# Patient Record
Sex: FEMALE | Race: WHITE | Employment: OTHER | URBAN - METROPOLITAN AREA
[De-identification: names, ages, dates, MRNs, and addresses within clinical notes are randomized per-mention and may not be internally consistent; named-entity substitution may affect disease eponyms.]

---

## 2017-03-14 ENCOUNTER — TELEPHONE (OUTPATIENT)
Dept: INTERNAL MEDICINE CLINIC | Age: 72
End: 2017-03-14

## 2017-04-12 ENCOUNTER — HOSPITAL ENCOUNTER (OUTPATIENT)
Dept: OTHER | Age: 72
Discharge: HOME | End: 2017-04-12
Attending: FAMILY MEDICINE | Admitting: FAMILY MEDICINE

## 2017-04-12 ENCOUNTER — OFFICE VISIT (OUTPATIENT)
Dept: INTERNAL MEDICINE CLINIC | Age: 72
End: 2017-04-12

## 2017-04-12 VITALS
OXYGEN SATURATION: 94 % | HEART RATE: 68 BPM | TEMPERATURE: 98 F | SYSTOLIC BLOOD PRESSURE: 150 MMHG | BODY MASS INDEX: 34.04 KG/M2 | HEIGHT: 62 IN | RESPIRATION RATE: 14 BRPM | WEIGHT: 185 LBS | DIASTOLIC BLOOD PRESSURE: 60 MMHG

## 2017-04-12 DIAGNOSIS — R53.83 FATIGUE, UNSPECIFIED TYPE: ICD-10-CM

## 2017-04-12 DIAGNOSIS — F32.A DEPRESSION, UNSPECIFIED DEPRESSION TYPE: ICD-10-CM

## 2017-04-12 DIAGNOSIS — L97.511 FOOT ULCER, RIGHT, LIMITED TO BREAKDOWN OF SKIN (HCC): ICD-10-CM

## 2017-04-12 DIAGNOSIS — M54.2 NECK PAIN: ICD-10-CM

## 2017-04-12 DIAGNOSIS — H81.09 MENIERE DISEASE, UNSPECIFIED LATERALITY: ICD-10-CM

## 2017-04-12 DIAGNOSIS — Z13.220 SCREENING FOR HYPERLIPIDEMIA: ICD-10-CM

## 2017-04-12 DIAGNOSIS — E03.9 ACQUIRED HYPOTHYROIDISM: ICD-10-CM

## 2017-04-12 DIAGNOSIS — E11.65 TYPE 2 DIABETES MELLITUS WITH HYPERGLYCEMIA, WITHOUT LONG-TERM CURRENT USE OF INSULIN (HCC): ICD-10-CM

## 2017-04-12 DIAGNOSIS — M89.9 DISORDER OF BONE: ICD-10-CM

## 2017-04-12 DIAGNOSIS — Z11.59 NEED FOR HEPATITIS C SCREENING TEST: ICD-10-CM

## 2017-04-12 DIAGNOSIS — I10 ESSENTIAL HYPERTENSION: Primary | ICD-10-CM

## 2017-04-12 LAB
ALBUMIN SERPL-MCNC: 4.5 G/DL (ref 3.4–5)
ALP BLD-CCNC: 62 U/L (ref 40–129)
ALT SERPL-CCNC: 25 U/L (ref 10–40)
ANION GAP SERPL CALCULATED.3IONS-SCNC: 16 MMOL/L (ref 3–16)
AST SERPL-CCNC: 25 U/L (ref 15–37)
BASOPHILS ABSOLUTE: 0 K/UL (ref 0–0.2)
BASOPHILS RELATIVE PERCENT: 0.4 %
BILIRUB SERPL-MCNC: 0.4 MG/DL (ref 0–1)
BILIRUBIN DIRECT: <0.2 MG/DL (ref 0–0.3)
BILIRUBIN, INDIRECT: NORMAL MG/DL (ref 0–1)
BUN BLDV-MCNC: 22 MG/DL (ref 7–20)
CALCIUM SERPL-MCNC: 9.8 MG/DL (ref 8.3–10.6)
CHLORIDE BLD-SCNC: 100 MMOL/L (ref 99–110)
CHOLESTEROL, TOTAL: 242 MG/DL (ref 0–199)
CO2: 26 MMOL/L (ref 21–32)
CREAT SERPL-MCNC: 1.3 MG/DL (ref 0.6–1.2)
CREATININE URINE POCT: 163.2
EOSINOPHILS ABSOLUTE: 0.3 K/UL (ref 0–0.6)
EOSINOPHILS RELATIVE PERCENT: 4.8 %
GFR AFRICAN AMERICAN: 49
GFR NON-AFRICAN AMERICAN: 40
GLUCOSE BLD-MCNC: 128 MG/DL (ref 70–99)
GLUCOSE BLD-MCNC: 134 MG/DL
HAV IGM SER IA-ACNC: NORMAL
HBA1C MFR BLD: 6.7 %
HCT VFR BLD CALC: 37.7 % (ref 36–48)
HDLC SERPL-MCNC: 36 MG/DL (ref 40–60)
HEMOGLOBIN: 12.5 G/DL (ref 12–16)
HEPATITIS B CORE IGM ANTIBODY: NORMAL
HEPATITIS B SURFACE ANTIGEN INTERPRETATION: NORMAL
HEPATITIS C ANTIBODY INTERPRETATION: NORMAL
LDL CHOLESTEROL CALCULATED: ABNORMAL MG/DL
LDL CHOLESTEROL DIRECT: 143 MG/DL
LYMPHOCYTES ABSOLUTE: 1.3 K/UL (ref 1–5.1)
LYMPHOCYTES RELATIVE PERCENT: 22.7 %
MCH RBC QN AUTO: 28.5 PG (ref 26–34)
MCHC RBC AUTO-ENTMCNC: 33 G/DL (ref 31–36)
MCV RBC AUTO: 86.2 FL (ref 80–100)
MICROALBUMIN/CREAT 24H UR: 5 MG/G{CREAT}
MICROALBUMIN/CREAT UR-RTO: 8.1
MONOCYTES ABSOLUTE: 0.4 K/UL (ref 0–1.3)
MONOCYTES RELATIVE PERCENT: 6.6 %
NEUTROPHILS ABSOLUTE: 3.7 K/UL (ref 1.7–7.7)
NEUTROPHILS RELATIVE PERCENT: 65.5 %
PDW BLD-RTO: 15.4 % (ref 12.4–15.4)
PLATELET # BLD: 198 K/UL (ref 135–450)
PMV BLD AUTO: 9 FL (ref 5–10.5)
POTASSIUM SERPL-SCNC: 4.1 MMOL/L (ref 3.5–5.1)
RBC # BLD: 4.38 M/UL (ref 4–5.2)
SODIUM BLD-SCNC: 142 MMOL/L (ref 136–145)
TOTAL PROTEIN: 7.3 G/DL (ref 6.4–8.2)
TRIGL SERPL-MCNC: 334 MG/DL (ref 0–150)
TSH SERPL DL<=0.05 MIU/L-ACNC: 3 UIU/ML (ref 0.27–4.2)
VITAMIN D 25-HYDROXY: 53.8 NG/ML
VLDLC SERPL CALC-MCNC: ABNORMAL MG/DL
WBC # BLD: 5.7 K/UL (ref 4–11)

## 2017-04-12 PROCEDURE — 82044 UR ALBUMIN SEMIQUANTITATIVE: CPT | Performed by: FAMILY MEDICINE

## 2017-04-12 PROCEDURE — 82962 GLUCOSE BLOOD TEST: CPT | Performed by: FAMILY MEDICINE

## 2017-04-12 PROCEDURE — 99214 OFFICE O/P EST MOD 30 MIN: CPT | Performed by: FAMILY MEDICINE

## 2017-04-12 PROCEDURE — 83036 HEMOGLOBIN GLYCOSYLATED A1C: CPT | Performed by: FAMILY MEDICINE

## 2017-04-12 ASSESSMENT — ENCOUNTER SYMPTOMS
CONSTIPATION: 1
DIARRHEA: 0
BACK PAIN: 1
SHORTNESS OF BREATH: 0
TROUBLE SWALLOWING: 0

## 2017-04-18 ENCOUNTER — TELEPHONE (OUTPATIENT)
Dept: INTERNAL MEDICINE CLINIC | Age: 72
End: 2017-04-18

## 2017-05-02 ENCOUNTER — OFFICE VISIT (OUTPATIENT)
Dept: ENT CLINIC | Age: 72
End: 2017-05-02

## 2017-05-02 VITALS
WEIGHT: 175 LBS | SYSTOLIC BLOOD PRESSURE: 155 MMHG | DIASTOLIC BLOOD PRESSURE: 91 MMHG | HEIGHT: 62 IN | HEART RATE: 79 BPM | BODY MASS INDEX: 32.2 KG/M2

## 2017-05-02 DIAGNOSIS — H60.8X1 CHRONIC ECZEMATOUS OTITIS EXTERNA OF RIGHT EAR: Primary | ICD-10-CM

## 2017-05-02 PROCEDURE — 99214 OFFICE O/P EST MOD 30 MIN: CPT | Performed by: OTOLARYNGOLOGY

## 2017-05-02 RX ORDER — LANOLIN ALCOHOL/MO/W.PET/CERES
1000 CREAM (GRAM) TOPICAL DAILY
COMMUNITY
End: 2019-11-21

## 2017-05-02 RX ORDER — ASCORBIC ACID 500 MG
500 TABLET ORAL DAILY
COMMUNITY
End: 2019-11-21

## 2017-05-02 RX ORDER — VITAMIN E 268 MG
400 CAPSULE ORAL DAILY
COMMUNITY
End: 2019-11-21

## 2017-05-02 RX ORDER — METHYLPREDNISOLONE 4 MG/1
4 TABLET ORAL SEE ADMIN INSTRUCTIONS
Qty: 1 KIT | Refills: 0 | Status: SHIPPED | OUTPATIENT
Start: 2017-05-02 | End: 2018-02-15

## 2017-05-02 RX ORDER — TRIAMCINOLONE ACETONIDE 1 MG/G
CREAM TOPICAL
Qty: 15 G | Refills: 1 | Status: SHIPPED | OUTPATIENT
Start: 2017-05-02 | End: 2018-01-08

## 2017-05-02 ASSESSMENT — ENCOUNTER SYMPTOMS
ALLERGIC/IMMUNOLOGIC NEGATIVE: 1
SINUS PRESSURE: 0
RESPIRATORY NEGATIVE: 1
EYES NEGATIVE: 1

## 2017-05-03 ENCOUNTER — OFFICE VISIT (OUTPATIENT)
Dept: INTERNAL MEDICINE CLINIC | Age: 72
End: 2017-05-03

## 2017-05-03 VITALS
TEMPERATURE: 98.6 F | SYSTOLIC BLOOD PRESSURE: 180 MMHG | HEIGHT: 62 IN | RESPIRATION RATE: 18 BRPM | BODY MASS INDEX: 33.68 KG/M2 | DIASTOLIC BLOOD PRESSURE: 50 MMHG | OXYGEN SATURATION: 98 % | WEIGHT: 183 LBS | HEART RATE: 95 BPM

## 2017-05-03 DIAGNOSIS — G47.00 INSOMNIA, UNSPECIFIED TYPE: ICD-10-CM

## 2017-05-03 DIAGNOSIS — E11.9 TYPE 2 DIABETES MELLITUS WITHOUT COMPLICATION, WITHOUT LONG-TERM CURRENT USE OF INSULIN (HCC): ICD-10-CM

## 2017-05-03 DIAGNOSIS — M54.2 NECK PAIN: Primary | ICD-10-CM

## 2017-05-03 DIAGNOSIS — M79.89 LEG SWELLING: ICD-10-CM

## 2017-05-03 DIAGNOSIS — R07.9 CHEST PAIN, UNSPECIFIED TYPE: ICD-10-CM

## 2017-05-03 DIAGNOSIS — E78.5 HYPERLIPIDEMIA, UNSPECIFIED HYPERLIPIDEMIA TYPE: ICD-10-CM

## 2017-05-03 PROCEDURE — 99214 OFFICE O/P EST MOD 30 MIN: CPT | Performed by: FAMILY MEDICINE

## 2017-05-03 RX ORDER — QUETIAPINE FUMARATE 50 MG/1
50 TABLET, FILM COATED ORAL NIGHTLY
Qty: 30 TABLET | Refills: 1 | Status: SHIPPED | OUTPATIENT
Start: 2017-05-03 | End: 2018-01-08

## 2017-05-03 RX ORDER — GLIMEPIRIDE 1 MG/1
1 TABLET ORAL EVERY MORNING
Qty: 90 TABLET | Refills: 1 | Status: SHIPPED | OUTPATIENT
Start: 2017-05-03 | End: 2017-06-16 | Stop reason: SDUPTHER

## 2017-05-03 RX ORDER — ATORVASTATIN CALCIUM 20 MG/1
20 TABLET, FILM COATED ORAL DAILY
Qty: 90 TABLET | Refills: 1 | Status: SHIPPED | OUTPATIENT
Start: 2017-05-03 | End: 2018-01-08

## 2017-05-03 ASSESSMENT — ENCOUNTER SYMPTOMS: SHORTNESS OF BREATH: 1

## 2017-06-14 ENCOUNTER — OFFICE VISIT (OUTPATIENT)
Dept: INTERNAL MEDICINE CLINIC | Age: 72
End: 2017-06-14

## 2017-06-14 VITALS
HEIGHT: 61 IN | RESPIRATION RATE: 16 BRPM | DIASTOLIC BLOOD PRESSURE: 60 MMHG | OXYGEN SATURATION: 97 % | WEIGHT: 182 LBS | SYSTOLIC BLOOD PRESSURE: 130 MMHG | BODY MASS INDEX: 34.36 KG/M2 | TEMPERATURE: 98.2 F | HEART RATE: 67 BPM

## 2017-06-14 DIAGNOSIS — S05.12XA ECCHYMOSIS OF LEFT EYE: ICD-10-CM

## 2017-06-14 DIAGNOSIS — R00.2 PALPITATIONS: ICD-10-CM

## 2017-06-14 DIAGNOSIS — R07.9 CHEST PAIN, UNSPECIFIED TYPE: Primary | ICD-10-CM

## 2017-06-14 DIAGNOSIS — Z23 NEED FOR PNEUMOCOCCAL VACCINATION: ICD-10-CM

## 2017-06-14 DIAGNOSIS — S00.81XA FACIAL ABRASION, INITIAL ENCOUNTER: ICD-10-CM

## 2017-06-14 PROCEDURE — 99213 OFFICE O/P EST LOW 20 MIN: CPT | Performed by: FAMILY MEDICINE

## 2017-06-14 PROCEDURE — 90670 PCV13 VACCINE IM: CPT | Performed by: FAMILY MEDICINE

## 2017-06-14 PROCEDURE — 90471 IMMUNIZATION ADMIN: CPT | Performed by: FAMILY MEDICINE

## 2017-06-14 PROCEDURE — G0009 ADMIN PNEUMOCOCCAL VACCINE: HCPCS | Performed by: FAMILY MEDICINE

## 2017-06-14 PROCEDURE — 90714 TD VACC NO PRESV 7 YRS+ IM: CPT | Performed by: FAMILY MEDICINE

## 2017-06-14 ASSESSMENT — ENCOUNTER SYMPTOMS: FACIAL SWELLING: 1

## 2017-06-16 DIAGNOSIS — I10 ESSENTIAL HYPERTENSION: ICD-10-CM

## 2017-06-16 DIAGNOSIS — E11.9 TYPE 2 DIABETES MELLITUS WITHOUT COMPLICATION, WITHOUT LONG-TERM CURRENT USE OF INSULIN (HCC): ICD-10-CM

## 2017-06-16 RX ORDER — CLONIDINE HYDROCHLORIDE 0.3 MG/1
0.3 TABLET ORAL 3 TIMES DAILY
Qty: 270 TABLET | Refills: 1 | Status: SHIPPED | OUTPATIENT
Start: 2017-06-16 | End: 2018-03-23 | Stop reason: SDUPTHER

## 2017-06-16 RX ORDER — AMLODIPINE BESYLATE 10 MG/1
TABLET ORAL
Qty: 90 TABLET | Refills: 1 | Status: SHIPPED | OUTPATIENT
Start: 2017-06-16 | End: 2018-03-23 | Stop reason: SDUPTHER

## 2017-06-16 RX ORDER — LOSARTAN POTASSIUM 25 MG/1
25 TABLET ORAL DAILY
Qty: 30 TABLET | Refills: 5 | Status: SHIPPED | OUTPATIENT
Start: 2017-06-16 | End: 2017-10-18 | Stop reason: SDUPTHER

## 2017-06-16 RX ORDER — LEVOTHYROXINE SODIUM 0.05 MG/1
TABLET ORAL
Qty: 90 TABLET | Refills: 1 | Status: SHIPPED | OUTPATIENT
Start: 2017-06-16 | End: 2018-03-23 | Stop reason: SDUPTHER

## 2017-06-16 RX ORDER — GLIMEPIRIDE 1 MG/1
1 TABLET ORAL EVERY MORNING
Qty: 90 TABLET | Refills: 1 | Status: SHIPPED | OUTPATIENT
Start: 2017-06-16 | End: 2018-03-23 | Stop reason: SDUPTHER

## 2017-10-18 ENCOUNTER — OFFICE VISIT (OUTPATIENT)
Dept: INTERNAL MEDICINE CLINIC | Age: 72
End: 2017-10-18

## 2017-10-18 VITALS
RESPIRATION RATE: 16 BRPM | WEIGHT: 171 LBS | SYSTOLIC BLOOD PRESSURE: 140 MMHG | HEIGHT: 61 IN | DIASTOLIC BLOOD PRESSURE: 40 MMHG | OXYGEN SATURATION: 98 % | BODY MASS INDEX: 32.28 KG/M2 | TEMPERATURE: 98.1 F | HEART RATE: 71 BPM

## 2017-10-18 DIAGNOSIS — I10 ESSENTIAL HYPERTENSION: ICD-10-CM

## 2017-10-18 DIAGNOSIS — F32.A DEPRESSION, UNSPECIFIED DEPRESSION TYPE: ICD-10-CM

## 2017-10-18 DIAGNOSIS — E11.9 TYPE 2 DIABETES MELLITUS WITHOUT COMPLICATION, WITHOUT LONG-TERM CURRENT USE OF INSULIN (HCC): Primary | ICD-10-CM

## 2017-10-18 PROCEDURE — 99214 OFFICE O/P EST MOD 30 MIN: CPT | Performed by: FAMILY MEDICINE

## 2017-10-18 RX ORDER — LOSARTAN POTASSIUM 25 MG/1
25 TABLET ORAL DAILY
Qty: 30 TABLET | Refills: 5 | Status: SHIPPED | OUTPATIENT
Start: 2017-10-18 | End: 2017-10-18 | Stop reason: SDUPTHER

## 2017-10-18 RX ORDER — LOSARTAN POTASSIUM 25 MG/1
25 TABLET ORAL DAILY
Qty: 90 TABLET | Refills: 1 | Status: SHIPPED | OUTPATIENT
Start: 2017-10-18 | End: 2018-03-23 | Stop reason: SDUPTHER

## 2017-10-18 RX ORDER — SERTRALINE HYDROCHLORIDE 100 MG/1
100 TABLET, FILM COATED ORAL DAILY
Qty: 90 TABLET | Refills: 1 | Status: SHIPPED | OUTPATIENT
Start: 2017-10-18 | End: 2018-03-23 | Stop reason: SDUPTHER

## 2017-10-18 ASSESSMENT — ENCOUNTER SYMPTOMS: SHORTNESS OF BREATH: 0

## 2017-10-18 NOTE — PROGRESS NOTES
[facility]  1945  female     Chief Complaint   Patient presents with    Diabetes       HPI: Pt presents to the office for f/u controlled diabetes. Pt states her blood sugars are ranging 110-130 (fasting). Her post prandials are running in the 200s. Pt's  just recently  so she had not been able to exercise. She does plan to start walking. She admits to bilateral hand numbness that seems to be improving with taking otc potassium. Review of Systems   Respiratory: Negative for shortness of breath. Cardiovascular: Negative for chest pain and leg swelling. Neurological: Positive for numbness. BP (!) 140/40   Pulse 71   Temp 98.1 °F (36.7 °C)   Resp 16   Ht 5' 1\" (1.549 m)   Wt 171 lb (77.6 kg)   SpO2 98%   BMI 32.31 kg/m²     Physical Exam   Constitutional: She appears well-developed and well-nourished. No distress. HENT:   Head: Normocephalic and atraumatic. Eyes: Conjunctivae and EOM are normal.   Neck: Normal range of motion. Neck supple. Cardiovascular: Normal rate, regular rhythm, normal heart sounds and intact distal pulses. No murmur heard. Pulmonary/Chest: Effort normal and breath sounds normal.   Musculoskeletal: She exhibits edema. 1+ pitting edema R>>L    Neurological: She is alert. Skin: Skin is warm and dry. Psychiatric: She has a normal mood and affect. Her behavior is normal. Judgment and thought content normal.   Vitals reviewed. 1. Type 2 diabetes mellitus without complication, without long-term current use of insulin (HCC)  Hemoglobin A1C   2. Essential hypertension  Controlled. Refilled losartan (COZAAR) 25 MG tablet     Pt advised to discontinue or take otc potassium every 1-3 days to avoid hyperkalemia being on the losartan. Return in about 3 months (around 2018) for f/u diabetes . Patient's medications, allergies, past medical, surgical, social and family histories were reviewed and updated as appropriate.

## 2017-10-18 NOTE — PATIENT INSTRUCTIONS
rapid-acting insulin to take before you eat. If you use an insulin pump, you get a constant rate of insulin during the day. So the pump must be programmed at meals to give you extra insulin to cover the rise in blood sugar after meals. When you know how much carbohydrate you will eat, you can take the right amount of insulin. Or, if you always use the same amount of insulin, you need to make sure that you eat the same amount of carbohydrate at meals. If you need more help to understand carbohydrate counting and food labels, ask your doctor, dietitian, or diabetes educator. How do you get started with meal planning? Here are some tips to get started:  · Plan your meals a week at a time. Don't forget to include snacks too. · Use cookbooks or online recipes to plan several main meals. Plan some quick meals for busy nights. You also can double some recipes that freeze well. Then you can save half for other busy nights when you don't have time to cook. · Make sure you have the ingredients you need for your recipes. If you're running low on basic items, put these items on your shopping list too. · List foods that you use to make breakfasts, lunches, and snacks. List plenty of fruits and vegetables. · Post this list on the refrigerator. Add to it as you think of more things you need. · Take the list to the store to do your weekly shopping. Follow-up care is a key part of your treatment and safety. Be sure to make and go to all appointments, and call your doctor if you are having problems. It's also a good idea to know your test results and keep a list of the medicines you take. Where can you learn more? Go to https://franc.Klip.in. org and sign in to your [employer] account. Enter Z313 in the ChorPpay box to learn more about \"Learning About Meal Planning for Diabetes. \"     If you do not have an account, please click on the \"Sign Up Now\" link.   Current as of: March 13, 2017  Content Version: 11.3  © 1004-9483 [employer], [employer]. Care instructions adapted under license by [facility] ([facility]). If you have questions about a medical condition or this instruction, always ask your healthcare professional. [address] any warranty or liability for your use of this information. Patient Education        Learning About Diabetes Food Guidelines  Your Care Instructions  Meal planning is important to manage diabetes. It helps keep your blood sugar at a target level (which you set with your doctor). You don't have to eat special foods. You can eat what your family eats, including sweets once in a while. But you do have to pay attention to how often you eat and how much you eat of certain foods. You may want to work with a dietitian or a certified diabetes educator (CDE) to help you plan meals and snacks. A dietitian or CDE can also help you lose weight if that is one of your goals. What should you know about eating carbs? Managing the amount of carbohydrate (carbs) you eat is an important part of healthy meals when you have diabetes. Carbohydrate is found in many foods. · Learn which foods have carbs. And learn the amounts of carbs in different foods. ¨ Bread, cereal, pasta, and rice have about 15 grams of carbs in a serving. A serving is 1 slice of bread (1 ounce), ½ cup of cooked cereal, or 1/3 cup of cooked pasta or rice. ¨ Fruits have 15 grams of carbs in a serving. A serving is 1 small fresh fruit, such as an apple or orange; ½ of a banana; ½ cup of cooked or canned fruit; ½ cup of fruit juice; 1 cup of melon or raspberries; or 2 tablespoons of dried fruit. ¨ Milk and no-sugar-added yogurt have 15 grams of carbs in a serving. A serving is 1 cup of milk or 2/3 cup of no-sugar-added yogurt. ¨ Starchy vegetables have 15 grams of carbs in a serving.  A serving is ½ cup of mashed potatoes or sweet potato; 1 cup winter squash; ½ of a small baked potato; ½ cup of cooked sugar.  What else should you know? · Limit saturated fat, such as the fat from meat and dairy products. This is a healthy choice because people who have diabetes are at higher risk of heart disease. So choose lean cuts of meat and nonfat or low-fat dairy products. Use olive or canola oil instead of butter or shortening when cooking. · Don't skip meals. Your blood sugar may drop too low if you skip meals and take insulin or certain medicines for diabetes. · Check with your doctor before you drink alcohol. Alcohol can cause your blood sugar to drop too low. Alcohol can also cause a bad reaction if you take certain diabetes medicines. Follow-up care is a key part of your treatment and safety. Be sure to make and go to all appointments, and call your doctor if you are having problems. It's also a good idea to know your test results and keep a list of the medicines you take. Where can you learn more? Go to https://IntelliDOTpeAllied [employer] Network.Hotlease.Com. org and sign in to your LiveOffice account. Enter J998 in the [employer] box to learn more about \"Learning About Diabetes Food Guidelines. \"     If you do not have an account, please click on the \"Sign Up Now\" link. Current as of: March 13, 2017  Content Version: 11.3  © 2856-9614 [employer], Incorporated. Care instructions adapted under license by [facility] ([facility]). If you have questions about a medical condition or this instruction, always ask your healthcare professional. [place] [address] any warranty or liability for your use of this information.

## 2017-10-19 LAB
ESTIMATED AVERAGE GLUCOSE: 145.6 MG/DL
HBA1C MFR BLD: 6.7 %

## 2017-10-26 ENCOUNTER — HOSPITAL ENCOUNTER (OUTPATIENT)
Dept: MAMMOGRAPHY | Age: 72
Discharge: HOME | End: 2017-10-26
Attending: FAMILY MEDICINE | Admitting: FAMILY MEDICINE

## 2017-10-26 DIAGNOSIS — Z12.31 ENCOUNTER FOR SCREENING MAMMOGRAM FOR BREAST CANCER: ICD-10-CM

## 2017-10-31 ENCOUNTER — OFFICE VISIT (OUTPATIENT)
Dept: INTERNAL MEDICINE CLINIC | Age: 72
End: 2017-10-31

## 2017-10-31 VITALS
DIASTOLIC BLOOD PRESSURE: 60 MMHG | TEMPERATURE: 98.5 F | WEIGHT: 167 LBS | RESPIRATION RATE: 16 BRPM | BODY MASS INDEX: 31.53 KG/M2 | SYSTOLIC BLOOD PRESSURE: 120 MMHG | OXYGEN SATURATION: 97 % | HEIGHT: 61 IN | HEART RATE: 67 BPM

## 2017-10-31 DIAGNOSIS — Z23 NEEDS FLU SHOT: ICD-10-CM

## 2017-10-31 DIAGNOSIS — E11.65 CONTROLLED TYPE 2 DIABETES MELLITUS WITH HYPERGLYCEMIA, WITHOUT LONG-TERM CURRENT USE OF INSULIN (HCC): ICD-10-CM

## 2017-10-31 DIAGNOSIS — Z71.89 DNR (DO NOT RESUSCITATE) DISCUSSION: ICD-10-CM

## 2017-10-31 DIAGNOSIS — Z00.00 ROUTINE GENERAL MEDICAL EXAMINATION AT A HEALTH CARE FACILITY: Primary | ICD-10-CM

## 2017-10-31 PROCEDURE — 90662 IIV NO PRSV INCREASED AG IM: CPT | Performed by: FAMILY MEDICINE

## 2017-10-31 PROCEDURE — 99213 OFFICE O/P EST LOW 20 MIN: CPT | Performed by: FAMILY MEDICINE

## 2017-10-31 PROCEDURE — G0008 ADMIN INFLUENZA VIRUS VAC: HCPCS | Performed by: FAMILY MEDICINE

## 2017-10-31 PROCEDURE — G0438 PPPS, INITIAL VISIT: HCPCS | Performed by: FAMILY MEDICINE

## 2017-10-31 ASSESSMENT — ANXIETY QUESTIONNAIRES: GAD7 TOTAL SCORE: 0

## 2017-10-31 ASSESSMENT — LIFESTYLE VARIABLES: HOW OFTEN DO YOU HAVE A DRINK CONTAINING ALCOHOL: 0

## 2017-10-31 NOTE — PROGRESS NOTES
Medicare Annual Wellness Visit  Name: Mina Francisco Date: 10/31/2017   MRN: [id number] Sex: Female   Age: 71 y.o. Ethnicity: Non-/Non    : 1945 Race: Magda Hsieh is here for Annual Exam (wellness)    Screenings for behavioral, psychosocial and functional/safety risks, and cognitive dysfunction are all negative except as indicated below. These results, as well as other patient data from the [address] form, are documented in Flowsheets linked to this Encounter. Allergies   Allergen Reactions    Erythromycin      coma    Iodine Hives     Ct dye (however, pt states she has tolerated the dye after that one incident)    Metformin And Related Other (See Comments)     \"make me woozy\"     Prior to Visit Medications    Medication Sig Taking?  Authorizing Provider   losartan (COZAAR) 25 MG tablet Take 1 tablet by mouth daily Yes Rossy Cisneros DO   sertraline (ZOLOFT) 100 MG tablet Take 1 tablet by mouth daily Yes Rossy Cisneros DO   glimepiride (AMARYL) 1 MG tablet Take 1 tablet by mouth every morning Yes Rossy Cisneros DO   cloNIDine (CATAPRES) 0.3 MG tablet Take 1 tablet by mouth 3 times daily Yes Rossy Cisneros DO   amLODIPine (NORVASC) 10 MG tablet TAKE ONE TABLET BY MOUTH EVERY DAY Yes Rossy Cisneros DO   levothyroxine (SYNTHROID) 50 MCG tablet TAKE ONE TABLET BY MOUTH ONCE DAILY Yes Rossy Cisneros DO   linaclotide (LINZESS) 290 MCG CAPS capsule Take 1 capsule by mouth every morning (before breakfast) Yes Rossy Cisneros DO   atorvastatin (LIPITOR) 20 MG tablet Take 1 tablet by mouth daily Yes Rossy Cisneros DO   QUEtiapine (SEROQUEL) 50 MG tablet Take 1 tablet by mouth nightly Yes Rossy Dowell,    vitamin B-12 (CYANOCOBALAMIN) 1000 MCG tablet Take 1,000 mcg by mouth daily Yes Historical Provider, MD   ascorbic acid (VITAMIN C) 500 MG tablet Take 500 mg by mouth daily Yes Historical Provider, MD   vitamin D (CHOLECALCIFEROL) 1000 UNITS TABS tablet Take 4,000 Units by mouth daily Yes Historical Provider, MD   Flaxseed, Linseed, (FLAXSEED OIL) 1200 MG CAPS Take by mouth daily Yes Historical Provider, MD   vitamin E 400 UNIT capsule Take 400 Units by mouth daily Yes Historical Provider, MD   methylPREDNISolone (MEDROL, JHON,) 4 MG tablet Take 1 tablet by mouth See Admin Instructions Take by mouth. Yes Priscilla Cartwright MD   triamcinolone (KENALOG) 0.1 % cream Apply topically 2 times daily.  Yes Priscilla Cartwright MD   hydrochlorothiazide (HYDRODIURIL) 25 MG tablet Take 1 tablet by mouth daily Yes Kelvin Lin MD     Past Medical History:   Diagnosis Date    Alopecia     Anxiety and depression     Cancer ([facility] [address]     uterine    CTS (carpal tunnel syndrome)      History of rheumatic fever     Hypertension     Hypothyroidism     Meniere disease     bilateral ears    Nausea     associated with Meniere's dz    Obesity     Reflux esophagitis     Renal impairment     Sleep apnea     no longer needs cpap (per pt)    Type II or unspecified type diabetes mellitus without mention of complication, not stated as uncontrolled      Past Surgical History:   Procedure Laterality Date    ANKLE SURGERY Right     repair of fracture     APPENDECTOMY      BREAST SURGERY      rt  bx negative    COLONOSCOPY  4/15/11    HYSTERECTOMY      at age 35    MASTOID SURGERY      at age 26    TONSILLECTOMY      UPPER GASTROINTESTINAL ENDOSCOPY  4/14/11    with dilitation and biopsy     Family History   Problem Relation Age of Onset    Heart Disease Mother     Diabetes Mother     Cancer Mother      breast     Heart Failure Mother     Diabetes Father     Kidney Disease Father     Heart Disease Father     Heart Disease Sister     Diabetes Maternal Grandmother     Crohn's Disease Child     Crohn's Disease Grandchild        CareTeam (Including outside providers/suppliers regularly involved in providing care):   Patient Care Team:  Jorge Holland DO as PCP - [address] MD Tadeo as PCP - [facility] Attributed Provider  Radha Varner MD as Consulting Physician (Otolaryngology)    Wt Readings from Last 3 Encounters:   10/31/17 167 lb (75.8 kg)   10/18/17 171 lb (77.6 kg)   06/14/17 182 lb (82.6 kg)     Vitals:    10/31/17 0906   BP: 120/60   Pulse: 67   Resp: 16   Temp: 98.5 °F (36.9 °C)   SpO2: 97%   Weight: 167 lb (75.8 kg)   Height: 5' 1\" (1.549 m)       Musculoskeletal: normal range of motion, no joint swelling, deformity or tenderness and  DP and PT pulses are intact. Pt's monofilament testing revealed no diminished sensation. The following problems were reviewed today and where indicated follow up appointments were made and/or referrals ordered. Risk Factor Screenings with Interventions:   Fall Risk:  Timed Up and Go Test > 12 seconds?: no  2 or more falls in past year?: (!) yes  Fall with injury in past year?: (!) yes  Fall Risk Interventions:    · Home safety tips provided  · Home exercises provided to promote strength and balance    Depression:  PHQ-2 Score: 5  Depression Interventions:  · Regular exercise recommended- 3-5 times per week, 30-45 minutes per session    Anxiety:  Anxiety Score: 0  Anxiety Interventions:  · None indicated    Cognitive:   Words recalled: 0  Clock Drawing Test (CDT) Score: (!) Abnormal  Cognitive Impairment Interventions:  · Patient advised to follow-up in this office for further evaluation and treatment within 1 month(s)    Substance Abuse:  Social History     Social History Main Topics    Smoking status: Never Smoker    Smokeless tobacco: Never Used    Alcohol use No    Drug use: No    Sexual activity: Not Currently     Audit Questionnaire: Screen for Alcohol Misuse  How often do you have a drink containing alcohol?: Never  Substance Abuse Interventions:  · None indicated    Health Risk Assessment:   General  In general, how would you say your health is?: Very Good  In the past 7 days, have you experienced any of the following?: (!) Virus Vaccine 10/12/2012, 10/21/2013, 10/14/2014    Influenza Whole 10/12/2010    Influenza, High Dose 11/03/2015, 09/15/2016    Pneumococcal 13-valent Conjugate ([id number]) 06/14/2017    Pneumococcal Polysaccharide ([id number]) 04/14/2011    Td 06/14/2017    Zoster 12/22/2015        Health Maintenance   Topic Date Due    Diabetic retinal exam  09/01/2015    DTaP/Tdap/Td vaccine (1 - Tdap) 06/15/2017    Diabetic foot exam  07/13/2017    Flu vaccine (1) 09/01/2017    Diabetic microalbuminuria test  04/12/2018    Lipid screen  04/12/2018    Pneumococcal low/med risk (2 of 2 - PPSV23) 06/14/2018    Diabetic hemoglobin A1C test  10/18/2018    Breast cancer screen  10/26/2019    Colon cancer screen colonoscopy  04/15/2021    Zostavax vaccine  Completed    DEXA (modify frequency per FRAX score)  Completed    Hepatitis C screen  Completed     Recommendations for Preventive Services Due: see orders.   Recommended screening schedule for the next 5-10 years is provided to the patient in written form: see Patient Instructions/AVS.

## 2017-11-28 ENCOUNTER — OFFICE VISIT (OUTPATIENT)
Dept: INTERNAL MEDICINE CLINIC | Age: 72
End: 2017-11-28

## 2017-11-28 VITALS
DIASTOLIC BLOOD PRESSURE: 50 MMHG | WEIGHT: 170 LBS | OXYGEN SATURATION: 98 % | TEMPERATURE: 98 F | SYSTOLIC BLOOD PRESSURE: 120 MMHG | HEIGHT: 61 IN | RESPIRATION RATE: 16 BRPM | HEART RATE: 58 BPM | BODY MASS INDEX: 32.1 KG/M2

## 2017-11-28 DIAGNOSIS — Z13.31 POSITIVE DEPRESSION SCREENING: ICD-10-CM

## 2017-11-28 DIAGNOSIS — F03.90 DEMENTIA WITHOUT BEHAVIORAL DISTURBANCE, UNSPECIFIED DEMENTIA TYPE: Primary | ICD-10-CM

## 2017-11-28 PROCEDURE — G8431 POS CLIN DEPRES SCRN F/U DOC: HCPCS | Performed by: FAMILY MEDICINE

## 2017-11-28 PROCEDURE — 99214 OFFICE O/P EST MOD 30 MIN: CPT | Performed by: FAMILY MEDICINE

## 2017-11-28 RX ORDER — DONEPEZIL HYDROCHLORIDE 5 MG/1
5 TABLET, FILM COATED ORAL NIGHTLY
Qty: 30 TABLET | Refills: 1 | Status: SHIPPED | OUTPATIENT
Start: 2017-11-28 | End: 2018-01-08

## 2017-11-28 NOTE — PATIENT INSTRUCTIONS
Patient Education        Learning About the Symptoms of Dementia  What is dementia? We all forget things as we get older. Many older people have a slight loss of memory that does not affect their daily lives. But memory loss that gets worse may mean that you have dementia. Dementia is a loss of mental skills that affects your daily life. It can cause problems with memory, problem-solving, and learning. It also can cause problems with thinking and planning. Dementia usually gets worse over time. But how quickly it gets worse is different for each person. Some people stay the same for years. Others lose skills quickly. Your chances of having dementia rise as you get older. But this doesn't mean that everyone will get it. What causes dementia? Dementia is caused by damage to or changes in the brain. Alzheimer's disease is the most common kind of dementia. Alzheimer's causes a steady loss of memory and how well you can speak, think, and do your daily activities. The second most common kind of dementia is caused by a series of strokes. It's called vascular dementia. It often gets worse step by step. With each new stroke, more mental skills are lost.  Serious head injuries in the past can sometimes lead to dementia, too. What are the symptoms? Usually the first symptom of dementia is memory loss. Often the person who has the memory problem doesn't notice it, but family and friends do. People who have dementia may have increasing trouble with:  · Recalling recent events. They may forget appointments or lose objects. · Recognizing people and places. · Keeping up with conversations and activity. · Finding their way around familiar places, or driving to and from places they know well. · Keeping up personal care such as grooming or bathing. · Planning and carrying out routine tasks. They may have trouble following a recipe or writing a letter or email. What are some next steps?   If you are worried about memory loss, see your doctor soon. He or she can do a physical exam and ask questions about recent and past illnesses and life events. Think about bringing someone to your doctor's appointment to take notes for you. Your doctor may suggest a series of tests to measure memory changes over time. These tests give the doctor an overall picture of how well your brain is working. The doctor can use the results to decide the best treatment program and help make your life safer and easier. It is important to know that memory loss can be caused by things other than dementia. These things can include health problems such as depression, a low amount of thyroid hormone, and some infections. When those things are treated, your ability to remember can get better. Follow-up care is a key part of your treatment and safety. Be sure to make and go to all appointments, and call your doctor if you are having problems. It's also a good idea to know your test results and keep a list of the medicines you take. Where can you learn more? Go to https://[employer] Careyuli.Filmaka. org and sign in to your [employer] account. Enter [id number] [phone] in the Tipbit box to learn more about \"Learning About the Symptoms of Dementia. \"     If you do not have an account, please click on the \"Sign Up Now\" link. Current as of: May 3, 2017  Content Version: 11.3  © 1942-7949 [employer], Incorporated. Care instructions adapted under license by [facility] ([facility]). If you have questions about a medical condition or this instruction, always ask your healthcare professional. [place] [address] any warranty or liability for your use of this information. Patient Education        Learning About Depression Screening  What is depression screening? Depression screening is a way to see if you have depression symptoms. It may be done by a doctor or counselor. This screening is often part of a routine checkup.  That's because your mental health is just as important as your physical health. Depression is a medical illness that affects how you feel, think, and act. You may:  · Have less energy. · Lose interest in your daily activities. · Feel sad and grouchy for a long time. Depression is very common. It affects men and women of all ages. Many things can trigger depression. Some people become depressed after they have a stroke or find out they have a major illness like cancer or heart disease. The death of a loved one, a breakup, or changes in the natural brain chemicals may lead to depression. It can run in families. Most experts believe that a combination of family history (a person's genes) and stressful life events can cause depression. What happens during screening? Your doctor may ask about your feelings, any changes in eating habits, your energy level, and your interest in your daily tasks. He or she may ask other questions, such as how well you are sleeping and if you can focus on the things you do. This may be an informal talk between the two of you. Or your doctor may ask you to fill out a quick form and then talk about your answers. Some diseases or changes in your body can cause symptoms that look like depression. So your doctor may do blood tests to help rule out other problems, such as hormone changes, a low thyroid level, or anemia. What happens after screening? If you have signs of depression, your doctor will talk to you about your options. Doctors usually treat depression with medicines or counseling. Often, combining the two works best. Many people don't get help because they think that they'll get over the depression on their own. But people with depression may not get better unless they get treatment. Many people feel embarrassed or ashamed about having depression. But it isn't a sign of personal weakness. It's not a character flaw. A person who is depressed is not \"crazy. \" Depression is caused by changes in the brain.   A

## 2018-01-08 ENCOUNTER — OFFICE VISIT (OUTPATIENT)
Dept: ENT CLINIC | Age: 73
End: 2018-01-08

## 2018-01-08 VITALS — BODY MASS INDEX: 31.74 KG/M2 | SYSTOLIC BLOOD PRESSURE: 120 MMHG | WEIGHT: 168 LBS | DIASTOLIC BLOOD PRESSURE: 80 MMHG

## 2018-01-08 DIAGNOSIS — H69.91 DISORDER OF RIGHT EUSTACHIAN TUBE: ICD-10-CM

## 2018-01-08 DIAGNOSIS — H81.01 MENIERE'S DISEASE OF RIGHT EAR: Primary | ICD-10-CM

## 2018-01-08 DIAGNOSIS — J32.9 RECURRENT SINUSITIS: ICD-10-CM

## 2018-01-08 PROCEDURE — 99213 OFFICE O/P EST LOW 20 MIN: CPT | Performed by: OTOLARYNGOLOGY

## 2018-01-08 RX ORDER — LEVOFLOXACIN 500 MG/1
500 TABLET, FILM COATED ORAL DAILY
Qty: 7 TABLET | Refills: 0 | Status: SHIPPED | OUTPATIENT
Start: 2018-01-08 | End: 2018-02-15

## 2018-01-08 RX ORDER — PREDNISONE 10 MG/1
TABLET ORAL
Qty: 25 TABLET | Refills: 0 | Status: SHIPPED | OUTPATIENT
Start: 2018-01-08 | End: 2018-02-15

## 2018-01-12 ENCOUNTER — TELEPHONE (OUTPATIENT)
Dept: ENT CLINIC | Age: 73
End: 2018-01-12

## 2018-01-12 DIAGNOSIS — J30.9 ALLERGIC SINUSITIS: Primary | ICD-10-CM

## 2018-01-12 RX ORDER — MONTELUKAST SODIUM 10 MG/1
10 TABLET ORAL NIGHTLY
Qty: 15 TABLET | Refills: 1 | Status: SHIPPED | OUTPATIENT
Start: 2018-01-12 | End: 2018-02-15

## 2018-01-12 NOTE — TELEPHONE ENCOUNTER
Pt called back, she thinks it's the predisone, she has diabetes, things this may be causing her to feel weird. Sugar has been running high, please advise. She took it for 5 days. Still has quite a bit left, should she continue them?

## 2018-01-12 NOTE — TELEPHONE ENCOUNTER
Pt states you gave her a pill on Monday and it is making her head feel weird. (not the predisone). Her whole body feels tingly and sickening.    Please advise

## 2018-01-15 ENCOUNTER — TELEPHONE (OUTPATIENT)
Dept: ENT CLINIC | Age: 73
End: 2018-01-15

## 2018-01-15 NOTE — TELEPHONE ENCOUNTER
I doubt if it's due to the medication.   Maybe she should see her family physician and see what that person says

## 2018-02-14 ENCOUNTER — TELEPHONE (OUTPATIENT)
Dept: INTERNAL MEDICINE CLINIC | Age: 73
End: 2018-02-14

## 2018-02-15 ENCOUNTER — OFFICE VISIT (OUTPATIENT)
Dept: INTERNAL MEDICINE CLINIC | Age: 73
End: 2018-02-15

## 2018-02-15 VITALS
HEIGHT: 61 IN | DIASTOLIC BLOOD PRESSURE: 72 MMHG | WEIGHT: 179 LBS | SYSTOLIC BLOOD PRESSURE: 136 MMHG | BODY MASS INDEX: 33.79 KG/M2 | HEART RATE: 84 BPM

## 2018-02-15 DIAGNOSIS — E55.9 VITAMIN D DEFICIENCY: ICD-10-CM

## 2018-02-15 DIAGNOSIS — E03.9 ACQUIRED HYPOTHYROIDISM: ICD-10-CM

## 2018-02-15 DIAGNOSIS — I10 ESSENTIAL HYPERTENSION: ICD-10-CM

## 2018-02-15 DIAGNOSIS — E78.5 DYSLIPIDEMIA: ICD-10-CM

## 2018-02-15 DIAGNOSIS — F33.2 SEVERE EPISODE OF RECURRENT MAJOR DEPRESSIVE DISORDER, WITHOUT PSYCHOTIC FEATURES (HCC): ICD-10-CM

## 2018-02-15 DIAGNOSIS — E11.9 TYPE 2 DIABETES MELLITUS WITHOUT COMPLICATION, WITHOUT LONG-TERM CURRENT USE OF INSULIN (HCC): Primary | ICD-10-CM

## 2018-02-15 LAB
A/G RATIO: 1.8 (ref 1.1–2.2)
ALBUMIN SERPL-MCNC: 4.7 G/DL (ref 3.4–5)
ALP BLD-CCNC: 70 U/L (ref 40–129)
ALT SERPL-CCNC: 29 U/L (ref 10–40)
ANION GAP SERPL CALCULATED.3IONS-SCNC: 16 MMOL/L (ref 3–16)
AST SERPL-CCNC: 32 U/L (ref 15–37)
BASOPHILS ABSOLUTE: 0 K/UL (ref 0–0.2)
BASOPHILS RELATIVE PERCENT: 0.2 %
BILIRUB SERPL-MCNC: 0.4 MG/DL (ref 0–1)
BUN BLDV-MCNC: 24 MG/DL (ref 7–20)
CALCIUM SERPL-MCNC: 9.8 MG/DL (ref 8.3–10.6)
CHLORIDE BLD-SCNC: 95 MMOL/L (ref 99–110)
CHOLESTEROL, TOTAL: 298 MG/DL (ref 0–199)
CO2: 27 MMOL/L (ref 21–32)
CREAT SERPL-MCNC: 1.5 MG/DL (ref 0.6–1.2)
EOSINOPHILS ABSOLUTE: 0.1 K/UL (ref 0–0.6)
EOSINOPHILS RELATIVE PERCENT: 2.4 %
GFR AFRICAN AMERICAN: 41
GFR NON-AFRICAN AMERICAN: 34
GLOBULIN: 2.6 G/DL
GLUCOSE BLD-MCNC: 186 MG/DL (ref 70–99)
HCT VFR BLD CALC: 39.1 % (ref 36–48)
HDLC SERPL-MCNC: 36 MG/DL (ref 40–60)
HEMOGLOBIN: 13.3 G/DL (ref 12–16)
LDL CHOLESTEROL CALCULATED: ABNORMAL MG/DL
LDL CHOLESTEROL DIRECT: 153 MG/DL
LYMPHOCYTES ABSOLUTE: 1.2 K/UL (ref 1–5.1)
LYMPHOCYTES RELATIVE PERCENT: 20.2 %
MCH RBC QN AUTO: 28.8 PG (ref 26–34)
MCHC RBC AUTO-ENTMCNC: 34.2 G/DL (ref 31–36)
MCV RBC AUTO: 84.3 FL (ref 80–100)
MONOCYTES ABSOLUTE: 0.4 K/UL (ref 0–1.3)
MONOCYTES RELATIVE PERCENT: 6.4 %
NEUTROPHILS ABSOLUTE: 4.2 K/UL (ref 1.7–7.7)
NEUTROPHILS RELATIVE PERCENT: 70.8 %
PDW BLD-RTO: 15.9 % (ref 12.4–15.4)
PLATELET # BLD: 192 K/UL (ref 135–450)
PMV BLD AUTO: 8.7 FL (ref 5–10.5)
POTASSIUM SERPL-SCNC: 4 MMOL/L (ref 3.5–5.1)
RBC # BLD: 4.64 M/UL (ref 4–5.2)
SODIUM BLD-SCNC: 138 MMOL/L (ref 136–145)
TOTAL PROTEIN: 7.3 G/DL (ref 6.4–8.2)
TRIGL SERPL-MCNC: 582 MG/DL (ref 0–150)
TSH SERPL DL<=0.05 MIU/L-ACNC: 2.53 UIU/ML (ref 0.27–4.2)
VLDLC SERPL CALC-MCNC: ABNORMAL MG/DL
WBC # BLD: 6 K/UL (ref 4–11)

## 2018-02-15 PROCEDURE — 99214 OFFICE O/P EST MOD 30 MIN: CPT | Performed by: NURSE PRACTITIONER

## 2018-02-16 LAB
ESTIMATED AVERAGE GLUCOSE: 151.3 MG/DL
HBA1C MFR BLD: 6.9 %
VITAMIN D 25-HYDROXY: 80.8 NG/ML

## 2018-02-18 ASSESSMENT — ENCOUNTER SYMPTOMS
SHORTNESS OF BREATH: 0
GASTROINTESTINAL NEGATIVE: 1
RESPIRATORY NEGATIVE: 1

## 2018-02-18 NOTE — PROGRESS NOTES
 vitamin B-12 (CYANOCOBALAMIN) 1000 MCG tablet Take 1,000 mcg by mouth daily      ascorbic acid (VITAMIN C) 500 MG tablet Take 500 mg by mouth daily      vitamin D (CHOLECALCIFEROL) 1000 UNITS TABS tablet Take 4,000 Units by mouth daily      vitamin E 400 UNIT capsule Take 400 Units by mouth daily      hydrochlorothiazide (HYDRODIURIL) 25 MG tablet Take 1 tablet by mouth daily 90 tablet 2     Current Facility-Administered Medications   Medication Dose Route Frequency Provider Last Rate Last Dose    triamcinolone acetonide (KENALOG) injection 20 mg  20 mg Other Once Kike Dietz MD             Review of Systems   Constitutional: Negative for chills and fever. Respiratory: Negative. Negative for shortness of breath. Cardiovascular: Negative. Negative for chest pain. Gastrointestinal: Negative. Genitourinary: Negative. Skin: Negative. Neurological: Negative. Psychiatric/Behavioral: Positive for dysphoric mood. The patient is nervous/anxious. All other systems reviewed and are negative. Objective:   Physical Exam   Constitutional: She is oriented to person, place, and time. She appears well-developed and well-nourished. No distress. HENT:   Head: Normocephalic and atraumatic. Eyes: Conjunctivae are normal. No scleral icterus. Neck: Neck supple. No JVD present. Cardiovascular: Normal rate and regular rhythm. Pulmonary/Chest: Effort normal and breath sounds normal. No respiratory distress. She has no wheezes. Abdominal: Soft. She exhibits no distension. There is no tenderness. There is no rebound and no guarding. Musculoskeletal: Normal range of motion. She exhibits no edema. Neurological: She is alert and oriented to person, place, and time. Skin: Skin is warm and dry. She is not diaphoretic. Psychiatric: Her behavior is normal. Thought content normal. Her mood appears anxious. She exhibits a depressed mood.      /72   Pulse 84   Ht 5' 1\" (1.549 m)

## 2018-02-19 DIAGNOSIS — E78.5 DYSLIPIDEMIA: ICD-10-CM

## 2018-02-19 DIAGNOSIS — E11.9 TYPE 2 DIABETES MELLITUS WITHOUT COMPLICATION, WITHOUT LONG-TERM CURRENT USE OF INSULIN (HCC): Primary | ICD-10-CM

## 2018-02-19 RX ORDER — ATORVASTATIN CALCIUM 40 MG/1
40 TABLET, FILM COATED ORAL NIGHTLY
Qty: 30 TABLET | Refills: 5 | Status: SHIPPED | OUTPATIENT
Start: 2018-02-19 | End: 2018-02-23

## 2018-02-21 ENCOUNTER — TELEPHONE (OUTPATIENT)
Dept: INTERNAL MEDICINE CLINIC | Age: 73
End: 2018-02-21

## 2018-02-21 NOTE — LETTER
[facility]  [address] [address]  Phone: [phone]  Fax: [facility], [address] [place] [address]        February 22, 2018     Patient: Lorean Goldberg   YOB: 1945   Date of Visit: 2/21/2018       To Whom It May Concern: It is my medical opinion that Shahida Marino requires a disability parking placard for the following reasons:  She cannot walk 200 feet without stopping to rest.  Duration of need: permanent    If you have any questions or concerns, please don't hesitate to call.     Sincerely,        Timur Lopez, CNP

## 2018-02-23 DIAGNOSIS — E78.2 MIXED HYPERLIPIDEMIA: Primary | ICD-10-CM

## 2018-02-23 RX ORDER — PRAVASTATIN SODIUM 40 MG
40 TABLET ORAL EVERY EVENING
Qty: 30 TABLET | Refills: 5 | Status: SHIPPED | OUTPATIENT
Start: 2018-02-23 | End: 2018-03-23 | Stop reason: SDUPTHER

## 2018-03-09 DIAGNOSIS — E11.9 TYPE 2 DIABETES MELLITUS WITHOUT COMPLICATION, WITHOUT LONG-TERM CURRENT USE OF INSULIN (HCC): ICD-10-CM

## 2018-03-23 DIAGNOSIS — F32.A DEPRESSION, UNSPECIFIED DEPRESSION TYPE: ICD-10-CM

## 2018-03-23 DIAGNOSIS — E78.2 MIXED HYPERLIPIDEMIA: ICD-10-CM

## 2018-03-23 DIAGNOSIS — I10 ESSENTIAL HYPERTENSION: ICD-10-CM

## 2018-03-23 DIAGNOSIS — E11.9 TYPE 2 DIABETES MELLITUS WITHOUT COMPLICATION, WITHOUT LONG-TERM CURRENT USE OF INSULIN (HCC): ICD-10-CM

## 2018-03-23 RX ORDER — PRAVASTATIN SODIUM 40 MG
40 TABLET ORAL EVERY EVENING
Qty: 90 TABLET | Refills: 1 | Status: SHIPPED | OUTPATIENT
Start: 2018-03-23 | End: 2018-09-11

## 2018-03-23 RX ORDER — SERTRALINE HYDROCHLORIDE 100 MG/1
100 TABLET, FILM COATED ORAL DAILY
Qty: 90 TABLET | Refills: 1 | Status: SHIPPED | OUTPATIENT
Start: 2018-03-23 | End: 2018-12-14 | Stop reason: SDUPTHER

## 2018-03-23 RX ORDER — GLIMEPIRIDE 2 MG/1
2 TABLET ORAL EVERY MORNING
Qty: 90 TABLET | Refills: 1 | Status: SHIPPED | OUTPATIENT
Start: 2018-03-23 | End: 2018-09-11 | Stop reason: SDUPTHER

## 2018-03-23 RX ORDER — AMLODIPINE BESYLATE 10 MG/1
10 TABLET ORAL DAILY
Qty: 90 TABLET | Refills: 1 | Status: SHIPPED | OUTPATIENT
Start: 2018-03-23 | End: 2019-01-18 | Stop reason: SDUPTHER

## 2018-03-23 RX ORDER — LEVOTHYROXINE SODIUM 0.05 MG/1
50 TABLET ORAL DAILY
Qty: 90 TABLET | Refills: 1 | Status: SHIPPED | OUTPATIENT
Start: 2018-03-23 | End: 2018-12-28 | Stop reason: SDUPTHER

## 2018-03-23 RX ORDER — CLONIDINE HYDROCHLORIDE 0.3 MG/1
0.3 TABLET ORAL 3 TIMES DAILY
Qty: 270 TABLET | Refills: 1 | Status: SHIPPED | OUTPATIENT
Start: 2018-03-23 | End: 2018-12-17 | Stop reason: SDUPTHER

## 2018-03-23 RX ORDER — LOSARTAN POTASSIUM 25 MG/1
25 TABLET ORAL DAILY
Qty: 90 TABLET | Refills: 1 | Status: SHIPPED | OUTPATIENT
Start: 2018-03-23 | End: 2018-09-13 | Stop reason: SDUPTHER

## 2018-03-23 NOTE — TELEPHONE ENCOUNTER
Patient is calling to request refills on all her medicines. She wants 90 days on all of them. She wants them sent to [facility] on [address] [facility]. 1. Metformin 500 mg  2. Pravastatin  40 mg  3. Losartan  25 mg  4. Sertraline 100 mg  5. Glimepiride 1 mg  6. Clonidine  0.3 mg  7. Amlodipine 10 mg  8.  Levothyroxine  50 mcg

## 2018-05-15 ENCOUNTER — OFFICE VISIT (OUTPATIENT)
Dept: INTERNAL MEDICINE CLINIC | Age: 73
End: 2018-05-15

## 2018-05-15 ENCOUNTER — TELEPHONE (OUTPATIENT)
Dept: INTERNAL MEDICINE CLINIC | Age: 73
End: 2018-05-15

## 2018-05-15 VITALS
WEIGHT: 174 LBS | HEIGHT: 61 IN | HEART RATE: 68 BPM | SYSTOLIC BLOOD PRESSURE: 136 MMHG | DIASTOLIC BLOOD PRESSURE: 62 MMHG | BODY MASS INDEX: 32.85 KG/M2

## 2018-05-15 DIAGNOSIS — I10 ESSENTIAL HYPERTENSION: Primary | ICD-10-CM

## 2018-05-15 DIAGNOSIS — F33.2 SEVERE EPISODE OF RECURRENT MAJOR DEPRESSIVE DISORDER, WITHOUT PSYCHOTIC FEATURES (HCC): ICD-10-CM

## 2018-05-15 DIAGNOSIS — E11.9 TYPE 2 DIABETES MELLITUS WITHOUT COMPLICATION, WITHOUT LONG-TERM CURRENT USE OF INSULIN (HCC): ICD-10-CM

## 2018-05-15 DIAGNOSIS — N18.30 CKD (CHRONIC KIDNEY DISEASE) STAGE 3, GFR 30-59 ML/MIN (HCC): ICD-10-CM

## 2018-05-15 LAB
ALBUMIN SERPL-MCNC: 4.9 G/DL (ref 3.4–5)
ANION GAP SERPL CALCULATED.3IONS-SCNC: 19 MMOL/L (ref 3–16)
BUN BLDV-MCNC: 30 MG/DL (ref 7–20)
CALCIUM SERPL-MCNC: 9.9 MG/DL (ref 8.3–10.6)
CHLORIDE BLD-SCNC: 96 MMOL/L (ref 99–110)
CO2: 26 MMOL/L (ref 21–32)
CREAT SERPL-MCNC: 1.6 MG/DL (ref 0.6–1.2)
GFR AFRICAN AMERICAN: 38
GFR NON-AFRICAN AMERICAN: 32
GLUCOSE BLD-MCNC: 113 MG/DL (ref 70–99)
PHOSPHORUS: 3.9 MG/DL (ref 2.5–4.9)
POTASSIUM SERPL-SCNC: 4 MMOL/L (ref 3.5–5.1)
SODIUM BLD-SCNC: 141 MMOL/L (ref 136–145)

## 2018-05-15 PROCEDURE — 99214 OFFICE O/P EST MOD 30 MIN: CPT | Performed by: NURSE PRACTITIONER

## 2018-05-15 ASSESSMENT — ENCOUNTER SYMPTOMS
SHORTNESS OF BREATH: 0
GASTROINTESTINAL NEGATIVE: 1

## 2018-05-16 LAB
ESTIMATED AVERAGE GLUCOSE: 148.5 MG/DL
HBA1C MFR BLD: 6.8 %

## 2018-06-14 ENCOUNTER — OFFICE VISIT (OUTPATIENT)
Dept: ENT CLINIC | Age: 73
End: 2018-06-14

## 2018-06-14 VITALS — SYSTOLIC BLOOD PRESSURE: 160 MMHG | DIASTOLIC BLOOD PRESSURE: 80 MMHG

## 2018-06-14 DIAGNOSIS — H81.02 MENIERE'S DISEASE OF LEFT EAR: Primary | ICD-10-CM

## 2018-06-14 PROCEDURE — 99213 OFFICE O/P EST LOW 20 MIN: CPT | Performed by: OTOLARYNGOLOGY

## 2018-06-14 RX ORDER — TRIAMTERENE AND HYDROCHLOROTHIAZIDE 37.5; 25 MG/1; MG/1
1 CAPSULE ORAL EVERY MORNING
Qty: 90 CAPSULE | Refills: 3 | Status: SHIPPED | OUTPATIENT
Start: 2018-06-14 | End: 2020-09-11 | Stop reason: ALTCHOICE

## 2018-06-26 ENCOUNTER — HOSPITAL ENCOUNTER (OUTPATIENT)
Dept: PHYSICAL THERAPY | Age: 73
Discharge: HOME | End: 2018-06-30
Admitting: OTOLARYNGOLOGY

## 2018-07-01 ENCOUNTER — HOSPITAL ENCOUNTER (OUTPATIENT)
Dept: OTHER | Age: 73
Discharge: HOME | End: 2018-07-31
Attending: OTOLARYNGOLOGY | Admitting: OTOLARYNGOLOGY

## 2018-09-11 ENCOUNTER — OFFICE VISIT (OUTPATIENT)
Dept: FAMILY MEDICINE CLINIC | Age: 73
End: 2018-09-11

## 2018-09-11 ENCOUNTER — TELEPHONE (OUTPATIENT)
Dept: FAMILY MEDICINE CLINIC | Age: 73
End: 2018-09-11

## 2018-09-11 VITALS
DIASTOLIC BLOOD PRESSURE: 36 MMHG | HEART RATE: 71 BPM | BODY MASS INDEX: 32.74 KG/M2 | SYSTOLIC BLOOD PRESSURE: 132 MMHG | OXYGEN SATURATION: 97 % | HEIGHT: 61 IN | WEIGHT: 173.4 LBS

## 2018-09-11 DIAGNOSIS — M54.50 CHRONIC BILATERAL LOW BACK PAIN WITHOUT SCIATICA: ICD-10-CM

## 2018-09-11 DIAGNOSIS — Z00.00 ANNUAL PHYSICAL EXAM: ICD-10-CM

## 2018-09-11 DIAGNOSIS — E11.9 TYPE 2 DIABETES MELLITUS WITHOUT COMPLICATION, WITHOUT LONG-TERM CURRENT USE OF INSULIN (HCC): Primary | ICD-10-CM

## 2018-09-11 DIAGNOSIS — I10 HYPERTENSION, ESSENTIAL: ICD-10-CM

## 2018-09-11 DIAGNOSIS — Z12.39 BREAST CANCER SCREENING: ICD-10-CM

## 2018-09-11 DIAGNOSIS — G89.29 CHRONIC BILATERAL LOW BACK PAIN WITHOUT SCIATICA: ICD-10-CM

## 2018-09-11 DIAGNOSIS — Z78.0 POST-MENOPAUSAL: ICD-10-CM

## 2018-09-11 LAB
CREATININE URINE POCT: NORMAL
HBA1C MFR BLD: 7.3 %
MICROALBUMIN/CREAT 24H UR: NORMAL MG/G{CREAT}
MICROALBUMIN/CREAT UR-RTO: NORMAL

## 2018-09-11 PROCEDURE — 83036 HEMOGLOBIN GLYCOSYLATED A1C: CPT | Performed by: FAMILY MEDICINE

## 2018-09-11 PROCEDURE — 82044 UR ALBUMIN SEMIQUANTITATIVE: CPT | Performed by: FAMILY MEDICINE

## 2018-09-11 PROCEDURE — 99215 OFFICE O/P EST HI 40 MIN: CPT | Performed by: FAMILY MEDICINE

## 2018-09-11 RX ORDER — GLIMEPIRIDE 2 MG/1
2 TABLET ORAL EVERY MORNING
Qty: 90 TABLET | Refills: 1 | Status: SHIPPED | OUTPATIENT
Start: 2018-09-11 | End: 2019-08-01 | Stop reason: SDUPTHER

## 2018-09-11 ASSESSMENT — PATIENT HEALTH QUESTIONNAIRE - PHQ9
SUM OF ALL RESPONSES TO PHQ9 QUESTIONS 1 & 2: 0
1. LITTLE INTEREST OR PLEASURE IN DOING THINGS: 0
SUM OF ALL RESPONSES TO PHQ QUESTIONS 1-9: 0
2. FEELING DOWN, DEPRESSED OR HOPELESS: 0
SUM OF ALL RESPONSES TO PHQ QUESTIONS 1-9: 0

## 2018-09-11 NOTE — TELEPHONE ENCOUNTER
Please see note regarding Tino. ...... Lázaro Murray Relion Prime Test Strips  Last refill , I could not find these.  Was not able to greta up

## 2018-09-11 NOTE — PATIENT INSTRUCTIONS
good, quick way to make sure that you have a balanced meal. It also helps you spread carbs throughout the day. ¨ Divide your plate by types of foods. Put non-starchy vegetables on half the plate, meat or other protein food on one-quarter of the plate, and a grain or starchy vegetable in the final quarter of the plate. To this you can add a small piece of fruit and 1 cup of milk or yogurt, depending on how many carbs you are supposed to eat at a meal.  · Try to eat about the same amount of carbs at each meal. Do not \"save up\" your daily allowance of carbs to eat at one meal.  · Proteins have very little or no carbs per serving. Examples of proteins are beef, chicken, turkey, fish, eggs, tofu, cheese, cottage cheese, and peanut butter. A serving size of meat is 3 ounces, which is about the size of a deck of cards. Examples of meat substitute serving sizes (equal to 1 ounce of meat) are 1/4 cup of cottage cheese, 1 egg, 1 tablespoon of peanut butter, and ½ cup of tofu. How can you eat out and still eat healthy? · Learn to estimate the serving sizes of foods that have carbohydrate. If you measure food at home, it will be easier to estimate the amount in a serving of restaurant food. · If the meal you order has too much carbohydrate (such as potatoes, corn, or baked beans), ask to have a low-carbohydrate food instead. Ask for a salad or green vegetables. · If you use insulin, check your blood sugar before and after eating out to help you plan how much to eat in the future. · If you eat more carbohydrate at a meal than you had planned, take a walk or do other exercise. This will help lower your blood sugar. What else should you know? · Limit saturated fat, such as the fat from meat and dairy products. This is a healthy choice because people who have diabetes are at higher risk of heart disease. So choose lean cuts of meat and nonfat or low-fat dairy products.  Use olive or canola oil instead of butter or shortening when cooking. · Don't skip meals. Your blood sugar may drop too low if you skip meals and take insulin or certain medicines for diabetes. · Check with your doctor before you drink alcohol. Alcohol can cause your blood sugar to drop too low. Alcohol can also cause a bad reaction if you take certain diabetes medicines. Follow-up care is a key part of your treatment and safety. Be sure to make and go to all appointments, and call your doctor if you are having problems. It's also a good idea to know your test results and keep a list of the medicines you take. Where can you learn more? Go to [email or web address] org and sign in to your [employer] account. Enter T501 in the [employer] box to learn more about \"Learning About Diabetes Food Guidelines. \"     If you do not have an account, please click on the \"Sign Up Now\" link. Current as of: December 7, 2017  Content Version: 11.7  © 2895-1072 [employer]. Care instructions adapted under license by [facility] ([facility]). If you have questions about a medical condition or this instruction, always ask your healthcare professional. [address] any warranty or liability for your use of this information.

## 2018-09-11 NOTE — TELEPHONE ENCOUNTER
Medication and Quantity requested: Relion Prime Test Strips with ICD-10 and testing directions    Last Visit  9/11/18    Pharmacy and phone number updated in EPIC:  yes

## 2018-09-11 NOTE — PROGRESS NOTES
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 She takes triamterene HCTZ, losartan, clonidine, amlodipine for blood pressure. Takes glimepiride for DM 2. Last A1c was 7.3 today. AM glucose is typically in 150s. Pt not following diabetic diet. Last eye exam 3 months ago. Denies polyuria/ polydipsia. +occasional low blood sugar when she takes glimepiride, resolves easily with some sugar. Last mammogram 11/2018. Last tdap 2017. Last c-scope 4-5 years ago. Was told to follow-up in 10 years. Patient has stage I kidney failure, is not seeing nephrology. Pt has HLD, couldn't tolerate statins. Patient has complaints of low bilateral back pain at the waistline starting over a year ago. Seemed to start when she was helping to lift her late  during transfers. Denies sciatica or incontinence. Pain is more or less constant, worse with movement.          Patient Active Problem List    Diagnosis Date Noted    Essential hypertension      Priority: Medium    Severe episode of recurrent major depressive disorder, without psychotic features ([address] 05/15/2018    Type 2 diabetes mellitus without complication, without long-term current use of insulin ([address] 02/19/2018    Disorder of right eustachian tube 01/08/2018    Allergic sinusitis 01/08/2018    Chronic eczematous otitis externa of right ear 05/02/2017    Acquired hypothyroidism 11/16/2016    Sleep apnea     Meniere disease     Cancer [facility])        Past Medical History:   Diagnosis Date    Alopecia     Anxiety and depression     Cancer ([address]     uterine    CTS (carpal tunnel syndrome)      History of rheumatic fever     Hypertension     Hypothyroidism     Meniere disease     bilateral (1.55 m)   Wt 173 lb 6.4 oz (78.7 kg)   SpO2 97%   BMI 32.74 kg/m²     Physical Exam   General:  Well-appearing, NAD, alert, non-toxic  HEENT:  Normocephalic, atraumatic. Pupils equal and round. Moist mucous membranes. Normal dentition  NECK:  Supple, normal range of motion, no LAD, no meningeal signs, no JVD, nontender  CHEST/LUNGS: CTAB, no crackles, no wheeze, no rhonchi. Symmetric rise  CARDIOVASCULAR: RRR,  no murmur, no rub  ABDOMEN: Soft, non-tender, non-distended. No masses  EXTREMETIES: Normal movement of all extremities. No edema. No joint swelling. SKIN:  No rash, no cellulitis, no bruising, no petechiae/purpura/vesicles/pustules/abscess  PSYCH:  A+O x 3; normal affect  NEURO:  GCS 15, CN2-12 grossly intact, no focal motor/sensory deficits, no cerebellar deficits, normal gait, normal speech      Assessment/Plan     56-year-old female here for annual exam.  She is up-to-date on her vaccines. She is up-to-date with her mammogram, we will order new one to be done in 2 months. She is up-to-date on her colonoscopy, asked her to bring in the official record of her latest CT scan. She is due for routine labs, we will order today. We will also check a DEXA scan. Patient's blood pressure is borderline on her current medicine, will continue current meds. She has a wide pulse pressure, will monitor for now. No significant murmur on exam.    Patient has type 2 diabetes is slightly above goal on glimepiride alone. Recommend following diabetic diet and weight loss. Will add Januvia. Follow-up in 3 months for A1c check. Patient has chronic low back pain. Likely musculoskeletal.  We will check an L-spine x-ray. Recommend Tylenol as needed for pain. May need physical therapy.     Discussed with patient medication/s dosage, instructions, potential S/E, A/R and Drug Interaction  Educational material provided regarding patient's condition  Tylenol or Motrin as needed for pain or fever  Advise to return here if worse or go to nearest ER  Encourage fluids  Pt discharged in stable condition at 15:11      1. Annual physical exam    - CBC Auto Differential; Future  - Comprehensive Metabolic Panel; Future  - Lipid, Fasting; Future  - TSH with Reflex; Future    2. Type 2 diabetes mellitus without complication, without long-term current use of insulin (HCC)    - POCT glycosylated hemoglobin (Hb A1C)  - POCT microalbumin  - glimepiride (AMARYL) 2 MG tablet; Take 1 tablet by mouth every morning  Dispense: 90 tablet; Refill: 1  - SITagliptin (JANUVIA) 50 MG tablet; Take 1 tablet by mouth daily  Dispense: 90 tablet; Refill: 1    3. Post-menopausal    - DEXA BONE DENSITY AXIAL SKELETON; Future    4. Chronic bilateral low back pain without sciatica    - XR LUMBAR SPINE (MIN 4 VIEWS); Future    5. Breast cancer screening    - Mammography Screening    6. Hypertension, essential        Orders Placed This Encounter   Procedures    XR LUMBAR SPINE (MIN 4 VIEWS)     Standing Status:   Future     Standing Expiration Date:   9/11/2019     Order Specific Question:   Reason for exam:     Answer:   Low back pain    DEXA BONE DENSITY AXIAL SKELETON     Standing Status:   Future     Standing Expiration Date:   9/11/2019   [facility] Mammography Screening     Scheduling Instructions:       To schedule your mammography within one of our [facility] ([facility] [place]), please call [phone] ([facility])            To schedule through our [place] [address] at a location near you, please call [phone]     Order Specific Question:   Other (Specify in Free Text)     Answer:   Screening bilateral mammogram with additional diagnostic mammogram and/or ultrasound as recommended by the radiologist    CBC Auto Differential     Standing Status:   Future     Standing Expiration Date:   9/11/2019    Comprehensive Metabolic Panel     Standing Status:   Future     Standing Expiration Date:   9/11/2019    Lipid, Fasting Standing Status:   Future     Standing Expiration Date:   9/11/2019    TSH with Reflex     Standing Status:   Future     Standing Expiration Date:   9/11/2019    POCT glycosylated hemoglobin (Hb A1C)    POCT microalbumin       Return in about 3 months (around 12/11/2018) for labwork follow up.     Ramya Romero MD    9/11/2018  3:12 PM

## 2018-09-12 DIAGNOSIS — E11.9 TYPE 2 DIABETES MELLITUS WITHOUT COMPLICATION, WITHOUT LONG-TERM CURRENT USE OF INSULIN (HCC): ICD-10-CM

## 2018-09-13 ENCOUNTER — HOSPITAL ENCOUNTER (OUTPATIENT)
Dept: OTHER | Age: 73
Discharge: HOME | End: 2018-09-13
Attending: FAMILY MEDICINE | Admitting: FAMILY MEDICINE

## 2018-09-13 DIAGNOSIS — I10 ESSENTIAL HYPERTENSION: ICD-10-CM

## 2018-09-13 DIAGNOSIS — E11.9 TYPE 2 DIABETES MELLITUS WITHOUT COMPLICATION, WITHOUT LONG-TERM CURRENT USE OF INSULIN (HCC): ICD-10-CM

## 2018-09-13 DIAGNOSIS — G89.29 CHRONIC BILATERAL LOW BACK PAIN WITHOUT SCIATICA: ICD-10-CM

## 2018-09-13 DIAGNOSIS — Z00.00 ANNUAL PHYSICAL EXAM: ICD-10-CM

## 2018-09-13 DIAGNOSIS — M54.50 CHRONIC BILATERAL LOW BACK PAIN WITHOUT SCIATICA: ICD-10-CM

## 2018-09-13 DIAGNOSIS — H81.02 MENIERE'S DISEASE OF LEFT EAR: ICD-10-CM

## 2018-09-13 LAB
A/G RATIO: 1.6 (ref 1.1–2.2)
ALBUMIN SERPL-MCNC: 4.9 G/DL (ref 3.4–5)
ALP BLD-CCNC: 77 U/L (ref 40–129)
ALT SERPL-CCNC: 39 U/L (ref 10–40)
ANION GAP SERPL CALCULATED.3IONS-SCNC: 14 MMOL/L (ref 3–16)
AST SERPL-CCNC: 34 U/L (ref 15–37)
BASOPHILS ABSOLUTE: 0 K/UL (ref 0–0.2)
BASOPHILS RELATIVE PERCENT: 0.3 %
BILIRUB SERPL-MCNC: 0.4 MG/DL (ref 0–1)
BUN BLDV-MCNC: 25 MG/DL (ref 7–20)
CALCIUM SERPL-MCNC: 10.2 MG/DL (ref 8.3–10.6)
CHLORIDE BLD-SCNC: 99 MMOL/L (ref 99–110)
CHOLESTEROL, FASTING: 309 MG/DL (ref 0–199)
CO2: 29 MMOL/L (ref 21–32)
CREAT SERPL-MCNC: 1.5 MG/DL (ref 0.6–1.2)
EOSINOPHILS ABSOLUTE: 0.3 K/UL (ref 0–0.6)
EOSINOPHILS RELATIVE PERCENT: 4.4 %
GFR AFRICAN AMERICAN: 41
GFR NON-AFRICAN AMERICAN: 34
GLOBULIN: 3.1 G/DL
GLUCOSE BLD-MCNC: 143 MG/DL (ref 70–99)
HCT VFR BLD CALC: 41.3 % (ref 36–48)
HDLC SERPL-MCNC: 40 MG/DL (ref 40–60)
HEMOGLOBIN: 14.2 G/DL (ref 12–16)
LDL CHOLESTEROL CALCULATED: ABNORMAL MG/DL
LDL CHOLESTEROL DIRECT: 187 MG/DL
LYMPHOCYTES ABSOLUTE: 1.6 K/UL (ref 1–5.1)
LYMPHOCYTES RELATIVE PERCENT: 24.8 %
MCH RBC QN AUTO: 29.1 PG (ref 26–34)
MCHC RBC AUTO-ENTMCNC: 34.5 G/DL (ref 31–36)
MCV RBC AUTO: 84.4 FL (ref 80–100)
MONOCYTES ABSOLUTE: 0.5 K/UL (ref 0–1.3)
MONOCYTES RELATIVE PERCENT: 7.5 %
NEUTROPHILS ABSOLUTE: 4 K/UL (ref 1.7–7.7)
NEUTROPHILS RELATIVE PERCENT: 63 %
PDW BLD-RTO: 14.9 % (ref 12.4–15.4)
PLATELET # BLD: 194 K/UL (ref 135–450)
PMV BLD AUTO: 8.7 FL (ref 5–10.5)
POTASSIUM SERPL-SCNC: 4.1 MMOL/L (ref 3.5–5.1)
RBC # BLD: 4.89 M/UL (ref 4–5.2)
SODIUM BLD-SCNC: 142 MMOL/L (ref 136–145)
TOTAL PROTEIN: 8 G/DL (ref 6.4–8.2)
TRIGLYCERIDE, FASTING: 584 MG/DL (ref 0–150)
TSH REFLEX: 3.83 UIU/ML (ref 0.27–4.2)
VLDLC SERPL CALC-MCNC: ABNORMAL MG/DL
WBC # BLD: 6.3 K/UL (ref 4–11)

## 2018-09-13 RX ORDER — LOSARTAN POTASSIUM 25 MG/1
25 TABLET ORAL DAILY
Qty: 90 TABLET | Refills: 3 | Status: SHIPPED | OUTPATIENT
Start: 2018-09-13 | End: 2019-08-07 | Stop reason: SDUPTHER

## 2018-09-13 NOTE — TELEPHONE ENCOUNTER
Medication and Quantity requested:     losartan (COZAAR) 25 MG tablet   And  hydrochlorothiazide (HYDRODIURIL) 25 MG tablet     Last Visit  9/11/18    Pharmacy and phone number updated in EPIC:  yes

## 2018-09-13 NOTE — TELEPHONE ENCOUNTER
Please call patient tell her we will stop the Januvia and put her back on metformin. The prescription was are sent to the pharmacy.

## 2018-09-25 ENCOUNTER — HOSPITAL ENCOUNTER (OUTPATIENT)
Dept: GENERAL RADIOLOGY | Age: 73
Discharge: HOME | End: 2018-09-25
Payer: MEDICARE

## 2018-09-25 DIAGNOSIS — Z78.0 POST-MENOPAUSAL: ICD-10-CM

## 2018-09-25 PROCEDURE — 77080 DXA BONE DENSITY AXIAL: CPT

## 2018-10-30 ENCOUNTER — HOSPITAL ENCOUNTER (OUTPATIENT)
Dept: WOMENS IMAGING | Age: 73
Discharge: HOME | End: 2018-10-30
Payer: MEDICARE

## 2018-10-30 DIAGNOSIS — Z12.39 BREAST CANCER SCREENING: ICD-10-CM

## 2018-10-30 PROCEDURE — 77063 BREAST TOMOSYNTHESIS BI: CPT

## 2018-12-11 ENCOUNTER — OFFICE VISIT (OUTPATIENT)
Dept: FAMILY MEDICINE CLINIC | Age: 73
End: 2018-12-11
Payer: MEDICARE

## 2018-12-11 VITALS
OXYGEN SATURATION: 98 % | SYSTOLIC BLOOD PRESSURE: 120 MMHG | DIASTOLIC BLOOD PRESSURE: 70 MMHG | HEART RATE: 81 BPM | WEIGHT: 168 LBS | BODY MASS INDEX: 31.72 KG/M2

## 2018-12-11 DIAGNOSIS — K59.00 CONSTIPATION, UNSPECIFIED CONSTIPATION TYPE: ICD-10-CM

## 2018-12-11 DIAGNOSIS — E11.9 TYPE 2 DIABETES MELLITUS WITHOUT COMPLICATION, WITHOUT LONG-TERM CURRENT USE OF INSULIN (HCC): Primary | ICD-10-CM

## 2018-12-11 DIAGNOSIS — E78.5 HYPERLIPIDEMIA, UNSPECIFIED HYPERLIPIDEMIA TYPE: ICD-10-CM

## 2018-12-11 LAB — HBA1C MFR BLD: 7 %

## 2018-12-11 PROCEDURE — 99214 OFFICE O/P EST MOD 30 MIN: CPT | Performed by: FAMILY MEDICINE

## 2018-12-11 PROCEDURE — 83036 HEMOGLOBIN GLYCOSYLATED A1C: CPT | Performed by: FAMILY MEDICINE

## 2018-12-11 RX ORDER — SIMVASTATIN 20 MG
20 TABLET ORAL NIGHTLY
Qty: 90 TABLET | Refills: 1 | Status: SHIPPED | OUTPATIENT
Start: 2018-12-11 | End: 2019-03-14 | Stop reason: SDUPTHER

## 2018-12-11 NOTE — PROGRESS NOTES
[address] Note    Date: 12/11/2018                                               Subjective/Objective:     Chief Complaint   Patient presents with   [facility]     DM. pt said she is ill. . hands and legs are numb. .. augusto not moving. HPI   Patient is here for diabetic checkup. She currently takes glimepiride. Is following diabetic diet for the most part. Denies polyuria or polydipsia. Last diabetic eye exam was 6 months ago. Has had a few episodes of hypoglycemia that resolved with taking some sugar. Morning glucose is typically in mid 100s. PM glucose is below 100 consistently. Patient has hypertension. Takes Losartan, triamterene, HCTZ, clonidine, amlodipine. Is tolerating the medicines well without side effects. Pt has concerns for constipation. Says her bowels don't move. Denies dry hard stools. No blood in stool. Is only having a BM about once a week. Has tried miralax but it doesn't work. Has also tried dulcolax, which helps a bit. Had a prescription medicine in the past that worked well but doesn't recall the name.          Patient Active Problem List    Diagnosis Date Noted    Essential hypertension      Priority: Medium    Severe episode of recurrent major depressive disorder, without psychotic features ([address] 05/15/2018    Type 2 diabetes mellitus without complication, without long-term current use of insulin ([facility] 02/19/2018    Disorder of right eustachian tube 01/08/2018    Allergic sinusitis 01/08/2018    Chronic eczematous otitis externa of right ear 05/02/2017    Acquired hypothyroidism 11/16/2016    Sleep apnea     Meniere disease     Cancer [facility])        Past Medical History:   Diagnosis Date    Alopecia     Anxiety and depression     Cancer (HCC)     uterine    CTS (carpal tunnel syndrome)      History of rheumatic fever     Hypertension     Hypothyroidism     Meniere disease     bilateral ears    Nausea     associated with Meniere's dz vomiting, no fever, no SOB, no HA    Vitals:  /70   Pulse 81   Wt 168 lb (76.2 kg)   SpO2 98%   BMI 31.72 kg/m²     Physical Exam   General:  Well-appearing, NAD, alert, non-toxic  HEENT:  Normocephalic, atraumatic. Pupils equal and round. CHEST/LUNGS: CTAB, no crackles, no wheeze, no rhonchi. Symmetric rise  CARDIOVASCULAR: RRR,  no murmur, no rub  EXTREMETIES: Normal movement of all extremities  SKIN:  No rash, no cellulitis, no bruising, no petechiae/purpura/vesicles/pustules/abscess  PSYCH:  A+O x 3; normal affect  NEURO:  GCS 15, CN2-12 grossly intact, no focal motor/sensory deficits, no cerebellar deficits, normal gait, normal speech      Assessment/Plan     43-year-old female with type 2 diabetes. Her A1c is slightly above goal.  Patient does not want to start a new medicine. Refer to drop some weight with an enhanced diabetic diet. Follow-up in 3 months for recheck. May need to adjust medicines at that time. Patient's blood pressure is at goal.  Continue current medicines. Patient has functional constipation. Given that it's not improved with MiraLAX and Dulcolax, will refer to GI for further evaluation. Recommend increase fiber in the diet. Continue MiraLAX twice a day and Dulcolax as needed. Patient has hyperlipidemia. She felt a little woozy when she took Lipitor. We'll try simvastatin. Recheck lipids in 6 months. Discussed with patient medication/s dosage, instructions, potential S/E, A/R and Drug Interaction  Educational material provided regarding patient's condition  Tylenol or Motrin as needed for pain or fever  Advise to return here if worse or go to nearest ER  Encourage fluids  Pt discharged in stable condition at 10:15      1. Type 2 diabetes mellitus without complication, without long-term current use of insulin (HCC)    - POCT glycosylated hemoglobin (Hb A1C)    2.  Constipation, unspecified constipation type    - [facility] - Everett You MD, Gastroenterology,

## 2018-12-14 ENCOUNTER — TELEPHONE (OUTPATIENT)
Dept: INTERNAL MEDICINE CLINIC | Age: 73
End: 2018-12-14

## 2018-12-14 DIAGNOSIS — F32.A DEPRESSION, UNSPECIFIED DEPRESSION TYPE: ICD-10-CM

## 2018-12-14 RX ORDER — SERTRALINE HYDROCHLORIDE 100 MG/1
100 TABLET, FILM COATED ORAL DAILY
Qty: 90 TABLET | Refills: 1 | Status: SHIPPED | OUTPATIENT
Start: 2018-12-14 | End: 2019-03-18 | Stop reason: SDUPTHER

## 2018-12-14 RX ORDER — ALPRAZOLAM 0.5 MG/1
0.5 TABLET ORAL 3 TIMES DAILY PRN
Qty: 60 TABLET | Refills: 1 | OUTPATIENT
Start: 2018-12-14

## 2018-12-17 ENCOUNTER — TELEPHONE (OUTPATIENT)
Dept: INTERNAL MEDICINE CLINIC | Age: 73
End: 2018-12-17

## 2018-12-17 DIAGNOSIS — I10 ESSENTIAL HYPERTENSION: ICD-10-CM

## 2018-12-17 RX ORDER — CLONIDINE HYDROCHLORIDE 0.3 MG/1
0.3 TABLET ORAL 3 TIMES DAILY
Qty: 270 TABLET | Refills: 1 | Status: SHIPPED | OUTPATIENT
Start: 2018-12-17 | End: 2019-07-15 | Stop reason: SDUPTHER

## 2018-12-17 NOTE — TELEPHONE ENCOUNTER
Medication and Quantity requested: cloNIDine (CATAPRES) 0.3 MG tablet  #270     Last Visit  12/11/18    Pharmacy and phone number updated in EPIC:  yes

## 2018-12-28 RX ORDER — LEVOTHYROXINE SODIUM 0.05 MG/1
50 TABLET ORAL DAILY
Qty: 90 TABLET | Refills: 1 | Status: SHIPPED | OUTPATIENT
Start: 2018-12-28 | End: 2019-06-21 | Stop reason: SDUPTHER

## 2019-01-18 DIAGNOSIS — I10 ESSENTIAL HYPERTENSION: ICD-10-CM

## 2019-01-18 RX ORDER — AMLODIPINE BESYLATE 10 MG/1
10 TABLET ORAL DAILY
Qty: 90 TABLET | Refills: 1 | Status: SHIPPED | OUTPATIENT
Start: 2019-01-18 | End: 2019-07-12 | Stop reason: SDUPTHER

## 2019-02-21 LAB
DIABETIC RETINOPATHY: NEGATIVE
DIABETIC RETINOPATHY: NEGATIVE

## 2019-03-06 ENCOUNTER — OFFICE VISIT (OUTPATIENT)
Dept: FAMILY MEDICINE CLINIC | Age: 74
End: 2019-03-06
Payer: MEDICARE

## 2019-03-06 VITALS
HEART RATE: 68 BPM | RESPIRATION RATE: 17 BRPM | WEIGHT: 169 LBS | HEIGHT: 61 IN | SYSTOLIC BLOOD PRESSURE: 128 MMHG | BODY MASS INDEX: 31.91 KG/M2 | TEMPERATURE: 98 F | DIASTOLIC BLOOD PRESSURE: 78 MMHG | OXYGEN SATURATION: 97 %

## 2019-03-06 DIAGNOSIS — Z01.818 PRE-OP EXAM: ICD-10-CM

## 2019-03-06 DIAGNOSIS — Z01.818 PRE-OP EXAM: Primary | ICD-10-CM

## 2019-03-06 PROCEDURE — 99213 OFFICE O/P EST LOW 20 MIN: CPT | Performed by: FAMILY MEDICINE

## 2019-03-07 LAB
A/G RATIO: 2 (ref 1.1–2.2)
ALBUMIN SERPL-MCNC: 4.9 G/DL (ref 3.4–5)
ALP BLD-CCNC: 66 U/L (ref 40–129)
ALT SERPL-CCNC: 21 U/L (ref 10–40)
ANION GAP SERPL CALCULATED.3IONS-SCNC: 16 MMOL/L (ref 3–16)
AST SERPL-CCNC: 22 U/L (ref 15–37)
BILIRUB SERPL-MCNC: 0.3 MG/DL (ref 0–1)
BUN BLDV-MCNC: 42 MG/DL (ref 7–20)
CALCIUM SERPL-MCNC: 9.9 MG/DL (ref 8.3–10.6)
CHLORIDE BLD-SCNC: 97 MMOL/L (ref 99–110)
CO2: 25 MMOL/L (ref 21–32)
CREAT SERPL-MCNC: 1.6 MG/DL (ref 0.6–1.2)
GFR AFRICAN AMERICAN: 38
GFR NON-AFRICAN AMERICAN: 32
GLOBULIN: 2.5 G/DL
GLUCOSE BLD-MCNC: 138 MG/DL (ref 70–99)
PHOSPHORUS: 4.1 MG/DL (ref 2.5–4.9)
POTASSIUM SERPL-SCNC: 4.2 MMOL/L (ref 3.5–5.1)
SODIUM BLD-SCNC: 138 MMOL/L (ref 136–145)
TOTAL PROTEIN: 7.4 G/DL (ref 6.4–8.2)

## 2019-03-11 ENCOUNTER — OFFICE VISIT (OUTPATIENT)
Dept: FAMILY MEDICINE CLINIC | Age: 74
End: 2019-03-11
Payer: MEDICARE

## 2019-03-11 VITALS
HEART RATE: 71 BPM | DIASTOLIC BLOOD PRESSURE: 80 MMHG | WEIGHT: 169 LBS | SYSTOLIC BLOOD PRESSURE: 120 MMHG | OXYGEN SATURATION: 95 % | BODY MASS INDEX: 32.32 KG/M2

## 2019-03-11 DIAGNOSIS — E78.5 HYPERLIPIDEMIA, UNSPECIFIED HYPERLIPIDEMIA TYPE: ICD-10-CM

## 2019-03-11 DIAGNOSIS — F32.A DEPRESSION, UNSPECIFIED DEPRESSION TYPE: ICD-10-CM

## 2019-03-11 DIAGNOSIS — E03.9 HYPOTHYROIDISM, UNSPECIFIED TYPE: ICD-10-CM

## 2019-03-11 DIAGNOSIS — E11.9 TYPE 2 DIABETES MELLITUS WITHOUT COMPLICATION, WITHOUT LONG-TERM CURRENT USE OF INSULIN (HCC): Primary | ICD-10-CM

## 2019-03-11 DIAGNOSIS — I10 HYPERTENSION, ESSENTIAL: ICD-10-CM

## 2019-03-11 LAB
CHOLESTEROL, FASTING: 225 MG/DL (ref 0–199)
HBA1C MFR BLD: 6.7 %
HDLC SERPL-MCNC: 38 MG/DL (ref 40–60)
LDL CHOLESTEROL CALCULATED: ABNORMAL MG/DL
LDL CHOLESTEROL DIRECT: 150 MG/DL
TRIGLYCERIDE, FASTING: 385 MG/DL (ref 0–150)
VLDLC SERPL CALC-MCNC: ABNORMAL MG/DL

## 2019-03-11 PROCEDURE — 83036 HEMOGLOBIN GLYCOSYLATED A1C: CPT | Performed by: FAMILY MEDICINE

## 2019-03-11 PROCEDURE — 99214 OFFICE O/P EST MOD 30 MIN: CPT | Performed by: FAMILY MEDICINE

## 2019-03-14 DIAGNOSIS — E78.5 HYPERLIPIDEMIA, UNSPECIFIED HYPERLIPIDEMIA TYPE: ICD-10-CM

## 2019-03-14 RX ORDER — SIMVASTATIN 40 MG
40 TABLET ORAL NIGHTLY
Qty: 90 TABLET | Refills: 1 | Status: SHIPPED | OUTPATIENT
Start: 2019-03-14 | End: 2019-09-07 | Stop reason: SDUPTHER

## 2019-03-18 ENCOUNTER — TELEPHONE (OUTPATIENT)
Dept: FAMILY MEDICINE CLINIC | Age: 74
End: 2019-03-18

## 2019-03-18 DIAGNOSIS — F32.A DEPRESSION, UNSPECIFIED DEPRESSION TYPE: ICD-10-CM

## 2019-03-18 RX ORDER — SERTRALINE HYDROCHLORIDE 100 MG/1
100 TABLET, FILM COATED ORAL DAILY
Qty: 90 TABLET | Refills: 1 | Status: CANCELLED | OUTPATIENT
Start: 2019-03-18

## 2019-03-18 RX ORDER — SERTRALINE HYDROCHLORIDE 100 MG/1
100 TABLET, FILM COATED ORAL DAILY
Qty: 90 TABLET | Refills: 1 | Status: SHIPPED | OUTPATIENT
Start: 2019-03-18 | End: 2019-09-20 | Stop reason: SDUPTHER

## 2019-03-23 ENCOUNTER — OFFICE VISIT (OUTPATIENT)
Dept: FAMILY MEDICINE CLINIC | Age: 74
End: 2019-03-23
Payer: MEDICARE

## 2019-03-23 VITALS
WEIGHT: 170.8 LBS | DIASTOLIC BLOOD PRESSURE: 60 MMHG | OXYGEN SATURATION: 98 % | HEART RATE: 72 BPM | BODY MASS INDEX: 32.67 KG/M2 | TEMPERATURE: 98 F | SYSTOLIC BLOOD PRESSURE: 120 MMHG

## 2019-03-23 DIAGNOSIS — R05.9 COUGH: Primary | ICD-10-CM

## 2019-03-23 DIAGNOSIS — J40 BRONCHITIS: ICD-10-CM

## 2019-03-23 PROCEDURE — 99213 OFFICE O/P EST LOW 20 MIN: CPT | Performed by: FAMILY MEDICINE

## 2019-03-23 RX ORDER — PROMETHAZINE HYDROCHLORIDE AND CODEINE PHOSPHATE 6.25; 1 MG/5ML; MG/5ML
5 SYRUP ORAL EVERY 4 HOURS PRN
Qty: 118 ML | Refills: 0 | Status: SHIPPED | OUTPATIENT
Start: 2019-03-23 | End: 2019-03-30

## 2019-03-25 ENCOUNTER — OFFICE VISIT (OUTPATIENT)
Dept: FAMILY MEDICINE CLINIC | Age: 74
End: 2019-03-25
Payer: MEDICARE

## 2019-03-25 VITALS
HEART RATE: 87 BPM | SYSTOLIC BLOOD PRESSURE: 130 MMHG | TEMPERATURE: 97.7 F | WEIGHT: 170 LBS | OXYGEN SATURATION: 95 % | DIASTOLIC BLOOD PRESSURE: 60 MMHG | BODY MASS INDEX: 32.51 KG/M2

## 2019-03-25 DIAGNOSIS — R05.9 COUGH: Primary | ICD-10-CM

## 2019-03-25 DIAGNOSIS — J40 BRONCHITIS: ICD-10-CM

## 2019-03-25 PROCEDURE — 99213 OFFICE O/P EST LOW 20 MIN: CPT | Performed by: FAMILY MEDICINE

## 2019-03-25 RX ORDER — BENZONATATE 100 MG/1
100 CAPSULE ORAL 3 TIMES DAILY PRN
Qty: 15 CAPSULE | Refills: 0 | Status: SHIPPED | OUTPATIENT
Start: 2019-03-25 | End: 2019-03-30

## 2019-03-25 RX ORDER — DOXYCYCLINE HYCLATE 100 MG
100 TABLET ORAL 2 TIMES DAILY
Qty: 14 TABLET | Refills: 0 | Status: SHIPPED | OUTPATIENT
Start: 2019-03-25 | End: 2019-04-01

## 2019-05-03 DIAGNOSIS — K59.00 CONSTIPATION, UNSPECIFIED CONSTIPATION TYPE: Primary | ICD-10-CM

## 2019-05-03 NOTE — TELEPHONE ENCOUNTER
[facility] called with patient in pharmacy to check on this prescription, requesting a refill. Please advise.

## 2019-05-21 DIAGNOSIS — E11.9 TYPE 2 DIABETES MELLITUS WITHOUT COMPLICATION, WITHOUT LONG-TERM CURRENT USE OF INSULIN (HCC): ICD-10-CM

## 2019-05-21 NOTE — TELEPHONE ENCOUNTER
The patient went to  her metformin rx from the pharmacy, because she took her last pill today. The pharmacy told her that it wasn't on file for her anymore. It is also no longer on her med list. If you refer back to the telephone encounter for 09/11/18, it looks like it was okay'd by Dr. Dayday Berumen, but the pharmacy needs a new order. Rx pending.     Last ov: 03/25/19    Lab Results   Component Value Date    LABA1C 6.7 03/11/2019     Lab Results   Component Value Date    .5 05/15/2018

## 2019-05-23 DIAGNOSIS — E11.9 TYPE 2 DIABETES MELLITUS WITHOUT COMPLICATION, WITHOUT LONG-TERM CURRENT USE OF INSULIN (HCC): ICD-10-CM

## 2019-05-23 NOTE — TELEPHONE ENCOUNTER
Medication and Quantity requested:        metFORMIN (GLUCOPHAGE) 1000 MG tablet - (takes twice daily w/meals)      Last Visit  03/25/19 - Dr Amie Thomas and phone number updated in EPIC:  Yes    ([address])

## 2019-07-12 DIAGNOSIS — I10 ESSENTIAL HYPERTENSION: ICD-10-CM

## 2019-07-12 RX ORDER — AMLODIPINE BESYLATE 10 MG/1
TABLET ORAL
Qty: 90 TABLET | Refills: 0 | Status: SHIPPED | OUTPATIENT
Start: 2019-07-12 | End: 2019-10-31

## 2019-07-15 DIAGNOSIS — I10 ESSENTIAL HYPERTENSION: ICD-10-CM

## 2019-07-15 RX ORDER — CLONIDINE HYDROCHLORIDE 0.3 MG/1
0.3 TABLET ORAL 3 TIMES DAILY
Qty: 270 TABLET | Refills: 1 | Status: SHIPPED | OUTPATIENT
Start: 2019-07-15 | End: 2020-03-30

## 2019-09-07 DIAGNOSIS — E78.5 HYPERLIPIDEMIA, UNSPECIFIED HYPERLIPIDEMIA TYPE: ICD-10-CM

## 2019-09-10 RX ORDER — SIMVASTATIN 40 MG
40 TABLET ORAL NIGHTLY
Qty: 90 TABLET | Refills: 2 | Status: SHIPPED | OUTPATIENT
Start: 2019-09-10 | End: 2019-11-21 | Stop reason: ALTCHOICE

## 2019-09-13 ENCOUNTER — OFFICE VISIT (OUTPATIENT)
Dept: FAMILY MEDICINE CLINIC | Age: 74
End: 2019-09-13
Payer: MEDICARE

## 2019-09-13 VITALS
DIASTOLIC BLOOD PRESSURE: 80 MMHG | HEART RATE: 65 BPM | OXYGEN SATURATION: 98 % | BODY MASS INDEX: 32.13 KG/M2 | SYSTOLIC BLOOD PRESSURE: 138 MMHG | WEIGHT: 168 LBS

## 2019-09-13 DIAGNOSIS — M19.90 ARTHRITIS: ICD-10-CM

## 2019-09-13 DIAGNOSIS — K59.00 CONSTIPATION, UNSPECIFIED CONSTIPATION TYPE: ICD-10-CM

## 2019-09-13 DIAGNOSIS — I10 ESSENTIAL HYPERTENSION: ICD-10-CM

## 2019-09-13 DIAGNOSIS — E11.9 TYPE 2 DIABETES MELLITUS WITHOUT COMPLICATION, WITHOUT LONG-TERM CURRENT USE OF INSULIN (HCC): Primary | ICD-10-CM

## 2019-09-13 LAB
CREATININE URINE POCT: 200
HBA1C MFR BLD: 6.7 %
MICROALBUMIN/CREAT 24H UR: 80 MG/G{CREAT}
MICROALBUMIN/CREAT UR-RTO: <30

## 2019-09-13 PROCEDURE — 99214 OFFICE O/P EST MOD 30 MIN: CPT | Performed by: FAMILY MEDICINE

## 2019-09-13 PROCEDURE — 82044 UR ALBUMIN SEMIQUANTITATIVE: CPT | Performed by: FAMILY MEDICINE

## 2019-09-13 PROCEDURE — 83036 HEMOGLOBIN GLYCOSYLATED A1C: CPT | Performed by: FAMILY MEDICINE

## 2019-09-20 DIAGNOSIS — F32.A DEPRESSION, UNSPECIFIED DEPRESSION TYPE: ICD-10-CM

## 2019-09-20 RX ORDER — SERTRALINE HYDROCHLORIDE 100 MG/1
100 TABLET, FILM COATED ORAL DAILY
Qty: 90 TABLET | Refills: 1 | Status: SHIPPED | OUTPATIENT
Start: 2019-09-20 | End: 2019-10-31

## 2019-09-20 NOTE — TELEPHONE ENCOUNTER
Pt requesting refill on sertraline (ZOLOFT) 100 MG tablet.     [address] [address] [phone] [place] [phone]    Last visit 09/13/19  Next visit 12/13/19

## 2019-09-21 DIAGNOSIS — F32.A DEPRESSION, UNSPECIFIED DEPRESSION TYPE: ICD-10-CM

## 2019-09-23 RX ORDER — SERTRALINE HYDROCHLORIDE 100 MG/1
TABLET, FILM COATED ORAL
Qty: 90 TABLET | Refills: 1 | Status: SHIPPED | OUTPATIENT
Start: 2019-09-23 | End: 2020-01-21 | Stop reason: SDUPTHER

## 2019-10-31 ENCOUNTER — HOSPITAL ENCOUNTER (OUTPATIENT)
Dept: WOMENS IMAGING | Age: 74
Discharge: HOME | End: 2019-10-31
Payer: MEDICARE

## 2019-10-31 ENCOUNTER — OFFICE VISIT (OUTPATIENT)
Dept: ENT CLINIC | Age: 74
End: 2019-10-31
Payer: MEDICARE

## 2019-10-31 VITALS — DIASTOLIC BLOOD PRESSURE: 60 MMHG | HEART RATE: 71 BPM | OXYGEN SATURATION: 97 % | SYSTOLIC BLOOD PRESSURE: 136 MMHG

## 2019-10-31 DIAGNOSIS — Z12.31 ENCOUNTER FOR SCREENING MAMMOGRAM FOR BREAST CANCER: ICD-10-CM

## 2019-10-31 DIAGNOSIS — H69.93 DISORDER OF BOTH EUSTACHIAN TUBES: Primary | ICD-10-CM

## 2019-10-31 DIAGNOSIS — J30.9 ALLERGIC SINUSITIS: ICD-10-CM

## 2019-10-31 PROCEDURE — 77067 SCR MAMMO BI INCL CAD: CPT

## 2019-10-31 PROCEDURE — 99213 OFFICE O/P EST LOW 20 MIN: CPT | Performed by: OTOLARYNGOLOGY

## 2019-10-31 RX ORDER — METHYLPREDNISOLONE 4 MG/1
4 TABLET ORAL SEE ADMIN INSTRUCTIONS
Qty: 1 KIT | Refills: 0 | Status: SHIPPED | OUTPATIENT
Start: 2019-10-31 | End: 2019-11-21

## 2019-10-31 RX ORDER — FLUTICASONE PROPIONATE 50 MCG
2 SPRAY, SUSPENSION (ML) NASAL DAILY
Qty: 1 BOTTLE | Refills: 1 | Status: SHIPPED | OUTPATIENT
Start: 2019-10-31 | End: 2019-11-21

## 2019-11-21 ENCOUNTER — TELEPHONE (OUTPATIENT)
Dept: INTERNAL MEDICINE CLINIC | Age: 74
End: 2019-11-21

## 2019-11-21 ENCOUNTER — OFFICE VISIT (OUTPATIENT)
Dept: INTERNAL MEDICINE CLINIC | Age: 74
End: 2019-11-21
Payer: MEDICARE

## 2019-11-21 VITALS
DIASTOLIC BLOOD PRESSURE: 60 MMHG | OXYGEN SATURATION: 98 % | BODY MASS INDEX: 32.13 KG/M2 | HEART RATE: 89 BPM | SYSTOLIC BLOOD PRESSURE: 126 MMHG | WEIGHT: 168 LBS

## 2019-11-21 DIAGNOSIS — Z91.81 AT HIGH RISK FOR FALLS: ICD-10-CM

## 2019-11-21 DIAGNOSIS — E11.65 CONTROLLED TYPE 2 DIABETES MELLITUS WITH HYPERGLYCEMIA, UNSPECIFIED WHETHER LONG TERM INSULIN USE (HCC): Primary | ICD-10-CM

## 2019-11-21 DIAGNOSIS — I10 ESSENTIAL HYPERTENSION: ICD-10-CM

## 2019-11-21 DIAGNOSIS — E03.9 HYPOTHYROIDISM, UNSPECIFIED TYPE: ICD-10-CM

## 2019-11-21 DIAGNOSIS — E11.9 TYPE 2 DIABETES MELLITUS WITHOUT COMPLICATION, WITHOUT LONG-TERM CURRENT USE OF INSULIN (HCC): ICD-10-CM

## 2019-11-21 LAB
ALBUMIN SERPL-MCNC: 4.7 G/DL (ref 3.4–5)
ANION GAP SERPL CALCULATED.3IONS-SCNC: 16 MMOL/L (ref 3–16)
BASOPHILS ABSOLUTE: 0 K/UL (ref 0–0.2)
BASOPHILS RELATIVE PERCENT: 0.2 %
BUN BLDV-MCNC: 29 MG/DL (ref 7–20)
CALCIUM SERPL-MCNC: 9.9 MG/DL (ref 8.3–10.6)
CHLORIDE BLD-SCNC: 95 MMOL/L (ref 99–110)
CHP ED QC CHECK: NORMAL
CO2: 24 MMOL/L (ref 21–32)
CREAT SERPL-MCNC: 1.6 MG/DL (ref 0.6–1.2)
EOSINOPHILS ABSOLUTE: 0.2 K/UL (ref 0–0.6)
EOSINOPHILS RELATIVE PERCENT: 2.6 %
GFR AFRICAN AMERICAN: 38
GFR NON-AFRICAN AMERICAN: 32
GLUCOSE BLD-MCNC: 169 MG/DL (ref 70–99)
GLUCOSE BLD-MCNC: 225 MG/DL
HCT VFR BLD CALC: 42 % (ref 36–48)
HEMOGLOBIN: 14.2 G/DL (ref 12–16)
LYMPHOCYTES ABSOLUTE: 1.1 K/UL (ref 1–5.1)
LYMPHOCYTES RELATIVE PERCENT: 15.3 %
MCH RBC QN AUTO: 28.5 PG (ref 26–34)
MCHC RBC AUTO-ENTMCNC: 33.8 G/DL (ref 31–36)
MCV RBC AUTO: 84.3 FL (ref 80–100)
MONOCYTES ABSOLUTE: 0.5 K/UL (ref 0–1.3)
MONOCYTES RELATIVE PERCENT: 6.3 %
NEUTROPHILS ABSOLUTE: 5.5 K/UL (ref 1.7–7.7)
NEUTROPHILS RELATIVE PERCENT: 75.6 %
PDW BLD-RTO: 15.2 % (ref 12.4–15.4)
PHOSPHORUS: 3.8 MG/DL (ref 2.5–4.9)
PLATELET # BLD: 187 K/UL (ref 135–450)
PMV BLD AUTO: 8.7 FL (ref 5–10.5)
POTASSIUM SERPL-SCNC: 4.3 MMOL/L (ref 3.5–5.1)
RBC # BLD: 4.98 M/UL (ref 4–5.2)
SODIUM BLD-SCNC: 135 MMOL/L (ref 136–145)
TSH REFLEX: 1.72 UIU/ML (ref 0.27–4.2)
WBC # BLD: 7.2 K/UL (ref 4–11)

## 2019-11-21 PROCEDURE — 82962 GLUCOSE BLOOD TEST: CPT | Performed by: INTERNAL MEDICINE

## 2019-11-21 PROCEDURE — 99214 OFFICE O/P EST MOD 30 MIN: CPT | Performed by: INTERNAL MEDICINE

## 2019-11-21 RX ORDER — PIOGLITAZONEHYDROCHLORIDE 15 MG/1
15 TABLET ORAL DAILY
Qty: 90 TABLET | Refills: 2 | Status: SHIPPED | OUTPATIENT
Start: 2019-11-21 | End: 2020-01-21

## 2019-11-21 RX ORDER — AMLODIPINE BESYLATE 10 MG/1
10 TABLET ORAL
COMMUNITY
Start: 2017-12-13 | End: 2019-11-21 | Stop reason: ALTCHOICE

## 2019-11-21 RX ORDER — GLIMEPIRIDE 2 MG/1
TABLET ORAL
Qty: 90 TABLET | Refills: 3 | Status: SHIPPED | OUTPATIENT
Start: 2019-11-21 | End: 2020-03-10 | Stop reason: ALTCHOICE

## 2019-11-27 ASSESSMENT — ENCOUNTER SYMPTOMS
BLURRED VISION: 0
ORTHOPNEA: 0
SHORTNESS OF BREATH: 0

## 2020-01-21 ENCOUNTER — OFFICE VISIT (OUTPATIENT)
Dept: INTERNAL MEDICINE CLINIC | Age: 75
End: 2020-01-21
Payer: MEDICARE

## 2020-01-21 VITALS
DIASTOLIC BLOOD PRESSURE: 68 MMHG | SYSTOLIC BLOOD PRESSURE: 200 MMHG | HEART RATE: 76 BPM | OXYGEN SATURATION: 96 % | WEIGHT: 171 LBS | BODY MASS INDEX: 32.71 KG/M2

## 2020-01-21 DIAGNOSIS — E11.65 CONTROLLED TYPE 2 DIABETES MELLITUS WITH HYPERGLYCEMIA, UNSPECIFIED WHETHER LONG TERM INSULIN USE (HCC): ICD-10-CM

## 2020-01-21 LAB
A/G RATIO: 1.7 (ref 1.1–2.2)
ALBUMIN SERPL-MCNC: 4.6 G/DL (ref 3.4–5)
ALP BLD-CCNC: 64 U/L (ref 40–129)
ALT SERPL-CCNC: 22 U/L (ref 10–40)
ANION GAP SERPL CALCULATED.3IONS-SCNC: 16 MMOL/L (ref 3–16)
AST SERPL-CCNC: 23 U/L (ref 15–37)
BILIRUB SERPL-MCNC: 0.3 MG/DL (ref 0–1)
BUN BLDV-MCNC: 22 MG/DL (ref 7–20)
CALCIUM SERPL-MCNC: 10.1 MG/DL (ref 8.3–10.6)
CHLORIDE BLD-SCNC: 99 MMOL/L (ref 99–110)
CHP ED QC CHECK: NORMAL
CO2: 26 MMOL/L (ref 21–32)
CREAT SERPL-MCNC: 1.3 MG/DL (ref 0.6–1.2)
GFR AFRICAN AMERICAN: 48
GFR NON-AFRICAN AMERICAN: 40
GLOBULIN: 2.7 G/DL
GLUCOSE BLD-MCNC: 126 MG/DL
GLUCOSE BLD-MCNC: 129 MG/DL (ref 70–99)
POTASSIUM SERPL-SCNC: 4.4 MMOL/L (ref 3.5–5.1)
SODIUM BLD-SCNC: 141 MMOL/L (ref 136–145)
TOTAL PROTEIN: 7.3 G/DL (ref 6.4–8.2)

## 2020-01-21 PROCEDURE — G8399 PT W/DXA RESULTS DOCUMENT: HCPCS | Performed by: INTERNAL MEDICINE

## 2020-01-21 PROCEDURE — 1036F TOBACCO NON-USER: CPT | Performed by: INTERNAL MEDICINE

## 2020-01-21 PROCEDURE — 3017F COLORECTAL CA SCREEN DOC REV: CPT | Performed by: INTERNAL MEDICINE

## 2020-01-21 PROCEDURE — G8482 FLU IMMUNIZE ORDER/ADMIN: HCPCS | Performed by: INTERNAL MEDICINE

## 2020-01-21 PROCEDURE — 2022F DILAT RTA XM EVC RTNOPTHY: CPT | Performed by: INTERNAL MEDICINE

## 2020-01-21 PROCEDURE — 4040F PNEUMOC VAC/ADMIN/RCVD: CPT | Performed by: INTERNAL MEDICINE

## 2020-01-21 PROCEDURE — G8427 DOCREV CUR MEDS BY ELIG CLIN: HCPCS | Performed by: INTERNAL MEDICINE

## 2020-01-21 PROCEDURE — G8417 CALC BMI ABV UP PARAM F/U: HCPCS | Performed by: INTERNAL MEDICINE

## 2020-01-21 PROCEDURE — 1090F PRES/ABSN URINE INCON ASSESS: CPT | Performed by: INTERNAL MEDICINE

## 2020-01-21 PROCEDURE — 99214 OFFICE O/P EST MOD 30 MIN: CPT | Performed by: INTERNAL MEDICINE

## 2020-01-21 PROCEDURE — 1123F ACP DISCUSS/DSCN MKR DOCD: CPT | Performed by: INTERNAL MEDICINE

## 2020-01-21 PROCEDURE — 82962 GLUCOSE BLOOD TEST: CPT | Performed by: INTERNAL MEDICINE

## 2020-01-21 PROCEDURE — 3044F HG A1C LEVEL LT 7.0%: CPT | Performed by: INTERNAL MEDICINE

## 2020-01-21 RX ORDER — SERTRALINE HYDROCHLORIDE 25 MG/1
25 TABLET, FILM COATED ORAL EVERY MORNING
Qty: 90 TABLET | Refills: 1 | Status: SHIPPED | OUTPATIENT
Start: 2020-01-21 | End: 2020-03-10

## 2020-01-21 RX ORDER — LOSARTAN POTASSIUM 50 MG/1
50 TABLET ORAL DAILY
Qty: 90 TABLET | Refills: 1 | Status: SHIPPED | OUTPATIENT
Start: 2020-01-21 | End: 2020-05-11

## 2020-01-21 RX ORDER — SERTRALINE HYDROCHLORIDE 100 MG/1
100 TABLET, FILM COATED ORAL EVERY MORNING
Qty: 90 TABLET | Refills: 1 | Status: SHIPPED | OUTPATIENT
Start: 2020-01-21 | End: 2020-07-24 | Stop reason: SDUPTHER

## 2020-01-21 RX ORDER — CLONIDINE HYDROCHLORIDE 0.1 MG/1
0.3 TABLET ORAL 2 TIMES DAILY
Status: DISCONTINUED | OUTPATIENT
Start: 2020-01-21 | End: 2020-03-30

## 2020-01-21 RX ADMIN — CLONIDINE HYDROCHLORIDE 0.3 MG: 0.1 TABLET ORAL at 08:16

## 2020-01-21 ASSESSMENT — PATIENT HEALTH QUESTIONNAIRE - PHQ9
2. FEELING DOWN, DEPRESSED OR HOPELESS: 0
1. LITTLE INTEREST OR PLEASURE IN DOING THINGS: 0
SUM OF ALL RESPONSES TO PHQ QUESTIONS 1-9: 0
SUM OF ALL RESPONSES TO PHQ QUESTIONS 1-9: 0
SUM OF ALL RESPONSES TO PHQ9 QUESTIONS 1 & 2: 0

## 2020-01-21 NOTE — PROGRESS NOTES
[address]  Phone: [phone]           Patient Name: Henok Dickson    YOB: 1945    Today's Date: 1/21/20           Chief Complaint   Patient presents with    Diabetes    Discuss Medications          Subjective: Anxiety and depression  Grieving  still  She is alone all the time  Her three children live locally, but her one son she has not seen in two years. Her daughter is busy with her children- last saw her three weeks ago  Her younger son she sees him twice per week   Friends-  They are busy   She attends Religious- [facility].. Attends Sundays. Has a hard time driving at night so has a hard time with evening services    She feels like her mood is worse in the winter        Diabetes   She presents for her follow-up diabetic visit. She has type 2 diabetes mellitus. Her disease course has been stable. Pertinent negatives for hypoglycemia include no headaches or sweats. There are no diabetic associated symptoms. Pertinent negatives for diabetes include no blurred vision and no chest pain. Symptoms are stable. Risk factors for coronary artery disease include diabetes mellitus and sedentary lifestyle. Hypertension   This is a chronic problem. The current episode started more than 1 year ago. The problem is controlled. Associated symptoms include anxiety. Pertinent negatives include no blurred vision, chest pain, headaches, malaise/fatigue, neck pain, orthopnea, palpitations, peripheral edema, PND, shortness of breath or sweats. There are no associated agents to hypertension.    Has not taken blood pressure medications today yet  Home BPs 130/70s   History:     Past Medical History:   Diagnosis Date    Alopecia     Anxiety and depression     Cancer (HCC)     uterine    CTS (carpal tunnel syndrome)      History of rheumatic fever     Hypertension     Hypothyroidism     Meniere disease bilateral ears    Nausea     associated with Meniere's dz    Obesity     Reflux esophagitis     Renal impairment     Sleep apnea     no longer needs cpap (per pt)    Type II or unspecified type diabetes mellitus without mention of complication, not stated as uncontrolled        Current Outpatient Medications on File Prior to Visit   Medication Sig Dispense Refill    metFORMIN (GLUCOPHAGE) 500 MG tablet Take 1,000 mg by mouth      glimepiride (AMARYL) 2 MG tablet TAKE 1 TABLET BY MOUTH IN THE MORNING 90 tablet 3    cloNIDine (CATAPRES) 0.3 MG tablet Take 1 tablet by mouth 3 times daily 270 tablet 1    levothyroxine (SYNTHROID) 50 MCG tablet TAKE 1 TABLET BY MOUTH ONCE DAILY 90 tablet 3    Multiple Vitamins-Minerals (CENTRUM SILVER 50+WOMEN PO) Take by mouth      blood glucose test strips (RELION GLUCOSE TEST STRIPS) strip 1 each by In Vitro route daily As needed. 100 each 3    triamterene-hydrochlorothiazide (DYAZIDE) 37.5-25 MG per capsule Take 1 capsule by mouth every morning 90 capsule 3     No current facility-administered medications on file prior to visit. Social History     Tobacco Use    Smoking status: Never Smoker    Smokeless tobacco: Never Used   Substance Use Topics    Alcohol use: No         Review of Systems:    Review of Systems   Constitutional: Negative for malaise/fatigue. Eyes: Negative for blurred vision. Respiratory: Negative for shortness of breath. Cardiovascular: Negative for chest pain, palpitations, orthopnea and PND. Musculoskeletal: Negative for neck pain. Neurological: Negative for headaches.        Objective:    Vitals:    01/21/20 0800 01/21/20 0836 01/21/20 0849   BP: (!) 200/70 (!) 202/80 (!) 200/68   Site:  Right Upper Arm    Position:  Sitting    Cuff Size:  Medium Adult    Pulse: 76     SpO2: 96%     Weight: 171 lb (77.6 kg)       Wt Readings from Last 3 Encounters:   01/22/20 171 lb (77.6 kg)   01/21/20 171 lb (77.6 kg)   11/21/19 168 lb (76.2 kg) Body mass index is 32.71 kg/m². Physical Exam  Constitutional:       General: She is not in acute distress. Appearance: She is well-developed. HENT:      Head: Normocephalic. Mouth/Throat:      Pharynx: No oropharyngeal exudate. Cardiovascular:      Rate and Rhythm: Normal rate and regular rhythm. Heart sounds: Normal heart sounds. No murmur. Pulmonary:      Effort: Pulmonary effort is normal.      Breath sounds: Normal breath sounds. Abdominal:      General: There is no distension. Palpations: Abdomen is soft. Tenderness: There is no tenderness. Musculoskeletal:      Right lower leg: Edema present. Left lower leg: Edema present. Skin:     General: Skin is warm. Findings: No rash. Results for Ryann Maciel (MRN [id number]) as of 1/21/2020 08:06   Ref.  Range 11/21/2019 11:20   Sodium Latest Ref Range: 136 - 145 mmol/L 135 (L)   Potassium Latest Ref Range: 3.5 - 5.1 mmol/L 4.3   Chloride Latest Ref Range: 99 - 110 mmol/L 95 (L)   CO2 Latest Ref Range: 21 - 32 mmol/L 24   BUN Latest Ref Range: 7 - 20 mg/dL 29 (H)   Creatinine Latest Ref Range: 0.6 - 1.2 mg/dL 1.6 (H)   Anion Gap Latest Ref Range: 3 - 16  16   GFR Non- Latest Ref Range: >60  32 (A)   GFR  Latest Ref Range: >60  38 (A)   Glucose Latest Ref Range: 70 - 99 mg/dL 169 (H)   Calcium Latest Ref Range: 8.3 - 10.6 mg/dL 9.9   Phosphorus Latest Ref Range: 2.5 - 4.9 mg/dL 3.8   Albumin Latest Ref Range: 3.4 - 5.0 g/dL 4.7   TSH Latest Ref Range: 0.27 - 4.20 uIU/mL 1.72   WBC Latest Ref Range: 4.0 - 11.0 K/uL 7.2   RBC Latest Ref Range: 4.00 - 5.20 M/uL 4.98   Hemoglobin Quant Latest Ref Range: 12.0 - 16.0 g/dL 14.2   Hematocrit Latest Ref Range: 36.0 - 48.0 % 42.0   MCV Latest Ref Range: 80.0 - 100.0 fL 84.3   MCH Latest Ref Range: 26.0 - 34.0 pg 28.5   MCHC Latest Ref Range: 31.0 - 36.0 g/dL 33.8   MPV Latest Ref Range: 5.0 - 10.5 fL 8.7   RDW Latest Ref Range: transcription errors may be present.

## 2020-01-22 ENCOUNTER — OFFICE VISIT (OUTPATIENT)
Dept: INTERNAL MEDICINE CLINIC | Age: 75
End: 2020-01-22
Payer: MEDICARE

## 2020-01-22 ENCOUNTER — NURSE TRIAGE (OUTPATIENT)
Dept: OTHER | Facility: CLINIC | Age: 75
End: 2020-01-22

## 2020-01-22 ENCOUNTER — TELEPHONE (OUTPATIENT)
Dept: INTERNAL MEDICINE CLINIC | Age: 75
End: 2020-01-22

## 2020-01-22 VITALS
WEIGHT: 171 LBS | SYSTOLIC BLOOD PRESSURE: 196 MMHG | DIASTOLIC BLOOD PRESSURE: 84 MMHG | BODY MASS INDEX: 32.71 KG/M2 | HEART RATE: 66 BPM

## 2020-01-22 LAB
ESTIMATED AVERAGE GLUCOSE: 142.7 MG/DL
HBA1C MFR BLD: 6.6 %

## 2020-01-22 PROCEDURE — G8417 CALC BMI ABV UP PARAM F/U: HCPCS | Performed by: INTERNAL MEDICINE

## 2020-01-22 PROCEDURE — 3017F COLORECTAL CA SCREEN DOC REV: CPT | Performed by: INTERNAL MEDICINE

## 2020-01-22 PROCEDURE — 1036F TOBACCO NON-USER: CPT | Performed by: INTERNAL MEDICINE

## 2020-01-22 PROCEDURE — 99214 OFFICE O/P EST MOD 30 MIN: CPT | Performed by: INTERNAL MEDICINE

## 2020-01-22 PROCEDURE — G8482 FLU IMMUNIZE ORDER/ADMIN: HCPCS | Performed by: INTERNAL MEDICINE

## 2020-01-22 PROCEDURE — 4040F PNEUMOC VAC/ADMIN/RCVD: CPT | Performed by: INTERNAL MEDICINE

## 2020-01-22 PROCEDURE — 1090F PRES/ABSN URINE INCON ASSESS: CPT | Performed by: INTERNAL MEDICINE

## 2020-01-22 PROCEDURE — G8399 PT W/DXA RESULTS DOCUMENT: HCPCS | Performed by: INTERNAL MEDICINE

## 2020-01-22 PROCEDURE — G8427 DOCREV CUR MEDS BY ELIG CLIN: HCPCS | Performed by: INTERNAL MEDICINE

## 2020-01-22 PROCEDURE — 1123F ACP DISCUSS/DSCN MKR DOCD: CPT | Performed by: INTERNAL MEDICINE

## 2020-01-22 RX ORDER — ACETAMINOPHEN 500 MG
1000 TABLET ORAL EVERY 6 HOURS PRN
Qty: 120 TABLET | Refills: 3 | Status: SHIPPED | OUTPATIENT
Start: 2020-01-22

## 2020-01-22 RX ORDER — TOPIRAMATE 25 MG/1
25 TABLET ORAL NIGHTLY
Qty: 10 TABLET | Refills: 0 | Status: SHIPPED | OUTPATIENT
Start: 2020-01-22 | End: 2021-07-19 | Stop reason: ALTCHOICE

## 2020-01-22 ASSESSMENT — ENCOUNTER SYMPTOMS
SHORTNESS OF BREATH: 0
PHOTOPHOBIA: 0

## 2020-01-22 NOTE — PROGRESS NOTES
2020     Darrel Kern (:  1945) is a 69 y.o. female, here for evaluation of the following medical concerns:    HPI  Patient comes to the office today after being seen in the office yesterday for blood pressure. She complained of having a headache today and needs to be seen. Patient did not take her blood pressure medication yesterday but took 50 mg of losartan and thinks that that caused her headache today. Patient is not compliant with her medications for her diabetes, hypothyroidism or hypertension. She was adamant that she did not need to take any medication for these things and was only taking metformin as needed when her blood sugar was elevated. Review of Systems   Eyes: Negative for photophobia. Respiratory: Negative for shortness of breath. Cardiovascular: Negative for chest pain. Neurological: Positive for dizziness, weakness and light-headedness. Prior to Visit Medications    Medication Sig Taking?  Authorizing Provider   acetaminophen (APAP EXTRA STRENGTH) 500 MG tablet Take 2 tablets by mouth every 6 hours as needed for Pain Yes Kim Lugo MD   topiramate (TOPAMAX) 25 MG tablet Take 1 tablet by mouth nightly Yes Kim Lugo MD   losartan (COZAAR) 50 MG tablet Take 1 tablet by mouth daily Yes Thierry Soliz MD   sertraline (ZOLOFT) 100 MG tablet Take 1 tablet by mouth every morning Total 125mg Yes Thierry Soliz MD   sertraline (ZOLOFT) 25 MG tablet Take 1 tablet by mouth every morning Total 125mg Yes Thierry Soliz MD   metFORMIN (GLUCOPHAGE) 500 MG tablet Take 1,000 mg by mouth Yes Historical Provider, MD   glimepiride (AMARYL) 2 MG tablet TAKE 1 TABLET BY MOUTH IN THE MORNING Yes Thierry Soliz MD   cloNIDine (CATAPRES) 0.3 MG tablet Take 1 tablet by mouth 3 times daily Yes Trenton Sutton MD   levothyroxine (SYNTHROID) 50 MCG tablet TAKE 1 TABLET BY MOUTH ONCE DAILY Yes Trenton Sutton MD   Multiple Vitamins-Minerals (CENTRUM SILVER 50+WOMEN PO) Take by mouth Yes Historical Provider, MD   blood glucose test strips (RELION GLUCOSE TEST STRIPS) strip 1 each by In Vitro route daily As needed. Yes Sam Lou MD   triamterene-hydrochlorothiazide (DYAZIDE) 37.5-25 MG per capsule Take 1 capsule by mouth every morning Yes Freddy Mc MD        Social History     Tobacco Use    Smoking status: Never Smoker    Smokeless tobacco: Never Used   Substance Use Topics    Alcohol use: No        Vitals:    01/22/20 1651 01/22/20 1655   BP: (!) 196/86 (!) 196/84   Pulse: 66    Weight: 171 lb (77.6 kg)      Estimated body mass index is 32.71 kg/m² as calculated from the following:    Height as of 3/6/19: 5' 0.63\" (1.54 m). Weight as of this encounter: 171 lb (77.6 kg). Physical Exam  Vitals signs reviewed. Constitutional:       General: She is not in acute distress. Appearance: She is well-developed. She is not diaphoretic. HENT:      Head: Normocephalic and atraumatic. Right Ear: Tympanic membrane, ear canal and external ear normal. There is no impacted cerumen. Left Ear: Tympanic membrane, ear canal and external ear normal. There is no impacted cerumen. Nose: Nose normal. No congestion or rhinorrhea. Mouth/Throat:      Mouth: Mucous membranes are moist.   Pulmonary:      Effort: Pulmonary effort is normal.   Neurological:      Mental Status: She is alert and oriented to person, place, and time. Cranial Nerves: No cranial nerve deficit. Psychiatric:         Behavior: Behavior normal.         Thought Content: Thought content normal.         Judgment: Judgment normal.         ASSESSMENT/PLAN:  Assessment/Plan:  Evelin Nickerson was seen today for dizziness and headache. Diagnoses and all orders for this visit:    Hypertensive urgency  Comments:  Patient would not believe that her headache might be related to the hypertension that she had. She refused to take any medication.     Acute non intractable tension-type headache  -     acetaminophen (APAP EXTRA STRENGTH) 500 MG tablet; Take 2 tablets by mouth every 6 hours as needed for Pain  -     topiramate (TOPAMAX) 25 MG tablet; Take 1 tablet by mouth nightly    Non-compliance  Comments: This apparently has been a trait consistent with the patient for a while. Acquired hypothyroidism  Comments:  Patient is noncompliant. Return in about 4 weeks (around 2/19/2020) for visit with Dr. Christopher Butterfield. An electronic signature was used to authenticate this note.     --Yajaira Ram MD on 1/22/2020 at 5:28 PM

## 2020-01-22 NOTE — TELEPHONE ENCOUNTER
Reason for Disposition   SEVERE headache or neck pain    Protocols used: DIZZINESS-ADULT-OH    Received call from Marley Hummel in [facility]. Patient states she took Losartan 50 mg  Yesterday and today. She states she knows that this has caused her problems. She states she has dizziness, and severe headache. When asked to rate pain on scale 1-10, she said \"100\". She denies weakness on one side of her body versus the other. Speech is clear. She just wants something for her pain. She   Call soft transferred to PCP office to discuss. Spoke with Dr. Jeri Saucedo and he suggests patient come into office at 4:50. Patient agrees with plan.

## 2020-01-28 ASSESSMENT — ENCOUNTER SYMPTOMS
BLURRED VISION: 0
SHORTNESS OF BREATH: 0
ORTHOPNEA: 0

## 2020-01-31 ENCOUNTER — OFFICE VISIT (OUTPATIENT)
Dept: INTERNAL MEDICINE CLINIC | Age: 75
End: 2020-01-31
Payer: MEDICARE

## 2020-01-31 VITALS
DIASTOLIC BLOOD PRESSURE: 88 MMHG | HEART RATE: 86 BPM | BODY MASS INDEX: 31.94 KG/M2 | SYSTOLIC BLOOD PRESSURE: 168 MMHG | OXYGEN SATURATION: 98 % | WEIGHT: 167 LBS

## 2020-01-31 PROBLEM — I10 WHITE COAT SYNDROME WITH DIAGNOSIS OF HYPERTENSION: Status: ACTIVE | Noted: 2020-01-31

## 2020-01-31 PROCEDURE — G8399 PT W/DXA RESULTS DOCUMENT: HCPCS | Performed by: INTERNAL MEDICINE

## 2020-01-31 PROCEDURE — 1090F PRES/ABSN URINE INCON ASSESS: CPT | Performed by: INTERNAL MEDICINE

## 2020-01-31 PROCEDURE — 1036F TOBACCO NON-USER: CPT | Performed by: INTERNAL MEDICINE

## 2020-01-31 PROCEDURE — G8417 CALC BMI ABV UP PARAM F/U: HCPCS | Performed by: INTERNAL MEDICINE

## 2020-01-31 PROCEDURE — 4040F PNEUMOC VAC/ADMIN/RCVD: CPT | Performed by: INTERNAL MEDICINE

## 2020-01-31 PROCEDURE — G8427 DOCREV CUR MEDS BY ELIG CLIN: HCPCS | Performed by: INTERNAL MEDICINE

## 2020-01-31 PROCEDURE — 3017F COLORECTAL CA SCREEN DOC REV: CPT | Performed by: INTERNAL MEDICINE

## 2020-01-31 PROCEDURE — 1123F ACP DISCUSS/DSCN MKR DOCD: CPT | Performed by: INTERNAL MEDICINE

## 2020-01-31 PROCEDURE — G8482 FLU IMMUNIZE ORDER/ADMIN: HCPCS | Performed by: INTERNAL MEDICINE

## 2020-01-31 PROCEDURE — 99213 OFFICE O/P EST LOW 20 MIN: CPT | Performed by: INTERNAL MEDICINE

## 2020-01-31 RX ORDER — LISINOPRIL 10 MG/1
10 TABLET ORAL DAILY
Qty: 90 TABLET | Refills: 3 | Status: SHIPPED | OUTPATIENT
Start: 2020-01-31 | End: 2020-08-06 | Stop reason: SDUPTHER

## 2020-01-31 RX ORDER — LISINOPRIL 10 MG/1
10 TABLET ORAL DAILY
COMMUNITY
End: 2020-01-31 | Stop reason: SDUPTHER

## 2020-01-31 ASSESSMENT — ENCOUNTER SYMPTOMS
SHORTNESS OF BREATH: 0
ORTHOPNEA: 0
BLURRED VISION: 0

## 2020-01-31 NOTE — PROGRESS NOTES
[address] [place] [address]  Phone: [phone]           Patient Name: Gardenia Barron    YOB: 1945    Today's Date: 1/31/20           Chief Complaint   Patient presents with    Hypertension    Depression          Subjective:  Says clonidine \"tore her up\" that was given to her in the office  Went to the ER for headache. She was given lisinopril 10mg   Patient says she now feels great today. She feels it is because of claustrophobia her blood pressure so high    Hypertension   This is a chronic problem. The current episode started more than 1 year ago. The problem is uncontrolled. Associated symptoms include headaches (resolved ). Pertinent negatives include no anxiety, blurred vision, chest pain, malaise/fatigue, neck pain, orthopnea, palpitations, peripheral edema, PND, shortness of breath or sweats.    Home BPs 130/60     History:     Past Medical History:   Diagnosis Date    Alopecia     Anxiety and depression     Cancer (HCC)     uterine    CTS (carpal tunnel syndrome)      History of rheumatic fever     Hypertension     Hypothyroidism     Meniere disease     bilateral ears    Nausea     associated with Meniere's dz    Obesity     Reflux esophagitis     Renal impairment     Sleep apnea     no longer needs cpap (per pt)    Type II or unspecified type diabetes mellitus without mention of complication, not stated as uncontrolled        Current Outpatient Medications on File Prior to Visit   Medication Sig Dispense Refill    topiramate (TOPAMAX) 25 MG tablet Take 1 tablet by mouth nightly 10 tablet 0    sertraline (ZOLOFT) 100 MG tablet Take 1 tablet by mouth every morning Total 125mg 90 tablet 1    sertraline (ZOLOFT) 25 MG tablet Take 1 tablet by mouth every morning Total 125mg 90 tablet 1    metFORMIN (GLUCOPHAGE) 500 MG tablet Take 1,000 mg by mouth      glimepiride (AMARYL) 2 MG tablet TAKE 1 TABLET BY

## 2020-02-26 ENCOUNTER — TELEPHONE (OUTPATIENT)
Dept: INTERNAL MEDICINE CLINIC | Age: 75
End: 2020-02-26

## 2020-02-26 NOTE — TELEPHONE ENCOUNTER
Patient came into the office complaining of dizziness and wozziness and she thinks it is from her Linsinopril. Please advise, patient offered an appointment for 3/10 at 9:30 to discuss with you if needed.

## 2020-02-27 NOTE — TELEPHONE ENCOUNTER
Called patient and informed her of the message but the patient informed me that she had already thrown the medication away. She said they were bad for me and made her very sick.

## 2020-03-02 ENCOUNTER — HOSPITAL ENCOUNTER (OUTPATIENT)
Age: 75
Discharge: HOME | End: 2020-03-02
Payer: MEDICARE

## 2020-03-02 LAB
A/G RATIO: 1.5 (ref 1.1–2.2)
ALBUMIN SERPL-MCNC: 4.6 G/DL (ref 3.4–5)
ALP BLD-CCNC: 60 U/L (ref 40–129)
ALT SERPL-CCNC: 20 U/L (ref 10–40)
ANION GAP SERPL CALCULATED.3IONS-SCNC: 17 MMOL/L (ref 3–16)
AST SERPL-CCNC: 23 U/L (ref 15–37)
BILIRUB SERPL-MCNC: 0.4 MG/DL (ref 0–1)
BUN BLDV-MCNC: 33 MG/DL (ref 7–20)
CALCIUM SERPL-MCNC: 9.8 MG/DL (ref 8.3–10.6)
CHLORIDE BLD-SCNC: 97 MMOL/L (ref 99–110)
CHOLESTEROL, TOTAL: 271 MG/DL (ref 0–199)
CO2: 24 MMOL/L (ref 21–32)
CREAT SERPL-MCNC: 1.5 MG/DL (ref 0.6–1.2)
FOLATE: >20 NG/ML (ref 4.78–24.2)
GFR AFRICAN AMERICAN: 41
GFR NON-AFRICAN AMERICAN: 34
GLOBULIN: 3 G/DL
GLUCOSE BLD-MCNC: 130 MG/DL (ref 70–99)
HDLC SERPL-MCNC: 39 MG/DL (ref 40–60)
HOMOCYSTEINE: 15 UMOL/L (ref 0–10)
LDL CHOLESTEROL CALCULATED: ABNORMAL MG/DL
LDL CHOLESTEROL DIRECT: 179 MG/DL
MAGNESIUM: 1.9 MG/DL (ref 1.8–2.4)
POTASSIUM SERPL-SCNC: 4.6 MMOL/L (ref 3.5–5.1)
SODIUM BLD-SCNC: 138 MMOL/L (ref 136–145)
T4 FREE: 1.5 NG/DL (ref 0.9–1.8)
THYROID PEROXIDASE (TPO) ABS: 9 IU/ML
TOTAL PROTEIN: 7.6 G/DL (ref 6.4–8.2)
TRIGL SERPL-MCNC: 370 MG/DL (ref 0–150)
TSH SERPL DL<=0.05 MIU/L-ACNC: 1.44 UIU/ML (ref 0.27–4.2)
VITAMIN B-12: 943 PG/ML (ref 211–911)
VITAMIN D 25-HYDROXY: 43.6 NG/ML
VLDLC SERPL CALC-MCNC: ABNORMAL MG/DL

## 2020-03-02 PROCEDURE — 36415 COLL VENOUS BLD VENIPUNCTURE: CPT

## 2020-03-02 PROCEDURE — 83090 ASSAY OF HOMOCYSTEINE: CPT

## 2020-03-02 PROCEDURE — 82746 ASSAY OF FOLIC ACID SERUM: CPT

## 2020-03-02 PROCEDURE — 84681 ASSAY OF C-PEPTIDE: CPT

## 2020-03-02 PROCEDURE — 84443 ASSAY THYROID STIM HORMONE: CPT

## 2020-03-02 PROCEDURE — 86376 MICROSOMAL ANTIBODY EACH: CPT

## 2020-03-02 PROCEDURE — 83921 ORGANIC ACID SINGLE QUANT: CPT

## 2020-03-02 PROCEDURE — 84439 ASSAY OF FREE THYROXINE: CPT

## 2020-03-02 PROCEDURE — 83735 ASSAY OF MAGNESIUM: CPT

## 2020-03-02 PROCEDURE — 80061 LIPID PANEL: CPT

## 2020-03-02 PROCEDURE — 80053 COMPREHEN METABOLIC PANEL: CPT

## 2020-03-02 PROCEDURE — 82607 VITAMIN B-12: CPT

## 2020-03-02 PROCEDURE — 82306 VITAMIN D 25 HYDROXY: CPT

## 2020-03-04 LAB
C-PEPTIDE: 4.7 NG/ML (ref 1.1–4.4)
METHYLMALONIC ACID: 0.2 UMOL/L (ref 0–0.4)

## 2020-03-10 ENCOUNTER — TELEPHONE (OUTPATIENT)
Dept: INTERNAL MEDICINE CLINIC | Age: 75
End: 2020-03-10

## 2020-03-10 ENCOUNTER — OFFICE VISIT (OUTPATIENT)
Dept: INTERNAL MEDICINE CLINIC | Age: 75
End: 2020-03-10
Payer: MEDICARE

## 2020-03-10 VITALS
OXYGEN SATURATION: 97 % | SYSTOLIC BLOOD PRESSURE: 140 MMHG | BODY MASS INDEX: 30.98 KG/M2 | DIASTOLIC BLOOD PRESSURE: 80 MMHG | WEIGHT: 162 LBS | HEART RATE: 84 BPM

## 2020-03-10 PROCEDURE — 4040F PNEUMOC VAC/ADMIN/RCVD: CPT | Performed by: INTERNAL MEDICINE

## 2020-03-10 PROCEDURE — 1090F PRES/ABSN URINE INCON ASSESS: CPT | Performed by: INTERNAL MEDICINE

## 2020-03-10 PROCEDURE — G8399 PT W/DXA RESULTS DOCUMENT: HCPCS | Performed by: INTERNAL MEDICINE

## 2020-03-10 PROCEDURE — G8427 DOCREV CUR MEDS BY ELIG CLIN: HCPCS | Performed by: INTERNAL MEDICINE

## 2020-03-10 PROCEDURE — G8417 CALC BMI ABV UP PARAM F/U: HCPCS | Performed by: INTERNAL MEDICINE

## 2020-03-10 PROCEDURE — G8482 FLU IMMUNIZE ORDER/ADMIN: HCPCS | Performed by: INTERNAL MEDICINE

## 2020-03-10 PROCEDURE — 3017F COLORECTAL CA SCREEN DOC REV: CPT | Performed by: INTERNAL MEDICINE

## 2020-03-10 PROCEDURE — 1123F ACP DISCUSS/DSCN MKR DOCD: CPT | Performed by: INTERNAL MEDICINE

## 2020-03-10 PROCEDURE — 1036F TOBACCO NON-USER: CPT | Performed by: INTERNAL MEDICINE

## 2020-03-10 PROCEDURE — 99214 OFFICE O/P EST MOD 30 MIN: CPT | Performed by: INTERNAL MEDICINE

## 2020-03-10 RX ORDER — DULAGLUTIDE 0.75 MG/.5ML
INJECTION, SOLUTION SUBCUTANEOUS
COMMUNITY
Start: 2020-02-06 | End: 2020-08-06 | Stop reason: SDUPTHER

## 2020-03-10 RX ORDER — AMPICILLIN TRIHYDRATE 250 MG
1000 CAPSULE ORAL
COMMUNITY
End: 2022-06-06

## 2020-03-10 ASSESSMENT — ENCOUNTER SYMPTOMS
COUGH: 0
CHANGE IN BOWEL HABIT: 0
ABDOMINAL PAIN: 0
NAUSEA: 0
SHORTNESS OF BREATH: 0

## 2020-03-10 NOTE — PROGRESS NOTES
 History of rheumatic fever     Hypertension     Hypothyroidism     Meniere disease     bilateral ears    Nausea     associated with Meniere's dz    Obesity     Reflux esophagitis     Renal impairment     Sleep apnea     no longer needs cpap (per pt)    Type II or unspecified type diabetes mellitus without mention of complication, not stated as uncontrolled        Current Outpatient Medications on File Prior to Visit   Medication Sig Dispense Refill    Cinnamon 500 MG CAPS Take 1,000 mg by mouth      UNABLE TO FIND Brain Strong (Memory Support)      lisinopril (PRINIVIL;ZESTRIL) 10 MG tablet Take 1 tablet by mouth daily 90 tablet 3    acetaminophen (APAP EXTRA STRENGTH) 500 MG tablet Take 2 tablets by mouth every 6 hours as needed for Pain 120 tablet 3    sertraline (ZOLOFT) 100 MG tablet Take 1 tablet by mouth every morning Total 125mg 90 tablet 1    metFORMIN (GLUCOPHAGE) 500 MG tablet Take 1,000 mg by mouth      cloNIDine (CATAPRES) 0.3 MG tablet Take 1 tablet by mouth 3 times daily 270 tablet 1    levothyroxine (SYNTHROID) 50 MCG tablet TAKE 1 TABLET BY MOUTH ONCE DAILY 90 tablet 3    Multiple Vitamins-Minerals (CENTRUM SILVER 50+WOMEN PO) Take by mouth      blood glucose test strips (RELION GLUCOSE TEST STRIPS) strip 1 each by In Vitro route daily As needed.  869 each 3    TRULICITY 2.68 IE/4.2UP SOPN       topiramate (TOPAMAX) 25 MG tablet Take 1 tablet by mouth nightly (Patient not taking: Reported on 3/10/2020) 10 tablet 0    losartan (COZAAR) 50 MG tablet Take 1 tablet by mouth daily (Patient not taking: Reported on 1/31/2020) 90 tablet 1    triamterene-hydrochlorothiazide (DYAZIDE) 37.5-25 MG per capsule Take 1 capsule by mouth every morning (Patient not taking: Reported on 1/31/2020) 90 capsule 3     Current Facility-Administered Medications on File Prior to Visit   Medication Dose Route Frequency Provider Last Rate Last Dose    cloNIDine (CATAPRES) tablet 0.3 mg  0.3 mg Oral BID Jennifer Hinton MD   0.3 mg at 01/21/20 0090       Social History     Tobacco Use    Smoking status: Never Smoker    Smokeless tobacco: Never Used   Substance Use Topics    Alcohol use: No         Review of Systems:    Review of Systems   Constitutional: Negative for chills, diaphoresis, fatigue and fever. HENT: Positive for tinnitus. Negative for congestion. Ear congestion   Respiratory: Negative for cough and shortness of breath. Cardiovascular: Negative for chest pain. Gastrointestinal: Negative for abdominal pain, anorexia, change in bowel habit and nausea. Musculoskeletal: Negative for arthralgias, joint swelling, myalgias and neck pain. Neurological: Positive for dizziness and light-headedness. Negative for numbness and headaches. Occasional vertigo     Objective:    Vitals:    03/10/20 0920   BP: (!) 140/80   Pulse: 84   SpO2: 97%   Weight: 162 lb (73.5 kg)     Wt Readings from Last 3 Encounters:   03/10/20 162 lb (73.5 kg)   01/31/20 167 lb (75.8 kg)   01/22/20 171 lb (77.6 kg)       Body mass index is 30.98 kg/m². Physical Exam  Constitutional:       General: She is not in acute distress. Appearance: She is well-developed. HENT:      Head: Normocephalic. Mouth/Throat:      Pharynx: No oropharyngeal exudate. Cardiovascular:      Rate and Rhythm: Normal rate and regular rhythm. Heart sounds: Normal heart sounds. No murmur. Pulmonary:      Effort: Pulmonary effort is normal.      Breath sounds: Normal breath sounds. Abdominal:      General: There is no distension. Palpations: Abdomen is soft. Tenderness: There is no abdominal tenderness. Skin:     General: Skin is warm. Findings: No rash. Results for Jignesh Templeton (MRN [id number]) as of 3/10/2020 09:31   Ref.  Range 3/2/2020 12:07   Sodium Latest Ref Range: 136 - 145 mmol/L 138   Potassium Latest Ref Range: 3.5 - 5.1 mmol/L 4.6   Chloride Latest Ref Range: 99 - 110 Latest Ref Range: 0.0 - 1.0 mg/dL 0.3   Total Protein Latest Ref Range: 6.4 - 8.2 g/dL 7.3   Glucose Latest Ref Range: 70 - 99 mg/dL 129 (H)   Hemoglobin A1C Latest Ref Range: See comment % 6.6   eAG (mg/dL) Latest Units: mg/dL 142.7     Assessment:    1. Mood disorder [facility])  To psychology for further management  - Ambulatory referral to Psychology    2. Essential hypertension  Well-controlled        Plan/Patient Instructions:    Patient Instructions   Let me know your Diabetes doctor's  name    Restart Flonase     Refer for counseling        Return for as scheduled . [address]       Documentation was done using voice recognition dragon software. Every effort was made to ensure accuracy; however, inadvertent, unintentional computerized transcription errors may be present.

## 2020-03-30 RX ORDER — CLONIDINE HYDROCHLORIDE 0.3 MG/1
0.3 TABLET ORAL 3 TIMES DAILY
Qty: 270 TABLET | Refills: 1 | OUTPATIENT
Start: 2020-03-30

## 2020-03-30 RX ORDER — CLONIDINE HYDROCHLORIDE 0.3 MG/1
TABLET ORAL
Qty: 270 TABLET | Refills: 1 | Status: SHIPPED | OUTPATIENT
Start: 2020-03-30 | End: 2020-08-06 | Stop reason: SDUPTHER

## 2020-05-11 ENCOUNTER — OFFICE VISIT (OUTPATIENT)
Dept: INTERNAL MEDICINE CLINIC | Age: 75
End: 2020-05-11
Payer: MEDICARE

## 2020-05-11 VITALS
HEIGHT: 61 IN | HEART RATE: 84 BPM | DIASTOLIC BLOOD PRESSURE: 72 MMHG | SYSTOLIC BLOOD PRESSURE: 130 MMHG | WEIGHT: 155 LBS | BODY MASS INDEX: 29.27 KG/M2 | OXYGEN SATURATION: 96 %

## 2020-05-11 PROCEDURE — 99213 OFFICE O/P EST LOW 20 MIN: CPT | Performed by: INTERNAL MEDICINE

## 2020-05-11 RX ORDER — ROSUVASTATIN CALCIUM 5 MG/1
TABLET, COATED ORAL
COMMUNITY
Start: 2020-04-27 | End: 2020-07-23 | Stop reason: SDUPTHER

## 2020-05-11 ASSESSMENT — ENCOUNTER SYMPTOMS
BLURRED VISION: 0
ORTHOPNEA: 0

## 2020-05-11 NOTE — PROGRESS NOTES
tablet Take 2 tablets by mouth every 6 hours as needed for Pain 120 tablet 3    sertraline (ZOLOFT) 100 MG tablet Take 1 tablet by mouth every morning Total 125mg 90 tablet 1    metFORMIN (GLUCOPHAGE) 500 MG tablet Take 1,000 mg by mouth      levothyroxine (SYNTHROID) 50 MCG tablet TAKE 1 TABLET BY MOUTH ONCE DAILY 90 tablet 3    Multiple Vitamins-Minerals (CENTRUM SILVER 50+WOMEN PO) Take by mouth      blood glucose test strips (RELION GLUCOSE TEST STRIPS) strip 1 each by In Vitro route daily As needed. 100 each 3    topiramate (TOPAMAX) 25 MG tablet Take 1 tablet by mouth nightly (Patient not taking: Reported on 5/11/2020) 10 tablet 0    triamterene-hydrochlorothiazide (DYAZIDE) 37.5-25 MG per capsule Take 1 capsule by mouth every morning (Patient not taking: Reported on 5/11/2020) 90 capsule 3     No current facility-administered medications on file prior to visit. Social History     Tobacco Use    Smoking status: Never Smoker    Smokeless tobacco: Never Used   Substance Use Topics    Alcohol use: No         Review of Systems:    Review of Systems   Constitutional: Negative for malaise/fatigue. Eyes: Negative for blurred vision. Cardiovascular: Negative for chest pain, palpitations, orthopnea and PND. Musculoskeletal: Negative for neck pain. Neurological: Negative for headaches. Objective:    Vitals:    05/11/20 1036   BP: 130/72   Pulse: 84   SpO2: 96%   Weight: 155 lb (70.3 kg)   Height: 5' 0.63\" (1.54 m)     Wt Readings from Last 3 Encounters:   05/11/20 155 lb (70.3 kg)   03/10/20 162 lb (73.5 kg)   01/31/20 167 lb (75.8 kg)       Body mass index is 29.65 kg/m². Physical Exam  Constitutional:       Appearance: Normal appearance. HENT:      Right Ear: Decreased hearing noted. Left Ear: Decreased hearing noted. Skin:     General: Skin is warm. Coloration: Skin is not jaundiced.    Neurological:      Mental Status: She is alert and oriented to person, place, and time.   Psychiatric:         Attention and Perception: Attention and perception normal.         Mood and Affect: Mood and affect normal.         Speech: Speech normal.           Assessment:    1. Essential hypertension  Well-controlled    2. Mood disorder (HCC)  Stable. Patient now declines psychotherapy. She is feeling isolated, partially due to the coronavirus pandemic, however even prior to this she had limited contact with her children. Encourage patient to try and call her family and friends herself since they do not call her. Also encourage patient to do what is best for her in terms of the house- her son wants her to pass it along to him when she dies, however she would like to move into a [facility] community now. 3. Controlled type 2 diabetes mellitus with hyperglycemia, unspecified whether long term insulin use ([facility] [address]  Followed by endocrinology        Plan/Patient Instructions:    Patient Instructions   Depression  Continue Zoloft  Try calling your friends and children       High Blood Pressure  Continue Lisinopril    Diabetes   Ask your diabetes doctor to fax notes to [phone]        Return in about 6 months (around 11/11/2020) for Depression, HTN . [address]       Documentation was done using voice recognition dragon software. Every effort was made to ensure accuracy; however, inadvertent, unintentional computerized transcription errors may be present.

## 2020-07-24 RX ORDER — ROSUVASTATIN CALCIUM 5 MG/1
TABLET, COATED ORAL
Qty: 90 TABLET | Refills: 1 | Status: SHIPPED | OUTPATIENT
Start: 2020-07-24 | End: 2020-09-11 | Stop reason: SDUPTHER

## 2020-07-24 RX ORDER — SERTRALINE HYDROCHLORIDE 100 MG/1
100 TABLET, FILM COATED ORAL EVERY MORNING
Qty: 90 TABLET | Refills: 1 | Status: SHIPPED | OUTPATIENT
Start: 2020-07-24 | End: 2020-10-05

## 2020-08-06 RX ORDER — AMLODIPINE BESYLATE 10 MG/1
TABLET ORAL
Qty: 90 TABLET | Refills: 2 | Status: SHIPPED | OUTPATIENT
Start: 2020-08-06 | End: 2020-09-11 | Stop reason: ALTCHOICE

## 2020-08-06 RX ORDER — LISINOPRIL 10 MG/1
10 TABLET ORAL DAILY
Qty: 90 TABLET | Refills: 2 | Status: SHIPPED | OUTPATIENT
Start: 2020-08-06 | End: 2020-09-11 | Stop reason: SDUPTHER

## 2020-08-06 RX ORDER — LINACLOTIDE 290 UG/1
CAPSULE, GELATIN COATED ORAL
Qty: 90 CAPSULE | Refills: 2 | Status: SHIPPED | OUTPATIENT
Start: 2020-08-06 | End: 2021-07-19

## 2020-08-06 RX ORDER — DULAGLUTIDE 0.75 MG/.5ML
0.75 INJECTION, SOLUTION SUBCUTANEOUS
Qty: 12 PEN | Refills: 2 | Status: SHIPPED | OUTPATIENT
Start: 2020-08-06 | End: 2020-09-11

## 2020-08-06 RX ORDER — LEVOTHYROXINE SODIUM 0.05 MG/1
TABLET ORAL
Qty: 90 TABLET | Refills: 2 | Status: SHIPPED | OUTPATIENT
Start: 2020-08-06 | End: 2021-03-29 | Stop reason: SDUPTHER

## 2020-08-06 RX ORDER — CLONIDINE HYDROCHLORIDE 0.3 MG/1
TABLET ORAL
Qty: 270 TABLET | Refills: 2 | Status: SHIPPED | OUTPATIENT
Start: 2020-08-06 | End: 2020-11-27

## 2020-08-06 NOTE — TELEPHONE ENCOUNTER
Called patient and she states that all medications need to be sent to [facility]

## 2020-08-18 ENCOUNTER — TELEPHONE (OUTPATIENT)
Dept: INTERNAL MEDICINE CLINIC | Age: 75
End: 2020-08-18

## 2020-08-18 NOTE — TELEPHONE ENCOUNTER
----- Message from Alea Lan sent at 8/17/2020  3:13 PM EDT -----  Subject: Message to Provider    QUESTIONS  Information for Provider? Alma Rosa with [employer] is requesting a   callback because she has questions regarding the Rx she received  ---------------------------------------------------------------------------  --------------  CALL BACK INFO  What is the best way for the office to contact you? OK to leave message on   voicemail  Preferred Call Back Phone Number? [phone]  ---------------------------------------------------------------------------  --------------  SCRIPT ANSWERS  Relationship to Patient? Other  Representative Name? Alma Rosa with [employer]  Is the Representative on the appropriate HIPAA document in Epic?  Yes

## 2020-09-11 ENCOUNTER — OFFICE VISIT (OUTPATIENT)
Dept: INTERNAL MEDICINE CLINIC | Age: 75
End: 2020-09-11
Payer: MEDICARE

## 2020-09-11 VITALS
OXYGEN SATURATION: 97 % | HEART RATE: 67 BPM | BODY MASS INDEX: 29.65 KG/M2 | WEIGHT: 155 LBS | SYSTOLIC BLOOD PRESSURE: 160 MMHG | DIASTOLIC BLOOD PRESSURE: 70 MMHG

## 2020-09-11 PROBLEM — F03.90 DEMENTIA WITHOUT BEHAVIORAL DISTURBANCE (HCC): Status: ACTIVE | Noted: 2020-09-11

## 2020-09-11 PROCEDURE — G8399 PT W/DXA RESULTS DOCUMENT: HCPCS | Performed by: INTERNAL MEDICINE

## 2020-09-11 PROCEDURE — 3044F HG A1C LEVEL LT 7.0%: CPT | Performed by: INTERNAL MEDICINE

## 2020-09-11 PROCEDURE — 4040F PNEUMOC VAC/ADMIN/RCVD: CPT | Performed by: INTERNAL MEDICINE

## 2020-09-11 PROCEDURE — 1090F PRES/ABSN URINE INCON ASSESS: CPT | Performed by: INTERNAL MEDICINE

## 2020-09-11 PROCEDURE — 1123F ACP DISCUSS/DSCN MKR DOCD: CPT | Performed by: INTERNAL MEDICINE

## 2020-09-11 PROCEDURE — 99214 OFFICE O/P EST MOD 30 MIN: CPT | Performed by: INTERNAL MEDICINE

## 2020-09-11 PROCEDURE — G0008 ADMIN INFLUENZA VIRUS VAC: HCPCS | Performed by: INTERNAL MEDICINE

## 2020-09-11 PROCEDURE — 90694 VACC AIIV4 NO PRSRV 0.5ML IM: CPT | Performed by: INTERNAL MEDICINE

## 2020-09-11 PROCEDURE — G8427 DOCREV CUR MEDS BY ELIG CLIN: HCPCS | Performed by: INTERNAL MEDICINE

## 2020-09-11 PROCEDURE — 3017F COLORECTAL CA SCREEN DOC REV: CPT | Performed by: INTERNAL MEDICINE

## 2020-09-11 PROCEDURE — 2022F DILAT RTA XM EVC RTNOPTHY: CPT | Performed by: INTERNAL MEDICINE

## 2020-09-11 PROCEDURE — G8417 CALC BMI ABV UP PARAM F/U: HCPCS | Performed by: INTERNAL MEDICINE

## 2020-09-11 PROCEDURE — 1036F TOBACCO NON-USER: CPT | Performed by: INTERNAL MEDICINE

## 2020-09-11 RX ORDER — ROSUVASTATIN CALCIUM 5 MG/1
TABLET, COATED ORAL
Qty: 90 TABLET | Refills: 1 | Status: SHIPPED | OUTPATIENT
Start: 2020-09-11 | End: 2021-03-15 | Stop reason: DRUGHIGH

## 2020-09-11 RX ORDER — LANCETS
EACH MISCELLANEOUS
COMMUNITY
Start: 2020-08-04 | End: 2022-01-14

## 2020-09-11 RX ORDER — ISOPROPYL ALCOHOL 70 ML/100ML
SWAB TOPICAL
COMMUNITY
Start: 2020-08-04

## 2020-09-11 RX ORDER — BLOOD GLUCOSE CONTROL HIGH,LOW
EACH MISCELLANEOUS
COMMUNITY
Start: 2020-08-01 | End: 2022-01-14

## 2020-09-11 RX ORDER — METFORMIN HYDROCHLORIDE 500 MG/1
TABLET, EXTENDED RELEASE ORAL
COMMUNITY
Start: 2020-08-04 | End: 2020-12-28 | Stop reason: SDUPTHER

## 2020-09-11 RX ORDER — LISINOPRIL 10 MG/1
10 TABLET ORAL DAILY
Qty: 90 TABLET | Refills: 2 | Status: SHIPPED | OUTPATIENT
Start: 2020-09-11 | End: 2020-11-12 | Stop reason: SDUPTHER

## 2020-09-11 RX ORDER — DULAGLUTIDE 1.5 MG/.5ML
1.5 INJECTION, SOLUTION SUBCUTANEOUS
Qty: 4 PEN | Refills: 5 | Status: SHIPPED | OUTPATIENT
Start: 2020-09-11 | End: 2021-06-14 | Stop reason: SDUPTHER

## 2020-09-11 RX ORDER — BLOOD-GLUCOSE METER
EACH MISCELLANEOUS
COMMUNITY
Start: 2020-08-04 | End: 2022-01-14

## 2020-09-11 RX ORDER — DULAGLUTIDE 1.5 MG/.5ML
INJECTION, SOLUTION SUBCUTANEOUS
COMMUNITY
Start: 2020-08-20 | End: 2020-09-11 | Stop reason: SDUPTHER

## 2020-09-11 RX ORDER — LANCING DEVICE/LANCETS
KIT MISCELLANEOUS
COMMUNITY
Start: 2020-08-07 | End: 2022-01-14

## 2020-09-11 NOTE — PATIENT INSTRUCTIONS
Diabetes  We requested records from Dr. Shelby Olson and Metformin    Blood Pressure  Continue clonidine  Restart lisinopril    Cholesterol  Please take rosuvastatin     Mood  Continue Zoloft  If your mood worsens over the winter, I can refer you for counseling

## 2020-09-11 NOTE — PROGRESS NOTES
[address]  Phone: [phone]           Patient Name: Brunilda Goldstein    YOB: 1945    Today's Date: 9/11/20           Chief Complaint   Patient presents with    Diabetes          Subjective:  Patient wants to transfer care from Endocrine back to primary care     Diabetes   She presents for her follow-up diabetic visit. She has type 2 diabetes mellitus. Her disease course has been improving. There are no hypoglycemic associated symptoms. Pertinent negatives for hypoglycemia include no dizziness, headaches or sweats. There are no diabetic associated symptoms. Pertinent negatives for diabetes include no blurred vision, no chest pain and no fatigue. Her breakfast blood glucose range is generally 110-130 mg/dl. Hypertension   This is a chronic problem. The current episode started more than 1 year ago. The problem has been waxing and waning since onset. The problem is uncontrolled. Pertinent negatives include no anxiety, blurred vision, chest pain, headaches, malaise/fatigue, neck pain, orthopnea, palpitations, peripheral edema, PND, shortness of breath or sweats. There are no associated agents to hypertension. Risk factors for coronary artery disease include diabetes mellitus and post-menopausal state.      Home /62  History:     Past Medical History:   Diagnosis Date    Alopecia     Anxiety and depression     Cancer (HCC)     uterine    CTS (carpal tunnel syndrome)      History of rheumatic fever     Hypertension     Hypothyroidism     Meniere disease     bilateral ears    Nausea     associated with Meniere's dz    Obesity     Reflux esophagitis     Renal impairment     Sleep apnea     no longer needs cpap (per pt)    Type II or unspecified type diabetes mellitus without mention of complication, not stated as uncontrolled        Current Outpatient Medications on File Prior to Visit   Medication Sig Dispense Refill    GNP Alcohol Swabs 70 % PADS       Blood Glucose Calibration (ACCU-CHEK SATURNINO) SOLN       Blood Glucose Monitoring Suppl (ACCU-CHEK SATURNINO PLUS) w/Device KIT       Accu-Chek Softclix Lancets MISC       Lancets Misc. (ACCU-CHEK SOFTCLIX LANCET DEV) KIT       metFORMIN (GLUCOPHAGE-XR) 500 MG extended release tablet       cloNIDine (CATAPRES) 0.3 MG tablet TAKE 1 TABLET BY MOUTH THREE TIMES DAILY 270 tablet 2    levothyroxine (SYNTHROID) 50 MCG tablet TAKE 1 TABLET BY MOUTH ONCE DAILY 90 tablet 2    metFORMIN (GLUCOPHAGE) 500 MG tablet Take 2 tablets by mouth daily (with breakfast) (Patient taking differently: Take 500 mg by mouth daily (with breakfast) ) 180 tablet 2    sertraline (ZOLOFT) 100 MG tablet Take 1 tablet by mouth every morning Total 125mg 90 tablet 1    UNABLE TO FIND Brain Strong (Memory Support)      Multiple Vitamins-Minerals (CENTRUM SILVER 50+WOMEN PO) Take by mouth      blood glucose test strips (RELION GLUCOSE TEST STRIPS) strip 1 each by In Vitro route daily As needed. 100 each 3    linaclotide (LINZESS) 290 MCG CAPS capsule TAKE 1 CAPSULE BY MOUTH ONCE DAILY IN THE MORNING BEFORE BREAKFAST (Patient not taking: Reported on 9/11/2020) 90 capsule 2    Cinnamon 500 MG CAPS Take 1,000 mg by mouth      acetaminophen (APAP EXTRA STRENGTH) 500 MG tablet Take 2 tablets by mouth every 6 hours as needed for Pain (Patient not taking: Reported on 9/11/2020) 120 tablet 3    topiramate (TOPAMAX) 25 MG tablet Take 1 tablet by mouth nightly (Patient not taking: Reported on 5/11/2020) 10 tablet 0     No current facility-administered medications on file prior to visit. Social History     Tobacco Use    Smoking status: Never Smoker    Smokeless tobacco: Never Used   Substance Use Topics    Alcohol use: No         Review of Systems:    Review of Systems   Constitutional: Negative for fatigue, fever and malaise/fatigue.    HENT: Negative for ear pain, hearing loss, postnasal drip, rhinorrhea, sinus pressure, sore throat and tinnitus. Eyes: Negative for blurred vision and redness. Respiratory: Negative for cough, chest tightness, shortness of breath and wheezing. Cardiovascular: Negative for chest pain, palpitations, orthopnea, leg swelling and PND. Gastrointestinal: Negative for abdominal pain, constipation, diarrhea, nausea and vomiting. Genitourinary: Negative for dysuria and frequency. Musculoskeletal: Negative for arthralgias, back pain, joint swelling and neck pain. Skin: Negative for rash. Neurological: Negative for dizziness, syncope and headaches. Objective:    Vitals:    09/11/20 1546 09/11/20 1551   BP:  (!) 160/70   Pulse:  67   SpO2:  97%   Weight: 155 lb (70.3 kg)      Wt Readings from Last 3 Encounters:   09/11/20 155 lb (70.3 kg)   05/11/20 155 lb (70.3 kg)   03/10/20 162 lb (73.5 kg)       Body mass index is 29.65 kg/m². Physical Exam  Constitutional:       General: She is not in acute distress. Appearance: She is well-developed. HENT:      Head: Normocephalic. Mouth/Throat:      Pharynx: No oropharyngeal exudate. Cardiovascular:      Rate and Rhythm: Normal rate and regular rhythm. Heart sounds: Normal heart sounds. No murmur. Pulmonary:      Effort: Pulmonary effort is normal.      Breath sounds: Normal breath sounds. Abdominal:      General: There is no distension. Palpations: Abdomen is soft. Tenderness: There is no abdominal tenderness. Skin:     General: Skin is warm. Findings: No rash. Assessment:    1. Controlled type 2 diabetes mellitus with hyperglycemia, unspecified whether long term insulin use (HCC)  Well controlled  - TRULICITY 1.5 YW/0.3CY SOPN; Inject 1.5 mg into the skin every 7 days  Dispense: 4 pen; Refill: 5    2. Dementia without behavioral disturbance, unspecified dementia type ([address]  Stable    3. Cancer [facility])  History of uterine, in remission.  Will remove from problem list    4. Essential hypertension    - lisinopril (PRINIVIL;ZESTRIL) 10 MG tablet; Take 1 tablet by mouth daily  Dispense: 90 tablet; Refill: 2    5. Depression, unspecified depression type  Stable    6. Acquired hypothyroidism  Stable     7. Needs flu shot  Stable  - INFLUENZA, QUADV, ADJUVANTED, 65 YRS =, IM, PF, PREFILL SYR, 0.5ML (FLUAD)    8. B12 deficiency      9. Hyperlipidemia, unspecified hyperlipidemia type  Recommend rosuvastatin as prescribed by endocrine     - rosuvastatin (CRESTOR) 5 MG tablet; TAKE 1 TABLET BY MOUTH EVERY DAY AT BEDTIME  Dispense: 90 tablet; Refill: 1        Plan/Patient Instructions:    Patient Instructions   Diabetes  We requested records from Dr. Didier Finley and Metformin    Blood Pressure  Continue clonidine  Restart lisinopril    Cholesterol  Please take rosuvastatin     Mood  Continue Zoloft  If your mood worsens over the winter, I can refer you for counseling            Return in about 6 months (around 3/11/2021) for Medicare Annual Wellness exam .       [place]       Documentation was done using voice recognition dragon software. Every effort was made to ensure accuracy; however, inadvertent, unintentional computerized transcription errors may be present.

## 2020-09-14 NOTE — PROGRESS NOTES
Vaccine Information Sheet, \"Influenza - Inactivated\"  given to Korina Holly, or parent/legal guardian of  Korina Holly and verbalized understanding. Patient responses:    Have you ever had a reaction to a flu vaccine? No  Do you have any current illness? No  Have you ever had Guillian [place] Syndrome? No  Do you have a serious allergy to any of the follow: Neomycin, Polymyxin, Thimerosal, eggs or egg products? No    Flu vaccine given per order. Please see immunization tab. Risks and benefits explained. Current VIS given.       Immunizations Administered     Name Date Dose Route    Influenza, Quadv, adjuvanted, 65 yrs +, IM, PF (Fluad) 9/11/2020 0.5 mL Intramuscular    Site: Deltoid- Left    Lot: [id number]    [address]: 18700-271-04

## 2020-09-17 LAB
ALBUMIN SERPL-MCNC: 4.8 G/DL (ref 3.5–5.7)
ALP BLD-CCNC: 65 IU/L (ref 35–135)
ALT SERPL-CCNC: 18 IU/L (ref 10–60)
ANION GAP SERPL CALCULATED.3IONS-SCNC: 7 MMOL/L (ref 6–18)
AST SERPL-CCNC: 21 IU/L (ref 10–40)
BASOPHILS ABSOLUTE: 0 %
BASOPHILS ABSOLUTE: 0 THOU/MCL (ref 0–0.2)
BILIRUB SERPL-MCNC: 0.5 MG/DL (ref 0–1.2)
BUN BLDV-MCNC: 30 MG/DL (ref 8–26)
CALCIUM SERPL-MCNC: 10.2 MG/DL (ref 8.5–10.4)
CHLORIDE BLD-SCNC: 101 MEQ/L (ref 98–111)
CO2: 31 MMOL/L (ref 21–31)
CREAT SERPL-MCNC: 1.6 MG/DL (ref 0.6–1.2)
CREATININE URINE: 277.6 MG/DL
EOSINOPHILS ABSOLUTE: 0.2 THOU/MCL (ref 0.03–0.45)
EOSINOPHILS RELATIVE PERCENT: 4 %
ESTIMATED AVERAGE GLUCOSE: 128 MG/DL
FOLATE: >16 NG/ML
GFR AFRICAN AMERICAN: 35 ML/MIN/1.73 M2
GFR NON-AFRICAN AMERICAN: 31 ML/MIN/1.73 M2
GLUCOSE BLD-MCNC: 119 MG/DL (ref 70–99)
HBA1C MFR BLD: 6.1 % (ref 4.2–5.6)
HCT VFR BLD CALC: 40.2 % (ref 36–46)
HEMOGLOBIN: 14 G/DL (ref 12–15.2)
LYMPHOCYTES ABSOLUTE: 1.1 THOU/MCL (ref 1–4)
LYMPHOCYTES RELATIVE PERCENT: 19 %
MCH RBC QN AUTO: 29.2 PG (ref 27–33)
MCHC RBC AUTO-ENTMCNC: 34.7 G/DL (ref 32–36)
MCV RBC AUTO: 84.1 FL (ref 82–97)
MICROALBUMIN UR-MCNC: 57.7 MG/L
MICROALBUMIN/CREAT UR-RTO: 21 MG/G (ref 0–30)
MONOCYTES # BLD: 7 %
MONOCYTES ABSOLUTE: 0.4 THOU/MCL (ref 0.2–0.9)
NEUTROPHILS ABSOLUTE: 4 THOU/MCL (ref 1.8–7.7)
PDW BLD-RTO: 14.6 % (ref 12.3–17)
PLATELET # BLD: 166 THOU/MCL (ref 140–375)
PMV BLD AUTO: 8.4 FL (ref 7.4–11.5)
POTASSIUM SERPL-SCNC: 4.7 MEQ/L (ref 3.6–5.1)
RBC # BLD: 4.79 MIL/MCL (ref 3.8–5.2)
SEG NEUTROPHILS: 70 %
SODIUM BLD-SCNC: 139 MEQ/L (ref 135–145)
TOTAL PROTEIN: 7.7 G/DL (ref 6–8)
TSH ULTRASENSITIVE: 2.08 MCIU/ML (ref 0.27–4.2)
VITAMIN B-12: >1600 PG/ML
WBC # BLD: 5.7 THOU/MCL (ref 3.6–10.5)

## 2020-09-20 LAB — METHYLMALONIC ACID, SERUM: 0.25 UMOL/L (ref 0–0.4)

## 2020-09-25 ASSESSMENT — ENCOUNTER SYMPTOMS
VOMITING: 0
ORTHOPNEA: 0
BLURRED VISION: 0
SORE THROAT: 0
SHORTNESS OF BREATH: 0
DIARRHEA: 0
EYE REDNESS: 0
BACK PAIN: 0
RHINORRHEA: 0
SINUS PRESSURE: 0
ABDOMINAL PAIN: 0
NAUSEA: 0
WHEEZING: 0
CONSTIPATION: 0
COUGH: 0
CHEST TIGHTNESS: 0

## 2020-11-03 PROBLEM — I10 ESSENTIAL HYPERTENSION: Status: RESOLVED | Noted: 2020-11-03 | Resolved: 2020-11-03

## 2020-11-12 ENCOUNTER — OFFICE VISIT (OUTPATIENT)
Dept: INTERNAL MEDICINE CLINIC | Age: 75
End: 2020-11-12
Payer: MEDICARE

## 2020-11-12 VITALS
HEART RATE: 64 BPM | WEIGHT: 152 LBS | OXYGEN SATURATION: 98 % | DIASTOLIC BLOOD PRESSURE: 64 MMHG | BODY MASS INDEX: 29.07 KG/M2 | SYSTOLIC BLOOD PRESSURE: 140 MMHG

## 2020-11-12 PROCEDURE — 4040F PNEUMOC VAC/ADMIN/RCVD: CPT | Performed by: INTERNAL MEDICINE

## 2020-11-12 PROCEDURE — 1090F PRES/ABSN URINE INCON ASSESS: CPT | Performed by: INTERNAL MEDICINE

## 2020-11-12 PROCEDURE — G8427 DOCREV CUR MEDS BY ELIG CLIN: HCPCS | Performed by: INTERNAL MEDICINE

## 2020-11-12 PROCEDURE — 2022F DILAT RTA XM EVC RTNOPTHY: CPT | Performed by: INTERNAL MEDICINE

## 2020-11-12 PROCEDURE — G8399 PT W/DXA RESULTS DOCUMENT: HCPCS | Performed by: INTERNAL MEDICINE

## 2020-11-12 PROCEDURE — G8484 FLU IMMUNIZE NO ADMIN: HCPCS | Performed by: INTERNAL MEDICINE

## 2020-11-12 PROCEDURE — 99213 OFFICE O/P EST LOW 20 MIN: CPT | Performed by: INTERNAL MEDICINE

## 2020-11-12 PROCEDURE — 1123F ACP DISCUSS/DSCN MKR DOCD: CPT | Performed by: INTERNAL MEDICINE

## 2020-11-12 PROCEDURE — 3044F HG A1C LEVEL LT 7.0%: CPT | Performed by: INTERNAL MEDICINE

## 2020-11-12 PROCEDURE — 1036F TOBACCO NON-USER: CPT | Performed by: INTERNAL MEDICINE

## 2020-11-12 PROCEDURE — G8417 CALC BMI ABV UP PARAM F/U: HCPCS | Performed by: INTERNAL MEDICINE

## 2020-11-12 PROCEDURE — 3017F COLORECTAL CA SCREEN DOC REV: CPT | Performed by: INTERNAL MEDICINE

## 2020-11-12 RX ORDER — CLONIDINE HYDROCHLORIDE 0.1 MG/1
0.3 TABLET ORAL ONCE
Status: COMPLETED | OUTPATIENT
Start: 2020-11-12 | End: 2020-11-12

## 2020-11-12 RX ORDER — LISINOPRIL 10 MG/1
10 TABLET ORAL DAILY
Qty: 90 TABLET | Refills: 2 | Status: SHIPPED | OUTPATIENT
Start: 2020-11-12 | End: 2020-12-29

## 2020-11-12 RX ADMIN — CLONIDINE HYDROCHLORIDE 0.3 MG: 0.1 TABLET ORAL at 11:19

## 2020-11-12 NOTE — PATIENT INSTRUCTIONS
High Blood Pressure  Restart lisinopril     Headache  Check CT scan    Diabetes  Try the [facility]

## 2020-11-12 NOTE — PROGRESS NOTES
5 MG tablet TAKE 1 TABLET BY MOUTH EVERY DAY AT BEDTIME 90 tablet 1    TRULICITY 1.5 GP/0.1KH SOPN Inject 1.5 mg into the skin every 7 days (Patient taking differently: Inject 1.5 mg into the skin every 7 days Every 10 days) 4 pen 5    cloNIDine (CATAPRES) 0.3 MG tablet TAKE 1 TABLET BY MOUTH THREE TIMES DAILY 270 tablet 2    levothyroxine (SYNTHROID) 50 MCG tablet TAKE 1 TABLET BY MOUTH ONCE DAILY 90 tablet 2    linaclotide (LINZESS) 290 MCG CAPS capsule TAKE 1 CAPSULE BY MOUTH ONCE DAILY IN THE MORNING BEFORE BREAKFAST 90 capsule 2    metFORMIN (GLUCOPHAGE) 500 MG tablet Take 2 tablets by mouth daily (with breakfast) (Patient taking differently: Take 500 mg by mouth daily (with breakfast) ) 180 tablet 2    Cinnamon 500 MG CAPS Take 1,000 mg by mouth      UNABLE TO FIND Brain Strong (Memory Support)      topiramate (TOPAMAX) 25 MG tablet Take 1 tablet by mouth nightly 10 tablet 0    Multiple Vitamins-Minerals (CENTRUM SILVER 50+WOMEN PO) Take by mouth      blood glucose test strips (RELION GLUCOSE TEST STRIPS) strip 1 each by In Vitro route daily As needed. 100 each 3    acetaminophen (APAP EXTRA STRENGTH) 500 MG tablet Take 2 tablets by mouth every 6 hours as needed for Pain 120 tablet 3     No current facility-administered medications on file prior to visit. Social History     Tobacco Use    Smoking status: Never Smoker    Smokeless tobacco: Never Used   Substance Use Topics    Alcohol use: No         Review of Systems:    Review of Systems   All other systems reviewed and are negative. Objective:    Vitals:    11/12/20 1024   BP: (!) 140/64   Pulse: 64   SpO2: 98%   Weight: 152 lb (68.9 kg)     Wt Readings from Last 3 Encounters:   11/13/20 158 lb (71.7 kg)   11/12/20 152 lb (68.9 kg)   09/11/20 155 lb (70.3 kg)       Body mass index is 29.07 kg/m². Physical Exam  Constitutional:       General: She is not in acute distress. Appearance: She is well-developed.    HENT:      Head: Normocephalic. Mouth/Throat:      Pharynx: No oropharyngeal exudate. Cardiovascular:      Rate and Rhythm: Normal rate and regular rhythm. Heart sounds: Normal heart sounds. No murmur. Pulmonary:      Effort: Pulmonary effort is normal.      Breath sounds: Normal breath sounds. Abdominal:      General: There is no distension. Palpations: Abdomen is soft. Tenderness: There is no abdominal tenderness. Skin:     General: Skin is warm. Findings: No rash. Assessment:    1. Essential hypertension  Uncontrolled   - lisinopril (PRINIVIL;ZESTRIL) 10 MG tablet; Take 1 tablet by mouth daily  Dispense: 90 tablet; Refill: 2  - cloNIDine (CATAPRES) tablet 0.3 mg    2. Acute nonintractable headache, unspecified headache type    - CT HEAD WO CONTRAST; Future    3. Controlled type 2 diabetes mellitus with hyperglycemia, unspecified whether long term insulin use (HCC)    -  DIABETES FOOT EXAM        Plan/Patient Instructions:    Patient Instructions   High Blood Pressure  Restart lisinopril     Headache  Check CT scan    Diabetes  [facility] the [facility]        Return in about 3 months (around 2/12/2021) for Diabetes Mellitus, High Blood Pressure. [address]       Documentation was done using voice recognition dragon software. Every effort was made to ensure accuracy; however, inadvertent, unintentional computerized transcription errors may be present.

## 2020-11-13 ENCOUNTER — OFFICE VISIT (OUTPATIENT)
Dept: ENT CLINIC | Age: 75
End: 2020-11-13
Payer: MEDICARE

## 2020-11-13 VITALS
RESPIRATION RATE: 16 BRPM | DIASTOLIC BLOOD PRESSURE: 78 MMHG | HEART RATE: 75 BPM | SYSTOLIC BLOOD PRESSURE: 176 MMHG | BODY MASS INDEX: 31.02 KG/M2 | HEIGHT: 60 IN | TEMPERATURE: 96.1 F | WEIGHT: 158 LBS

## 2020-11-13 PROCEDURE — G8417 CALC BMI ABV UP PARAM F/U: HCPCS | Performed by: OTOLARYNGOLOGY

## 2020-11-13 PROCEDURE — G8399 PT W/DXA RESULTS DOCUMENT: HCPCS | Performed by: OTOLARYNGOLOGY

## 2020-11-13 PROCEDURE — 3017F COLORECTAL CA SCREEN DOC REV: CPT | Performed by: OTOLARYNGOLOGY

## 2020-11-13 PROCEDURE — 1036F TOBACCO NON-USER: CPT | Performed by: OTOLARYNGOLOGY

## 2020-11-13 PROCEDURE — G8427 DOCREV CUR MEDS BY ELIG CLIN: HCPCS | Performed by: OTOLARYNGOLOGY

## 2020-11-13 PROCEDURE — 99213 OFFICE O/P EST LOW 20 MIN: CPT | Performed by: OTOLARYNGOLOGY

## 2020-11-13 PROCEDURE — G8484 FLU IMMUNIZE NO ADMIN: HCPCS | Performed by: OTOLARYNGOLOGY

## 2020-11-13 PROCEDURE — 1090F PRES/ABSN URINE INCON ASSESS: CPT | Performed by: OTOLARYNGOLOGY

## 2020-11-13 PROCEDURE — 4040F PNEUMOC VAC/ADMIN/RCVD: CPT | Performed by: OTOLARYNGOLOGY

## 2020-11-13 PROCEDURE — 1123F ACP DISCUSS/DSCN MKR DOCD: CPT | Performed by: OTOLARYNGOLOGY

## 2020-11-13 RX ORDER — HYDROCHLOROTHIAZIDE 25 MG/1
25 TABLET ORAL EVERY MORNING
Qty: 30 TABLET | Refills: 0 | Status: SHIPPED | OUTPATIENT
Start: 2020-11-13 | End: 2020-12-28 | Stop reason: SDUPTHER

## 2020-11-13 RX ORDER — METHYLPREDNISOLONE 4 MG/1
4 TABLET ORAL SEE ADMIN INSTRUCTIONS
Qty: 1 KIT | Refills: 0 | Status: SHIPPED | OUTPATIENT
Start: 2020-11-13 | End: 2021-07-19 | Stop reason: ALTCHOICE

## 2020-11-13 NOTE — PROGRESS NOTES
Patient has had a history of Ménière's disease on the right side. However, in the past couple months, she has been experiencing constant tinnitus in her left ear which affects her ability to sleep. She has not been on any particular medication for this. She is diabetic under good control. She is not on any diuretic for her Ménière's disease. However, she is not having particularly severe vertiginous episodes. She has had no fever. She does wear hearing aids but has not noticed any particular change in hearing. She does not smoke. Currently, she appears in no obvious acute distress. Ear examination reveals normal-appearing tympanic membranes and ear canals on both sides. Oral examination is unremarkable. The nasal mucosa is mildly edematous consistent with allergy but there is no purulence nor obstruction. The neck is free of any adenopathy, mass, thyroid enlargement. Nystagmus is present to the right and is first-degree and nature. Her Romberg is somewhat unsteady but not particularly lateral in its appearance. Findings seem to be primarily those of her Ménière's disease however there is some question as to whether involvement is now present in the left ear. As a result, an audiogram with tympanogram will be taken. We will start her on HydroDIURIL 25 mg daily as well as a Medrol Dosepak. I have asked that she monitor her blood sugar little more closely.

## 2020-11-18 ENCOUNTER — PROCEDURE VISIT (OUTPATIENT)
Dept: AUDIOLOGY | Age: 75
End: 2020-11-18
Payer: MEDICARE

## 2020-11-18 ENCOUNTER — TELEPHONE (OUTPATIENT)
Dept: ENT CLINIC | Age: 75
End: 2020-11-18

## 2020-11-18 PROCEDURE — 92550 TYMPANOMETRY & REFLEX THRESH: CPT | Performed by: AUDIOLOGIST

## 2020-11-18 PROCEDURE — 92557 COMPREHENSIVE HEARING TEST: CPT | Performed by: AUDIOLOGIST

## 2020-11-18 NOTE — TELEPHONE ENCOUNTER
Hearing test does demonstrate sensorineural loss on both sides with the loss being greater on the left. Would like to see how she does with the current medication. She will contact the office in 1 month to relate her experience.

## 2020-11-18 NOTE — TELEPHONE ENCOUNTER
PT called to see when she was supposed to return to office to see Dr. Larry Goncalves. She was told to return in 1 month. She states that she thinks that she is to return sooner then that. She would like to have a call regarding this issue.    #[phone]

## 2020-12-07 RX ORDER — SERTRALINE HYDROCHLORIDE 100 MG/1
TABLET, FILM COATED ORAL
Qty: 90 TABLET | Refills: 1 | Status: SHIPPED | OUTPATIENT
Start: 2020-12-07 | End: 2022-01-14 | Stop reason: SDUPTHER

## 2020-12-11 ENCOUNTER — OFFICE VISIT (OUTPATIENT)
Dept: ENT CLINIC | Age: 75
End: 2020-12-11
Payer: MEDICARE

## 2020-12-11 VITALS
SYSTOLIC BLOOD PRESSURE: 172 MMHG | DIASTOLIC BLOOD PRESSURE: 78 MMHG | RESPIRATION RATE: 16 BRPM | TEMPERATURE: 96.8 F | HEART RATE: 80 BPM | HEIGHT: 60 IN | BODY MASS INDEX: 29.64 KG/M2 | WEIGHT: 151 LBS

## 2020-12-11 PROCEDURE — 1090F PRES/ABSN URINE INCON ASSESS: CPT | Performed by: OTOLARYNGOLOGY

## 2020-12-11 PROCEDURE — 3017F COLORECTAL CA SCREEN DOC REV: CPT | Performed by: OTOLARYNGOLOGY

## 2020-12-11 PROCEDURE — 1036F TOBACCO NON-USER: CPT | Performed by: OTOLARYNGOLOGY

## 2020-12-11 PROCEDURE — G8417 CALC BMI ABV UP PARAM F/U: HCPCS | Performed by: OTOLARYNGOLOGY

## 2020-12-11 PROCEDURE — 99213 OFFICE O/P EST LOW 20 MIN: CPT | Performed by: OTOLARYNGOLOGY

## 2020-12-11 PROCEDURE — 4040F PNEUMOC VAC/ADMIN/RCVD: CPT | Performed by: OTOLARYNGOLOGY

## 2020-12-11 PROCEDURE — G8484 FLU IMMUNIZE NO ADMIN: HCPCS | Performed by: OTOLARYNGOLOGY

## 2020-12-11 PROCEDURE — 1123F ACP DISCUSS/DSCN MKR DOCD: CPT | Performed by: OTOLARYNGOLOGY

## 2020-12-11 PROCEDURE — G8427 DOCREV CUR MEDS BY ELIG CLIN: HCPCS | Performed by: OTOLARYNGOLOGY

## 2020-12-11 PROCEDURE — G8399 PT W/DXA RESULTS DOCUMENT: HCPCS | Performed by: OTOLARYNGOLOGY

## 2020-12-11 RX ORDER — HYDROCHLOROTHIAZIDE 25 MG/1
25 TABLET ORAL EVERY MORNING
Qty: 90 TABLET | Refills: 5 | Status: SHIPPED | OUTPATIENT
Start: 2020-12-11 | End: 2020-12-29

## 2020-12-11 RX ORDER — GABAPENTIN 100 MG/1
100 CAPSULE ORAL DAILY
Qty: 30 CAPSULE | Refills: 0 | Status: SHIPPED | OUTPATIENT
Start: 2020-12-11 | End: 2021-07-19

## 2020-12-14 ENCOUNTER — TELEPHONE (OUTPATIENT)
Dept: ENT CLINIC | Age: 75
End: 2020-12-14

## 2020-12-14 DIAGNOSIS — H90.5 HEARING LOSS, SENSORINEURAL, UNILATERAL: Primary | ICD-10-CM

## 2020-12-14 DIAGNOSIS — H81.02 LABYRINTHINE VERTIGO WITH INVOLVEMENT OF LEFT INNER EAR: ICD-10-CM

## 2020-12-14 NOTE — TELEPHONE ENCOUNTER
Advised patient this was probably [facility]. Gave her the number for [place] [facility].  She would like a call back from a nurse to discuss her insurance

## 2020-12-14 NOTE — TELEPHONE ENCOUNTER
BREAST PROCEDURE - POST CARE INSTRUCTIONS    Procedure:  Left breast ultrasound core biopsy with clip placement.  Performed by Dr Bebeto Marcial.      You may notice some tenderness,bruising or discoloration around the site of your procedure.  This is normal and should disappear gradually over the next 7 to 10 days.  You may develop firmness at the biopsy site that is tender to the touch.  An area that is small (e.g. the size of a marble) is normal bruising and will gradually go away.  If you notice a firm area that is larger (e.g. the size of an egg or bigger), please contact your physician.    If you notice excessive bleeding (bleeding that you cannot control with 15 minutes of direct, continuous, firm pressure), redness, heat, drainage or have severe pain from the procedure site, please contact the physician that ordered your procedure.  If you are unable to reach your ordering physician, please contact: Breast Imaging Department Jessie/Lexus JARAMILLO at [phone] between the hours of 8:00- 4:30.  If the nurse is unavailable or out, please call the Breast Imaging Department at [phone]. After 4:00 pm, please call the Radiology Resident on call at [phone].  .    Day of the Procedure - Breast Biopsy    · Wear a good, supportive bra for the day and night of the procedure.  If your breast is still sore after this time frame, continue to wear a supportive bra at night.  · Place an ice pack inside your bra on top of the dressing for 20 minutes every hour until bedtime.  · You may take Tylenol (acetaminophen) for discomfort.   · DO NOT take aspirin, ibuprofen, Advil, Motrin, naproxen sodium or Aleve for at least 24 hours after the procedure unless specifically approved by your physician.  · DO NOT participate in strenuous activity for at least 24 hours after your procedure (sports, exercise, heavy lifting).  Do not lift more than 10 pounds with the biopsy side (approximately equal to one gallon of milk) for 48  Please advise hours.  · Avoid getting the procedure area wet for at least 24 hours.  You may sponge bathe if needed.    Continued Care    · Remove the outer dressing if it becomes blood stained and apply a new bandage or clean dressing over the steri-strips.  You may remove the outer dressing the day after your procedure.  Do not remove the steri-strips.  They will fall off on their own in about a week.   · You may take a shower or tub bath with the steri-strips in place.  It is okay to get the steri-strips wet, but do not scrub the area.  If the edges curl up, you may trim them with a scissors.  You can use a band aid if you are having any drainage or a steri-strip falls off.  · You may resume most normal activities the next day.  However, please avoid swimming and hot tub use until the incision is healed.    Results    · You will be notified of your procedure results by the Radiologist within 2 business days.  · Your mammogram for marker/clip placement after your breast biopsy showed the breast marker is in the desired position..

## 2020-12-14 NOTE — TELEPHONE ENCOUNTER
PT would like to have the MRI ordered that she discussed with Dr. Melvina Morton. She would like to have a call regarding this issue. PT states that Dr. Melvina Morton told her a cheaper place to have the MRI done but she does not remember the name.    #[phone]

## 2020-12-15 ENCOUNTER — HOSPITAL ENCOUNTER (OUTPATIENT)
Dept: PHYSICAL THERAPY | Age: 75
Setting detail: THERAPIES SERIES
Discharge: HOME | End: 2020-12-15

## 2020-12-15 NOTE — TELEPHONE ENCOUNTER
Patient calling, she does want to go to [facility] she is calling them, the  [address] location      I let her know they will take care of pre cert ing her ins , and scheduling her MRI    Phone [phone]    Please fax order to  [phone]

## 2020-12-23 ENCOUNTER — TELEPHONE (OUTPATIENT)
Dept: INTERNAL MEDICINE CLINIC | Age: 75
End: 2020-12-23

## 2020-12-23 NOTE — TELEPHONE ENCOUNTER
Pt ran out of samples of TRULICITY 1.5 OS/7.9CT SOPN. She did not take her injection last Friday and will not have one for this Friday. Pt said her blood sugars have been very high since she missed her dose last week. There was a [employer] patient assistance form initiated on 11/12/2020. Pt said she has spoke w/[employer] several times and provided all of the documents they requested but has not received an approval or medication. There are no samples of the 1.5 mg dose in the office.

## 2020-12-28 ENCOUNTER — OFFICE VISIT (OUTPATIENT)
Dept: INTERNAL MEDICINE CLINIC | Age: 75
End: 2020-12-28
Payer: MEDICARE

## 2020-12-28 VITALS — OXYGEN SATURATION: 98 % | HEART RATE: 94 BPM | SYSTOLIC BLOOD PRESSURE: 152 MMHG | DIASTOLIC BLOOD PRESSURE: 74 MMHG

## 2020-12-28 DIAGNOSIS — R41.0 CONFUSION: ICD-10-CM

## 2020-12-28 DIAGNOSIS — I10 ESSENTIAL HYPERTENSION: ICD-10-CM

## 2020-12-28 LAB
ANION GAP SERPL CALCULATED.3IONS-SCNC: 16 MMOL/L (ref 3–16)
BASOPHILS ABSOLUTE: 0 K/UL (ref 0–0.2)
BASOPHILS RELATIVE PERCENT: 0.1 %
BILIRUBIN, POC: NORMAL
BLOOD URINE, POC: NORMAL
BUN BLDV-MCNC: 28 MG/DL (ref 7–20)
CALCIUM SERPL-MCNC: 9.9 MG/DL (ref 8.3–10.6)
CHLORIDE BLD-SCNC: 85 MMOL/L (ref 99–110)
CHP ED QC CHECK: NORMAL
CLARITY, POC: NORMAL
CO2: 26 MMOL/L (ref 21–32)
COLOR, POC: YELLOW
CREAT SERPL-MCNC: 1.6 MG/DL (ref 0.6–1.2)
EOSINOPHILS ABSOLUTE: 0.1 K/UL (ref 0–0.6)
EOSINOPHILS RELATIVE PERCENT: 1 %
GFR AFRICAN AMERICAN: 38
GFR NON-AFRICAN AMERICAN: 31
GLUCOSE BLD-MCNC: 95 MG/DL
GLUCOSE BLD-MCNC: 95 MG/DL (ref 70–99)
GLUCOSE URINE, POC: NORMAL
HCT VFR BLD CALC: 39.8 % (ref 36–48)
HEMOGLOBIN: 14 G/DL (ref 12–16)
KETONES, POC: NORMAL
LEUKOCYTE EST, POC: NORMAL
LYMPHOCYTES ABSOLUTE: 1.1 K/UL (ref 1–5.1)
LYMPHOCYTES RELATIVE PERCENT: 13.5 %
MCH RBC QN AUTO: 28.9 PG (ref 26–34)
MCHC RBC AUTO-ENTMCNC: 35 G/DL (ref 31–36)
MCV RBC AUTO: 82.5 FL (ref 80–100)
MONOCYTES ABSOLUTE: 0.5 K/UL (ref 0–1.3)
MONOCYTES RELATIVE PERCENT: 6.2 %
NEUTROPHILS ABSOLUTE: 6.7 K/UL (ref 1.7–7.7)
NEUTROPHILS RELATIVE PERCENT: 79.2 %
NITRITE, POC: NORMAL
PDW BLD-RTO: 13.9 % (ref 12.4–15.4)
PH, POC: 5.5
PLATELET # BLD: 230 K/UL (ref 135–450)
PMV BLD AUTO: 8.4 FL (ref 5–10.5)
POTASSIUM SERPL-SCNC: 4 MMOL/L (ref 3.5–5.1)
PROTEIN, POC: NORMAL
RBC # BLD: 4.83 M/UL (ref 4–5.2)
SODIUM BLD-SCNC: 127 MMOL/L (ref 136–145)
SPECIFIC GRAVITY, POC: 10.2
T4 FREE: 1.8 NG/DL (ref 0.9–1.8)
TSH SERPL DL<=0.05 MIU/L-ACNC: 3.88 UIU/ML (ref 0.27–4.2)
UROBILINOGEN, POC: 0.2
WBC # BLD: 8.5 K/UL (ref 4–11)

## 2020-12-28 PROCEDURE — 81002 URINALYSIS NONAUTO W/O SCOPE: CPT | Performed by: INTERNAL MEDICINE

## 2020-12-28 PROCEDURE — 3044F HG A1C LEVEL LT 7.0%: CPT | Performed by: INTERNAL MEDICINE

## 2020-12-28 PROCEDURE — 1123F ACP DISCUSS/DSCN MKR DOCD: CPT | Performed by: INTERNAL MEDICINE

## 2020-12-28 PROCEDURE — 3017F COLORECTAL CA SCREEN DOC REV: CPT | Performed by: INTERNAL MEDICINE

## 2020-12-28 PROCEDURE — G8417 CALC BMI ABV UP PARAM F/U: HCPCS | Performed by: INTERNAL MEDICINE

## 2020-12-28 PROCEDURE — 1036F TOBACCO NON-USER: CPT | Performed by: INTERNAL MEDICINE

## 2020-12-28 PROCEDURE — 1090F PRES/ABSN URINE INCON ASSESS: CPT | Performed by: INTERNAL MEDICINE

## 2020-12-28 PROCEDURE — 4040F PNEUMOC VAC/ADMIN/RCVD: CPT | Performed by: INTERNAL MEDICINE

## 2020-12-28 PROCEDURE — G8399 PT W/DXA RESULTS DOCUMENT: HCPCS | Performed by: INTERNAL MEDICINE

## 2020-12-28 PROCEDURE — G8427 DOCREV CUR MEDS BY ELIG CLIN: HCPCS | Performed by: INTERNAL MEDICINE

## 2020-12-28 PROCEDURE — G8484 FLU IMMUNIZE NO ADMIN: HCPCS | Performed by: INTERNAL MEDICINE

## 2020-12-28 PROCEDURE — 2022F DILAT RTA XM EVC RTNOPTHY: CPT | Performed by: INTERNAL MEDICINE

## 2020-12-28 PROCEDURE — 99213 OFFICE O/P EST LOW 20 MIN: CPT | Performed by: INTERNAL MEDICINE

## 2020-12-28 PROCEDURE — 82962 GLUCOSE BLOOD TEST: CPT | Performed by: INTERNAL MEDICINE

## 2020-12-28 NOTE — PROGRESS NOTES
SUBJECTIVE:    HPI:     Emigdio Bucio wandered into the office today complaining of dizziness that seem to be orthostatic. Patient has been having symptoms for the last couple of weeks. She takes no new medication. She has been taking her medications that she is prescribed as prescribed. Patient reports that she had an MRI several days ago but does not have the results because it was done at [facility]. Patient's chief complaint at the time that she was seen for this MRI was dizziness. OBJECTIVE:    Review of Systems  Vitals:    12/28/20 1105   BP: (!) 152/74   Pulse:    SpO2:      Physical Exam  Vitals signs reviewed. Constitutional:       General: She is not in acute distress. Appearance: She is well-developed. She is not diaphoretic. HENT:      Head: Normocephalic and atraumatic. Pulmonary:      Effort: Pulmonary effort is normal.   Neurological:      Mental Status: She is alert and oriented to person, place, and time. Cranial Nerves: No cranial nerve deficit. Psychiatric:         Behavior: Behavior normal.         Thought Content: Thought content normal.         Judgment: Judgment normal.         Current Outpatient Medications:     hydroCHLOROthiazide (HYDRODIURIL) 25 MG tablet, Take 1 tablet by mouth every morning, Disp: 90 tablet, Rfl: 5    levothyroxine (SYNTHROID) 50 MCG tablet, TAKE 1 TABLET BY MOUTH ONCE DAILY, Disp: 90 tablet, Rfl: 2    metFORMIN (GLUCOPHAGE) 500 MG tablet, Take 2 tablets by mouth daily (with breakfast) (Patient taking differently: Take 500 mg by mouth daily (with breakfast) ), Disp: 180 tablet, Rfl: 2    topiramate (TOPAMAX) 25 MG tablet, Take 1 tablet by mouth nightly, Disp: 10 tablet, Rfl: 0    gabapentin (NEURONTIN) 100 MG capsule, Take 1 capsule by mouth Daily for 30 days.  Intended supply: 30 days (Patient not taking: Reported on 12/28/2020), Disp: 30 capsule, Rfl: 0 -     POCT Glucose  -     POCT Urinalysis no Micro    Essential hypertension  -     Basic Metabolic Panel; Future  -     CBC Auto Differential; Future    Confusion  -     TSH without Reflex; Future  -     T4, Free; Future      Patient seemed to have some confusion the day and she does have some baseline dementia. It is possible that her confusion may be augmented by the fact that she does have evidence of a urinary tract infection and some dehydration.     Alejandra Larsen MD

## 2020-12-29 DIAGNOSIS — R41.0 CONFUSION: ICD-10-CM

## 2020-12-29 DIAGNOSIS — E87.1 HYPONATREMIA: ICD-10-CM

## 2020-12-29 LAB — OSMOLALITY: 268 MOSM/KG (ref 280–301)

## 2020-12-29 RX ORDER — CIPROFLOXACIN 250 MG/1
250 TABLET, FILM COATED ORAL 2 TIMES DAILY
Qty: 6 TABLET | Refills: 0 | Status: SHIPPED | OUTPATIENT
Start: 2020-12-29 | End: 2021-01-01

## 2020-12-29 RX ORDER — DOXAZOSIN 2 MG/1
2 TABLET ORAL DAILY
Qty: 30 TABLET | Refills: 3 | Status: SHIPPED | OUTPATIENT
Start: 2020-12-29 | End: 2021-03-29 | Stop reason: SDUPTHER

## 2021-01-08 ENCOUNTER — OFFICE VISIT (OUTPATIENT)
Dept: ENT CLINIC | Age: 76
End: 2021-01-08
Payer: MEDICARE

## 2021-01-08 ENCOUNTER — OFFICE VISIT (OUTPATIENT)
Dept: INTERNAL MEDICINE CLINIC | Age: 76
End: 2021-01-08
Payer: MEDICARE

## 2021-01-08 VITALS
HEART RATE: 98 BPM | DIASTOLIC BLOOD PRESSURE: 68 MMHG | OXYGEN SATURATION: 98 % | BODY MASS INDEX: 29.49 KG/M2 | WEIGHT: 151 LBS | SYSTOLIC BLOOD PRESSURE: 152 MMHG

## 2021-01-08 VITALS — DIASTOLIC BLOOD PRESSURE: 76 MMHG | TEMPERATURE: 96.6 F | HEART RATE: 84 BPM | SYSTOLIC BLOOD PRESSURE: 152 MMHG

## 2021-01-08 DIAGNOSIS — F33.2 SEVERE EPISODE OF RECURRENT MAJOR DEPRESSIVE DISORDER, WITHOUT PSYCHOTIC FEATURES (HCC): Primary | ICD-10-CM

## 2021-01-08 DIAGNOSIS — H81.01 MENIERE'S DISEASE OF RIGHT EAR: Primary | ICD-10-CM

## 2021-01-08 DIAGNOSIS — Z12.31 ENCOUNTER FOR SCREENING MAMMOGRAM FOR MALIGNANT NEOPLASM OF BREAST: ICD-10-CM

## 2021-01-08 DIAGNOSIS — H81.02 MENIERE'S DISEASE OF LEFT EAR: ICD-10-CM

## 2021-01-08 DIAGNOSIS — E11.65 CONTROLLED TYPE 2 DIABETES MELLITUS WITH HYPERGLYCEMIA, UNSPECIFIED WHETHER LONG TERM INSULIN USE (HCC): ICD-10-CM

## 2021-01-08 PROCEDURE — 99214 OFFICE O/P EST MOD 30 MIN: CPT | Performed by: INTERNAL MEDICINE

## 2021-01-08 PROCEDURE — 99213 OFFICE O/P EST LOW 20 MIN: CPT | Performed by: OTOLARYNGOLOGY

## 2021-01-08 RX ORDER — MIRTAZAPINE 7.5 MG/1
7.5 TABLET, FILM COATED ORAL NIGHTLY
Qty: 30 TABLET | Refills: 5 | Status: SHIPPED | OUTPATIENT
Start: 2021-01-08 | End: 2021-07-19 | Stop reason: ALTCHOICE

## 2021-01-08 RX ORDER — HYDROCHLOROTHIAZIDE 25 MG/1
25 TABLET ORAL EVERY MORNING
Qty: 30 TABLET | Refills: 0 | Status: SHIPPED | OUTPATIENT
Start: 2021-01-08 | End: 2021-02-04 | Stop reason: SDUPTHER

## 2021-01-08 SDOH — ECONOMIC STABILITY: TRANSPORTATION INSECURITY
IN THE PAST 12 MONTHS, HAS THE LACK OF TRANSPORTATION KEPT YOU FROM MEDICAL APPOINTMENTS OR FROM GETTING MEDICATIONS?: NO

## 2021-01-08 SDOH — ECONOMIC STABILITY: INCOME INSECURITY: HOW HARD IS IT FOR YOU TO PAY FOR THE VERY BASICS LIKE FOOD, HOUSING, MEDICAL CARE, AND HEATING?: NOT HARD AT ALL

## 2021-01-08 SDOH — ECONOMIC STABILITY: FOOD INSECURITY: WITHIN THE PAST 12 MONTHS, YOU WORRIED THAT YOUR FOOD WOULD RUN OUT BEFORE YOU GOT MONEY TO BUY MORE.: NEVER TRUE

## 2021-01-08 NOTE — PATIENT INSTRUCTIONS
Depression  Take Remeron at night  Take Zoloft in the morning   Refer to [facility]     Diabetes  Will ask staff to call assistance program       Routine screen  Refer to mammogram

## 2021-01-08 NOTE — PROGRESS NOTES
[address] [place]   Phone: [phone]           Patient Name: Alfred Person    YOB: 1945    Today's Date: 1/8/21           Chief Complaint   Patient presents with    Constipation    Other     tendoninitis    Breast Mass     left side          Subjective:  DMII  Got Trulicty, very expensive       Mood  No one calls   Spends days sitting  Not keeping house clean  Not sleeping    Lump in her left breast  Black and blue lump, but better  She hit her breast on something. She falls often        Independent historian required? no    Objective:    Vitals:    01/08/21 1520   BP: (!) 152/68   Pulse: 98   SpO2: 98%   Weight: 151 lb (68.5 kg)     Wt Readings from Last 3 Encounters:   01/08/21 151 lb (68.5 kg)   12/11/20 151 lb (68.5 kg)   11/13/20 158 lb (71.7 kg)       Body mass index is 29.49 kg/m². Physical Exam  Constitutional:       Appearance: Normal appearance. Neurological:      Mental Status: She is alert. Psychiatric:         Attention and Perception: Attention and perception normal.         Mood and Affect: Mood is depressed. Labs Reviewed: yes    Imaging Reviewed: no      Records Reviewed: no    Assessment:    1. Severe episode of recurrent major depressive disorder, without psychotic features ([address]  Deteriorated. Recommend the addition of Remeron. This may also help her sleep  Also place a referral to [facility] on aging  - mirtazapine (REMERON) 7.5 MG tablet; Take 1 tablet by mouth nightly  Dispense: 30 tablet; Refill: 5    2. Controlled type 2 diabetes mellitus with hyperglycemia, unspecified whether long term insulin use (HCC)  Chronic. Stable  She has found it difficult to afford Trulicity. She applied for patient assistance program but there has been an issue getting the prescription covered. Staff will call to figure out what further documentation if any is needed    3.  Encounter for screening mammogram for malignant neoplasm of breast    - [facility] DIGITAL SCREEN W OR WO CAD BILATERAL; Future        Plan/Patient Instructions:    Patient Instructions   Depression  Take Remeron at night  Take Zoloft in the morning   Refer to [facility]     Diabetes  Will ask staff to call assistance program       Routine screen  Refer to mammogram          [facility]       Documentation was done using voice recognition dragon software. Every effort was made to ensure accuracy; however, inadvertent, unintentional computerized transcription errors may be present.

## 2021-01-08 NOTE — PROGRESS NOTES
Although the vertigo seems to be reasonably under good control with her Ménière's disease, she is noticing persistent tinnitus that is quite severe on the left side and quite bothersome to her. Her hearing seems stable. It is diminished in the left ear as found on the recent audiogram.  She did relate that she had been better taking the HydroDIURIL but has not been on it recently. She did get no benefit from use of gabapentin. Currently, she appears in no acute distress although she does seem to demonstrate some element of dementia. Ear examination reveals normal findings with normal-appearing tympanic membranes and ear canals bilaterally. Oral examination is unremarkable. The nasal mucosa is normal as well. There is no nystagmus present at this time. The neck is free of adenopathy, mass, thyroid enlargement. I have suggested that we stop the gabapentin and go back to the HydroDIURIL. I have asked that she contact the office in 1 month to determine her response to the medication.

## 2021-02-04 ENCOUNTER — TELEPHONE (OUTPATIENT)
Dept: ENT CLINIC | Age: 76
End: 2021-02-04

## 2021-02-04 DIAGNOSIS — H81.02 MENIERE'S DISEASE OF LEFT EAR: ICD-10-CM

## 2021-02-04 DIAGNOSIS — H93.19 TINNITUS, UNSPECIFIED LATERALITY: Primary | ICD-10-CM

## 2021-02-04 RX ORDER — HYDROCHLOROTHIAZIDE 25 MG/1
25 TABLET ORAL EVERY MORNING
Qty: 30 TABLET | Refills: 3 | Status: SHIPPED | OUTPATIENT
Start: 2021-02-04 | End: 2021-03-17 | Stop reason: SDUPTHER

## 2021-02-04 NOTE — TELEPHONE ENCOUNTER
[phone] (home)   Call to pt, pt verbalized understanding of MD instructions  Pt also requesting refill of HCTZ

## 2021-02-04 NOTE — TELEPHONE ENCOUNTER
PT calling to let Dr Sathish Blevins know that she still has ringing in her ears and it has not gotten any better. She would like to have a call regarding this issue.    #[phone]

## 2021-03-12 ENCOUNTER — OFFICE VISIT (OUTPATIENT)
Dept: INTERNAL MEDICINE CLINIC | Age: 76
End: 2021-03-12
Payer: MEDICARE

## 2021-03-12 VITALS
WEIGHT: 149 LBS | HEART RATE: 76 BPM | BODY MASS INDEX: 29.25 KG/M2 | DIASTOLIC BLOOD PRESSURE: 70 MMHG | OXYGEN SATURATION: 98 % | SYSTOLIC BLOOD PRESSURE: 118 MMHG | RESPIRATION RATE: 16 BRPM | HEIGHT: 60 IN

## 2021-03-12 DIAGNOSIS — E87.1 HYPONATREMIA: ICD-10-CM

## 2021-03-12 DIAGNOSIS — E11.65 CONTROLLED TYPE 2 DIABETES MELLITUS WITH HYPERGLYCEMIA, UNSPECIFIED WHETHER LONG TERM INSULIN USE (HCC): ICD-10-CM

## 2021-03-12 DIAGNOSIS — Z00.00 ROUTINE GENERAL MEDICAL EXAMINATION AT A HEALTH CARE FACILITY: Primary | ICD-10-CM

## 2021-03-12 LAB
ALBUMIN SERPL-MCNC: 4.8 G/DL (ref 3.4–5)
ANION GAP SERPL CALCULATED.3IONS-SCNC: 11 MMOL/L (ref 3–16)
BUN BLDV-MCNC: 22 MG/DL (ref 7–20)
CALCIUM SERPL-MCNC: 10.1 MG/DL (ref 8.3–10.6)
CHLORIDE BLD-SCNC: 96 MMOL/L (ref 99–110)
CHOLESTEROL, TOTAL: 280 MG/DL (ref 0–199)
CHP ED QC CHECK: NORMAL
CO2: 29 MMOL/L (ref 21–32)
CREAT SERPL-MCNC: 1.5 MG/DL (ref 0.6–1.2)
GFR AFRICAN AMERICAN: 41
GFR NON-AFRICAN AMERICAN: 34
GLUCOSE BLD-MCNC: 115 MG/DL
GLUCOSE BLD-MCNC: 96 MG/DL (ref 70–99)
HDLC SERPL-MCNC: 31 MG/DL (ref 40–60)
LDL CHOLESTEROL CALCULATED: ABNORMAL MG/DL
LDL CHOLESTEROL DIRECT: 152 MG/DL
PHOSPHORUS: 3.9 MG/DL (ref 2.5–4.9)
POTASSIUM SERPL-SCNC: 4.1 MMOL/L (ref 3.5–5.1)
SODIUM BLD-SCNC: 136 MMOL/L (ref 136–145)
TRIGL SERPL-MCNC: 533 MG/DL (ref 0–150)
VLDLC SERPL CALC-MCNC: ABNORMAL MG/DL

## 2021-03-12 PROCEDURE — G0439 PPPS, SUBSEQ VISIT: HCPCS | Performed by: INTERNAL MEDICINE

## 2021-03-12 PROCEDURE — 82962 GLUCOSE BLOOD TEST: CPT | Performed by: INTERNAL MEDICINE

## 2021-03-12 ASSESSMENT — LIFESTYLE VARIABLES
HOW OFTEN DO YOU HAVE A DRINK CONTAINING ALCOHOL: 0
AUDIT-C TOTAL SCORE: INCOMPLETE
AUDIT TOTAL SCORE: INCOMPLETE
HOW OFTEN DO YOU HAVE A DRINK CONTAINING ALCOHOL: NEVER

## 2021-03-12 ASSESSMENT — PATIENT HEALTH QUESTIONNAIRE - PHQ9
SUM OF ALL RESPONSES TO PHQ QUESTIONS 1-9: 0
2. FEELING DOWN, DEPRESSED OR HOPELESS: 0
SUM OF ALL RESPONSES TO PHQ QUESTIONS 1-9: 0
1. LITTLE INTEREST OR PLEASURE IN DOING THINGS: 0

## 2021-03-12 NOTE — PROGRESS NOTES
Medicare Annual Wellness Visit  Name: Vanessa Michele Date: 3/12/2021   MRN: [id number] Sex: Female   Age: 77 y.o. Ethnicity: Non-/Non    : 1945 Race: Jere Irving is here for Medicare AWV    Screenings for behavioral, psychosocial and functional/safety risks, and cognitive dysfunction are all negative except as indicated below. These results, as well as other patient data from the [address] [facility] [address] form, are documented in Flowsheets linked to this Encounter. No Known Allergies      Prior to Visit Medications    Medication Sig Taking?  Authorizing Provider   hydroCHLOROthiazide (HYDRODIURIL) 25 MG tablet Take 1 tablet by mouth every morning Yes Asher Newman MD   sertraline (ZOLOFT) 100 MG tablet TAKE 1 TABLET BY MOUTH EVERY MORNING (TOTAL 125MG) Yes Maria Elena Zaman MD   cloNIDine (CATAPRES) 0.3 MG tablet TAKE 1 TABLET BY MOUTH THREE TIMES DAILY Yes Maria Elena Zaman MD   [facility] [place] [facility] Alcohol Swabs 70 % PADS  Yes Historical Provider, MD   Blood Glucose Calibration (ACCU-CHEK SATURNINO) SOLN  Yes Historical Provider, MD   Blood Glucose Monitoring Suppl (ACCU-CHEK SATURNINO PLUS) w/Device KIT  Yes Historical Provider, MD   Accu-Chek Softclix Lancets MISC  Yes Historical Provider, MD   Lancets Misc. (ACCU-CHEK SOFTCLIX LANCET DEV) KIT  Yes Historical Provider, MD   rosuvastatin (CRESTOR) 5 MG tablet TAKE 1 TABLET BY MOUTH EVERY DAY AT BEDTIME Yes Maria Elena Zaman MD   TRULICITY 1.5 VG/3.5XH SOPN Inject 1.5 mg into the skin every 7 days Yes Maria Elena Zaman MD   levothyroxine (SYNTHROID) 50 MCG tablet TAKE 1 TABLET BY MOUTH ONCE DAILY Yes Maria Elena Zaman MD   metFORMIN (GLUCOPHAGE) 500 MG tablet Take 2 tablets by mouth daily (with breakfast)  Patient taking differently: Take 500 mg by mouth daily (with breakfast)  Yes Maria Elena Zaman MD   Cinnamon 500 MG CAPS Take 1,000 mg by mouth Yes Historical Provider, MD Mercy Marcelosa Tha Ar[address] (Memory Support) Yes Historical Provider, MD   acetaminophen (APAP EXTRA STRENGTH) 500 MG tablet Take 2 tablets by mouth every 6 hours as needed for Pain Yes Andrae Wray MD   topiramate (TOPAMAX) 25 MG tablet Take 1 tablet by mouth nightly Yes Andrae Wray MD   Multiple Vitamins-Minerals (CENTRUM SILVER 50+WOMEN PO) Take by mouth Yes Historical Provider, MD   blood glucose test strips (RELION GLUCOSE TEST STRIPS) strip 1 each by In Vitro route daily As needed. Yes Alisa Castaneda MD   Vitamins-Lipotropics (LIPO FLAVONOID PLUS) TABS Take 1 capsule by mouth 3 times daily (with meals) for 15 days  Flakita Castorena MD   mirtazapine (REMERON) 7.5 MG tablet Take 1 tablet by mouth nightly  Patient not taking: Reported on 3/12/2021  Lyn Mendoza MD   doxazosin (CARDURA) 2 MG tablet Take 1 tablet by mouth daily  Andrae Wray MD   gabapentin (NEURONTIN) 100 MG capsule Take 1 capsule by mouth Daily for 30 days. Intended supply: 30 days  Flakita Castorena MD   methylPREDNISolone (MEDROL, JHON,) 4 MG tablet Take 1 tablet by mouth See Admin Instructions Take by mouth.   Patient not taking: Reported on 3/12/2021  Flakita Castorena MD   linaclotide (LINZESS) 290 MCG CAPS capsule TAKE 1 CAPSULE BY MOUTH ONCE DAILY IN THE MORNING BEFORE BREAKFAST  Patient not taking: Reported on 3/12/2021  Lyn Mendoza MD         Past Medical History:   Diagnosis Date    Alopecia     Anxiety and depression     Cancer ([facility] [address]     uterine    CTS (carpal tunnel syndrome)      History of rheumatic fever     Hypertension     Hypothyroidism     Meniere disease     bilateral ears    Nausea     associated with Meniere's dz    Obesity     Reflux esophagitis     Renal impairment     Sleep apnea     no longer needs cpap (per pt)    Type II or unspecified type diabetes mellitus without mention of complication, not stated as uncontrolled        Past Surgical History:   Procedure Laterality Date    ANKLE SURGERY Right     repair of fracture     APPENDECTOMY      BREAST SURGERY      rt  bx negative    COLONOSCOPY  4/15/11    HYSTERECTOMY      at age 35    MASTOID SURGERY      at age 26   [address]  4/14/11    with dilitation and biopsy         Family History   Problem Relation Age of Onset    Heart Disease Mother     Diabetes Mother     Cancer Mother         breast     Heart Failure Mother     Diabetes Father     Kidney Disease Father     Heart Disease Father     Heart Disease Sister     Diabetes Maternal Grandmother     Crohn's Disease Child     Crohn's Disease Grandchild        CareTeam (Including outside providers/suppliers regularly involved in providing care):   Patient Care Team:  Kwesi Preciado MD as PCP - General (Internal Medicine)  Kwesi Preciado MD as PCP - [facility]  Magdiel Lorenzo MD as Consulting Physician (Otolaryngology)  JENN Serna CNP as Nurse Practitioner (Endocrinology)  JENN Serna CNP as Nurse Practitioner (Family Nurse Practitioner)    Wt Readings from Last 3 Encounters:   03/12/21 149 lb (67.6 kg)   01/08/21 151 lb (68.5 kg)   12/11/20 151 lb (68.5 kg)     Vitals:    03/12/21 1001   BP: 118/70   Pulse: 76   Resp: 16   SpO2: 98%   Weight: 149 lb (67.6 kg)   Height: 5' (1.524 m)     Body mass index is 29.1 kg/m². Based upon direct observation of the patient, evaluation of cognition reveals recent and remote memory intact. Patient's complete Health Risk Assessment and screening values have been reviewed and are found in Flowsheets. The following problems were reviewed today and where indicated follow up appointments were made and/or referrals ordered.     Positive Risk Factor Screenings with Interventions:     Fall Risk:  2 or more falls in past year?: no  Fall with injury in past year?: (!) yes  Fall Risk Interventions:    · one mechanical fall          General Health and ACP:  General  In general, how would you say your health is?: Fair  In the past 7 days, have you experienced any of the following?  New or Increased Pain, New or Increased Fatigue, Loneliness, Social Isolation, Stress or Anger?: (!) Loneliness, Social Isolation  Do you get the social and emotional support that you need?: Yes  Do you have a Living Will?: Yes  Advance Directives     Power of Joaquín Smith Will ACP-Advance Directive ACP-Power of     Not on File Not on File Not on File Not on File      General Health Risk Interventions:  · Loneliness: doing better  · Social isolation: doing better    Health Habits/Nutrition:  Health Habits/Nutrition  Do you exercise for at least 20 minutes 2-3 times per week?: (!) No  Have you lost any weight without trying in the past 3 months?: (!) Yes  Do you eat only one meal per day?: No  Have you seen the dentist within the past year?: (!) No  Body mass index: (!) 29.1  Health Habits/Nutrition Interventions:  · Will start exercising once the weather improves     Hearing/Vision:  No exam data present  Hearing/Vision  Do you or your family notice any trouble with your hearing that hasn't been managed with hearing aids?: (!) Yes  Do you have difficulty driving, watching TV, or doing any of your daily activities because of your eyesight?: No  Have you had an eye exam within the past year?: Yes  Hearing/Vision Interventions:  · Hearing concerns:  Getting new hearing aids     Safety:  Safety  Do you have working smoke detectors?: Yes  Have all throw rugs been removed or fastened?: (!) No  Do you have non-slip mats or surfaces in all bathtubs/showers?: Yes  Do all of your stairways have a railing or banister?: Yes  Are your doorways, halls and stairs free of clutter?: Yes  Do you always fasten your seatbelt when you are in a car?: Yes  Safety Interventions:  · Home safety tips provided     Personalized Preventive Plan   Current Health Maintenance Status  Immunization History   Administered Date(s) Administered    Influenza 10/12/2012, 10/21/2013    Influenza Virus Vaccine 10/14/2014    Influenza Whole 10/12/2010    Influenza, High Dose (Fluzone 65 yrs and older) 11/03/2015, 09/15/2016, 10/31/2017, 10/21/2018, 09/21/2019    Influenza, Quadv, adjuvanted, 65 yrs +, IM, PF (Fluad) 09/11/2020    Pneumococcal Conjugate 13-valent (Vavqblc03) 06/14/2017    Pneumococcal Polysaccharide (Gcreuktsz10) 04/14/2011    Td, unspecified formulation 06/14/2017    Zoster Live (Zostavax) 12/22/2015        Health Maintenance   Topic Date Due    DTaP/Tdap/Td vaccine (1 - Tdap) 05/12/1965    Shingles Vaccine (2 of 3) 02/16/2016    Pneumococcal 65+ years Vaccine (2 of 2 - PPSV23) 06/14/2018    Annual Wellness Visit (AWV)  Never done    Diabetic retinal exam  02/21/2020    Lipid screen  03/02/2021    COVID-19 Vaccine (2 - Moderna 2-dose series) 03/31/2021    Colon cancer screen colonoscopy  04/15/2021    A1C test (Diabetic or Prediabetic)  09/17/2021    Diabetic microalbuminuria test  09/17/2021    Diabetic foot exam  11/12/2021    TSH testing  12/28/2021    Potassium monitoring  12/28/2021    Creatinine monitoring  12/28/2021    Breast cancer screen  01/18/2023    DEXA (modify frequency per FRAX score)  Completed    Flu vaccine  Completed    Hepatitis C screen  Completed    Hepatitis A vaccine  Aged Out    Hib vaccine  Aged Out    Meningococcal (ACWY) vaccine  Aged Out     Recommendations for [place] Due: see orders and patient instructions/AVS.  . Recommended screening schedule for the next 5-10 years is provided to the patient in written form: see Patient Instructions/AVS.    Ruby Melvin was seen today for medicare awv.     Diagnoses and all orders for this visit:    Routine general medical examination at a health care facility    Controlled type 2 diabetes mellitus with hyperglycemia, unspecified whether long term insulin use (HCC)  -     POCT Glucose  -     Hemoglobin A1C  -     Lipid Panel    Hyponatremia  -     Renal Function Panel

## 2021-03-13 LAB
ESTIMATED AVERAGE GLUCOSE: 114 MG/DL
HBA1C MFR BLD: 5.6 %

## 2021-03-15 DIAGNOSIS — E78.5 HYPERLIPIDEMIA, UNSPECIFIED HYPERLIPIDEMIA TYPE: Primary | ICD-10-CM

## 2021-03-15 RX ORDER — ROSUVASTATIN CALCIUM 20 MG/1
20 TABLET, COATED ORAL DAILY
Qty: 90 TABLET | Refills: 1 | Status: SHIPPED | OUTPATIENT
Start: 2021-03-15 | Stop reason: SDUPTHER

## 2021-03-17 DIAGNOSIS — H81.02 MENIERE'S DISEASE OF LEFT EAR: ICD-10-CM

## 2021-03-17 NOTE — TELEPHONE ENCOUNTER
Refill Request     Last Seen: 1/8/2021    Last Written: 2/4/2021    Next Appointment:   Future Appointments   Date Time Provider Tevin Alvarez   7/19/2021 10:00 AM MD JESSICA Rhodes   3/28/2022 10:00 AM MD JESSICA Rhodes  E[facility]       Future appointment scheduled      Requested Prescriptions     Pending Prescriptions Disp Refills    hydroCHLOROthiazide (HYDRODIURIL) 25 MG tablet 30 tablet 3     Sig: Take 1 tablet by mouth every morning

## 2021-03-21 RX ORDER — HYDROCHLOROTHIAZIDE 25 MG/1
25 TABLET ORAL EVERY MORNING
Qty: 30 TABLET | Refills: 1 | Status: SHIPPED | OUTPATIENT
Start: 2021-03-21 | Stop reason: SDUPTHER

## 2021-03-25 DIAGNOSIS — H81.02 MENIERE'S DISEASE OF LEFT EAR: ICD-10-CM

## 2021-03-25 RX ORDER — HYDROCHLOROTHIAZIDE 25 MG/1
25 TABLET ORAL EVERY MORNING
Qty: 30 TABLET | Refills: 1 | Status: SHIPPED | OUTPATIENT
Start: 2021-03-25 | End: 2021-06-01

## 2021-03-29 ENCOUNTER — TELEPHONE (OUTPATIENT)
Dept: ENT CLINIC | Age: 76
End: 2021-03-29

## 2021-03-29 ENCOUNTER — TELEPHONE (OUTPATIENT)
Dept: INTERNAL MEDICINE CLINIC | Age: 76
End: 2021-03-29

## 2021-03-29 DIAGNOSIS — E78.5 HYPERLIPIDEMIA, UNSPECIFIED HYPERLIPIDEMIA TYPE: ICD-10-CM

## 2021-03-29 DIAGNOSIS — F32.A DEPRESSION, UNSPECIFIED DEPRESSION TYPE: ICD-10-CM

## 2021-03-29 DIAGNOSIS — I10 ESSENTIAL HYPERTENSION: ICD-10-CM

## 2021-03-29 DIAGNOSIS — H81.02 MENIERE'S DISEASE OF LEFT EAR: Primary | ICD-10-CM

## 2021-03-29 RX ORDER — METFORMIN HYDROCHLORIDE 500 MG/1
1000 TABLET, EXTENDED RELEASE ORAL
Qty: 60 TABLET | Refills: 0 | Status: CANCELLED | OUTPATIENT
Start: 2021-03-29

## 2021-03-29 RX ORDER — HYDROCHLOROTHIAZIDE 25 MG/1
25 TABLET ORAL EVERY MORNING
Qty: 90 TABLET | Refills: 3 | Status: SHIPPED | OUTPATIENT
Start: 2021-03-29 | End: 2021-11-04 | Stop reason: SDUPTHER

## 2021-03-29 NOTE — TELEPHONE ENCOUNTER
Patient very confused about her medication refills. She said her insurance company wants the refills sent to them directly but she's not sure if it is [employer] [place] or Medicare. Patient said they do not go to the pharmacy, they go directly to her insurance company. She said there are 5 medications to be sent and Dr Bharathi Duarte will know what they are.

## 2021-03-29 NOTE — TELEPHONE ENCOUNTER
Ava haro [facility] 90 day supply with RF's   3,         For  Hydro Chlorothiazide    25 mg ,     Jose

## 2021-03-29 NOTE — TELEPHONE ENCOUNTER
Vivek Oseguera called to clarify patient will need a 90 day supply of her medications sent to them as well as a 30 day script to [address] [place] [address].     Multiple refill dates for 5 medications  Last OV 03/12/2021  Next OV 07/19/2021

## 2021-03-30 RX ORDER — LEVOTHYROXINE SODIUM 0.05 MG/1
TABLET ORAL
Qty: 90 TABLET | Refills: 1 | Status: SHIPPED | OUTPATIENT
Start: 2021-03-30 | End: 2021-11-04 | Stop reason: SDUPTHER

## 2021-03-30 RX ORDER — SERTRALINE HYDROCHLORIDE 100 MG/1
TABLET, FILM COATED ORAL
Qty: 90 TABLET | Refills: 1 | Status: SHIPPED | OUTPATIENT
Start: 2021-03-30 | End: 2021-10-19 | Stop reason: ALTCHOICE

## 2021-03-30 RX ORDER — ROSUVASTATIN CALCIUM 20 MG/1
20 TABLET, COATED ORAL DAILY
Qty: 90 TABLET | Refills: 1 | Status: SHIPPED | OUTPATIENT
Start: 2021-03-30 | End: 2021-07-19

## 2021-03-30 RX ORDER — DOXAZOSIN 2 MG/1
2 TABLET ORAL DAILY
Qty: 90 TABLET | Refills: 1 | Status: SHIPPED | OUTPATIENT
Start: 2021-03-30 | End: 2021-07-19 | Stop reason: ALTCHOICE

## 2021-04-07 ENCOUNTER — TELEPHONE (OUTPATIENT)
Dept: INTERNAL MEDICINE CLINIC | Age: 76
End: 2021-04-07

## 2021-04-07 NOTE — TELEPHONE ENCOUNTER
Called and let a message for patient to call the office for a clearer message as to what she is wanting Dr. Trung Frias or myself to do regarding her medication.

## 2021-04-07 NOTE — TELEPHONE ENCOUNTER
----- Message from Dary Qureshi sent at 4/7/2021 11:34 AM EDT -----  Subject: Message to Provider    QUESTIONS  Information for Provider? Pt would like to provide the number for Penny Pan or reynaldo LACY that her pharmacy is providing for her [phone]. Pt   believes it is for her insurance to clear her medication. Please advise pt   is not sure she said they aren't giving her all the info.  ---------------------------------------------------------------------------  --------------  CALL BACK INFO  What is the best way for the office to contact you? OK to leave message on   voicemail  Preferred Call Back Phone Number? [phone]  ---------------------------------------------------------------------------  --------------  SCRIPT ANSWERS  Relationship to Patient?  Self

## 2021-04-20 NOTE — TELEPHONE ENCOUNTER
Pt came in the office to discuss medication, but was unable to say which one it was so she would like a call back to discuss not taking anymore. The pt stated the nurse or doctor would know which medication. Lela Campa stated she would give the pt a juan back.

## 2021-04-20 NOTE — TELEPHONE ENCOUNTER
Called patient back and she states that her Trulicity makes her so dizzy and that she doesn't want to take it anymore. Please advise.

## 2021-04-21 ENCOUNTER — OFFICE VISIT (OUTPATIENT)
Dept: ENT CLINIC | Age: 76
End: 2021-04-21
Payer: MEDICARE

## 2021-04-21 VITALS
HEART RATE: 68 BPM | DIASTOLIC BLOOD PRESSURE: 60 MMHG | HEIGHT: 60 IN | BODY MASS INDEX: 29.45 KG/M2 | WEIGHT: 150 LBS | SYSTOLIC BLOOD PRESSURE: 98 MMHG | TEMPERATURE: 97 F

## 2021-04-21 DIAGNOSIS — H93.19 TINNITUS, UNSPECIFIED LATERALITY: ICD-10-CM

## 2021-04-21 DIAGNOSIS — H81.02 MENIERE'S DISEASE OF LEFT EAR: Primary | ICD-10-CM

## 2021-04-21 PROCEDURE — 99213 OFFICE O/P EST LOW 20 MIN: CPT | Performed by: OTOLARYNGOLOGY

## 2021-04-21 RX ORDER — GABAPENTIN 100 MG/1
100 CAPSULE ORAL 2 TIMES DAILY
Qty: 60 CAPSULE | Refills: 0 | Status: SHIPPED | OUTPATIENT
Start: 2021-04-21 | End: 2021-10-19

## 2021-04-21 NOTE — TELEPHONE ENCOUNTER
Pt came in the office and stated to disregard the message that it was her and she need to go to a different doctor for something else.

## 2021-04-21 NOTE — PROGRESS NOTES
Patient continues to complain of tinnitus greater on the left than the right that is constant and is associated with vertigo which is not seemingly controlled. She has rather strange affect in this regard. Because of this, I do suspect that dementia is contributing factor. She is diabetic on oral medication. Currently, she appears in no obvious acute distress. Ear examination reveals normal-appearing tympanic membranes and ear canals bilaterally. Oral examination is unremarkable. The neck is free of adenopathy, mass, thyroid enlargement. No focal neurologic signs are apparent. Gait appears normal.  No cerebellar findings are noted. There is no nystagmus present. Romberg is normal.  I do feel that electronystagmogram may be beneficial.  We will try gabapentin. She may well need a neurologic evaluation.

## 2021-04-22 ENCOUNTER — TELEPHONE (OUTPATIENT)
Dept: ENT CLINIC | Age: 76
End: 2021-04-22

## 2021-04-22 DIAGNOSIS — H81.02 MENIERE'S DISEASE OF LEFT EAR: Primary | ICD-10-CM

## 2021-04-22 RX ORDER — MECLIZINE HYDROCHLORIDE 25 MG/1
12.5 TABLET ORAL 2 TIMES DAILY
Qty: 30 TABLET | Refills: 1 | Status: SHIPPED | OUTPATIENT
Start: 2021-04-22 | End: 2021-10-19 | Stop reason: ALTCHOICE

## 2021-04-22 NOTE — TELEPHONE ENCOUNTER
Patient called. She is unable to take the gabapentin prescribed. It is making her very dizzy and foggy.  I advised her to stop taking it and I would reach out to Dr. Polo Covarrubias to see if there is anything else she would be able to take

## 2021-05-03 ENCOUNTER — PROCEDURE VISIT (OUTPATIENT)
Dept: AUDIOLOGY | Age: 76
End: 2021-05-03
Payer: MEDICARE

## 2021-05-03 DIAGNOSIS — H90.A32 MIXED CONDUCTIVE AND SENSORINEURAL HEARING LOSS OF LEFT EAR WITH RESTRICTED HEARING OF RIGHT EAR: Primary | ICD-10-CM

## 2021-05-03 PROCEDURE — 92557 COMPREHENSIVE HEARING TEST: CPT | Performed by: AUDIOLOGIST

## 2021-05-03 PROCEDURE — 92567 TYMPANOMETRY: CPT | Performed by: AUDIOLOGIST

## 2021-05-03 NOTE — Clinical Note
Thresholds essentially unchanged from last audio (November 2020). Some decrease in right sided discrimination.

## 2021-05-03 NOTE — PROGRESS NOTES
[facility]  Division of Audiology/Otolaryngology    5/3/2021     PCP: Owen Severin, MD   MRN: [id number]     AUDIOLOGIC AND OTHER PERTINENT MEDICAL HISTORY:        Ms. Reno Polanco was seen for hearing and middle ear evaluation. Otolaryngology  is referral source of today's appointment. Patient presents with debilitating tinnitus moreso on left side. The degree is reportedly severe. The timing is constant. She is a longtime user of amplification, bilaterally. AUDIOGRAM/IMMITTANCE (MIDDLE EAR FUNCTION):        Audiogram demonstrates mild-moderate sensory loss of right ear. Severe mixed loss of left. Loss is asymmetrical-greater on left. Speech discrimination ability was good, from left, more difficulty from right side in comparison to last audiogram in 11/20. Immittance consistent with normal middle ear function (Type A curve) bilaterally. Ipsilateral acoustic reflexes were present from right ear, absent from left-coincides with hearing status. Chart cc'd to: Tracy Paiz MD        COUNSELING:          Audiogram results were reviewed with patient. Patient expressed interest in new amplification-she prefers in-the-ear model hearing devices. RECOMMENDATIONS:      Proceed with new hearing devices.   Will call insurance for verification  ENT to medically advise    Electronically signed by Hyaden Salter on 5/3/21 at 12:00 PM.

## 2021-05-05 ENCOUNTER — TELEPHONE (OUTPATIENT)
Dept: ENT CLINIC | Age: 76
End: 2021-05-05

## 2021-05-05 NOTE — TELEPHONE ENCOUNTER
Audiogram does demonstrate sensorineural loss on both sides with the loss being greater on the right. I do need the ENG in order to determine follow-up therapy.

## 2021-05-31 DIAGNOSIS — H81.02 MENIERE'S DISEASE OF LEFT EAR: ICD-10-CM

## 2021-06-01 ENCOUNTER — CLINICAL DOCUMENTATION (OUTPATIENT)
Dept: AUDIOLOGY | Age: 76
End: 2021-06-01

## 2021-06-01 RX ORDER — HYDROCHLOROTHIAZIDE 25 MG/1
TABLET ORAL
Qty: 30 TABLET | Refills: 0 | Status: SHIPPED | OUTPATIENT
Start: 2021-06-01 | End: 2021-06-02 | Stop reason: SDUPTHER

## 2021-06-01 NOTE — PROGRESS NOTES
Called patient to verify member benefits for hearing aids. All pertinent info relayed. Patient insurance is contracted with [employer]. [phone] is member number to call upon scheduling apt.   spoke with Jennifer Quarles.    Reference number of call is [id number]

## 2021-06-02 ENCOUNTER — TELEPHONE (OUTPATIENT)
Dept: ENT CLINIC | Age: 76
End: 2021-06-02

## 2021-06-02 DIAGNOSIS — H81.02 MENIERE'S DISEASE OF LEFT EAR: ICD-10-CM

## 2021-06-02 RX ORDER — ALPRAZOLAM 0.25 MG/1
0.25 TABLET ORAL DAILY
Qty: 30 TABLET | Refills: 0 | OUTPATIENT
Start: 2021-06-02 | End: 2021-07-02

## 2021-06-02 RX ORDER — HYDROCHLOROTHIAZIDE 25 MG/1
TABLET ORAL
Qty: 90 TABLET | Refills: 3 | Status: SHIPPED | OUTPATIENT
Start: 2021-06-02 | End: 2021-07-19 | Stop reason: ALTCHOICE

## 2021-06-02 NOTE — TELEPHONE ENCOUNTER
Patient would like a 90 days supply of meds due to insurance     Meclizine is not helping her ears     Is there something else that you can call in

## 2021-06-14 ENCOUNTER — TELEPHONE (OUTPATIENT)
Dept: INTERNAL MEDICINE CLINIC | Age: 76
End: 2021-06-14

## 2021-06-14 DIAGNOSIS — E11.65 CONTROLLED TYPE 2 DIABETES MELLITUS WITH HYPERGLYCEMIA, UNSPECIFIED WHETHER LONG TERM INSULIN USE (HCC): ICD-10-CM

## 2021-06-14 RX ORDER — DULAGLUTIDE 1.5 MG/.5ML
1.5 INJECTION, SOLUTION SUBCUTANEOUS
Qty: 4 PEN | Refills: 5 | Status: SHIPPED | OUTPATIENT
Start: 2021-06-14 | End: 2021-07-19

## 2021-06-14 NOTE — TELEPHONE ENCOUNTER
----- Message from Maribell Celeste sent at 6/14/2021  9:01 AM EDT -----  Subject: Medication Problem    QUESTIONS  Name of Medication? TRULICITY 1.5 OZ/0.9YR SOPN  Patient-reported dosage and instructions? Inject once a week   What question or problem do you have with the medication? Patient states   this medication is not working for her doesn't help her at all and is   uncomfortable to use and will not use this medication again. Would like a   tablet or a different form of taking the medication no injection. Preferred Pharmacy? [address] [facility] [address] [phone] - [phone]  Pharmacy phone number (if available)? [phone]  Additional Information for Provider? Also, 1.000 metformin in the morning   would like for the doctor to know that the Metformin is working really   well for her. She has been taking 1,000mg in the morning and 1,000mg at   night. Would rather continue this regimen.   ---------------------------------------------------------------------------  --------------  CALL BACK INFO  What is the best way for the office to contact you? OK to leave message on   voicemail  Preferred Call Back Phone Number? [phone]  ---------------------------------------------------------------------------  --------------  SCRIPT ANSWERS  Relationship to Patient?  Self

## 2021-06-23 ENCOUNTER — TELEPHONE (OUTPATIENT)
Dept: ENT CLINIC | Age: 76
End: 2021-06-23

## 2021-06-23 NOTE — TELEPHONE ENCOUNTER
We just got a call from [address] [place] [address] ,  Patient is there to get glasses     And she told the young lady there to call here and she could get a new rx,  I let her know that we are ears nose and throat, and we do not do eye exams     And let her know that if she needed eye drops or ear drops fine,  We could do that,

## 2021-06-28 ENCOUNTER — TELEPHONE (OUTPATIENT)
Dept: INTERNAL MEDICINE CLINIC | Age: 76
End: 2021-06-28

## 2021-06-28 NOTE — TELEPHONE ENCOUNTER
----- Message from Miroslava Schwartz sent at 6/25/2021  3:35 PM EDT -----  Subject: Appointment Request    Reason for Call: Routine Physical Exam    QUESTIONS  Type of Appointment? Established Patient  Reason for appointment request? No appointments available during search  Additional Information for Provider? Nain Pinto 1945 [id number]   [phone] Pt needs to reschedule her 6 month follow up because she will   be out of town on July 19, 2021. Tried to reschedule but not available   appt.  ---------------------------------------------------------------------------  --------------  CALL BACK INFO  What is the best way for the office to contact you? OK to leave message on   voicemail  Preferred Call Back Phone Number? [phone]  ---------------------------------------------------------------------------  --------------  SCRIPT ANSWERS  Relationship to Patient? Self  Have your symptoms changed? No  Appointment reason? Well Care/Follow Ups  Select a Well Care/Follow Ups appointment reason? Adult Physical Exam   [Medicare Annual Wellness, AWV, PAP, Pelvic]  (If the patient has Medicare as their primary insurance coverage ask this   question) Are you requesting a Medicare Annual Wellness Visit? No  (Is the patient requesting a pap smear with their physical exam?)? No  (Is the patient requesting their annual physical and does not need PAP or   AWV per above?)? Yes   Have you been diagnosed with, awaiting test results for, or told that you   are suspected of having COVID-19 (Coronavirus)? (If patient has tested   negative or was tested as a requirement for work, school, or travel and   not based on symptoms, answer no)? No  Do you currently have flu-like symptoms including fever or chills, cough,   shortness of breath, difficulty breathing, or new loss of taste or smell? No  Have you had close contact with someone with COVID-19 in the last 14 days?    No  (Service Expert  click yes below to proceed with Paula Parikh Business As Usual   Scheduling)?  Yes

## 2021-07-19 ENCOUNTER — TELEPHONE (OUTPATIENT)
Dept: INTERNAL MEDICINE CLINIC | Age: 76
End: 2021-07-19

## 2021-07-19 ENCOUNTER — OFFICE VISIT (OUTPATIENT)
Dept: INTERNAL MEDICINE CLINIC | Age: 76
End: 2021-07-19
Payer: MEDICARE

## 2021-07-19 VITALS
WEIGHT: 148 LBS | DIASTOLIC BLOOD PRESSURE: 74 MMHG | BODY MASS INDEX: 29.06 KG/M2 | TEMPERATURE: 97.4 F | OXYGEN SATURATION: 98 % | HEIGHT: 60 IN | SYSTOLIC BLOOD PRESSURE: 124 MMHG | HEART RATE: 66 BPM

## 2021-07-19 DIAGNOSIS — E03.9 ACQUIRED HYPOTHYROIDISM: ICD-10-CM

## 2021-07-19 DIAGNOSIS — E11.9 TYPE 2 DIABETES MELLITUS WITHOUT COMPLICATION, WITHOUT LONG-TERM CURRENT USE OF INSULIN (HCC): Primary | ICD-10-CM

## 2021-07-19 DIAGNOSIS — Z12.11 SCREEN FOR COLON CANCER: ICD-10-CM

## 2021-07-19 DIAGNOSIS — F03.90 DEMENTIA WITHOUT BEHAVIORAL DISTURBANCE, UNSPECIFIED DEMENTIA TYPE: ICD-10-CM

## 2021-07-19 DIAGNOSIS — N18.32 CHRONIC KIDNEY DISEASE (CKD) STAGE G3B/A1, MODERATELY DECREASED GLOMERULAR FILTRATION RATE (GFR) BETWEEN 30-44 ML/MIN/1.73 SQUARE METER AND ALBUMINURIA CREATININE RATIO LESS THAN 30 MG/G (HCC): ICD-10-CM

## 2021-07-19 DIAGNOSIS — E78.2 MIXED HYPERLIPIDEMIA: ICD-10-CM

## 2021-07-19 LAB
CHP ED QC CHECK: NORMAL
GLUCOSE BLD-MCNC: 107 MG/DL

## 2021-07-19 PROCEDURE — 99214 OFFICE O/P EST MOD 30 MIN: CPT | Performed by: INTERNAL MEDICINE

## 2021-07-19 PROCEDURE — 82962 GLUCOSE BLOOD TEST: CPT | Performed by: INTERNAL MEDICINE

## 2021-07-19 RX ORDER — FLASH GLUCOSE SCANNING READER
EACH MISCELLANEOUS
COMMUNITY
End: 2021-07-20 | Stop reason: SDUPTHER

## 2021-07-19 RX ORDER — FLASH GLUCOSE SENSOR
KIT MISCELLANEOUS
Qty: 1 EACH | Refills: 0 | Status: SHIPPED | OUTPATIENT
Start: 2021-07-19 | End: 2021-08-03

## 2021-07-19 RX ORDER — FLASH GLUCOSE SCANNING READER
EACH MISCELLANEOUS
Status: CANCELLED | OUTPATIENT
Start: 2021-07-19

## 2021-07-19 RX ORDER — DONEPEZIL HYDROCHLORIDE 5 MG/1
5 TABLET, FILM COATED ORAL NIGHTLY
Qty: 30 TABLET | Refills: 3 | Status: SHIPPED | OUTPATIENT
Start: 2021-07-19 | End: 2021-10-19

## 2021-07-19 ASSESSMENT — PATIENT HEALTH QUESTIONNAIRE - PHQ9
1. LITTLE INTEREST OR PLEASURE IN DOING THINGS: 0
SUM OF ALL RESPONSES TO PHQ QUESTIONS 1-9: 0
2. FEELING DOWN, DEPRESSED OR HOPELESS: 0
SUM OF ALL RESPONSES TO PHQ QUESTIONS 1-9: 0
SUM OF ALL RESPONSES TO PHQ QUESTIONS 1-9: 0
SUM OF ALL RESPONSES TO PHQ9 QUESTIONS 1 & 2: 0

## 2021-07-19 NOTE — PATIENT INSTRUCTIONS
Hand arthritis  Take Tylenol as needed   You can also take Diclofenac gel or Biofreeze- as the pharmacist about it      Memory/Dementia  Start Aricept     Diabetes  Lets see if your insurance will cover Freest[employer]timee     Colon cancer screen  Check Cologuard

## 2021-07-19 NOTE — TELEPHONE ENCOUNTER
Pt needs a prescription for the Free style bessie reader. Pharmacy has recevied script for the sensor but needs the reader to compliment it. Please send script [facility] on file.

## 2021-07-19 NOTE — PROGRESS NOTES
Amanda Nelson (:  1945) is a 77 y.o. female,Established patient, here for evaluation of the following chief complaint(s):  Diabetes (Wants a stick-free sensor) and Hypertension      ASSESSMENT/PLAN:  1. Type 2 diabetes mellitus without complication, without long-term current use of insulin ([facility])  Assessment & Plan:   Well-controlled, changes made today: Continue to hold Trulicity and continue Metformin monotherapy. Orders:  -     POCT Glucose  -     Continuous Blood Gluc Sensor ([address] [place] [address]) [facility] [place] Check sugar TID, Disp-1 each, R-0Normal  -     COMPREHENSIVE METABOLIC PANEL  -     Hemoglobin A1C  2. Dementia without behavioral disturbance, unspecified dementia type ([facility]  Assessment & Plan:   Stable. Patient interested in treatment. Recommend trial of Aricept. Orders:  -     donepezil (ARICEPT) 5 MG tablet; Take 1 tablet by mouth nightly, Disp-30 tablet, R-3Normal  3. Acquired hypothyroidism  Assessment & Plan:   Well-controlled, continue current medications  4. Chronic kidney disease (CKD) stage G3b/A1, moderately decreased glomerular filtration rate (GFR) between 30-44 mL/min/1.73 square meter and albuminuria creatinine ratio less than 30 mg/g ([facility])  Assessment & Plan:   Stable. Encourage patient to avoid NSAIDs. 5. Mixed hyperlipidemia  Assessment & Plan:   Uncontrolled, Patient has chosen not to take statin. 6. Screen for colon cancer  -     Cologuard (For External Results Only); Future      Patient Instructions   Hand arthritis  Take Tylenol as needed   You can also take Diclofenac gel or Biofreeze- as the pharmacist about it      Memory/Dementia  Start Aricept     Diabetes  Lets see if your insurance will cover Freestyle Ezra     Colon cancer screen  Check Cologuard        Return in about 3 months (around 10/19/2021) for Diabetes Mellitus; Dementia . SUBJECTIVE/OBJECTIVE:  Diabetes  She presents for her follow-up diabetic visit. She has type 2 diabetes mellitus.  Her disease course has been stable. There is no change in her home blood glucose trend. Her breakfast blood glucose range is generally 110-130 mg/dl. Trulicity was too expensive. Stopped taking it. Hand arthritis        Review of Systems    Physical Exam  Results for Ladonna Singh (MRN [id number]) as of 7/19/2021 10:10   Ref. Range 12/28/2020 15:34 1/18/2021 10:35 3/12/2021 10:14 3/12/2021 11:24   Sodium Latest Ref Range: 136 - 145 mmol/L 127 (L)   136   Potassium Latest Ref Range: 3.5 - 5.1 mmol/L 4.0   4.1   Chloride Latest Ref Range: 99 - 110 mmol/L 85 (L)   96 (L)   CO2 Latest Ref Range: 21 - 32 mmol/L 26   29   BUN Latest Ref Range: 7 - 20 mg/dL 28 (H)   22 (H)   Creatinine Latest Ref Range: 0.6 - 1.2 mg/dL 1.6 (H)   1.5 (H)   Anion Gap Latest Ref Range: 3 - 16  16   11   GFR Non- Latest Ref Range: >60  31 (A)   34 (A)   GFR  Latest Ref Range: >60  38 (A)   41 (A)   Glucose Latest Ref Range: 70 - 99 mg/dL 95  115 96   Calcium Latest Ref Range: 8.3 - 10.6 mg/dL 9.9   10.1   Phosphorus Latest Ref Range: 2.5 - 4.9 mg/dL    3.9   Osmolality Latest Ref Range: 280 - 301 mOsm/kg 268 (L)      Cholesterol, Total Latest Ref Range: 0 - 199 mg/dL    280 (H)   HDL Cholesterol Latest Ref Range: 40 - 60 mg/dL    31 (L)   LDL Calculated Latest Ref Range: <100 mg/dL    see below   LDL Direct Latest Ref Range: <100 mg/dL    152 (H)   Triglycerides Latest Ref Range: 0 - 150 mg/dL    533 (H)   VLDL Cholesterol Calculated Latest Ref Range: Not Established mg/dL    see below   Albumin Latest Ref Range: 3.4 - 5.0 g/dL    4.8   Hemoglobin A1C Latest Ref Range: See comment %    5.6   eAG (mg/dL) Latest Units: mg/dL    114.0   TSH Latest Ref Range: 0.27 - 4.20 uIU/mL 3.88      T4 Free Latest Ref Range: 0.9 - 1.8 ng/dL 1.8                An electronic signature was used to authenticate this note. --Svetlana Ching MD     Documentation was done using voice recognition dragon software.   Every effort was made to ensure accuracy; however, inadvertent, unintentional computerized transcription errors may be present.

## 2021-07-20 DIAGNOSIS — E11.9 TYPE 2 DIABETES MELLITUS WITHOUT COMPLICATION, WITHOUT LONG-TERM CURRENT USE OF INSULIN (HCC): Primary | ICD-10-CM

## 2021-07-20 RX ORDER — FLASH GLUCOSE SCANNING READER
EACH MISCELLANEOUS
Qty: 1 DEVICE | Refills: 5 | Status: SHIPPED | OUTPATIENT
Start: 2021-07-20

## 2021-07-20 RX ORDER — FLASH GLUCOSE SCANNING READER
1 EACH MISCELLANEOUS 3 TIMES DAILY
Qty: 1 DEVICE | Refills: 5 | Status: SHIPPED | OUTPATIENT
Start: 2021-07-20

## 2021-07-20 NOTE — PROGRESS NOTES
Patient came in to office b/c [facility] received an order for her Freestyle sensors but not the reader device. Ezra reader ordered and sent to [facility].

## 2021-08-02 DIAGNOSIS — E11.9 TYPE 2 DIABETES MELLITUS WITHOUT COMPLICATION, WITHOUT LONG-TERM CURRENT USE OF INSULIN (HCC): ICD-10-CM

## 2021-08-02 NOTE — TELEPHONE ENCOUNTER
Recent Visits  Date Type Provider Dept   07/19/21 Office Visit Mendy Fraser MD [facility]   03/12/21 Office Visit Mendy Fraser MD [facility]   01/08/21 Office Visit Mendy Fraser MD [facility]   12/28/20 Office Visit Caroline Abdalla MD [facility]   11/12/20 Office Visit Mendy Fraser MD [facility]   09/11/20 Office Visit Mendy Fraser MD [facility]   05/11/20 Office Visit Mendy Fraser MD [facility]   03/10/20 Office Visit Mendy Fraser MD [facility]   Showing recent visits within past 540 days with a meds authorizing provider and meeting all other requirements  Future Appointments  Date Type Provider Dept   10/19/21 Appointment Mendy Fraser MD [facility]   Showing future appointments within next 150 days with a meds authorizing provider and meeting all other requirements     7/19/2021

## 2021-08-03 RX ORDER — FLASH GLUCOSE SENSOR
KIT MISCELLANEOUS
Qty: 1 EACH | Refills: 0 | Status: SHIPPED | OUTPATIENT
Start: 2021-08-03 | End: 2021-08-17

## 2021-08-15 DIAGNOSIS — E11.9 TYPE 2 DIABETES MELLITUS WITHOUT COMPLICATION, WITHOUT LONG-TERM CURRENT USE OF INSULIN (HCC): ICD-10-CM

## 2021-08-17 RX ORDER — FLASH GLUCOSE SENSOR
KIT MISCELLANEOUS
Qty: 1 EACH | Refills: 0 | Status: SHIPPED | OUTPATIENT
Start: 2021-08-17 | End: 2021-08-31

## 2021-08-31 ENCOUNTER — TELEPHONE (OUTPATIENT)
Dept: INTERNAL MEDICINE CLINIC | Age: 76
End: 2021-08-31

## 2021-08-31 DIAGNOSIS — E11.9 TYPE 2 DIABETES MELLITUS WITHOUT COMPLICATION, WITHOUT LONG-TERM CURRENT USE OF INSULIN (HCC): ICD-10-CM

## 2021-08-31 RX ORDER — FLASH GLUCOSE SENSOR
KIT MISCELLANEOUS
Qty: 2 EACH | Refills: 3 | Status: SHIPPED | OUTPATIENT
Start: 2021-08-31 | End: 2021-12-20

## 2021-08-31 NOTE — TELEPHONE ENCOUNTER
Patient came into the office and dropped off blood sugar log 7/7/21 to 8/31/21. Patient stated that she had must always drop off her blood sugar logs to Dr. Milla Molina.   Blood glucose log scanned in

## 2021-08-31 NOTE — TELEPHONE ENCOUNTER
Continuous Blood Glucose Sensor (Freestyle Ezra 14 day senor)  Last Filled 8/17/21  Last OV 7/19/21  Next OV 10/19/21    [address]

## 2021-09-08 DIAGNOSIS — E78.5 HYPERLIPIDEMIA, UNSPECIFIED HYPERLIPIDEMIA TYPE: ICD-10-CM

## 2021-09-08 RX ORDER — ROSUVASTATIN CALCIUM 20 MG/1
TABLET, COATED ORAL
Qty: 90 TABLET | Refills: 0 | Status: SHIPPED | OUTPATIENT
Start: 2021-09-08 | End: 2021-11-03 | Stop reason: SDUPTHER

## 2021-10-19 ENCOUNTER — OFFICE VISIT (OUTPATIENT)
Dept: INTERNAL MEDICINE CLINIC | Age: 76
End: 2021-10-19
Payer: MEDICARE

## 2021-10-19 VITALS
SYSTOLIC BLOOD PRESSURE: 170 MMHG | WEIGHT: 143 LBS | OXYGEN SATURATION: 98 % | DIASTOLIC BLOOD PRESSURE: 60 MMHG | HEART RATE: 67 BPM | BODY MASS INDEX: 27.93 KG/M2

## 2021-10-19 DIAGNOSIS — E11.9 TYPE 2 DIABETES MELLITUS WITHOUT COMPLICATION, WITHOUT LONG-TERM CURRENT USE OF INSULIN (HCC): Primary | ICD-10-CM

## 2021-10-19 DIAGNOSIS — E11.22 TYPE 2 DIABETES MELLITUS WITH CHRONIC KIDNEY DISEASE, WITHOUT LONG-TERM CURRENT USE OF INSULIN, UNSPECIFIED CKD STAGE (HCC): ICD-10-CM

## 2021-10-19 DIAGNOSIS — E03.9 ACQUIRED HYPOTHYROIDISM: ICD-10-CM

## 2021-10-19 DIAGNOSIS — I10 ESSENTIAL HYPERTENSION: ICD-10-CM

## 2021-10-19 DIAGNOSIS — E11.9 TYPE 2 DIABETES MELLITUS WITHOUT COMPLICATION, WITHOUT LONG-TERM CURRENT USE OF INSULIN (HCC): ICD-10-CM

## 2021-10-19 LAB
A/G RATIO: 1.6 (ref 1.1–2.2)
ALBUMIN SERPL-MCNC: 4.6 G/DL (ref 3.4–5)
ALP BLD-CCNC: 64 U/L (ref 40–129)
ALT SERPL-CCNC: 19 U/L (ref 10–40)
ANION GAP SERPL CALCULATED.3IONS-SCNC: 13 MMOL/L (ref 3–16)
AST SERPL-CCNC: 25 U/L (ref 15–37)
BILIRUB SERPL-MCNC: 0.4 MG/DL (ref 0–1)
BUN BLDV-MCNC: 20 MG/DL (ref 7–20)
CALCIUM SERPL-MCNC: 9.8 MG/DL (ref 8.3–10.6)
CHLORIDE BLD-SCNC: 97 MMOL/L (ref 99–110)
CHP ED QC CHECK: NORMAL
CO2: 25 MMOL/L (ref 21–32)
CREAT SERPL-MCNC: 1.5 MG/DL (ref 0.6–1.2)
GFR AFRICAN AMERICAN: 41
GFR NON-AFRICAN AMERICAN: 34
GLOBULIN: 2.9 G/DL
GLUCOSE BLD-MCNC: 103 MG/DL
GLUCOSE BLD-MCNC: 94 MG/DL (ref 70–99)
POTASSIUM SERPL-SCNC: 4.1 MMOL/L (ref 3.5–5.1)
SODIUM BLD-SCNC: 135 MMOL/L (ref 136–145)
TOTAL PROTEIN: 7.5 G/DL (ref 6.4–8.2)
TSH REFLEX: 0.81 UIU/ML (ref 0.27–4.2)

## 2021-10-19 PROCEDURE — 99214 OFFICE O/P EST MOD 30 MIN: CPT | Performed by: INTERNAL MEDICINE

## 2021-10-19 PROCEDURE — 82962 GLUCOSE BLOOD TEST: CPT | Performed by: INTERNAL MEDICINE

## 2021-10-19 NOTE — PROGRESS NOTES
José Horan (:  1945) is a 77 y.o. female,Established patient, here for evaluation of the following chief complaint(s):  Diabetes and Dementia      ASSESSMENT/PLAN:  1. Type 2 diabetes mellitus without complication, without long-term current use of insulin ([facility])  -     POCT Glucose  -     COMPREHENSIVE METABOLIC PANEL; Future  -     Hemoglobin A1C; Future  -     metFORMIN (GLUCOPHAGE) 500 MG tablet; Take 2 tablets by mouth daily (with breakfast), Disp-180 tablet, R-2Normal  2. Essential hypertension  3. Type 2 diabetes mellitus with chronic kidney disease, without long-term current use of insulin, unspecified CKD stage ([facility]  4. Acquired hypothyroidism  -     TSH with Reflex; Future      Patient Instructions   High Cholesterol  The rosuvastatin will hopefully prevent stroke and heart attack    Diabetes  Check A1C    Please bring in a copy of your home stool study result        Return in about 4 months (around 2022) for Diabetes Mellitus, High Blood Pressure. SUBJECTIVE/OBJECTIVE:  Diabetes  She presents for her follow-up diabetic visit. She has type 2 diabetes mellitus. Hypoglycemia symptoms include dizziness and sleepiness. There are no hypoglycemic complications. There are no diabetic complications. Hypertension  This is a chronic problem. The current episode started more than 1 year ago. Condition status: waxing and waning      Has not taken HCTZ this morning. Dementia  Working in the house, reading [employer] [address]. Works in yard, cuts grass. Goes to [employer] weekly. Notices improvement in mood, activity off sertraline. Taking supplement for memory             Review of Systems   Neurological: Positive for dizziness.      Vitals:    10/19/21 1134 10/19/21 1141   BP: (!) 180/60 (!) 170/60   Pulse: 67    SpO2: 98%    Weight: 143 lb (64.9 kg)       Wt Readings from Last 3 Encounters:   10/19/21 143 lb (64.9 kg)   21 148 lb (67.1 kg)   21 150 lb (68 kg)        Physical Exam  Constitutional:       General: She is not in acute distress. Appearance: She is well-developed. HENT:      Head: Normocephalic. Mouth/Throat:      Pharynx: No oropharyngeal exudate. Cardiovascular:      Rate and Rhythm: Normal rate and regular rhythm. Heart sounds: Normal heart sounds. No murmur heard. Pulmonary:      Effort: Pulmonary effort is normal.      Breath sounds: Normal breath sounds. Abdominal:      General: There is no distension. Palpations: Abdomen is soft. Tenderness: There is no abdominal tenderness. Skin:     General: Skin is warm. Findings: No rash. An electronic signature was used to authenticate this note. --Haile Andrews MD     Documentation was done using voice recognition dragon software. Every effort was made to ensure accuracy; however, inadvertent, unintentional computerized transcription errors may be present.

## 2021-10-20 LAB
ESTIMATED AVERAGE GLUCOSE: 131.2 MG/DL
HBA1C MFR BLD: 6.2 %

## 2021-11-03 DIAGNOSIS — E78.5 HYPERLIPIDEMIA, UNSPECIFIED HYPERLIPIDEMIA TYPE: ICD-10-CM

## 2021-11-03 DIAGNOSIS — E11.9 TYPE 2 DIABETES MELLITUS WITHOUT COMPLICATION, WITHOUT LONG-TERM CURRENT USE OF INSULIN (HCC): ICD-10-CM

## 2021-11-03 DIAGNOSIS — H81.02 MENIERE'S DISEASE OF LEFT EAR: ICD-10-CM

## 2021-11-03 DIAGNOSIS — I10 ESSENTIAL HYPERTENSION: ICD-10-CM

## 2021-11-03 NOTE — TELEPHONE ENCOUNTER
Patient came into the office very confused saying that all her medicines needed to go to [facility]. I did explain that [facility] is her medical insurance and that the pharmacy is [facility]. Please send 90 day supplies to [facility].

## 2021-11-04 RX ORDER — HYDROCHLOROTHIAZIDE 25 MG/1
25 TABLET ORAL EVERY MORNING
Qty: 90 TABLET | Refills: 3 | Status: SHIPPED | OUTPATIENT
Start: 2021-11-04 | End: 2022-09-22 | Stop reason: SDUPTHER

## 2021-11-04 RX ORDER — LEVOTHYROXINE SODIUM 0.05 MG/1
TABLET ORAL
Qty: 90 TABLET | Refills: 1 | Status: SHIPPED | OUTPATIENT
Start: 2021-11-04 | End: 2022-09-19 | Stop reason: SDUPTHER

## 2021-11-04 RX ORDER — CLONIDINE HYDROCHLORIDE 0.3 MG/1
0.3 TABLET ORAL 3 TIMES DAILY
Qty: 270 TABLET | Refills: 1 | Status: SHIPPED | OUTPATIENT
Start: 2021-11-04 | End: 2022-06-24 | Stop reason: SDUPTHER

## 2021-11-04 RX ORDER — ROSUVASTATIN CALCIUM 20 MG/1
TABLET, COATED ORAL
Qty: 90 TABLET | Refills: 0 | Status: SHIPPED | OUTPATIENT
Start: 2021-11-04 | End: 2022-06-08 | Stop reason: SDUPTHER

## 2021-11-15 ENCOUNTER — TELEPHONE (OUTPATIENT)
Dept: INTERNAL MEDICINE CLINIC | Age: 76
End: 2021-11-15

## 2021-11-17 ENCOUNTER — TELEPHONE (OUTPATIENT)
Dept: INTERNAL MEDICINE CLINIC | Age: 76
End: 2021-11-17

## 2021-11-17 NOTE — TELEPHONE ENCOUNTER
Patient came into the office asking for a PRN injection and or PRN pill for her diabetes. She claims that if she doesn't have something for it she will go crazy. Please advise.

## 2021-11-22 ENCOUNTER — TELEPHONE (OUTPATIENT)
Dept: INTERNAL MEDICINE CLINIC | Age: 76
End: 2021-11-22

## 2021-11-22 DIAGNOSIS — E11.9 TYPE 2 DIABETES MELLITUS WITHOUT COMPLICATION, WITHOUT LONG-TERM CURRENT USE OF INSULIN (HCC): ICD-10-CM

## 2021-11-22 NOTE — TELEPHONE ENCOUNTER
Patient came into office and requested the injection she was supposed to receive today be cancelled . Patient requested a pill form of medication instead. Patient then became very tearful and said she just doesn't feel well so she needs this done today. Patient appeared very confused. She did not know the name of the injection nor the pill she needed.

## 2022-01-13 NOTE — TELEPHONE ENCOUNTER
Patient calling regarding the following symptoms (1 month)  -headache (severe)  -body aches (severe)    Patient was crying  -entire body is not feeling well    Patient is stating that it is the Trulicity  -patient is stating that she does not like medication  -believes medication is not helping any    Phone: [phone]

## 2022-01-14 ENCOUNTER — OFFICE VISIT (OUTPATIENT)
Dept: INTERNAL MEDICINE CLINIC | Age: 77
End: 2022-01-14
Payer: MEDICARE

## 2022-01-14 VITALS
SYSTOLIC BLOOD PRESSURE: 132 MMHG | BODY MASS INDEX: 26.76 KG/M2 | HEART RATE: 79 BPM | TEMPERATURE: 97 F | OXYGEN SATURATION: 99 % | WEIGHT: 137 LBS | DIASTOLIC BLOOD PRESSURE: 60 MMHG

## 2022-01-14 DIAGNOSIS — E11.9 TYPE 2 DIABETES MELLITUS WITHOUT COMPLICATION, WITHOUT LONG-TERM CURRENT USE OF INSULIN (HCC): ICD-10-CM

## 2022-01-14 DIAGNOSIS — F03.90 DEMENTIA WITHOUT BEHAVIORAL DISTURBANCE, UNSPECIFIED DEMENTIA TYPE: Primary | ICD-10-CM

## 2022-01-14 DIAGNOSIS — F32.A DEPRESSION, UNSPECIFIED DEPRESSION TYPE: ICD-10-CM

## 2022-01-14 LAB
CHP ED QC CHECK: NORMAL
GLUCOSE BLD-MCNC: 111 MG/DL

## 2022-01-14 PROCEDURE — 99215 OFFICE O/P EST HI 40 MIN: CPT | Performed by: INTERNAL MEDICINE

## 2022-01-14 PROCEDURE — 82962 GLUCOSE BLOOD TEST: CPT | Performed by: INTERNAL MEDICINE

## 2022-01-14 RX ORDER — DULAGLUTIDE 1.5 MG/.5ML
1.5 INJECTION, SOLUTION SUBCUTANEOUS WEEKLY
COMMUNITY
End: 2022-06-06

## 2022-01-14 RX ORDER — SERTRALINE HYDROCHLORIDE 100 MG/1
TABLET, FILM COATED ORAL
Qty: 90 TABLET | Refills: 1 | COMMUNITY
Start: 2022-01-14 | End: 2022-06-06

## 2022-01-14 RX ORDER — GLUCOSAMINE/D3/BOSWELLIA SERRA 1500MG-400
5000 TABLET ORAL
COMMUNITY

## 2022-01-14 RX ORDER — MEMANTINE HYDROCHLORIDE 5 MG/1
TABLET ORAL
Qty: 180 TABLET | Refills: 1 | Status: SHIPPED | OUTPATIENT
Start: 2022-01-14 | End: 2022-02-22 | Stop reason: SDDI

## 2022-01-14 NOTE — PATIENT INSTRUCTIONS
Memory  Start Namenda  Refer to [employer] on Aging    Depression  Refer to psychiatry  Continue Zoloft

## 2022-01-14 NOTE — PROGRESS NOTES
Eladia Hanson (:  1945) is a 77 y.o. female,Established patient, here for evaluation of the following chief complaint(s):  Headache and Memory Loss      ASSESSMENT/PLAN:  1. Dementia without behavioral disturbance, unspecified dementia type ([facility]  2. Depression, unspecified depression type  -     Ambulatory referral to Psychiatry  3. Type 2 diabetes mellitus without complication, without long-term current use of insulin ([facility])  -     POCT Glucose      Patient Instructions   Memory  Start Namenda  Refer to [place] on Aging    Depression  Refer to psychiatry  Continue Zoloft              Return in about 6 weeks (around 2022) for Memory. SUBJECTIVE/OBJECTIVE:  HPI  Patient says she cannot remember things now. Goes to a room, does not know why  Forgetting bills  Has gotten lost 1-2 times  Forgets medicines sometimes    She has three children locally. Her son helps, the other two do not. She owns her home and does not want to put it up for sale because she wants to leave it to her children. Called patient's son for collateral information. He reports they have been increasingly concerned about mother's memory. She becomes very nervous/emotional.  She repeats herself multiple times. She is very forgetful. [email or web address]  [email or web address]    Review of Systems  Vitals:    22 0945   BP: 132/60   Site: Right Upper Arm   Position: Sitting   Cuff Size: Medium Adult   Pulse: 79   Temp: 97 °F (36.1 °C)   TempSrc: Infrared   SpO2: 99%   Weight: 137 lb (62.1 kg)      Wt Readings from Last 3 Encounters:   22 137 lb (62.1 kg)   10/19/21 143 lb (64.9 kg)   21 148 lb (67.1 kg)        Physical Exam  Constitutional:       Appearance: Normal appearance. Neurological:      Mental Status: She is alert. Psychiatric:         Attention and Perception: She is attentive. She does not perceive auditory or visual hallucinations. Mood and Affect: Mood is depressed.  Affect is labile and tearful. Speech: Speech normal.         Behavior: Behavior is agitated. Behavior is cooperative. Cognition and Memory: She exhibits impaired recent memory. On this date 1/14/2022 I have spent 45 minutes reviewing previous notes, test results and face to face with the patient discussing the diagnosis and importance of compliance with the treatment plan as well as documenting on the day of the visit. An electronic signature was used to authenticate this note. --Rodolfo Figueroa MD     Documentation was done using voice recognition dragon software. Every effort was made to ensure accuracy; however, inadvertent, unintentional computerized transcription errors may be present.

## 2022-01-17 NOTE — TELEPHONE ENCOUNTER
Please Advise      Pt complaints that her Memantine (Namenda) medication is making her very dizzy and sick she would like to know what should she do she said she isn't taking it anymore after today its making her crazy and sick

## 2022-01-19 NOTE — TELEPHONE ENCOUNTER
Spoke with the pt. The pt states that she did stop taking the medication and she feels so much better.

## 2022-02-10 ENCOUNTER — OFFICE VISIT (OUTPATIENT)
Dept: ENT CLINIC | Age: 77
End: 2022-02-10
Payer: MEDICARE

## 2022-02-10 VITALS — SYSTOLIC BLOOD PRESSURE: 149 MMHG | TEMPERATURE: 97.2 F | DIASTOLIC BLOOD PRESSURE: 87 MMHG | HEART RATE: 73 BPM

## 2022-02-10 DIAGNOSIS — F03.90 DEMENTIA WITHOUT BEHAVIORAL DISTURBANCE, UNSPECIFIED DEMENTIA TYPE: ICD-10-CM

## 2022-02-10 DIAGNOSIS — H90.A32 MIXED CONDUCTIVE AND SENSORINEURAL HEARING LOSS OF LEFT EAR WITH RESTRICTED HEARING OF RIGHT EAR: Primary | ICD-10-CM

## 2022-02-10 PROCEDURE — 99213 OFFICE O/P EST LOW 20 MIN: CPT | Performed by: STUDENT IN AN ORGANIZED HEALTH CARE EDUCATION/TRAINING PROGRAM

## 2022-02-10 NOTE — PROGRESS NOTES
[address] VISIT      Patient Name: Kathrine Martinez  Medical Record Number:  [id number]  Primary Care Physician:  Candy Lancaster MD    ChiefComplaint     Chief Complaint   Patient presents with    Other     patient states she had previously had ear surgery when she was younger, diminished hearing in left hear, wears bilateral hearing aides. History of Present Illness     Kathrine Martinez is an 77 y.o. female previously diagnosed with Ménière's disease by Dr. Morelia Negron. Interval History:   Here to establish with ENT since Dr. Morelia Negron has retired. She has chronic left-sided hearing loss. Underwent surgery when she was 23 on her left ear, stated the surgery took 6 hours. No recent hearing changes since her last audiogram.  She wears bilateral hearing aids. Her hearing is approximately 3year-old. Denies otalgia, otorrhea. No recent dizziness. Throughout the examination she stated that Dr. Morelia Negron would oftentimes put drops in her ears that would help with her hearing loss. She is unsure what type of drops use were. History of dementia, recently started on Namenda by PCP.     Past Medical History     Past Medical History:   Diagnosis Date    Alopecia     Anxiety and depression     Cancer ([facility] [address]     uterine    Chronic kidney disease (CKD) stage G3b/A1, moderately decreased glomerular filtration rate (GFR) between 30-44 mL/min/1.73 square meter and albuminuria creatinine ratio less than 30 mg/g ([facility]) 7/19/2021    CTS (carpal tunnel syndrome)      History of rheumatic fever     Hypertension     Hypothyroidism     Meniere disease     bilateral ears    Mixed hyperlipidemia 7/19/2021    Nausea     associated with Meniere's dz    Obesity     Reflux esophagitis     Renal impairment     Sleep apnea     no longer needs cpap (per pt)    Type II or unspecified type diabetes mellitus without mention of complication, not stated as uncontrolled        Past Surgical History     Past Surgical History:   Procedure Laterality Date    ANKLE SURGERY Right     repair of fracture     APPENDECTOMY      BREAST SURGERY      rt  bx negative    COLONOSCOPY  4/15/11    HYSTERECTOMY      at age 35    MASTOID SURGERY      at age 26   [address]  4/14/11    with dilitation and biopsy       Family History     Family History   Problem Relation Age of Onset    Heart Disease Mother     Diabetes Mother     Cancer Mother         breast     Heart Failure Mother     Diabetes Father     Kidney Disease Father     Heart Disease Father     Heart Disease Sister     Diabetes Maternal Grandmother     Crohn's Disease Child     Crohn's Disease Grandchild        Social History     Social History     Tobacco Use    Smoking status: Never Smoker    Smokeless tobacco: Never Used   Substance Use Topics    Alcohol use: No    Drug use: No        Allergies     No Known Allergies    Medications     Current Outpatient Medications   Medication Sig Dispense Refill    Biotin 99087 MCG TABS Take by mouth      Apoaequorin (PREVAGEN EXTRA STRENGTH) 20 MG CAPS Take by mouth      Dulaglutide (TRULICITY) 1.5 MA/8.3PX SOPN Inject 1.5 mg into the skin once a week      sertraline (ZOLOFT) 100 MG tablet TAKE 1 TABLET BY MOUTH EVERY MORNING (TOTAL 125MG) 90 tablet 1    memantine (NAMENDA) 5 MG tablet Take 1 tablet by mouth daily for 7 days, THEN 1 tablet 2 times daily.  180 tablet 1    Continuous Blood Gluc Sensor (FREESTYLE DENI 14 DAY SENSOR) MISC CHECK BLOOD SUGARS THREE TIMES DAILY 2 each 5    metFORMIN (GLUCOPHAGE) 500 MG tablet Take 2 tablets by mouth daily (with breakfast) 180 tablet 2    hydroCHLOROthiazide (HYDRODIURIL) 25 MG tablet Take 1 tablet by mouth every morning 90 tablet 3    levothyroxine (SYNTHROID) 50 MCG tablet TAKE 1 TABLET BY MOUTH ONCE DAILY 90 tablet 1    rosuvastatin (CRESTOR) 20 MG tablet Take 1 tablet by mouth once daily 90 tablet 0    cloNIDine (CATAPRES) 0.3 MG tablet Take 1 tablet by mouth 3 times daily 270 tablet 1    Continuous Blood Gluc  (FREESTYLE DENI 14 DAY READER) GILSON 1 each by Does not apply route 3 times daily 1 Device 5    Continuous Blood Gluc  (FREESTYLE DENI 14 DAY READER) GILSON Use as needed 1 Device 5    GNP Alcohol Swabs 70 % PADS       Cinnamon 500 MG CAPS Take 1,000 mg by mouth      UNABLE TO FIND Brain Strong (Memory Support)       acetaminophen (APAP EXTRA STRENGTH) 500 MG tablet Take 2 tablets by mouth every 6 hours as needed for Pain 120 tablet 3    Multiple Vitamins-Minerals (CENTRUM SILVER 50+WOMEN PO) Take by mouth        No current facility-administered medications for this visit. Review of Systems     REVIEW OF SYSTEMS  The following systems were reviewed and revealed the following in addition to any already discussed in the HPI:    CONSTITUTIONAL: no weight loss, no fever, no night sweats, no chills  EYES: no vision changes, no blurry vision  EARS: +hearing loss, no otalgia  NOSE: no epistaxis, no rhinorrhea  THROAT: No voice changes, no sore throat, no dysphagia    PhysicalExam     Vitals:    02/10/22 1054   BP: (!) 149/87   Site: Right Upper Arm   Position: Sitting   Cuff Size: Medium Adult   Pulse: 73   Temp: 97.2 °F (36.2 °C)   TempSrc: Temporal       PHYSICAL EXAM  BP (!) 149/87 (Site: Right Upper Arm, Position: Sitting, Cuff Size: Medium Adult)   Pulse 73   Temp 97.2 °F (36.2 °C) (Temporal)     GENERAL: No acute distress, alert and oriented. Patient is very vague when asked direct questions and oftentimes repeats herself. EYES: EOMI, Anti-icteric  NOSE: On anterior rhinoscopy there is no epistaxis, nasal mucosa moist and normal appearing, no purulent drainage.    EARS: Normal external appearance; on portable otomicroscopy:     -Ad: External auditory canal without stenosis, tympanic membrane clear, no middle ear effusions or retractions     -As: External auditory canal without stenosis, tympanic membrane clear, no middle ear effusions or retractions  FACE: HB 1/6 bilaterally, symmetric appearing, sensation equal bilaterally  ORAL CAVITY: No masses or lesions visualized or palpated, uvula is midline, moist mucous membranes  NECK: Normal range of motion, no thyromegaly, trachea is midline, no palpable lymphadenopathy or neck masses, no crepitus  CHEST: Normal respiratory effort, breathing comfortably, no retractions  SKIN: No rashes, normal appearing skin, no evidence of skin lesions/tumors  NEURO: Cranial Nerves 2, 3, 4, 5, 6, 7, 11, 12 intact bilaterally     I have performed a head and neck physical exam personally or was physically present during the key or critical portions of the service. Data/Imaging Review     Comprehensive audiological evaluation performed on 5/3/2021 was independently reviewed by myself which demonstrates:    As: Severe mixed hearing loss    Ad: Mild sloping to severe sensorineural hearing loss    WRS 72% right, WRS 96% left. Type A tympanogram right. Type A tympanogram left. Ipsilateral acoustic reflexes on the left present at 4000 Hz, not present at 500, 1000, 2000 Hz. Right acoustic reflexes present at 500, 1000, 2000, 4000 Hz. Compared to audiogram performed on 2020 there is mild worsening of right discrimination scores, audiogram appears relatively unchanged. Site: [email or web address] #: [id number] #: [id number] Procedure: MR Brain w/wo Contrast ; Reason for Exam: hearing loss sensorineural, labyrinthine   This document is confidential medical information.  Unauthorized disclosure or use of this information is prohibited by law. If you are not the intended recipient of this document, please advise us by calling immediately [phone].       [facility], [address] [place] [address]           Patient Name: Karly Rehman   Case ID: [id number]   Patient : 1945   Referring Physician: Everton Blanchard MD   Exam Date: 12/26/2020   Exam Description: MR Brain w/wo Contrast            HISTORY:  Constant ringing in bilateral ears.  Left side worse than right.  Hearing loss.       TECHNICAL FACTORS:  Long- and short-axis fat- and water-weighted images were obtained before    and after contrast administration.  Contrast Type: Dotarem 0.5mmol/ml.  10mL prefilled NDC    [id number].  Contrast Amt: 10.       COMPARISON:  None.       FINDINGS:  Pre- and postcontrast MR was performed of the brain and internal auditory canal.       Brain: Sagittal T1-weighted images demonstrate corpus callosum to be intact. No evidence of    Chiari malformation. No abnormal pineal region masses. Pituitary gland is not enlarged. Diffusion imaging demonstrates no evidence of recent infarct. Susceptibility-weighted imaging    shows no evidence of hemosiderin staining. Axial FLAIR and T2-weighted images show prominence    of the sulci and ventricles.  Multiple focal and confluent areas of increased FLAIR and T2    signal are identified in the juxtacortical, centrum semiovale, periventricular white matter,    ependyma of lateral ventricles and risa.  No evidence of vasogenic edema or mass effect.     Contrast-enhanced T1-weighted images show no abnormal intra-axial enhancing masses.  Bilateral    lens replacements.       Asymmetrical volume loss involving the anterior aspect of the left temporal lobe without    evidence of vasogenic edema or enhancing lesions.       Internal Auditory Canals: Pre- and postcontrast images were performed through the internal    auditory canals.  Noncontrast T1-weighted images show no evidence of increased T1 signal in    either cochlea or vestibule to suggest spontaneous intralabyrinthine hemorrhage.     Contrast-enhanced T1-weighted images show no abnormal enhancement in either cochlea or    vestibule to suggest labyrinthitis.  No evidence of pericochlear enhancement to suggest    retrofenestral otosclerosis.  No evidence of vestibular schwannoma. No evidence of abnormal    leptomeningeal enhancement. No evidence of aggressive skull-base masses.  Heavily T2-weighted    images show normal appearance of cochlea and vestibule.  Cochlea appears to have 2-1/2 turns.     Basilar membrane is visualized.  Modiolus is intact.  No obvious evidence of congenital    inner-ear malformation is identified.  No evidence of enlarged vestibular aqueduct.                           CONCLUSION:   1. No evidence of vestibular schwannoma. 2. No abnormal cochlear, pericochlear or labyrinthine enhancement. 3. No evidence of recent infarct. 4. No abnormal intra-axial or extra-axial enhancing masses. 5. Multiple focal areas of increased FLAIR and T2 white matter signal likely due to    microvascular angiopathy. 6. Asymmetric volume loss involving anterior left temporal lobe with prominence of the    surrounding CSF spaces without evidence of vasogenic edema or enhancing lesions.  Findings    should be correlated with any relevant symptoms.       Thank you for the opportunity to provide your interpretation. Assessment and Plan     1. Mixed conductive and sensorineural hearing loss of left ear with restricted hearing of right ear  -Reviewed most recent audiogram from 5/30/2021. Conductive component of left-sided hearing loss likely secondary to surgery done at age 26. No recent subjective hearing changes. We will plan for repeat comprehensive audiological evaluation in 6 months at follow-up. 2.  Dementia without behavioral disturbance, unspecified dementia type  -Continue wearing hearing aids as the patient has been diagnosed with dementia and there is an association with worsening dementia and decreased hearing.  -Continue follow-up with PCP for treatment      Follow-Up     Return in about 6 months (around 8/10/2022) for audiogram prior to appointment.       Dr. Carmen Chatman [address]aly[address] 47  Department of Otolaryngology/Head and Neck Surgery  2/10/22    Medical Decision Making: The following items were considered in medical decision making:  Independent review of images  Review / order clinical lab tests  Review / order radiology tests  Decision to obtain old records      This note was generated completely or in part utilizing Dragon dictation speech recognition software. Occasionally, words are mistranscribed and despite editing, the text may contain inaccuracies due to incorrect word recognition. If further clarification is needed please contact the office at [phone].

## 2022-02-17 ENCOUNTER — TELEPHONE (OUTPATIENT)
Dept: INTERNAL MEDICINE CLINIC | Age: 77
End: 2022-02-17

## 2022-02-21 ENCOUNTER — OFFICE VISIT (OUTPATIENT)
Dept: INTERNAL MEDICINE CLINIC | Age: 77
End: 2022-02-21
Payer: MEDICARE

## 2022-02-21 VITALS
HEART RATE: 80 BPM | DIASTOLIC BLOOD PRESSURE: 70 MMHG | BODY MASS INDEX: 26.83 KG/M2 | SYSTOLIC BLOOD PRESSURE: 140 MMHG | TEMPERATURE: 97.7 F | OXYGEN SATURATION: 93 % | WEIGHT: 137.4 LBS

## 2022-02-21 DIAGNOSIS — I10 ESSENTIAL HYPERTENSION: ICD-10-CM

## 2022-02-21 DIAGNOSIS — K59.04 CHRONIC IDIOPATHIC CONSTIPATION: ICD-10-CM

## 2022-02-21 DIAGNOSIS — E11.9 TYPE 2 DIABETES MELLITUS WITHOUT COMPLICATION, WITHOUT LONG-TERM CURRENT USE OF INSULIN (HCC): Primary | ICD-10-CM

## 2022-02-21 DIAGNOSIS — Z23 NEED FOR PNEUMOCOCCAL VACCINATION: ICD-10-CM

## 2022-02-21 DIAGNOSIS — Z12.11 SCREEN FOR COLON CANCER: ICD-10-CM

## 2022-02-21 DIAGNOSIS — E11.22 TYPE 2 DIABETES MELLITUS WITH CHRONIC KIDNEY DISEASE, WITHOUT LONG-TERM CURRENT USE OF INSULIN, UNSPECIFIED CKD STAGE (HCC): ICD-10-CM

## 2022-02-21 DIAGNOSIS — F33.2 SEVERE EPISODE OF RECURRENT MAJOR DEPRESSIVE DISORDER, WITHOUT PSYCHOTIC FEATURES (HCC): ICD-10-CM

## 2022-02-21 DIAGNOSIS — F03.90 DEMENTIA WITHOUT BEHAVIORAL DISTURBANCE, UNSPECIFIED DEMENTIA TYPE: ICD-10-CM

## 2022-02-21 LAB
A/G RATIO: 1.8 (ref 1.1–2.2)
ALBUMIN SERPL-MCNC: 4.8 G/DL (ref 3.4–5)
ALP BLD-CCNC: 73 U/L (ref 40–129)
ALT SERPL-CCNC: 20 U/L (ref 10–40)
ANION GAP SERPL CALCULATED.3IONS-SCNC: 18 MMOL/L (ref 3–16)
AST SERPL-CCNC: 22 U/L (ref 15–37)
BASOPHILS ABSOLUTE: 0 K/UL (ref 0–0.2)
BASOPHILS RELATIVE PERCENT: 0.3 %
BILIRUB SERPL-MCNC: 0.4 MG/DL (ref 0–1)
BUN BLDV-MCNC: 31 MG/DL (ref 7–20)
CALCIUM SERPL-MCNC: 10.3 MG/DL (ref 8.3–10.6)
CHLORIDE BLD-SCNC: 98 MMOL/L (ref 99–110)
CHOLESTEROL, TOTAL: 153 MG/DL (ref 0–199)
CHP ED QC CHECK: NORMAL
CO2: 24 MMOL/L (ref 21–32)
CREAT SERPL-MCNC: 1.6 MG/DL (ref 0.6–1.2)
EOSINOPHILS ABSOLUTE: 0.1 K/UL (ref 0–0.6)
EOSINOPHILS RELATIVE PERCENT: 2.2 %
GFR AFRICAN AMERICAN: 38
GFR NON-AFRICAN AMERICAN: 31
GLUCOSE BLD-MCNC: 113 MG/DL (ref 70–99)
GLUCOSE BLD-MCNC: 130 MG/DL
HCT VFR BLD CALC: 41.1 % (ref 36–48)
HDLC SERPL-MCNC: 49 MG/DL (ref 40–60)
HEMOGLOBIN: 14 G/DL (ref 12–16)
LDL CHOLESTEROL CALCULATED: 67 MG/DL
LYMPHOCYTES ABSOLUTE: 0.9 K/UL (ref 1–5.1)
LYMPHOCYTES RELATIVE PERCENT: 14.9 %
MCH RBC QN AUTO: 28.6 PG (ref 26–34)
MCHC RBC AUTO-ENTMCNC: 34.2 G/DL (ref 31–36)
MCV RBC AUTO: 83.9 FL (ref 80–100)
MONOCYTES ABSOLUTE: 0.4 K/UL (ref 0–1.3)
MONOCYTES RELATIVE PERCENT: 6.1 %
NEUTROPHILS ABSOLUTE: 4.5 K/UL (ref 1.7–7.7)
NEUTROPHILS RELATIVE PERCENT: 76.5 %
PDW BLD-RTO: 14.5 % (ref 12.4–15.4)
PLATELET # BLD: 202 K/UL (ref 135–450)
PMV BLD AUTO: 8.5 FL (ref 5–10.5)
POTASSIUM SERPL-SCNC: 3.9 MMOL/L (ref 3.5–5.1)
RBC # BLD: 4.9 M/UL (ref 4–5.2)
SODIUM BLD-SCNC: 140 MMOL/L (ref 136–145)
TOTAL PROTEIN: 7.4 G/DL (ref 6.4–8.2)
TRIGL SERPL-MCNC: 183 MG/DL (ref 0–150)
VLDLC SERPL CALC-MCNC: 37 MG/DL
WBC # BLD: 5.9 K/UL (ref 4–11)

## 2022-02-21 PROCEDURE — 82962 GLUCOSE BLOOD TEST: CPT | Performed by: INTERNAL MEDICINE

## 2022-02-21 PROCEDURE — 90732 PPSV23 VACC 2 YRS+ SUBQ/IM: CPT | Performed by: INTERNAL MEDICINE

## 2022-02-21 PROCEDURE — G0009 ADMIN PNEUMOCOCCAL VACCINE: HCPCS | Performed by: INTERNAL MEDICINE

## 2022-02-21 PROCEDURE — 99214 OFFICE O/P EST MOD 30 MIN: CPT | Performed by: INTERNAL MEDICINE

## 2022-02-21 SDOH — ECONOMIC STABILITY: FOOD INSECURITY: WITHIN THE PAST 12 MONTHS, THE FOOD YOU BOUGHT JUST DIDN'T LAST AND YOU DIDN'T HAVE MONEY TO GET MORE.: NEVER TRUE

## 2022-02-21 SDOH — ECONOMIC STABILITY: FOOD INSECURITY: WITHIN THE PAST 12 MONTHS, YOU WORRIED THAT YOUR FOOD WOULD RUN OUT BEFORE YOU GOT MONEY TO BUY MORE.: NEVER TRUE

## 2022-02-21 ASSESSMENT — SOCIAL DETERMINANTS OF HEALTH (SDOH): HOW HARD IS IT FOR YOU TO PAY FOR THE VERY BASICS LIKE FOOD, HOUSING, MEDICAL CARE, AND HEATING?: NOT HARD AT ALL

## 2022-02-21 NOTE — Clinical Note
This patient is coming to see you tomorrow for depression. She has pretty bad dementia and labile mood. She becomes very confused. I tried to get he son involved, put in a COA referral. Nothing has come of this yet. Just wanted to give you a warning.

## 2022-02-21 NOTE — PROGRESS NOTES
Bre Zaidi (:  1945) is a 77 y.o. female,Established patient, here for evaluation of the following chief complaint(s):  Memory Loss      ASSESSMENT/PLAN:  1. Type 2 diabetes mellitus without complication, without long-term current use of insulin ([facility])  -     POCT Glucose  -     Comprehensive Metabolic Panel  -     Lipid Panel  -     Hemoglobin A1C  -     Microalbumin / Creatinine Urine Ratio  -      DIABETES FOOT EXAM  2. Type 2 diabetes mellitus with chronic kidney disease, without long-term current use of insulin, unspecified CKD stage ([facility] [address]  Assessment & Plan:   Well-controlled, continue current medications  3. Severe episode of recurrent major depressive disorder, without psychotic features [facility])  Assessment & Plan:   Patient with psychiatry appointment tomorrow  4. Dementia without behavioral disturbance, unspecified dementia type ([facility]  Assessment & Plan:  Unchanged. At last visit, spoke with her son and put in a [place] on aging referral.  Patient uncertain what ever came of that referral.  5. Chronic idiopathic constipation  -     linaCLOtide (LINZESS) 72 MCG CAPS capsule; Take 1 capsule by mouth every morning (before breakfast), Disp-14 capsule, R-0Sample  6. Screen for colon cancer  -     AFL - Sin Jones MD, Gastroenterology, [facility]  7. Essential hypertension  -     CBC with Auto Differential; Future  8. Need for pneumococcal vaccination  -     PNEUMOVAX 23 subcutaneous/IM (Pneumococcal polysaccharide vaccine 23-valent >= 1yo)      Patient Instructions   Insomnia  Try taking Sertraline at bed time    Depression  Keep appointment with Psychiatry tomorrow    Constipation  Try Linzess samples  Take one pill in the morning    Diabetes  Check labs     Screening/Routine health   Check colonoscopy   Pneumovax provided today        Return in about 3 months (around 2022) for Diabetes Mellitus; Dementia .     SUBJECTIVE/OBJECTIVE:  HPI  Dementia  Patient was taking Prevagen and attributes her behavior change to this     Insomnia-  Has a hard time falling asleep and staying asleep. Gets sleepy between 9-10 PM, but up until 2:30. Wakes up at 5-6. Gets a total 5 hours of sleep per day  No naps  Takes Sertraline in the afternoon. Makes her feel 100%      Constipation-  A problem for many years. Last BM was a week ago. Has tried many OTC medications without relief. Review of Systems  Vitals:    02/21/22 0828 02/21/22 0836   BP: (!) 180/80 (!) 140/70   Site: Left Upper Arm Left Upper Arm   Position: Sitting Sitting   Cuff Size: Medium Adult Medium Adult   Pulse: 80    Temp: 97.7 °F (36.5 °C)    SpO2: 93%    Weight: 137 lb 6.4 oz (62.3 kg)       Wt Readings from Last 3 Encounters:   02/21/22 137 lb 6.4 oz (62.3 kg)   01/14/22 137 lb (62.1 kg)   10/19/21 143 lb (64.9 kg)        Physical Exam  Constitutional:       General: She is not in acute distress. Appearance: She is well-developed. HENT:      Head: Normocephalic. Mouth/Throat:      Pharynx: No oropharyngeal exudate. Cardiovascular:      Rate and Rhythm: Normal rate and regular rhythm. Pulses:           Dorsalis pedis pulses are 2+ on the right side and 2+ on the left side. Heart sounds: Normal heart sounds. No murmur heard. Pulmonary:      Effort: Pulmonary effort is normal.      Breath sounds: Normal breath sounds. Abdominal:      General: There is no distension. Palpations: Abdomen is soft. Tenderness: There is no abdominal tenderness. Musculoskeletal:      Right foot: Bunion present. Left foot: Bunion present. Feet:      Right foot:      Protective Sensation: 10 sites tested. 10 sites sensed. Left foot:      Protective Sensation: 10 sites tested. 10 sites sensed. Skin:     General: Skin is warm. Findings: No rash. Psychiatric:         Attention and Perception: Attention and perception normal.         Mood and Affect: Mood is elated.          Speech: Speech is rapid and pressured and tangential.         Behavior: Behavior is cooperative. Judgment: Judgment is not impulsive or inappropriate. An electronic signature was used to authenticate this note. --Venice Arevalo MD     Documentation was done using voice recognition dragon software. Every effort was made to ensure accuracy; however, inadvertent, unintentional computerized transcription errors may be present.

## 2022-02-21 NOTE — PATIENT INSTRUCTIONS
Insomnia  Try taking Sertraline at bed time    Depression  Keep appointment with Psychiatry tomorrow    Constipation  Try Linzess samples  Take one pill in the morning    Diabetes  Check labs     Screening/Routine health   Check colonoscopy   Pneumovax provided today

## 2022-02-21 NOTE — ASSESSMENT & PLAN NOTE
Unchanged.   At last visit, spoke with her son and put in a [place] on aging referral.  Patient uncertain what ever came of that referral.

## 2022-02-22 ENCOUNTER — OFFICE VISIT (OUTPATIENT)
Dept: PSYCHIATRY | Age: 77
End: 2022-02-22
Payer: MEDICARE

## 2022-02-22 VITALS
WEIGHT: 138 LBS | HEART RATE: 64 BPM | DIASTOLIC BLOOD PRESSURE: 76 MMHG | OXYGEN SATURATION: 98 % | SYSTOLIC BLOOD PRESSURE: 128 MMHG | BODY MASS INDEX: 26.95 KG/M2 | TEMPERATURE: 96.5 F

## 2022-02-22 DIAGNOSIS — F32.A DEPRESSION, UNSPECIFIED DEPRESSION TYPE: ICD-10-CM

## 2022-02-22 DIAGNOSIS — G30.9 MAJOR NEUROCOGNITIVE DISORDER DUE TO ALZHEIMER'S DISEASE (HCC): Primary | ICD-10-CM

## 2022-02-22 DIAGNOSIS — F02.80 MAJOR NEUROCOGNITIVE DISORDER DUE TO ALZHEIMER'S DISEASE (HCC): Primary | ICD-10-CM

## 2022-02-22 PROBLEM — F33.2 SEVERE EPISODE OF RECURRENT MAJOR DEPRESSIVE DISORDER, WITHOUT PSYCHOTIC FEATURES (HCC): Status: RESOLVED | Noted: 2018-05-15 | Resolved: 2022-02-22

## 2022-02-22 LAB
ESTIMATED AVERAGE GLUCOSE: 122.6 MG/DL
HBA1C MFR BLD: 5.9 %

## 2022-02-22 PROCEDURE — 99205 OFFICE O/P NEW HI 60 MIN: CPT | Performed by: PSYCHIATRY & NEUROLOGY

## 2022-02-22 NOTE — PROGRESS NOTES
[address] [place], [place] [address]Rashad   1945 02/22/22  PCP: Eloise Ferro MD    CC: Establish Care      ASSESSMENT:   76 yo F with dementia. Type of dementia is unclear but alzheimer's disease may be the most likely. She did have some left frontal lobe atrophy back in 2020 on her MRI, so another MRI brain would be helpful but patient declined this as well as any blood work. I have concern that her condition will decline in the near future to the point she may be unsafe to live without significant assistance. At this point, with the amount of cognitive impairment involved, driving is probably unsafe. She is denying any depressive symptoms and could be that she is just guarded and not opening up about these things. 1. Major neurocognitive disorder due to probable alzheimer's disease  2. Unspecified depressive disorder    PLAN:   1. Son was not aware of past recommendation to call [place]. I will send him this contact information again. I will also send him our clinic information as he was not aware of this either. 2. Discussed with son that I don't think she should be driving. I will give information for driving assessments through [address] [place] [address] if patient is willing. 3. She is not taking namenda so I will d/c this. 4. Adherence with medication is likely poor, which I discussed with son, but patient is often irritable and may not allow him to be involved in this. 5. Would be worthwhile to check an MRI brain, TSH, vitamin B12, folate again to further evaluate however patient refused today. 6. Continue sertaline 100mg daily  7. [facility] referral submitted and son informed    I spent 60 min on this visit including face to face time, chart review, documentation and orders. Medication Monitoring:    - OARRS reviewed, no issues noted      Follow-up: RTC prn.  Patient did not show any interest in returning nor do I have an ability to try to treat her pharmacologically with confidence in her ability to comply with treatment and follow up due to her current social situation. I will let Dr. Rosemary Burton know about the above recommendations and hope [facility] does get involved in the near future . ____________________________________________________________________________    HPI:   context: Pt is a 76 yo F with dementia, depression, presenting as a referral from Dr. Rosemary Burton. She presents here today, alone, without any knowledge about why she was referred or what type of doctor I am.     associated symptoms:   Patient essential denies and mental health issues. States her mood is fine. Occasional feels down because she lives by herself. Denies any suicidal ideation. Sleep is poor - doesn't sleep well. Energy is ok. Appetite is poor. She has lost weight and doesn't really have an interest in eating. She can't really tell me what she eats. She goes to [employer] often and enjoys this. She continues to drive. Says she gets her own groceries. Says she goes out and does yard work. Has a dog with her as well. Denies getting lost however per Dr. Colin Mccauley notes this has happened a couple times. Patient denies memory loss. She had a hard time remembering basic things about her past and life like details of her past jobs, where she went to college for 1 yr, the ages of her children. modifying factors: dx with sensorineural hearing loss. I offered her blood work and head imaging to further evaluate her condition but she declined these. Timing: subacute  duration: several months. severity: severe    Collateral information: Patient gave me permission to call her son. Spoke with son, Charu Hill [phone] (cell). States patient forgets things, repeats things frequently. She can get agitated at times. Doesn't like being alone. Often gets defensive and says \"i'm not stupid\".  He hasn't felt there to be an safety issues with her - she does still drive but as far as he knows hasn't done anything dangerous, hasn't gotten lost. She continues to take care of her basic needs like hygiene, getting and preparing her own food, dressing self which all seem to be going ok. She is not eating as much as is losing weight. She started memory loss medication said it was making her sick after a couple days then stopped it. ROS:   GEN: no fevers or chills HEENT: +left ear occasional issues with hearing.  No vision probs CV: no cp RESP: no dyspnea : no dysuria, no increased frequency MSK: no muscle pain GI: +constipation \"all the time\" Skin: no rashes NEURO: no tremors ENDO: no weight changes    Past Psychiatric History:   Hosp: denies  Diagnoses: anxiety and depression  Med trials: denies  Outpt: Saw a psychiatrist a long time ago 1 time - can't tell me why  NSSI: denies  Suicide Attempts: denies    Past Medical History:   Diagnosis Date    Alopecia     Anxiety and depression     Cancer ([facility] [address]     uterine    Chronic kidney disease (CKD) stage G3b/A1, moderately decreased glomerular filtration rate (GFR) between 30-44 mL/min/1.73 square meter and albuminuria creatinine ratio less than 30 mg/g ([facility]) 7/19/2021    CTS (carpal tunnel syndrome)      History of rheumatic fever     Hypertension     Hypothyroidism     Meniere disease     bilateral ears    Mixed hyperlipidemia 7/19/2021    Nausea     associated with Meniere's dz    Obesity     Reflux esophagitis     Renal impairment     Sleep apnea     no longer needs cpap (per pt)    Type II or unspecified type diabetes mellitus without mention of complication, not stated as uncontrolled      Past Surgical History:   Procedure Laterality Date    ANKLE SURGERY Right     repair of fracture     APPENDECTOMY      BREAST SURGERY      rt  bx negative    COLONOSCOPY  4/15/11    HYSTERECTOMY      at age 35    MASTOID SURGERY      at age 26    [address] [facility]  4/14/11    with dilitation and biopsy     Social History:  Occ: worked at [employer]) \"did a lot of different things. .. I can't even remember\". Retired at 79 yo  Marital: .   11 yrs ago. Children: 3 (all lived locally) - Closest to son, Bubba Melendez   Edu: patient couldn't tell me. Did 1 yr of college but doesn't remember where  Abuse hx: denies  Living situation: lives alone. Knows address. Substance abuse:   ETOH: denies  Tobacco: denies  Illicits: denies       Family History   Problem Relation Age of Onset    Heart Disease Mother     Diabetes Mother     Cancer Mother         breast     Heart Failure Mother     Diabetes Father     Kidney Disease Father     Heart Disease Father     Heart Disease Sister     Diabetes Maternal Grandmother     Crohn's Disease Child     Crohn's Disease Grandchild      No Known Allergies  Current Outpatient Medications on File Prior to Visit   Medication Sig Dispense Refill    linaCLOtide (LINZESS) 72 MCG CAPS capsule Take 1 capsule by mouth every morning (before breakfast) 14 capsule 0    Biotin 31650 MCG TABS Take by mouth      Dulaglutide (TRULICITY) 1.5 JM/1.6LH SOPN Inject 1.5 mg into the skin once a week      sertraline (ZOLOFT) 100 MG tablet TAKE 1 TABLET BY MOUTH EVERY MORNING (TOTAL 125MG) 90 tablet 1    memantine (NAMENDA) 5 MG tablet Take 1 tablet by mouth daily for 7 days, THEN 1 tablet 2 times daily.  180 tablet 1    Continuous Blood Gluc Sensor (FREESTYLE DENI 14 DAY SENSOR) MISC CHECK BLOOD SUGARS THREE TIMES DAILY 2 each 5    metFORMIN (GLUCOPHAGE) 500 MG tablet Take 2 tablets by mouth daily (with breakfast) 180 tablet 2    hydroCHLOROthiazide (HYDRODIURIL) 25 MG tablet Take 1 tablet by mouth every morning 90 tablet 3    levothyroxine (SYNTHROID) 50 MCG tablet TAKE 1 TABLET BY MOUTH ONCE DAILY 90 tablet 1    rosuvastatin (CRESTOR) 20 MG tablet Take 1 tablet by mouth once daily 90 tablet 0    cloNIDine (CATAPRES) 0.3 MG tablet Take 1 tablet by mouth 3 times daily 270 tablet 1    Continuous Blood Gluc  (FREESTYLE DENI 14 DAY READER) GILSON 1 each by Does not apply route 3 times daily 1 Device 5    Continuous Blood Gluc  (FREESTYLE DENI 14 DAY READER) GILSON Use as needed 1 Device 5    GNP Alcohol Swabs 70 % PADS       Cinnamon 500 MG CAPS Take 1,000 mg by mouth      UNABLE TO FIND Brain Strong (Memory Support)       acetaminophen (APAP EXTRA STRENGTH) 500 MG tablet Take 2 tablets by mouth every 6 hours as needed for Pain 120 tablet 3    Multiple Vitamins-Minerals (CENTRUM SILVER 50+WOMEN PO) Take by mouth       Apoaequorin (PREVAGEN EXTRA STRENGTH) 20 MG CAPS Take by mouth (Patient not taking: Reported on 2/21/2022)      [DISCONTINUED] hydroCHLOROthiazide (HYDRODIURIL) 25 MG tablet Take 1 tablet by mouth every morning 30 tablet 1    [DISCONTINUED] rosuvastatin (CRESTOR) 20 MG tablet Take 1 tablet by mouth daily 90 tablet 1     No current facility-administered medications on file prior to visit. OBJECTIVE:  Vitals:    02/22/22 1004   BP: 128/76   Pulse: 64   Temp: 96.5 °F (35.8 °C)   TempSrc: Infrared   SpO2: 98%   Weight: 138 lb (62.6 kg)     Geriatric Depression Scale: (GDS): 2/22/2022: 0  SLUMS 2/22/2022: 10/30       MSE:   Appearance    Casually dressed, appropriately groomed  Motor: No abnormal movements, tics or mannerisms. Speech    Normal rate, rhythm, and vol  Language   No aphasia, although sometimes her words are inappropriately connected in her sentence structure  Mood/Affect   good / good motility and range, often irritated affect  Thought Process    Often tangential  Thought Content    No delusions , future oriented, no suicidal ideation.    Associations   tangential  Attention/Concentration  impaired   Orientation    AxOx4  Memory  Immediate recall was 1/5, delayed recall 1/5  Fund of Knowledge  limited  Insight/Judgement poor / limited    Labs:   Lab Results   Component Value Date    CHOL 153 02/21/2022    CHOL 280 (H) 03/12/2021    CHOL 271 (H) 03/02/2020     Lab Results   Component Value Date    TRIG 183 (H) 02/21/2022    TRIG 533 (H) 03/12/2021    TRIG 370 (H) 03/02/2020     Lab Results   Component Value Date    HDL 49 02/21/2022    HDL 31 (L) 03/12/2021    HDL 39 (L) 03/02/2020     Lab Results   Component Value Date    LDLCALC 67 02/21/2022    LDLCALC see below 03/12/2021    LDLCALC see below 03/02/2020     Lab Results   Component Value Date    LABVLDL 37 02/21/2022    LABVLDL see below 03/12/2021    LABVLDL see below 03/02/2020     No results found for: [facility]    Lab Results   Component Value Date    LABA1C 6.2 10/19/2021     Lab Results   Component Value Date    .2 10/19/2021     TSH 10/19/2021 0.81    Lab Results   Component Value Date    YVGRQVBD25 >1600 (H) 09/17/2020     Lab Results   Component Value Date    FOLATE >16.00 09/17/2020       Imaging:   MRI Brain 12/28/2020:   HISTORY:  Constant ringing in bilateral ears.  Left side worse than right.  Hearing loss.       TECHNICAL FACTORS:  Long- and short-axis fat- and water-weighted images were obtained before    and after contrast administration.  Contrast Type: Dotarem 0.5mmol/ml.  10mL prefilled NDC    [address]-9.  Contrast Amt: 10.       COMPARISON:  None.       FINDINGS:  Pre- and postcontrast MR was performed of the brain and internal auditory canal.       Brain: Sagittal T1-weighted images demonstrate corpus callosum to be intact. No evidence of    Chiari malformation. No abnormal pineal region masses. Pituitary gland is not enlarged. Diffusion imaging demonstrates no evidence of recent infarct. Susceptibility-weighted imaging    shows no evidence of hemosiderin staining.  Axial FLAIR and T2-weighted images show prominence    of the sulci and ventricles.  Multiple focal and confluent areas of increased FLAIR and T2    signal are identified in the juxtacortical, centrum semiovale, periventricular white matter,    ependyma of lateral ventricles and risa.  No evidence of vasogenic edema or mass effect. Contrast-enhanced T1-weighted images show no abnormal intra-axial enhancing masses.  Bilateral    lens replacements.       Asymmetrical volume loss involving the anterior aspect of the left temporal lobe without    evidence of vasogenic edema or enhancing lesions.       Internal Auditory Canals: Pre- and postcontrast images were performed through the internal    auditory canals.  Noncontrast T1-weighted images show no evidence of increased T1 signal in    either cochlea or vestibule to suggest spontaneous intralabyrinthine hemorrhage.     Contrast-enhanced T1-weighted images show no abnormal enhancement in either cochlea or    vestibule to suggest labyrinthitis.  No evidence of pericochlear enhancement to suggest    retrofenestral otosclerosis.  No evidence of vestibular schwannoma. No evidence of abnormal    leptomeningeal enhancement. No evidence of aggressive skull-base masses.  Heavily T2-weighted    images show normal appearance of cochlea and vestibule.  Cochlea appears to have 2-1/2 turns.     Basilar membrane is visualized.  Modiolus is intact.  No obvious evidence of congenital    inner-ear malformation is identified.  No evidence of enlarged vestibular aqueduct.                           CONCLUSION:   1. No evidence of vestibular schwannoma. 2. No abnormal cochlear, pericochlear or labyrinthine enhancement. 3. No evidence of recent infarct. 4. No abnormal intra-axial or extra-axial enhancing masses. 5. Multiple focal areas of increased FLAIR and T2 white matter signal likely due to    microvascular angiopathy.    6. Asymmetric volume loss involving anterior left temporal lobe with prominence of the    surrounding CSF spaces without evidence of vasogenic edema or enhancing lesions.  Findings    should be correlated with any relevant symptoms.       Thank you for the opportunity to provide your interpretation.               Durga Resendiz MD   Psychiatry

## 2022-02-23 LAB
CREATININE URINE: 167.7 MG/DL (ref 28–259)
MICROALBUMIN UR-MCNC: 3.1 MG/DL
MICROALBUMIN/CREAT UR-RTO: 18.5 MG/G (ref 0–30)

## 2022-02-25 ENCOUNTER — TELEPHONE (OUTPATIENT)
Dept: INTERNAL MEDICINE CLINIC | Age: 77
End: 2022-02-25

## 2022-02-25 ENCOUNTER — OFFICE VISIT (OUTPATIENT)
Dept: INTERNAL MEDICINE CLINIC | Age: 77
End: 2022-02-25
Payer: MEDICARE

## 2022-02-25 VITALS
OXYGEN SATURATION: 98 % | BODY MASS INDEX: 27.29 KG/M2 | WEIGHT: 139 LBS | DIASTOLIC BLOOD PRESSURE: 70 MMHG | HEART RATE: 71 BPM | SYSTOLIC BLOOD PRESSURE: 125 MMHG | HEIGHT: 60 IN

## 2022-02-25 DIAGNOSIS — E11.9 TYPE 2 DIABETES MELLITUS WITHOUT COMPLICATION, WITHOUT LONG-TERM CURRENT USE OF INSULIN (HCC): ICD-10-CM

## 2022-02-25 DIAGNOSIS — R41.89 COGNITIVE IMPAIRMENT: ICD-10-CM

## 2022-02-25 DIAGNOSIS — K59.09 CHRONIC CONSTIPATION: ICD-10-CM

## 2022-02-25 DIAGNOSIS — K59.03 DRUG-INDUCED CONSTIPATION: ICD-10-CM

## 2022-02-25 DIAGNOSIS — E03.9 ACQUIRED HYPOTHYROIDISM: ICD-10-CM

## 2022-02-25 DIAGNOSIS — K59.04 CHRONIC IDIOPATHIC CONSTIPATION: Primary | ICD-10-CM

## 2022-02-25 DIAGNOSIS — F03.90 DEMENTIA WITHOUT BEHAVIORAL DISTURBANCE, UNSPECIFIED DEMENTIA TYPE: ICD-10-CM

## 2022-02-25 LAB
FOLATE: 17.1 NG/ML (ref 4.78–24.2)
VITAMIN B-12: 912 PG/ML (ref 211–911)

## 2022-02-25 PROCEDURE — 99214 OFFICE O/P EST MOD 30 MIN: CPT | Performed by: INTERNAL MEDICINE

## 2022-02-25 RX ORDER — POLYETHYLENE GLYCOL 3350 17 G/17G
17 POWDER, FOR SOLUTION ORAL 2 TIMES DAILY
Qty: 1530 G | Refills: 2 | Status: SHIPPED | OUTPATIENT
Start: 2022-02-25 | End: 2022-03-27

## 2022-02-25 RX ORDER — LUBIPROSTONE 8 UG/1
8 CAPSULE, GELATIN COATED ORAL 2 TIMES DAILY WITH MEALS
Qty: 60 CAPSULE | Refills: 0 | Status: SHIPPED | OUTPATIENT
Start: 2022-02-25 | End: 2022-04-04

## 2022-02-25 ASSESSMENT — ENCOUNTER SYMPTOMS
CONSTIPATION: 1
TROUBLE SWALLOWING: 0
VOICE CHANGE: 0
WHEEZING: 0
SHORTNESS OF BREATH: 0
NAUSEA: 0
VOMITING: 0

## 2022-02-25 NOTE — TELEPHONE ENCOUNTER
Pharmacy called reporting new medication Linzess is very costly and patient cannot afford it. Are there any alternatives to that medication?

## 2022-02-25 NOTE — PROGRESS NOTES
Caroline Ching  1945  female  77 y.o. SUBJECTIVE:       Chief Complaint   Patient presents with    New Patient     Was seeing Dr. Silvina Paulson Other     Chronic Constipation       HPI:  Patient also established with me. She has been complaining of increasing constipation over the last several years. Patient has been taking sertraline, clonidine for long time. She described bowel movement maybe every 5 days. At times she have to use Fleet enema and manual removal.  She recalls taking almost any over-the-counter medication including fiber or MiraLAX Colace Senokot  History of hypothyroidism. TSH within the normal range. History of diabetes mellitus. Excellent hemoglobin A1c. Denies hypoglycemic symptoms. Patient is diagnosed with cognitive impairment. She have several recommendation for psychiatrist.  Patient reported to me she lives alone she take care of her self and her dog. She reports using GPS. She reported never got lost in driving. She reports not having any motor vehicle accident.         Past Medical History:   Diagnosis Date    Alopecia     Anxiety and depression     Cancer ([facility] [address]     uterine    Chronic kidney disease (CKD) stage G3b/A1, moderately decreased glomerular filtration rate (GFR) between 30-44 mL/min/1.73 square meter and albuminuria creatinine ratio less than 30 mg/g ([facility]) 7/19/2021    CTS (carpal tunnel syndrome)      History of rheumatic fever     Hypertension     Hypothyroidism     Meniere disease     bilateral ears    Mixed hyperlipidemia 7/19/2021    Nausea     associated with Meniere's dz    Obesity     Reflux esophagitis     Renal impairment     Sleep apnea     no longer needs cpap (per pt)    Type II or unspecified type diabetes mellitus without mention of complication, not stated as uncontrolled      Past Surgical History:   Procedure Laterality Date    ANKLE SURGERY Right     repair of fracture     APPENDECTOMY      BREAST SURGERY      rt bx negative    COLONOSCOPY  4/15/11    HYSTERECTOMY      at age 35    MASTOID SURGERY      at age 26   [address]  11    with dilitation and biopsy     Social History     Socioeconomic History    Marital status:      Spouse name: None    Number of children: 3    Years of education: None    Highest education level: None   Occupational History    Occupation: retired \"patino secretary\"      Comment: [employer]   Tobacco Use    Smoking status: Never Smoker    Smokeless tobacco: Never Used   Substance and Sexual Activity    Alcohol use: No    Drug use: No    Sexual activity: Not Currently   Other Topics Concern    None   Social History Narrative    Her   in . She has three children who live locally-2020      Social Determinants of Health     Financial Resource Strain: Low Risk     Difficulty of Paying Living Expenses: Not hard at all   Food Insecurity: No Food Insecurity    Worried About Running Out of Food in the Last Year: Never true    [address] in the Last Year: Never true   Transportation Needs:     Lack of Transportation (Medical): Not on file    Lack of Transportation (Non-Medical):  Not on file   Physical Activity:     Days of Exercise per Week: Not on file    Minutes of Exercise per Session: Not on file   Stress:     Feeling of Stress : Not on file   Social Connections:     Frequency of Communication with Friends and Family: Not on file    Frequency of Social Gatherings with Friends and Family: Not on file    Attends Taoism Services: Not on file    Active Member of Clubs or Organizations: Not on file    Attends Club or Organization Meetings: Not on file    Marital Status: Not on file   Intimate Partner Violence:     Fear of Current or Ex-Partner: Not on file    Emotionally Abused: Not on file    Physically Abused: Not on file    Sexually Abused: Not on file   Housing Stability:     Unable to Pay for Housing in the Last Year: Not on file    Number of Places Lived in the Last Year: Not on file    Unstable Housing in the Last Year: Not on file     Family History   Problem Relation Age of Onset    Heart Disease Mother     Diabetes Mother     Cancer Mother         breast     Heart Failure Mother     Diabetes Father     Kidney Disease Father     Heart Disease Father     Heart Disease Sister     Diabetes Maternal Grandmother     Crohn's Disease Child     Crohn's Disease Grandchild        Review of Systems   Constitutional: Negative for diaphoresis and unexpected weight change. HENT: Negative for trouble swallowing and voice change. Respiratory: Negative for shortness of breath and wheezing. Cardiovascular: Negative for chest pain and palpitations. Gastrointestinal: Positive for constipation. Negative for nausea and vomiting. Neurological: Negative for dizziness and light-headedness. OBJECTIVE:  Pulse Readings from Last 4 Encounters:   02/25/22 71   02/22/22 64   02/21/22 80   02/10/22 73     Wt Readings from Last 4 Encounters:   02/25/22 139 lb (63 kg)   02/22/22 138 lb (62.6 kg)   02/21/22 137 lb 6.4 oz (62.3 kg)   01/14/22 137 lb (62.1 kg)     BP Readings from Last 4 Encounters:   02/25/22 125/70   02/22/22 128/76   02/21/22 (!) 140/70   02/10/22 (!) 149/87     Physical Exam  Vitals and nursing note reviewed. Constitutional:       Appearance: She is not ill-appearing. Eyes:      General: No scleral icterus. Conjunctiva/sclera: Conjunctivae normal.   Cardiovascular:      Rate and Rhythm: Normal rate and regular rhythm. Pulses: Normal pulses. Heart sounds: Normal heart sounds. Pulmonary:      Effort: Pulmonary effort is normal.      Breath sounds: Normal breath sounds. Abdominal:      General: Bowel sounds are normal.      Tenderness: There is no abdominal tenderness. There is no guarding or rebound. Musculoskeletal:      Right lower leg: No edema. Left lower leg: No edema. Skin:     Capillary Refill: Capillary refill takes less than 2 seconds. Neurological:      General: No focal deficit present. Mental Status: She is alert and oriented to person, place, and time.    Psychiatric:         Mood and Affect: Mood normal.         Behavior: Behavior normal.         CBC:   Lab Results   Component Value Date    WBC 5.9 02/21/2022    HGB 14.0 02/21/2022    HCT 41.1 02/21/2022     02/21/2022     CMP:  Lab Results   Component Value Date     02/21/2022    K 3.9 02/21/2022    CL 98 02/21/2022    CO2 24 02/21/2022    ANIONGAP 18 02/21/2022    GLUCOSE 113 02/21/2022    BUN 31 02/21/2022    CREATININE 1.6 02/21/2022    GFRAA 38 02/21/2022    GFRAA 53 04/10/2013    CALCIUM 10.3 02/21/2022    PROT 7.4 02/21/2022    PROT 7.4 06/06/2012    LABALBU 4.8 02/21/2022    AGRATIO 1.8 02/21/2022    BILITOT 0.4 02/21/2022    ALKPHOS 73 02/21/2022    ALT 20 02/21/2022    AST 22 02/21/2022    GLOB 2.9 10/19/2021     URINALYSIS:  Lab Results   Component Value Date    GLUCOSEU neg 12/28/2020    GLUCOSEU NEGATIVE 04/02/2011    KETUA trace 12/28/2020    KETUA NEGATIVE 04/02/2011    SPECGRAV 10.20 12/28/2020    SPECGRAV <=1.005 04/02/2011    BLOODU trace 12/28/2020    BLOODU NEGATIVE 04/02/2011    PHUR 5.5 12/28/2020    PHUR 6.0 04/02/2011    PROTEINU trace 12/28/2020    PROTEINU NEGATIVE 04/02/2011    NITRU NEGATIVE 04/02/2011    LEUKOCYTESUR 3+ 12/28/2020    LEUKOCYTESUR NEGATIVE 04/02/2011    LABMICR 3.10 02/23/2022     HBA1C:   Lab Results   Component Value Date    LABA1C 5.9 02/21/2022    .6 02/21/2022     MICRO/ALB:   Lab Results   Component Value Date    LABMICR 3.10 02/23/2022    LABCREA 167.7 02/23/2022    MALBCR 18.5 02/23/2022     LIPID:  Lab Results   Component Value Date    CHOL 153 02/21/2022    TRIG 183 02/21/2022    HDL 49 02/21/2022    HDL 42 06/06/2012    LDLCALC 67 02/21/2022    LABVLDL 37 02/21/2022     TSH:   Lab Results   Component Value Date TSHREFLEX 0.81 10/19/2021         ASSESSMENT/PLAN:  Assessment/Plan:  Greg Triana was seen today for new patient and other. Diagnoses and all orders for this visit:    Chronic idiopathic constipation  -     linaclotide (LINZESS) 145 MCG capsule; Take 1 capsule by mouth every morning (before breakfast)  -     polyethylene glycol (GLYCOLAX) 17 GM/SCOOP powder; Take 17 g by mouth 2 times daily  -     ROSS Hdez MD, Gastroenterology, [facility]    Cognitive impairment  -     MRI BRAIN WO CONTRAST; Future  -     Vitamin B12 & Folate; Future    Chronic constipation  -     polyethylene glycol (GLYCOLAX) 17 GM/SCOOP powder; Take 17 g by mouth 2 times daily  -     ROSS Hdez MD, Gastroenterology, [facility]    Dementia without behavioral disturbance, unspecified dementia type [facility])  -     MRI BRAIN [address]; Future    Drug-induced constipation  -     linaclotide (LINZESS) 145 MCG capsule; Take 1 capsule by mouth every morning (before breakfast)  -     polyethylene glycol (GLYCOLAX) 17 GM/SCOOP powder; Take 17 g by mouth 2 times daily    Acquired hypothyroidism  Continue current levothyroxine on empty stomach  Type 2 diabetes mellitus without complication, without long-term current use of insulin (HCC)  Excellent hemoglobin A1c  Continue Metformin and Trulicity.           Orders Placed This Encounter   Procedures    MRI BRAIN WO CONTRAST     Standing Status:   Future     Standing Expiration Date:   2/25/2023    Vitamin B12 & Folate     Standing Status:   Future     Number of Occurrences:   1     Standing Expiration Date:   2/25/2023    [facility]     Referral Priority:   Routine     Referral Type:   Eval and Treat     Referral Reason:   Specialty Services Required     Referred to Provider:   Roro Kc MD     Requested Specialty:   Gastroenterology     Number of Visits Requested:   1     Current Outpatient Medications   Medication Sig Dispense Refill    linaclotide (LINZESS) 145 MCG capsule Take 1 capsule by mouth every morning (before breakfast) 90 capsule 0    polyethylene glycol (GLYCOLAX) 17 GM/SCOOP powder Take 17 g by mouth 2 times daily 1530 g 2    Biotin 99272 MCG TABS Take by mouth      Apoaequorin (PREVAGEN EXTRA STRENGTH) 20 MG CAPS Take by mouth       Dulaglutide (TRULICITY) 1.5 KN/0.1LF SOPN Inject 1.5 mg into the skin once a week      sertraline (ZOLOFT) 100 MG tablet TAKE 1 TABLET BY MOUTH EVERY MORNING (TOTAL 125MG) 90 tablet 1    Continuous Blood Gluc Sensor (FREESTYLE DENI 14 DAY SENSOR) MISC CHECK BLOOD SUGARS THREE TIMES DAILY 2 each 5    metFORMIN (GLUCOPHAGE) 500 MG tablet Take 2 tablets by mouth daily (with breakfast) 180 tablet 2    hydroCHLOROthiazide (HYDRODIURIL) 25 MG tablet Take 1 tablet by mouth every morning 90 tablet 3    levothyroxine (SYNTHROID) 50 MCG tablet TAKE 1 TABLET BY MOUTH ONCE DAILY 90 tablet 1    rosuvastatin (CRESTOR) 20 MG tablet Take 1 tablet by mouth once daily 90 tablet 0    cloNIDine (CATAPRES) 0.3 MG tablet Take 1 tablet by mouth 3 times daily 270 tablet 1    Continuous Blood Gluc  (FREESTYLE DENI 14 DAY READER) GILSON 1 each by Does not apply route 3 times daily 1 Device 5    Continuous Blood Gluc  (FREESTYLE DENI 14 DAY READER) GILSON Use as needed 1 Device 5    GNP Alcohol Swabs 70 % PADS       Cinnamon 500 MG CAPS Take 1,000 mg by mouth      UNABLE TO FIND Brain Strong (Memory Support)       acetaminophen (APAP EXTRA STRENGTH) 500 MG tablet Take 2 tablets by mouth every 6 hours as needed for Pain 120 tablet 3    Multiple Vitamins-Minerals (CENTRUM SILVER 50+WOMEN PO) Take by mouth        No current facility-administered medications for this visit. Return in about 4 weeks (around 3/25/2022). An After Visit Summary was printed and given to the patient. Documentation was done using voice recognition dragon software.   Every effort was made to ensure accuracy; however, inadvertent  Unintentional computerized transcription errors may be present.

## 2022-02-25 NOTE — TELEPHONE ENCOUNTER
Continue MiraLAX two times daily as advised  Wait for GI consult for chronic constipation. Will Plecanatide or Lubipristone have better coverage?

## 2022-02-28 ENCOUNTER — CARE COORDINATION (OUTPATIENT)
Dept: CARE COORDINATION | Age: 77
End: 2022-02-28

## 2022-02-28 ENCOUNTER — TELEPHONE (OUTPATIENT)
Dept: INTERNAL MEDICINE CLINIC | Age: 77
End: 2022-02-28

## 2022-02-28 NOTE — CARE COORDINATION
LSW received call from ACM, Analia CASTANEDA, to inquire about pt ref. LSW reviewed feeling the ref was not appropriate for Care Coordination as it appears to be for  exclusively which is not an appropriate ref. Analia provided synopsis of pt ref and reviewed concerns regarding pt not being responsive to her assistance. SW expressed understanding. LSW reviewed that it sounds as if the physician is trying to obtain a true and accurate DX of dementia as they are attempting to scheduled an MRI and blood work. LSW reviewed that if ACM decides to accept case, she can always speak with pt and review that these procedures are being scheduled to investigate pt memory loss and rule out other DX. LSW encouraged ACM to steer clear of the \"dementia\" DX as individuals who receives this DX often experiences denial.     Analia reviewed that pt is not answering her phone. LSW reviewed is that [facility] is doing her due diligence in that she is attempting to outreach. If she completed her 3 outreaches, and pt does not return, LSW advised ACM to notify physician. LSW reviewed pt may benefit from APS ref from voiced concerns from physician. LSW reviewed physician will need to make referral, not ACM or LSW as we do not know of pt condition at this time.

## 2022-02-28 NOTE — CARE COORDINATION
ACM attempted to f/u with patient regarding CC enrollment. No answer. ACM left message with contact information through . Awaiting call return. Due to concern regarding patients memory, ACM will also reach out to social work for additional recommendations.

## 2022-02-28 NOTE — TELEPHONE ENCOUNTER
Error in previous note.     Corrected note:    LVM for patient with information and request for patient to call the office to make sure patient understood all the information and whether or not she scheduled an appt with GI.

## 2022-02-28 NOTE — TELEPHONE ENCOUNTER
LVM for patient with information and request for patient to call the office to make sure patient understood all the information and whether or know she scheduled an appt with GI.

## 2022-02-28 NOTE — TELEPHONE ENCOUNTER
Patient was called back and given all the information from the previous call/voicemail left earlier today. Patient states she will be making the appt with GI soon. Patient given information regarding recent blood work. Information also mailed to patient.

## 2022-03-02 ENCOUNTER — CARE COORDINATION (OUTPATIENT)
Dept: CARE COORDINATION | Age: 77
End: 2022-03-02

## 2022-03-02 NOTE — CARE COORDINATION
pcp referral for safety concerns due to cognitive status. F/u COA referral.     ACM outreached patient and introduced myself. Patient immediately interrupted ACM by stating she is not doing well but will be better in 2 weeks. When ACM asked patient what is wrong, she replied, \"just a whole lot of things. \" Patient continued to talk but did not make sense. Patient talked at me. She was not willing to fully engage in conversation and quickly got off the phone with ACM. Since I do not have first hand knowledge of her situation, I would recommend you place a referral to APS if you have immediate concerns for her safety.

## 2022-03-04 ENCOUNTER — OFFICE VISIT (OUTPATIENT)
Dept: INTERNAL MEDICINE CLINIC | Age: 77
End: 2022-03-04
Payer: MEDICARE

## 2022-03-04 VITALS
HEART RATE: 87 BPM | SYSTOLIC BLOOD PRESSURE: 130 MMHG | OXYGEN SATURATION: 96 % | WEIGHT: 136.4 LBS | DIASTOLIC BLOOD PRESSURE: 76 MMHG | BODY MASS INDEX: 26.78 KG/M2 | HEIGHT: 60 IN

## 2022-03-04 DIAGNOSIS — F03.90 DEMENTIA WITHOUT BEHAVIORAL DISTURBANCE, UNSPECIFIED DEMENTIA TYPE: Primary | ICD-10-CM

## 2022-03-04 PROCEDURE — 99214 OFFICE O/P EST MOD 30 MIN: CPT | Performed by: INTERNAL MEDICINE

## 2022-03-04 ASSESSMENT — ENCOUNTER SYMPTOMS
TROUBLE SWALLOWING: 0
ABDOMINAL PAIN: 0
WHEEZING: 0
NAUSEA: 0
SHORTNESS OF BREATH: 0
VOICE CHANGE: 0
VOMITING: 0

## 2022-03-04 NOTE — Clinical Note
Can you give additional information to patient daughter Edwin Fairbanks about [facility] memory care facilities?

## 2022-03-04 NOTE — PROGRESS NOTES
Joseline Clear  1945  female  77 y.o. SUBJECTIVE:       Chief Complaint   Patient presents with    Follow-up       HPI:  Follow-up visit cognitive issues  Patient has been living alone with her dog. She did reschedule her MRI as well as appointment with audiologist  And  GI. Patient is unable to get refill of medication amitzia. I have reviewed note from the  as well as care coordinator. Past Medical History:   Diagnosis Date    Alopecia     Anxiety and depression     Cancer ([facility] [address]     uterine    Chronic kidney disease (CKD) stage G3b/A1, moderately decreased glomerular filtration rate (GFR) between 30-44 mL/min/1.73 square meter and albuminuria creatinine ratio less than 30 mg/g ([facility]) 7/19/2021    CTS (carpal tunnel syndrome)      History of rheumatic fever     Hypertension     Hypothyroidism     Meniere disease     bilateral ears    Mixed hyperlipidemia 7/19/2021    Nausea     associated with Meniere's dz    Obesity     Reflux esophagitis     Renal impairment     Sleep apnea     no longer needs cpap (per pt)    Type II or unspecified type diabetes mellitus without mention of complication, not stated as uncontrolled      Past Surgical History:   Procedure Laterality Date    ANKLE SURGERY Right     repair of fracture     APPENDECTOMY      BREAST SURGERY      rt  bx negative    COLONOSCOPY  4/15/11    HYSTERECTOMY      at age 35    MASTOID SURGERY      at age 26   [address]  4/14/11    with dilitation and biopsy     Social History     Socioeconomic History    Marital status:       Spouse name: Not on file    Number of children: 3    Years of education: Not on file    Highest education level: Not on file   Occupational History    Occupation: retired \"patino secretary\"      Comment: [employer]   Tobacco Use    Smoking status: Never Smoker    Smokeless tobacco: Never Used   Substance and Sexual Activity    Alcohol use: No    Drug use: No    Sexual activity: Not Currently   Other Topics Concern    Not on file   Social History Narrative    Her   in 2017. She has three children who live locally-2020      Social Determinants of Health     Financial Resource Strain: Low Risk     Difficulty of Paying Living Expenses: Not hard at all   Food Insecurity: No Food Insecurity    Worried About Running Out of Food in the Last Year: Never true    [address] in the Last Year: Never true   Transportation Needs:     Lack of Transportation (Medical): Not on file    Lack of Transportation (Non-Medical):  Not on file   Physical Activity:     Days of Exercise per Week: Not on file    Minutes of Exercise per Session: Not on file   Stress:     Feeling of Stress : Not on file   Social Connections:     Frequency of Communication with Friends and Family: Not on file    Frequency of Social Gatherings with Friends and Family: Not on file    Attends Jain Services: Not on file    Active Member of [address] [place] [address] or Organizations: Not on file    Attends Club or Organization Meetings: Not on file    Marital Status: Not on file   Intimate Partner Violence:     Fear of Current or Ex-Partner: Not on file    Emotionally Abused: Not on file    Physically Abused: Not on file    Sexually Abused: Not on file   Housing Stability:     Unable to Pay for Housing in the Last Year: Not on file    Number of [place] in the Last Year: Not on file    Unstable Housing in the Last Year: Not on file     Family History   Problem Relation Age of Onset    Heart Disease Mother     Diabetes Mother     Cancer Mother         breast     Heart Failure Mother     Diabetes Father     Kidney Disease Father     Heart Disease Father     Heart Disease Sister     Diabetes Maternal Grandmother     Crohn's Disease Child     Crohn's Disease Grandchild        Review of Systems   Constitutional: Negative for diaphoresis and unexpected weight change. HENT: Negative for trouble swallowing and voice change. Respiratory: Negative for shortness of breath and wheezing. Cardiovascular: Negative for chest pain. Gastrointestinal: Negative for abdominal pain, nausea and vomiting. Neurological: Negative for dizziness and light-headedness. OBJECTIVE:  Pulse Readings from Last 4 Encounters:   03/04/22 87   02/25/22 71   02/22/22 64   02/21/22 80     Wt Readings from Last 4 Encounters:   03/04/22 136 lb 6.4 oz (61.9 kg)   02/25/22 139 lb (63 kg)   02/22/22 138 lb (62.6 kg)   02/21/22 137 lb 6.4 oz (62.3 kg)     BP Readings from Last 4 Encounters:   03/04/22 130/76   02/25/22 125/70   02/22/22 128/76   02/21/22 (!) 140/70     Physical Exam  Vitals and nursing note reviewed. Constitutional:       Appearance: She is not ill-appearing. Eyes:      Conjunctiva/sclera: Conjunctivae normal.   Cardiovascular:      Rate and Rhythm: Normal rate and regular rhythm. Pulses: Normal pulses. Heart sounds: Normal heart sounds. Pulmonary:      Effort: Pulmonary effort is normal.      Breath sounds: Normal breath sounds. Musculoskeletal:      Right lower leg: No edema. Left lower leg: No edema. Neurological:      General: No focal deficit present. Mental Status: She is alert and oriented to person, place, and time.    Psychiatric:         Mood and Affect: Mood normal.         Behavior: Behavior normal.         CBC:   Lab Results   Component Value Date    WBC 5.9 02/21/2022    HGB 14.0 02/21/2022    HCT 41.1 02/21/2022     02/21/2022     CMP:  Lab Results   Component Value Date     02/21/2022    K 3.9 02/21/2022    CL 98 02/21/2022    CO2 24 02/21/2022    ANIONGAP 18 02/21/2022    GLUCOSE 113 02/21/2022    BUN 31 02/21/2022    CREATININE 1.6 02/21/2022    GFRAA 38 02/21/2022    GFRAA 53 04/10/2013    CALCIUM 10.3 02/21/2022    PROT 7.4 02/21/2022    PROT 7.4 06/06/2012    LABALBU 4.8 02/21/2022    AGRATIO 1.8 02/21/2022    BILITOT 0.4 02/21/2022    ALKPHOS 73 02/21/2022    ALT 20 02/21/2022    AST 22 02/21/2022    GLOB 2.9 10/19/2021     URINALYSIS:  Lab Results   Component Value Date    GLUCOSEU neg 12/28/2020    GLUCOSEU NEGATIVE 04/02/2011    KETUA trace 12/28/2020    KETUA NEGATIVE 04/02/2011    SPECGRAV 10.20 12/28/2020    SPECGRAV <=1.005 04/02/2011    BLOODU trace 12/28/2020    BLOODU NEGATIVE 04/02/2011    PHUR 5.5 12/28/2020    PHUR 6.0 04/02/2011    PROTEINU trace 12/28/2020    PROTEINU NEGATIVE 04/02/2011    NITRU NEGATIVE 04/02/2011    LEUKOCYTESUR 3+ 12/28/2020    LEUKOCYTESUR NEGATIVE 04/02/2011    LABMICR 3.10 02/23/2022     HBA1C:   Lab Results   Component Value Date    LABA1C 5.9 02/21/2022    .6 02/21/2022     MICRO/ALB:   Lab Results   Component Value Date    LABMICR 3.10 02/23/2022    LABCREA 167.7 02/23/2022    MALBCR 18.5 02/23/2022     LIPID:  Lab Results   Component Value Date    CHOL 153 02/21/2022    TRIG 183 02/21/2022    HDL 49 02/21/2022    HDL 42 06/06/2012    LDLCALC 67 02/21/2022    LABVLDL 37 02/21/2022     TSH:   Lab Results   Component Value Date    TSHREFLEX 0.81 10/19/2021         ASSESSMENT/PLAN:    1. Dementia without behavioral disturbance, unspecified dementia type [facility])    I have discussed with patient son and daughter regarding further treatment plan  Currently patient daughter Aaron Cuenca as well as  Michael Petit working to help patient to consider placement in memory care after selling patient's house. Aaron Cuenca wanted to know more resources about different facilities in [facility]. Patient family member appears very supportive    There does not appears to be an immediate danger for the patient. Total time spent about 40-minute  No orders of the defined types were placed in this encounter.     Current Outpatient Medications   Medication Sig Dispense Refill    polyethylene glycol (GLYCOLAX) 17 GM/SCOOP powder Take 17 g by mouth 2 times daily 1530 g 2    lubiprostone (AMITIZA) 8 MCG CAPS capsule Take 1 capsule by mouth 2 times daily (with meals) 60 capsule 0    Biotin 20783 MCG TABS Take by mouth      Apoaequorin (PREVAGEN EXTRA STRENGTH) 20 MG CAPS Take by mouth       Dulaglutide (TRULICITY) 1.5 FK/7.5XV SOPN Inject 1.5 mg into the skin once a week      sertraline (ZOLOFT) 100 MG tablet TAKE 1 TABLET BY MOUTH EVERY MORNING (TOTAL 125MG) 90 tablet 1    Continuous Blood Gluc Sensor (FREESTYLE DENI 14 DAY SENSOR) MISC CHECK BLOOD SUGARS THREE TIMES DAILY 2 each 5    metFORMIN (GLUCOPHAGE) 500 MG tablet Take 2 tablets by mouth daily (with breakfast) 180 tablet 2    hydroCHLOROthiazide (HYDRODIURIL) 25 MG tablet Take 1 tablet by mouth every morning 90 tablet 3    levothyroxine (SYNTHROID) 50 MCG tablet TAKE 1 TABLET BY MOUTH ONCE DAILY 90 tablet 1    rosuvastatin (CRESTOR) 20 MG tablet Take 1 tablet by mouth once daily 90 tablet 0    cloNIDine (CATAPRES) 0.3 MG tablet Take 1 tablet by mouth 3 times daily 270 tablet 1    Continuous Blood Gluc  (FREESTYLE DENI 14 DAY READER) GILSON 1 each by Does not apply route 3 times daily 1 Device 5    Continuous Blood Gluc  (FREESTYLE DENI 14 DAY READER) GILSON Use as needed 1 Device 5    GNP Alcohol Swabs 70 % PADS       Cinnamon 500 MG CAPS Take 1,000 mg by mouth      UNABLE TO FIND Brain Strong (Memory Support)       acetaminophen (APAP EXTRA STRENGTH) 500 MG tablet Take 2 tablets by mouth every 6 hours as needed for Pain 120 tablet 3    Multiple Vitamins-Minerals (CENTRUM SILVER 50+WOMEN PO) Take by mouth        No current facility-administered medications for this visit. An After Visit Summary was printed and given to the patient. Documentation was done using voice recognition dragon software. Every effort was made to ensure accuracy; however, inadvertent  Unintentional computerized transcription errors may be present.

## 2022-03-07 ENCOUNTER — CARE COORDINATION (OUTPATIENT)
Dept: CARE COORDINATION | Age: 77
End: 2022-03-07

## 2022-03-07 SDOH — ECONOMIC STABILITY: INCOME INSECURITY: IN THE LAST 12 MONTHS, WAS THERE A TIME WHEN YOU WERE NOT ABLE TO PAY THE MORTGAGE OR RENT ON TIME?: NO

## 2022-03-07 SDOH — ECONOMIC STABILITY: HOUSING INSECURITY
IN THE LAST 12 MONTHS, WAS THERE A TIME WHEN YOU DID NOT HAVE A STEADY PLACE TO SLEEP OR SLEPT IN A SHELTER (INCLUDING NOW)?: NO

## 2022-03-07 SDOH — HEALTH STABILITY: PHYSICAL HEALTH: ON AVERAGE, HOW MANY MINUTES DO YOU ENGAGE IN EXERCISE AT THIS LEVEL?: 0 MIN

## 2022-03-07 SDOH — HEALTH STABILITY: PHYSICAL HEALTH: ON AVERAGE, HOW MANY DAYS PER WEEK DO YOU ENGAGE IN MODERATE TO STRENUOUS EXERCISE (LIKE A BRISK WALK)?: 0 DAYS

## 2022-03-07 SDOH — ECONOMIC STABILITY: HOUSING INSECURITY: IN THE LAST 12 MONTHS, HOW MANY PLACES HAVE YOU LIVED?: 1

## 2022-03-07 SDOH — ECONOMIC STABILITY: TRANSPORTATION INSECURITY
IN THE PAST 12 MONTHS, HAS LACK OF TRANSPORTATION KEPT YOU FROM MEETINGS, WORK, OR FROM GETTING THINGS NEEDED FOR DAILY LIVING?: NO

## 2022-03-07 ASSESSMENT — SOCIAL DETERMINANTS OF HEALTH (SDOH)
HOW OFTEN DO YOU GET TOGETHER WITH FRIENDS OR RELATIVES?: ONCE A WEEK
HOW OFTEN DO YOU ATTEND CHURCH OR RELIGIOUS SERVICES?: 1 TO 4 TIMES PER YEAR
IN A TYPICAL WEEK, HOW MANY TIMES DO YOU TALK ON THE PHONE WITH FAMILY, FRIENDS, OR NEIGHBORS?: ONCE A WEEK
HOW OFTEN DO YOU ATTENT MEETINGS OF THE CLUB OR ORGANIZATION YOU BELONG TO?: NEVER
DO YOU BELONG TO ANY CLUBS OR ORGANIZATIONS SUCH AS CHURCH GROUPS UNIONS, FRATERNAL OR ATHLETIC GROUPS, OR SCHOOL GROUPS?: NO

## 2022-03-07 ASSESSMENT — LIFESTYLE VARIABLES: HOW OFTEN DO YOU HAVE A DRINK CONTAINING ALCOHOL: NEVER

## 2022-03-07 NOTE — CARE COORDINATION
Ambulatory Care Coordination Note  3/7/2022  CM Risk Score: 2  Charlson 10 Year Mortality Risk Score: 100%     ACC: Analia Bedolla, RN    Summary Note:     PCP referral to care management for information on memory care placement. Updated communication form reviewed - patient consent given to discuss health information with daughter Faviola Oseguera. Patient is a  77 yr old female. PMHx includes T2DM, hlp, dementia. She lives alone in a single family home that she owns. Her home is paid off. She receives social security only but it is unclear how much. She is not active with community resources. She is not receptive to COA referral. [facility] attempted to complete medication review with patient but she keeps saying, \"honey, I am fine. Everything is fine. \" She insists she is doing what she is supposed to. AC unable to complete enrollment d/t patients cognitive status. Patient manages her medications independently - it is unclear if she is taking as prescribed. She manages her own finances with occasional assistance from her son. She states she does her own housekeeping, grocery shopping and drives short distances. [facility] outreached daughter Faviola Oseguera for additional information regarding needs. Family is in the process of cleaning out the patients home in preparation to sell. [facility] asked Rosette Hendrickson if patient is agreeable to sell her home. Rosette Hendrickson states she is agreeable but thinks it will take years to sell her home. Rosette Hendrickson and her siblings are currently working with an  to complete ACP documents. Rosette Hendrickson is requesting information on assisted living facilities as well as memory care placement. Rosetet Hendrickson is agreeable to referral to social work and Alzheimer's Association for caregiver support. Referral to Alzheimer's Association placed - requested f/u with daughter Rosette Hendrickson.     PLAN:    F/u on social work referral  F/u on referral to [facility] [address]      Prior to sending this referral, please add both AC Social Workers to the patient's care team. The referrals are identified through the care team assignment. Please complete the questions below, and/or provide a narrative description of the identified patient need below and include this smart phrase in your patient encounter.     -This patient is referred this date to [facility] for review, assessment, and consultation as needed regarding the following presenting concern(s). Please bold all that apply. No    [facility] assessment of patient's substance use disorder, if any, is indicated and requested. Yes   Patient has concerns about apparently deteriorating mental status. No   Patient desires assistance with locating an accessible behavioral health treatment provider. No   Patient has questions/concerns regarding application and/or eligibility for Medicaid. No   Patient has questions/concerns regarding application and/or eligibility for a Medicaid Waiver program ([place], Home Care Waiver, Assisted Living Waiver, etc.). No   Patient has questions/concerns about the cost of prescription drugs. No   Patient has questions/concerns regarding Medicare coverage, Including Part D prescription drug coverage. No   Patient desires supportive services in the home regarding activities of daily living (homemaking, transferring, bathing, toileting, transportation, shopping, etc.). Yes   Patient desires information about long-term care in a skilled nursing facility (SNF). No   Patient has inadequate and/or unaffordable housing. No   Patient desires assistance with smoking cessation (Note: select this option only if patient is reasonably unable to participate in smoking cessation support groups offered periodically at [facility]). No   Patient desires information about advance directives (Power of  [place], Living Will, DNR, Guardianship, etc.).     Please provide additional information if needed: (additional needs, household size, monthly income, source of income) Daughter Phi Epstein is requesting information on assisted living facilities and memory care placement. ACM placed referral to Alzheimers Association for caregiver support. Ambulatory Care Coordination Assessment    Care Coordination Protocol  Program Enrollment: Rising Risk  Referral from Primary Care Provider: Yes  Week 1 - Initial Assessment        Do you have Home O2 Therapy?: No      Ability to seek help/take action for Emergent Urgent situations i.e. fire, crime, inclement weather or health crisis.: Needs Assistance  Ability to ambulate to restroom: Independent  Ability handle personal hygeine needs (bathing/dressing/grooming): Independent  Ability to manage Medications: Needs Assistance  Ability to prepare Food Preparation: Needs Assistance  Ability to maintain home (clean home, laundry): Needs Assistance  Ability to drive and/or has transportation: Needs Assistance  Ability to do shopping: Needs Assistance  Ability to manage finances: Needs Assistance  Is patient able to live independently?: No     Current Housing: Private Residence        Per the Fall Risk Screening, did the patient have 2 or more falls or 1 fall with injury in the past year?: No     Frequent urination at night?: No     Are you experiencing loss of meaning?: No  Are you experiencing loss of hope and peace?: No     Suggested Interventions and Community Resources                  Prior to Admission medications    Medication Sig Start Date End Date Taking?  Authorizing Provider   polyethylene glycol (GLYCOLAX) 17 GM/SCOOP powder Take 17 g by mouth 2 times daily 2/25/22 3/27/22  Samia Piedra MD   lubiprostone (AMITIZA) 8 MCG CAPS capsule Take 1 capsule by mouth 2 times daily (with meals) 2/25/22   Saurav Quintana MD   Biotin 91105 MCG TABS Take by mouth    Historical Provider, MD   Apoaequorin (PREVAGEN EXTRA STRENGTH) 20 MG CAPS Take by mouth     Historical Provider, MD   Dulaglutide (TRULICITY) 1.5 MG/0.5ML SOPN Inject 1.5 mg into the skin once a week    Historical Provider, MD   sertraline (ZOLOFT) 100 MG tablet TAKE 1 TABLET BY MOUTH EVERY MORNING (TOTAL 125MG) 1/14/22   Mary Grace Lindo MD   Continuous Blood Gluc Sensor (FREESTYLE DENI 14 DAY SENSOR) MISC CHECK BLOOD SUGARS THREE TIMES DAILY 12/20/21   Mary Grace Lindo MD   metFORMIN (GLUCOPHAGE) 500 MG tablet Take 2 tablets by mouth daily (with breakfast) 11/22/21   Mary Grace Lindo MD   hydroCHLOROthiazide (HYDRODIURIL) 25 MG tablet Take 1 tablet by mouth every morning 11/4/21   Mary Grace Lindo MD   levothyroxine (SYNTHROID) 50 MCG tablet TAKE 1 TABLET BY MOUTH ONCE DAILY 11/4/21   Mary Grace Lindo MD   rosuvastatin (CRESTOR) 20 MG tablet Take 1 tablet by mouth once daily 11/4/21   Mary Grace Lindo MD   cloNIDine (CATAPRES) 0.3 MG tablet Take 1 tablet by mouth 3 times daily 11/4/21   Mary Grace Lindo MD   Continuous Blood Gluc  (FREESTYLE DENI 14 DAY READER) GILSON 1 each by Does not apply route 3 times daily 7/20/21   Mary Grace Lindo MD   Continuous Blood Gluc  (FREESTYLE DENI 14 DAY READER) GILSON Use as needed 7/20/21   Mary Grace Lindo MD   hydroCHLOROthiazide (HYDRODIURIL) 25 MG tablet Take 1 tablet by mouth every morning 3/21/21   Mary Grace Lindo MD   rosuvastatin (CRESTOR) 20 MG tablet Take 1 tablet by mouth daily 3/15/21   Mary Grace Lindo MD   [facility] Alcohol Swabs 70 % PADS  8/4/20   Historical Provider, MD   Cinnamon 500 MG CAPS Take 1,000 mg by mouth    Historical Provider, MD   UNABLE TO FIND Brain Strong (Memory Support)     Historical Provider, MD   acetaminophen (APAP EXTRA STRENGTH) 500 MG tablet Take 2 tablets by mouth every 6 hours as needed for Pain 1/22/20   Lexie Hernandez MD   Multiple Vitamins-Minerals (CENTRUM SILVER 50+WOMEN PO) Take by mouth     Historical Provider, MD       Future Appointments   Date Time Provider Tevin Alvarez   3/29/2022  9:30 AM F MRI RM 2 [facility] MRI [facility]: ROMINA VERGARA 4/4/2022 11:00 AM MD KAR SandovalFIELD IM [place] - D[facility]D   8/11/2022 10:00 AM Hayden Bruner AUDIO MMA   8/11/2022 10:40 AM DO REYES Donato ENT [facility]     ,   Diabetes Assessment    Medic Alert ID: No  How often do you test your blood sugar?:  (Comment: varies - 1-3 times daily - [facility] 3/7/22)   Do you have barriers with adherence to non-pharmacologic self-management interventions?  (Nutrition/Exercise/Self-Monitoring): Yes   Have you ever had to go to the ED for symptoms of low blood sugar?: No       No patient-reported symptoms   Do you have hyperglycemia symptoms?: No   Do you have hypoglycemia symptoms?: No   Blood Sugar Trends: Fluctuating       and   General Assessment    Do you have any symptoms that are causing concern?: No

## 2022-03-24 ENCOUNTER — CARE COORDINATION (OUTPATIENT)
Dept: CARE COORDINATION | Age: 77
End: 2022-03-24

## 2022-03-24 NOTE — CARE COORDINATION
ACM attempted to outreach daughter Rayne Foley to f/u on referrals. No answer. ACM left message with contact information through . Call return requested. ACM will f/u at later date/time.

## 2022-03-29 ENCOUNTER — HOSPITAL ENCOUNTER (OUTPATIENT)
Dept: MRI IMAGING | Age: 77
Discharge: HOME | End: 2022-03-29
Payer: MEDICARE

## 2022-03-29 ENCOUNTER — CARE COORDINATION (OUTPATIENT)
Dept: CARE COORDINATION | Age: 77
End: 2022-03-29

## 2022-03-29 DIAGNOSIS — R41.89 COGNITIVE IMPAIRMENT: ICD-10-CM

## 2022-03-29 DIAGNOSIS — F03.90 DEMENTIA WITHOUT BEHAVIORAL DISTURBANCE, UNSPECIFIED DEMENTIA TYPE: ICD-10-CM

## 2022-03-29 PROCEDURE — 70551 MRI BRAIN STEM W/O DYE: CPT

## 2022-03-29 NOTE — CARE COORDINATION
Ambulatory Care Coordination Note  3/29/2022  CM Risk Score: 2  Charlson 10 Year Mortality Risk Score: 100%     ACC: Analia Bedolla, RN    Summary Note:     ACM outreached patients daughter, Levar Peralta, regarding referrals. Levar Peralta is not receptive to talking to the Alzheimer Association until a definitive diagnosis is given. MRI of brain was completed today. Grethel will f/u with Dr. Devi Ulrich to discuss the results on 4/4/22. ACM will f/u after appointment with pcp. Levar Peralta encouraged to follow up with ACM if additional assistance is needed before then. PLAN:    Results of MRI  Alzheimer Association referral  MSW referral if placement information is needed        Care Coordination Interventions    Program Enrollment: Rising Risk  Referral from Primary Care Provider: Yes  Suggested Interventions and Community Resources         Goals Addressed    None         Prior to Admission medications    Medication Sig Start Date End Date Taking?  Authorizing Provider   lubiprostone (AMITIZA) 8 MCG CAPS capsule Take 1 capsule by mouth 2 times daily (with meals) 2/25/22   M Ag Goyla MD   Biotin 34641 MCG TABS Take by mouth    Historical Provider, MD   Apoaequorin (PREVAGEN EXTRA STRENGTH) 20 MG CAPS Take by mouth     Historical Provider, MD   Dulaglutide (TRULICITY) 1.5 BE/3.1HX SOPN Inject 1.5 mg into the skin once a week    Historical Provider, MD   sertraline (ZOLOFT) 100 MG tablet TAKE 1 TABLET BY MOUTH EVERY MORNING (TOTAL 125MG) 1/14/22   Ming Cheung MD   Continuous Blood Gluc Sensor (FREESTYLE DENI 14 DAY SENSOR) MISC CHECK BLOOD SUGARS THREE TIMES DAILY 12/20/21   Ming Cheung MD   metFORMIN (GLUCOPHAGE) 500 MG tablet Take 2 tablets by mouth daily (with breakfast) 11/22/21   Ming Cheung MD   hydroCHLOROthiazide (HYDRODIURIL) 25 MG tablet Take 1 tablet by mouth every morning 11/4/21   Ming Cheung MD   levothyroxine (SYNTHROID) 50 MCG tablet TAKE 1 TABLET BY MOUTH ONCE DAILY 11/4/21   Charley JANSEN Ella Brown MD   rosuvastatin (CRESTOR) 20 MG tablet Take 1 tablet by mouth once daily 11/4/21   Viki Amaro MD   cloNIDine (CATAPRES) 0.3 MG tablet Take 1 tablet by mouth 3 times daily 11/4/21   Viki Amaro MD   Continuous Blood Gluc  (FREESTYLE DENI 14 DAY READER) GILSON 1 each by Does not apply route 3 times daily 7/20/21   Viki Amaro MD   Continuous Blood Gluc  (FREESTYLE DENI 14 DAY READER) GILSON Use as needed 7/20/21   Viki Amaro MD   hydroCHLOROthiazide (HYDRODIURIL) 25 MG tablet Take 1 tablet by mouth every morning 3/21/21   Viki Amaro MD   rosuvastatin (CRESTOR) 20 MG tablet Take 1 tablet by mouth daily 3/15/21   Viki Amaro MD   [facility] Alcohol Swabs 70 % PADS  8/4/20   Historical Provider, MD   Cinnamon 500 MG CAPS Take 1,000 mg by mouth    Historical Provider, MD   UNABLE TO FIND Brain Strong (Memory Support)     Historical Provider, MD   acetaminophen (APAP EXTRA STRENGTH) 500 MG tablet Take 2 tablets by mouth every 6 hours as needed for Pain 1/22/20   Rodrigo Gillespie MD   Multiple Vitamins-Minerals (CENTRUM SILVER 50+WOMEN PO) Take by mouth     Historical Provider, MD       Future Appointments   Date Time Provider Tevin Alvarez   4/4/2022 11:00 AM LEONEL Crews MD [facility] [place] [facility]   8/11/2022 10:00 AM Hayden Kirkpatrick AUDIO [facility]   8/11/2022 10:40 AM Reza Bowen DO FF ENT [facility]     ,   Diabetes Assessment    Medic Alert ID: No  Meal Planning: None   How often do you test your blood sugar?:  (Comment: varies - 1-3 times daily - [facility] 3/7/22)   Do you have barriers with adherence to non-pharmacologic self-management interventions?  (Nutrition/Exercise/Self-Monitoring): Yes   Have you ever had to go to the ED for symptoms of low blood sugar?: No           and   General Assessment

## 2022-03-30 ENCOUNTER — TELEPHONE (OUTPATIENT)
Dept: INTERNAL MEDICINE CLINIC | Age: 77
End: 2022-03-30

## 2022-03-30 RX ORDER — ASPIRIN 81 MG/1
81 TABLET ORAL DAILY
COMMUNITY
End: 2022-06-06 | Stop reason: DRUGHIGH

## 2022-03-30 NOTE — TELEPHONE ENCOUNTER
----- Message from Lacho Wilkes MD sent at 3/30/2022  8:13 AM EDT -----  Generalized volume loss of the brain can be seen in patients with dementia. As well as evidence of old infarct. Enteric-coated aspirin 81 mg daily.

## 2022-03-30 NOTE — RESULT ENCOUNTER NOTE
Generalized volume loss of the brain can be seen in patients with dementia. As well as evidence of old infarct. Enteric-coated aspirin 81 mg daily.

## 2022-04-04 ENCOUNTER — OFFICE VISIT (OUTPATIENT)
Dept: INTERNAL MEDICINE CLINIC | Age: 77
End: 2022-04-04
Payer: MEDICARE

## 2022-04-04 VITALS
HEIGHT: 60 IN | SYSTOLIC BLOOD PRESSURE: 135 MMHG | OXYGEN SATURATION: 98 % | BODY MASS INDEX: 28.03 KG/M2 | WEIGHT: 142.8 LBS | HEART RATE: 63 BPM | DIASTOLIC BLOOD PRESSURE: 78 MMHG

## 2022-04-04 DIAGNOSIS — I10 ESSENTIAL HYPERTENSION: ICD-10-CM

## 2022-04-04 DIAGNOSIS — F02.80 LATE ONSET ALZHEIMER'S DEMENTIA WITHOUT BEHAVIORAL DISTURBANCE (HCC): Primary | ICD-10-CM

## 2022-04-04 DIAGNOSIS — K59.04 CHRONIC IDIOPATHIC CONSTIPATION: ICD-10-CM

## 2022-04-04 DIAGNOSIS — I63.81 RIGHT-SIDED LACUNAR INFARCTION (HCC): ICD-10-CM

## 2022-04-04 DIAGNOSIS — G30.1 LATE ONSET ALZHEIMER'S DEMENTIA WITHOUT BEHAVIORAL DISTURBANCE (HCC): Primary | ICD-10-CM

## 2022-04-04 PROCEDURE — 99214 OFFICE O/P EST MOD 30 MIN: CPT | Performed by: INTERNAL MEDICINE

## 2022-04-04 RX ORDER — DONEPEZIL HYDROCHLORIDE 5 MG/1
5 TABLET, FILM COATED ORAL NIGHTLY
Qty: 90 TABLET | Refills: 0 | Status: SHIPPED | OUTPATIENT
Start: 2022-04-04 | End: 2022-06-06

## 2022-04-04 ASSESSMENT — ENCOUNTER SYMPTOMS
SHORTNESS OF BREATH: 0
WHEEZING: 0
VOICE CHANGE: 0
VOMITING: 0
NAUSEA: 0
TROUBLE SWALLOWING: 0
ABDOMINAL PAIN: 0

## 2022-04-04 NOTE — PROGRESS NOTES
Leno Brunner  1945  female  77 y.o. SUBJECTIVE:       Chief Complaint   Patient presents with    Follow-up    Other     Family concerned about memory problems    Other     Both ears ringing       HPI:  Follow-up visit. Patient came with her son and daughter. Patient son and daughter reports they did not notice any significant dementia issues. She is doing her ADL. Her son and daughter also keep in touch with her almost every day  They are also looking for assisted living care  Facility. Patient drives only short distance locally. Patient never got lost.    Patient was tried Aricept and Namenda in the past however discontinued. Since last visit her constipation completely resolved. She noticed drinking coffee has been helping. Hypertension:    Leno Brunner returns for follow up of hypertension. Tolerating medications well and taking them as directed. No symptoms (denies chest pain,dyspnea,edema or TIA's or blurred vision) concerning for end organ damage are present.         Past Medical History:   Diagnosis Date    Alopecia     Anxiety and depression     Cancer ([facility] [address]     uterine    Chronic kidney disease (CKD) stage G3b/A1, moderately decreased glomerular filtration rate (GFR) between 30-44 mL/min/1.73 square meter and albuminuria creatinine ratio less than 30 mg/g ([facility]) 7/19/2021    CTS (carpal tunnel syndrome)      History of rheumatic fever     Hypertension     Hypothyroidism     Meniere disease     bilateral ears    Mixed hyperlipidemia 7/19/2021    Nausea     associated with Meniere's dz    Obesity     Reflux esophagitis     Renal impairment     Sleep apnea     no longer needs cpap (per pt)    Type II or unspecified type diabetes mellitus without mention of complication, not stated as uncontrolled      Past Surgical History:   Procedure Laterality Date    ANKLE SURGERY Right     repair of fracture     APPENDECTOMY      BREAST SURGERY      rt  bx negative    COLONOSCOPY  4/15/11    HYSTERECTOMY      at age 35    MASTOID SURGERY      at age 26   [address]    with dilitation and biopsy     Social History     Socioeconomic History    Marital status:      Spouse name: None    Number of children: 3    Years of education: None    Highest education level: None   Occupational History    Occupation: retired \"patino secretary\"      Comment: [employer]   Tobacco Use    Smoking status: Never Smoker    Smokeless tobacco: Never Used   Substance and Sexual Activity    Alcohol use: No    Drug use: No    Sexual activity: Not Currently   Other Topics Concern    None   Social History Narrative    Her   in . She has three children who live locally-2020      Social Determinants of Health     Financial Resource Strain: Low Risk     Difficulty of Paying Living Expenses: Not hard at all   Food Insecurity: No Food Insecurity    Worried About Running Out of Food in the Last Year: Never true    Antoinette of Food in the Last Year: Never true   Transportation Needs: No Transportation Needs    Lack of Transportation (Medical): No    Lack of Transportation (Non-Medical): No   Physical Activity: Inactive    Days of Exercise per Week: 0 days    Minutes of Exercise per Session: 0 min   Stress: No Stress Concern Present    Feeling of Stress : Not at all   Social Connections: Socially Isolated    Frequency of Communication with Friends and Family: Once a week    Frequency of Social Gatherings with Friends and Family: Once a week    Attends Gnosticism Services: 1 to 4 times per year    Active Member of [address] [place] [address] or Organizations: No    Attends Club or Organization Meetings: Never    Marital Status:     Intimate Partner Violence:     Fear of Current or Ex-Partner: Not on file    Emotionally Abused: Not on file    Physically Abused: Not on file    Sexually Abused: Not on file   Housing Stability: Low Risk     Unable to Pay for Housing in the Last Year: No    Number of Places Lived in the Last Year: 1    Unstable Housing in the Last Year: No     Family History   Problem Relation Age of Onset    Heart Disease Mother     Diabetes Mother     Cancer Mother         breast     Heart Failure Mother     Diabetes Father     Kidney Disease Father     Heart Disease Father     Heart Disease Sister     Diabetes Maternal Grandmother     Crohn's Disease Child     Crohn's Disease Grandchild        Review of Systems   Constitutional: Negative for diaphoresis and unexpected weight change. HENT: Negative for trouble swallowing and voice change. Respiratory: Negative for shortness of breath and wheezing. Cardiovascular: Negative for chest pain and palpitations. Gastrointestinal: Negative for abdominal pain, nausea and vomiting. Neurological: Negative for dizziness and light-headedness. OBJECTIVE:  Pulse Readings from Last 4 Encounters:   04/04/22 63   03/04/22 87   02/25/22 71   02/22/22 64     Wt Readings from Last 4 Encounters:   04/04/22 142 lb 12.8 oz (64.8 kg)   03/04/22 136 lb 6.4 oz (61.9 kg)   02/25/22 139 lb (63 kg)   02/22/22 138 lb (62.6 kg)     BP Readings from Last 4 Encounters:   04/04/22 135/78   03/04/22 130/76   02/25/22 125/70   02/22/22 128/76     Physical Exam  Vitals and nursing note reviewed. Constitutional:       General: She is not in acute distress. Appearance: Normal appearance. She is not ill-appearing. Eyes:      Conjunctiva/sclera: Conjunctivae normal.   Cardiovascular:      Rate and Rhythm: Normal rate and regular rhythm. Pulses: Normal pulses. Heart sounds: Normal heart sounds. Pulmonary:      Effort: Pulmonary effort is normal.      Breath sounds: Normal breath sounds. Abdominal:      General: Bowel sounds are normal.   Musculoskeletal:      Right lower leg: No edema. Left lower leg: No edema.    Neurological:      General: No focal deficit present. Mental Status: She is alert and oriented to person, place, and time. Psychiatric:         Mood and Affect: Mood normal.         Behavior: Behavior normal.         CBC:   Lab Results   Component Value Date    WBC 5.9 02/21/2022    HGB 14.0 02/21/2022    HCT 41.1 02/21/2022     02/21/2022     CMP:  Lab Results   Component Value Date     02/21/2022    K 3.9 02/21/2022    CL 98 02/21/2022    CO2 24 02/21/2022    ANIONGAP 18 02/21/2022    GLUCOSE 113 02/21/2022    BUN 31 02/21/2022    CREATININE 1.6 02/21/2022    GFRAA 38 02/21/2022    GFRAA 53 04/10/2013    CALCIUM 10.3 02/21/2022    PROT 7.4 02/21/2022    PROT 7.4 06/06/2012    LABALBU 4.8 02/21/2022    AGRATIO 1.8 02/21/2022    BILITOT 0.4 02/21/2022    ALKPHOS 73 02/21/2022    ALT 20 02/21/2022    AST 22 02/21/2022    GLOB 2.9 10/19/2021     URINALYSIS:  Lab Results   Component Value Date    GLUCOSEU neg 12/28/2020    GLUCOSEU NEGATIVE 04/02/2011    KETUA trace 12/28/2020    KETUA NEGATIVE 04/02/2011    SPECGRAV 10.20 12/28/2020    SPECGRAV <=1.005 04/02/2011    BLOODU trace 12/28/2020    BLOODU NEGATIVE 04/02/2011    PHUR 5.5 12/28/2020    PHUR 6.0 04/02/2011    PROTEINU trace 12/28/2020    PROTEINU NEGATIVE 04/02/2011    NITRU NEGATIVE 04/02/2011    LEUKOCYTESUR 3+ 12/28/2020    LEUKOCYTESUR NEGATIVE 04/02/2011    LABMICR 3.10 02/23/2022     HBA1C:   Lab Results   Component Value Date    LABA1C 5.9 02/21/2022    .6 02/21/2022     MICRO/ALB:   Lab Results   Component Value Date    LABMICR 3.10 02/23/2022    LABCREA 167.7 02/23/2022    MALBCR 18.5 02/23/2022     LIPID:  Lab Results   Component Value Date    CHOL 153 02/21/2022    TRIG 183 02/21/2022    HDL 49 02/21/2022    HDL 42 06/06/2012    LDLCALC 67 02/21/2022    LABVLDL 37 02/21/2022     TSH:   Lab Results   Component Value Date    TSHREFLEX 0.81 10/19/2021         ASSESSMENT/PLAN:  Assessment/Plan:  Glorious Hiss was seen today for follow-up, other and other.     Diagnoses and all orders for this visit:    Late onset Alzheimer's dementia without behavioral disturbance ([facility] [address]  Also history of a small right-sided lacunar infarct  Blood pressure control. Lipid control. Continue current aspirin, Crestor  -     donepezil (ARICEPT) 5 MG tablet; Take 1 tablet by mouth nightly    Chronic idiopathic constipation  Patient has follow-up appointment with GI. Overall constipation improved significantly. She is not taking any Amitiza  Essential hypertension  Patient denies any exertional chest pain, dyspnea, palpitations, syncope, orthopnea, edema or paroxysmal nocturnal dyspnea. Excellent blood pressure control  Continue hydrochlorothiazide              No orders of the defined types were placed in this encounter.     Current Outpatient Medications   Medication Sig Dispense Refill    donepezil (ARICEPT) 5 MG tablet Take 1 tablet by mouth nightly 90 tablet 0    aspirin 81 MG EC tablet Take 81 mg by mouth daily      Biotin 18196 MCG TABS Take by mouth      Apoaequorin (PREVAGEN EXTRA STRENGTH) 20 MG CAPS Take by mouth       Dulaglutide (TRULICITY) 1.5 HG/8.6MK SOPN Inject 1.5 mg into the skin once a week      sertraline (ZOLOFT) 100 MG tablet TAKE 1 TABLET BY MOUTH EVERY MORNING (TOTAL 125MG) 90 tablet 1    Continuous Blood Gluc Sensor (FREESTYLE DENI 14 DAY SENSOR) MISC CHECK BLOOD SUGARS THREE TIMES DAILY 2 each 5    metFORMIN (GLUCOPHAGE) 500 MG tablet Take 2 tablets by mouth daily (with breakfast) 180 tablet 2    hydroCHLOROthiazide (HYDRODIURIL) 25 MG tablet Take 1 tablet by mouth every morning 90 tablet 3    levothyroxine (SYNTHROID) 50 MCG tablet TAKE 1 TABLET BY MOUTH ONCE DAILY 90 tablet 1    rosuvastatin (CRESTOR) 20 MG tablet Take 1 tablet by mouth once daily 90 tablet 0    cloNIDine (CATAPRES) 0.3 MG tablet Take 1 tablet by mouth 3 times daily 270 tablet 1    Continuous Blood Gluc  (FREESTYLE DENI 14 DAY READER) GILSON 1 each by Does not apply route 3 times daily 1 Device 5    Continuous Blood Gluc  (FREESTSensicast Systems DENI 14 DAY READER) GILSON Use as needed 1 Device 5    GNP Alcohol Swabs 70 % PADS       Cinnamon 500 MG CAPS Take 1,000 mg by mouth      UNABLE TO FIND Brain Strong (Memory Support)       acetaminophen (APAP EXTRA STRENGTH) 500 MG tablet Take 2 tablets by mouth every 6 hours as needed for Pain 120 tablet 3    Multiple Vitamins-Minerals (CENTRUM SILVER 50+WOMEN PO) Take by mouth        No current facility-administered medications for this visit. Return in about 2 months (around 6/4/2022). An After Visit Summary was printed and given to the patient. Documentation was done using voice recognition dragon software. Every effort was made to ensure accuracy; however, inadvertent  Unintentional computerized transcription errors may be present.

## 2022-04-05 RX ORDER — LEVOTHYROXINE SODIUM 0.05 MG/1
TABLET ORAL
Qty: 90 TABLET | Refills: 3 | OUTPATIENT
Start: 2022-04-05

## 2022-04-11 ENCOUNTER — CARE COORDINATION (OUTPATIENT)
Dept: CARE COORDINATION | Age: 77
End: 2022-04-11

## 2022-04-11 NOTE — CARE COORDINATION
ACM outreached patients daughter, Isacc Zimmer,  for cc follow up. No answer. ACM left message through  requesting call return. ACM will follow up at later date/time.

## 2022-05-03 ENCOUNTER — CARE COORDINATION (OUTPATIENT)
Dept: CARE COORDINATION | Age: 77
End: 2022-05-03

## 2022-05-03 NOTE — CARE COORDINATION
Ambulatory Care Coordination Note  5/3/2022  CM Risk Score: 2  Charlson 10 Year Mortality Risk Score: 100%     ACC: Analia Bedolla, RN    Summary Note:     [facility] outreached patients daughter Darlin Lancaster regarding Alzheimer's Association referral and information on assisted living facilities. Per Darlin Lancaster, they are not interested in information regarding assisted living facilities. They are slowly getting her mother's home to sell but states it will still be awhile. They feel she is doing ok as of now. ACM referred Chanel to [email or web address] for a list of assisted living facilities in her area if needed. [facility] provided Darlin Lancaster with the information to the Alzheimer's Association but she does not feel they need assistance at this time. She states she will reach out in the future if needed. M discussed continuing with care coordination vs. Discharge. Darlin Lancaster is unable to identify additional needs at this time. [facility] will proceed with discharge per Chanel's request.              Care Coordination Interventions    Program Enrollment: Rising Risk  Referral from Primary Care Provider: Yes  Suggested Interventions and Community Resources         Goals Addressed    None         Prior to Admission medications    Medication Sig Start Date End Date Taking?  Authorizing Provider   donepezil (ARICEPT) 5 MG tablet Take 1 tablet by mouth nightly 4/4/22   Yunier Martinez MD   aspirin 81 MG EC tablet Take 81 mg by mouth daily    Historical Provider, MD   Biotin 26367 MCG TABS Take by mouth    Historical Provider, MD   Apoaequorin (PREVAGEN EXTRA STRENGTH) 20 MG CAPS Take by mouth     Historical Provider, MD   Dulaglutide (TRULICITY) 1.5 SO/0.9DW SOPN Inject 1.5 mg into the skin once a week    Historical Provider, MD   sertraline (ZOLOFT) 100 MG tablet TAKE 1 TABLET BY MOUTH EVERY MORNING (TOTAL 125MG) 1/14/22   Judith Medina MD   Continuous Blood Gluc Sensor (FREESTYLE DENI 14 DAY SENSOR) MISC CHECK BLOOD SUGARS THREE TIMES DAILY 12/20/21   Lesa Degroot MD   metFORMIN (GLUCOPHAGE) 500 MG tablet Take 2 tablets by mouth daily (with breakfast) 11/22/21   Lesa Degroot MD   hydroCHLOROthiazide (HYDRODIURIL) 25 MG tablet Take 1 tablet by mouth every morning 11/4/21   Lesa Degroot MD   levothyroxine (SYNTHROID) 50 MCG tablet TAKE 1 TABLET BY MOUTH ONCE DAILY 11/4/21   Lesa Degroot MD   rosuvastatin (CRESTOR) 20 MG tablet Take 1 tablet by mouth once daily 11/4/21   Lesa Degroot MD   cloNIDine (CATAPRES) 0.3 MG tablet Take 1 tablet by mouth 3 times daily 11/4/21   Lesa Degroot MD   Continuous Blood Gluc  (FREESTYLE DENI 14 DAY READER) GILSON 1 each by Does not apply route 3 times daily 7/20/21   Lesa Degroot MD   Continuous Blood Gluc  (FREESTYLE DENI 14 DAY READER) GILSON Use as needed 7/20/21   Lesa Degroot MD   hydroCHLOROthiazide (HYDRODIURIL) 25 MG tablet Take 1 tablet by mouth every morning 3/21/21   Lesa Degroot MD   rosuvastatin (CRESTOR) 20 MG tablet Take 1 tablet by mouth daily 3/15/21   Lesa Degroot MD   [facility] Alcohol Swabs 70 % PADS  8/4/20   Historical Provider, MD   Cinnamon 500 MG CAPS Take 1,000 mg by mouth    Historical Provider, MD   UNABLE TO FIND Brain Strong (Memory Support)     Historical Provider, MD   acetaminophen (APAP EXTRA STRENGTH) 500 MG tablet Take 2 tablets by mouth every 6 hours as needed for Pain 1/22/20   Nereida Beckman MD   Multiple Vitamins-Minerals (CENTRUM SILVER 50+WOMEN PO) Take by mouth     Historical Provider, MD       Future Appointments   Date Time Provider Tevin Alvarez   6/6/2022 11:40 AM MD MARGIE Orosco [facility] [place] [facility]   8/11/2022 10:00 AM Hayden Mendez [facility]   8/11/2022 10:40 AM DO REYES Gagnon [facility]      and   General Assessment

## 2022-05-11 DIAGNOSIS — E11.9 TYPE 2 DIABETES MELLITUS WITHOUT COMPLICATION, WITHOUT LONG-TERM CURRENT USE OF INSULIN (HCC): ICD-10-CM

## 2022-05-11 NOTE — TELEPHONE ENCOUNTER
Patient is requesting a refill of their prescription.     Requested Prescriptions     Pending Prescriptions Disp Refills    metFORMIN (GLUCOPHAGE) 500 MG tablet [Pharmacy Med Name: METFORMIN TAB 500MG] 180 tablet 2     Sig: TAKE 2 TABLETS DAILY WITH  BREAKFAST        Recent Visits  Date Type Provider Dept   04/04/22 Office Visit Devonte Denise MD [facility]   03/04/22 Office Visit Devonte Denise MD [facility]   02/25/22 Office Visit Devonte Denise MD [facility]   02/21/22 Office Visit Bernadette De MD [facility]   01/14/22 Office Visit Bernadette De MD [facility]   10/19/21 Office Visit Bernadette De MD [facility]   07/19/21 Office Visit Bernadette De MD [facility]   03/12/21 Office Visit Bernadette De MD [facility]   01/08/21 Office Visit Bernadette De MD [facility]   12/28/20 Office Visit Carolyn Barry MD [facility]   Showing recent visits within past 540 days with a meds authorizing provider and meeting all other requirements  Future Appointments  Date Type Provider Dept   06[facility]06[facility]   Showing future appointments within next 150 days with a meds authorizing provider and meeting all other requirements     2/21/2022

## 2022-06-06 ENCOUNTER — OFFICE VISIT (OUTPATIENT)
Dept: INTERNAL MEDICINE CLINIC | Age: 77
End: 2022-06-06
Payer: MEDICARE

## 2022-06-06 VITALS
BODY MASS INDEX: 28.27 KG/M2 | HEART RATE: 75 BPM | OXYGEN SATURATION: 98 % | HEIGHT: 60 IN | SYSTOLIC BLOOD PRESSURE: 160 MMHG | DIASTOLIC BLOOD PRESSURE: 90 MMHG | WEIGHT: 144 LBS

## 2022-06-06 DIAGNOSIS — E11.9 TYPE 2 DIABETES MELLITUS WITHOUT COMPLICATION, WITHOUT LONG-TERM CURRENT USE OF INSULIN (HCC): ICD-10-CM

## 2022-06-06 DIAGNOSIS — I10 ESSENTIAL HYPERTENSION: Primary | ICD-10-CM

## 2022-06-06 DIAGNOSIS — F32.A DEPRESSION, UNSPECIFIED DEPRESSION TYPE: ICD-10-CM

## 2022-06-06 DIAGNOSIS — F02.80 LATE ONSET ALZHEIMER'S DEMENTIA WITHOUT BEHAVIORAL DISTURBANCE (HCC): ICD-10-CM

## 2022-06-06 DIAGNOSIS — G30.1 LATE ONSET ALZHEIMER'S DEMENTIA WITHOUT BEHAVIORAL DISTURBANCE (HCC): ICD-10-CM

## 2022-06-06 PROCEDURE — 99214 OFFICE O/P EST MOD 30 MIN: CPT | Performed by: INTERNAL MEDICINE

## 2022-06-06 PROCEDURE — 1123F ACP DISCUSS/DSCN MKR DOCD: CPT | Performed by: INTERNAL MEDICINE

## 2022-06-06 PROCEDURE — 3044F HG A1C LEVEL LT 7.0%: CPT | Performed by: INTERNAL MEDICINE

## 2022-06-06 RX ORDER — ACETAMINOPHEN 160 MG
TABLET,DISINTEGRATING ORAL
COMMUNITY

## 2022-06-06 RX ORDER — DONEPEZIL HYDROCHLORIDE 10 MG/1
10 TABLET, FILM COATED ORAL NIGHTLY
Qty: 90 TABLET | Refills: 1 | Status: SHIPPED | OUTPATIENT
Start: 2022-06-06

## 2022-06-06 RX ORDER — SERTRALINE HYDROCHLORIDE 100 MG/1
TABLET, FILM COATED ORAL
Qty: 90 TABLET | Refills: 1 | Status: SHIPPED | OUTPATIENT
Start: 2022-06-06

## 2022-06-06 RX ORDER — ASPIRIN 325 MG
325 TABLET ORAL DAILY
COMMUNITY

## 2022-06-06 ASSESSMENT — PATIENT HEALTH QUESTIONNAIRE - PHQ9
4. FEELING TIRED OR HAVING LITTLE ENERGY: 0
5. POOR APPETITE OR OVEREATING: 0
SUM OF ALL RESPONSES TO PHQ QUESTIONS 1-9: 0
6. FEELING BAD ABOUT YOURSELF - OR THAT YOU ARE A FAILURE OR HAVE LET YOURSELF OR YOUR FAMILY DOWN: 0
3. TROUBLE FALLING OR STAYING ASLEEP: 0
SUM OF ALL RESPONSES TO PHQ QUESTIONS 1-9: 0
7. TROUBLE CONCENTRATING ON THINGS, SUCH AS READING THE NEWSPAPER OR WATCHING TELEVISION: 0
10. IF YOU CHECKED OFF ANY PROBLEMS, HOW DIFFICULT HAVE THESE PROBLEMS MADE IT FOR YOU TO DO YOUR WORK, TAKE CARE OF THINGS AT HOME, OR GET ALONG WITH OTHER PEOPLE: 0
2. FEELING DOWN, DEPRESSED OR HOPELESS: 0
9. THOUGHTS THAT YOU WOULD BE BETTER OFF DEAD, OR OF HURTING YOURSELF: 0
8. MOVING OR SPEAKING SO SLOWLY THAT OTHER PEOPLE COULD HAVE NOTICED. OR THE OPPOSITE, BEING SO FIGETY OR RESTLESS THAT YOU HAVE BEEN MOVING AROUND A LOT MORE THAN USUAL: 0
SUM OF ALL RESPONSES TO PHQ QUESTIONS 1-9: 0
SUM OF ALL RESPONSES TO PHQ QUESTIONS 1-9: 0

## 2022-06-06 ASSESSMENT — ENCOUNTER SYMPTOMS
TROUBLE SWALLOWING: 0
ABDOMINAL PAIN: 0
VOICE CHANGE: 0
SHORTNESS OF BREATH: 0
NAUSEA: 0
WHEEZING: 0
PHOTOPHOBIA: 0

## 2022-06-06 NOTE — PROGRESS NOTES
Maira Grant (:  1945) is a 69 y.o. female,Established patient, here for evaluation of the following chief complaint(s):  Follow-up (2 m), Depression (off sertraline---good in spring/summer), Memory Loss (answers do not always make sense. appears to be \"covering\"), Hypertension (states was only taking clonidine bid--\"my bp is up because of a salty meal.\"), and Diabetes (has López Large )         ASSESSMENT/PLAN:  1. Essential hypertension  Fluctuating blood pressure. Denies headache visual symptoms. It appears patient may not be taking medication regularly. 2. Late onset Alzheimer's dementia without behavioral disturbance (HCC)  We will increase-     donepezil (ARICEPT) 10 MG tablet; Take 1 tablet by mouth nightly, Disp-90 tablet, R-1Normal  I have communicated with patient daughter to consider placing her in a memory care. Meanwhile continued frequent home assistance from family members  3. Depression, unspecified depression type  Patient not sure whether she is taking Zoloft and not  Patient will restart  -     sertraline (ZOLOFT) 100 MG tablet; TAKE 1 TABLET BY MOUTH EVERY MORNING (TOTAL 125MG), Disp-90 tablet, R-1Normal  4. Type 2 diabetes mellitus without complication, without long-term current use of insulin ([address]  Patient reported blood sugar reading at home, and appears excellent. We will discontinue Trulicity. Return in about 2 weeks (around 2022). Patient is advised to bring all the medicine with her. She is also advised to bring her daughter with her. Subjective   SUBJECTIVE/OBJECTIVE:  HPI  Follow-up visit for chronic problems. Advised symptoms and history is reviewed as noted by nurse. Patient have readings of blood glucose from home. Most of the time excellent blood glucose. She is no longer taking Trulicity  Patient has been living alone with her dog. Continue to have memory issue. Her children sees her periodically. Patient reports she is fine.   Review of Systems   Constitutional: Negative for diaphoresis and unexpected weight change. HENT: Negative for trouble swallowing and voice change. Eyes: Negative for photophobia and visual disturbance. Respiratory: Negative for shortness of breath and wheezing. Cardiovascular: Negative for chest pain and palpitations. Gastrointestinal: Negative for abdominal pain and nausea. Allergic/Immunologic: Negative for environmental allergies. Psychiatric/Behavioral: Negative for dysphoric mood, sleep disturbance and suicidal ideas. The patient is nervous/anxious. Objective   Physical Exam  Vitals and nursing note reviewed. Constitutional:       Appearance: She is not ill-appearing. Eyes:      Conjunctiva/sclera: Conjunctivae normal.   Cardiovascular:      Rate and Rhythm: Normal rate and regular rhythm. Pulses: Normal pulses. Heart sounds: Normal heart sounds. Pulmonary:      Effort: Pulmonary effort is normal.      Breath sounds: Normal breath sounds. Abdominal:      General: Bowel sounds are normal.   Musculoskeletal:      Right lower leg: No edema. Left lower leg: No edema. Neurological:      Mental Status: She is alert. Comments: Patient appears more forgetfulness. Appears more anxious   Psychiatric:         Mood and Affect: Mood normal.         Behavior: Behavior normal.                An After Visit Summary was printed and given to the patient. Documentation was done using voice recognition dragon software. Every effort was made to ensure accuracy; however, inadvertent  Unintentional computerized transcription errors may be present. An electronic signature was used to authenticate this note.     --Brittany Manning MD

## 2022-06-08 ENCOUNTER — TELEPHONE (OUTPATIENT)
Dept: INTERNAL MEDICINE CLINIC | Age: 77
End: 2022-06-08

## 2022-06-08 DIAGNOSIS — E78.5 HYPERLIPIDEMIA, UNSPECIFIED HYPERLIPIDEMIA TYPE: ICD-10-CM

## 2022-06-08 RX ORDER — ROSUVASTATIN CALCIUM 20 MG/1
TABLET, COATED ORAL
Qty: 90 TABLET | Refills: 3 | Status: SHIPPED | OUTPATIENT
Start: 2022-06-08 | End: 2022-06-24 | Stop reason: SDUPTHER

## 2022-06-24 ENCOUNTER — OFFICE VISIT (OUTPATIENT)
Dept: INTERNAL MEDICINE CLINIC | Age: 77
End: 2022-06-24
Payer: MEDICARE

## 2022-06-24 VITALS
BODY MASS INDEX: 28.13 KG/M2 | HEART RATE: 55 BPM | DIASTOLIC BLOOD PRESSURE: 80 MMHG | OXYGEN SATURATION: 97 % | SYSTOLIC BLOOD PRESSURE: 184 MMHG | HEIGHT: 61 IN | WEIGHT: 149 LBS

## 2022-06-24 DIAGNOSIS — G30.1 LATE ONSET ALZHEIMER'S DEMENTIA WITHOUT BEHAVIORAL DISTURBANCE (HCC): Primary | ICD-10-CM

## 2022-06-24 DIAGNOSIS — E78.5 HYPERLIPIDEMIA, UNSPECIFIED HYPERLIPIDEMIA TYPE: ICD-10-CM

## 2022-06-24 DIAGNOSIS — I10 ESSENTIAL HYPERTENSION: ICD-10-CM

## 2022-06-24 DIAGNOSIS — F02.80 LATE ONSET ALZHEIMER'S DEMENTIA WITHOUT BEHAVIORAL DISTURBANCE (HCC): Primary | ICD-10-CM

## 2022-06-24 PROCEDURE — 99213 OFFICE O/P EST LOW 20 MIN: CPT | Performed by: INTERNAL MEDICINE

## 2022-06-24 PROCEDURE — 1123F ACP DISCUSS/DSCN MKR DOCD: CPT | Performed by: INTERNAL MEDICINE

## 2022-06-24 RX ORDER — AMPICILLIN TRIHYDRATE 250 MG
1000 CAPSULE ORAL
COMMUNITY

## 2022-06-24 RX ORDER — ROSUVASTATIN CALCIUM 20 MG/1
TABLET, COATED ORAL
Qty: 90 TABLET | Refills: 3 | Status: SHIPPED | OUTPATIENT
Start: 2022-06-24

## 2022-06-24 RX ORDER — CLONIDINE HYDROCHLORIDE 0.3 MG/1
0.3 TABLET ORAL 3 TIMES DAILY
Qty: 90 TABLET | Refills: 0
Start: 2022-06-24 | End: 2022-09-19 | Stop reason: SDUPTHER

## 2022-06-24 RX ORDER — IRBESARTAN 150 MG/1
150 TABLET ORAL DAILY
Qty: 90 TABLET | Refills: 1 | Status: SHIPPED | OUTPATIENT
Start: 2022-06-24

## 2022-06-24 RX ORDER — MEMANTINE HYDROCHLORIDE 5 MG/1
5 TABLET ORAL DAILY
Qty: 90 TABLET | Refills: 0 | Status: SHIPPED | OUTPATIENT
Start: 2022-06-24 | End: 2022-09-19

## 2022-06-24 ASSESSMENT — ENCOUNTER SYMPTOMS
VOMITING: 0
SHORTNESS OF BREATH: 0
NAUSEA: 0
WHEEZING: 0
ABDOMINAL PAIN: 0

## 2022-06-24 NOTE — PROGRESS NOTES
Oscar Louis  1945  female  69 y.o. SUBJECTIVE:       Chief Complaint   Patient presents with    Follow-up    Depression    Hypertension       HPI:  Follow-up visit. Patient brought all of her medicine with her. Medication reconciliation done. She takes clonidine 2 times daily rather than 3 times daily. She also have [place] [employer] system. At present she showed me how to check blood glucose is 131. History of dementia. Patient felt slight dizziness with Namenda in the past.    Past Medical History:   Diagnosis Date    Alopecia     Anxiety and depression     Cancer ([facility] [address]     uterine    Chronic kidney disease (CKD) stage G3b/A1, moderately decreased glomerular filtration rate (GFR) between 30-44 mL/min/1.73 square meter and albuminuria creatinine ratio less than 30 mg/g ([facility]) 7/19/2021    CTS (carpal tunnel syndrome)      History of rheumatic fever     Hypertension     Hypothyroidism     Meniere disease     bilateral ears    Mixed hyperlipidemia 7/19/2021    Nausea     associated with Meniere's dz    Obesity     Reflux esophagitis     Renal impairment     Sleep apnea     no longer needs cpap (per pt)    Type II or unspecified type diabetes mellitus without mention of complication, not stated as uncontrolled      Past Surgical History:   Procedure Laterality Date    ANKLE SURGERY Right     repair of fracture     APPENDECTOMY      BREAST SURGERY      rt  bx negative    COLONOSCOPY  4/15/11    HYSTERECTOMY (CERVIX STATUS UNKNOWN)      at age 35    MASTOID SURGERY      at age 26   [address]  4/14/11    with dilitation and biopsy     Social History     Socioeconomic History    Marital status:       Spouse name: None    Number of children: 3    Years of education: None    Highest education level: None   Occupational History    Occupation: retired \"patino secretary\"      Comment: [employer]   Tobacco Use    Smoking status: Never Smoker  Smokeless tobacco: Never Used   Substance and Sexual Activity    Alcohol use: No    Drug use: No    Sexual activity: Not Currently   Other Topics Concern    None   Social History Narrative    Her   in 2017. She has three children who live locally-2020      Social Determinants of Health     Financial Resource Strain: Low Risk     Difficulty of Paying Living Expenses: Not hard at all   Food Insecurity: No Food Insecurity    Worried About Running Out of Food in the Last Year: Never true    Antoinette of Food in the Last Year: Never true   Transportation Needs: No Transportation Needs    Lack of Transportation (Medical): No    Lack of Transportation (Non-Medical): No   Physical Activity: Inactive    Days of Exercise per Week: 0 days    Minutes of Exercise per Session: 0 min   Stress: No Stress Concern Present    Feeling of Stress : Not at all   Social Connections: Socially Isolated    Frequency of Communication with Friends and Family: Once a week    Frequency of Social Gatherings with Friends and Family: Once a week    Attends Sikhism Services: 1 to 4 times per year    Active Member of [address] [place] [address] or Organizations: No    Attends Club or Organization Meetings: Never    Marital Status:     Intimate Partner Violence:     Fear of Current or Ex-Partner: Not on file    Emotionally Abused: Not on file    Physically Abused: Not on file    Sexually Abused: Not on file   Housing Stability: [address] Unable to Pay for Housing in the Last Year: No    Number of Places Lived in the Last Year: 1    Unstable Housing in the Last Year: No     Family History   Problem Relation Age of Onset    Heart Disease Mother     Diabetes Mother     Cancer Mother         breast     Heart Failure Mother     Diabetes Father     Kidney Disease Father     Heart Disease Father     Heart Disease Sister     Diabetes Maternal Grandmother     Crohn's Disease Child     Crohn's Disease Grandchild Review of Systems   Constitutional: Negative for diaphoresis, fatigue and unexpected weight change. Respiratory: Negative for shortness of breath and wheezing. Cardiovascular: Negative for chest pain and palpitations. Gastrointestinal: Negative for abdominal pain, nausea and vomiting. Neurological: Negative for dizziness and light-headedness. OBJECTIVE:  Pulse Readings from Last 4 Encounters:   06/24/22 55   06/06/22 75   04/04/22 63   03/04/22 87     Wt Readings from Last 4 Encounters:   06/24/22 149 lb (67.6 kg)   06/06/22 144 lb (65.3 kg)   04/04/22 142 lb 12.8 oz (64.8 kg)   03/04/22 136 lb 6.4 oz (61.9 kg)     BP Readings from Last 4 Encounters:   06/24/22 (!) 184/80   06/06/22 (!) 160/90   04/04/22 135/78   03/04/22 130/76     Physical Exam  Vitals and nursing note reviewed. Constitutional:       Appearance: Normal appearance. She is not ill-appearing. Cardiovascular:      Rate and Rhythm: Normal rate and regular rhythm. Pulses: Normal pulses. Heart sounds: Normal heart sounds. Pulmonary:      Effort: Pulmonary effort is normal.      Breath sounds: Normal breath sounds. Musculoskeletal:      Right lower leg: No edema. Left lower leg: No edema. Neurological:      General: No focal deficit present. Mental Status: She is alert and oriented to person, place, and time.          CBC:   Lab Results   Component Value Date    WBC 5.9 02/21/2022    HGB 14.0 02/21/2022    HCT 41.1 02/21/2022     02/21/2022     CMP:  Lab Results   Component Value Date     02/21/2022    K 3.9 02/21/2022    CL 98 02/21/2022    CO2 24 02/21/2022    ANIONGAP 18 02/21/2022    GLUCOSE 113 02/21/2022    BUN 31 02/21/2022    CREATININE 1.6 02/21/2022    GFRAA 38 02/21/2022    GFRAA 53 04/10/2013    CALCIUM 10.3 02/21/2022    PROT 7.4 02/21/2022    PROT 7.4 06/06/2012    LABALBU 4.8 02/21/2022    AGRATIO 1.8 02/21/2022    BILITOT 0.4 02/21/2022    ALKPHOS 73 02/21/2022    ALT 20 02/21/2022    AST 22 02/21/2022    GLOB 2.9 10/19/2021     URINALYSIS:  Lab Results   Component Value Date    GLUCOSEU neg 12/28/2020    GLUCOSEU NEGATIVE 04/02/2011    KETUA trace 12/28/2020    KETUA NEGATIVE 04/02/2011    SPECGRAV 10.20 12/28/2020    SPECGRAV <=1.005 04/02/2011    BLOODU trace 12/28/2020    BLOODU NEGATIVE 04/02/2011    PHUR 5.5 12/28/2020    PHUR 6.0 04/02/2011    PROTEINU trace 12/28/2020    PROTEINU NEGATIVE 04/02/2011    NITRU NEGATIVE 04/02/2011    LEUKOCYTESUR 3+ 12/28/2020    LEUKOCYTESUR NEGATIVE 04/02/2011    LABMICR 3.10 02/23/2022     HBA1C:   Lab Results   Component Value Date    LABA1C 5.9 02/21/2022    .6 02/21/2022     MICRO/ALB:   Lab Results   Component Value Date    LABMICR 3.10 02/23/2022    LABCREA 167.7 02/23/2022    MALBCR 18.5 02/23/2022     LIPID:  Lab Results   Component Value Date    CHOL 153 02/21/2022    TRIG 183 02/21/2022    HDL 49 02/21/2022    HDL 42 06/06/2012    LDLCALC 67 02/21/2022    LABVLDL 37 02/21/2022     TSH:   Lab Results   Component Value Date    TSHREFLEX 0.81 10/19/2021         ASSESSMENT/PLAN:  Assessment/Plan:  Kaden Maciel was seen today for follow-up, depression and hypertension. Diagnoses and all orders for this visit:    Late onset Alzheimer's dementia without behavioral disturbance ([facility] [address]  We will add low-dose Namenda. Patient reported she had ?dizziness on twice daily Namenda in the past  Continue donepezil. Essential hypertension  Hypertension not well controlled. She is advised to take-     cloNIDine (CATAPRES) 0.3 MG tablet; Take 1 tablet by mouth 3 times daily  We will also add a irbesartan. Encourage hydration. Renal function next visit. Diabetes mellitus excellent control. Patient came with multiple blood sugar readings. No hypoglycemia. patient is advised to restart Crestor        No orders of the defined types were placed in this encounter.     Current Outpatient Medications   Medication Sig Dispense Refill    Cinnamon 500 MG CAPS Take 1,000 mg by mouth      irbesartan (AVAPRO) 150 MG tablet Take 1 tablet by mouth daily 90 tablet 1    memantine (NAMENDA) 5 MG tablet Take 1 tablet by mouth daily 90 tablet 0    cloNIDine (CATAPRES) 0.3 MG tablet Take 1 tablet by mouth 3 times daily 90 tablet 0    rosuvastatin (CRESTOR) 20 MG tablet Take 1 tablet by mouth once daily 90 tablet 3    aspirin 325 mg tablet Take 325 mg by mouth daily      Cholecalciferol (VITAMIN D3) 50 MCG (2000 UT) CAPS Take by mouth      donepezil (ARICEPT) 10 MG tablet Take 1 tablet by mouth nightly 90 tablet 1    sertraline (ZOLOFT) 100 MG tablet TAKE 1 TABLET BY MOUTH EVERY MORNING (TOTAL 125MG) 90 tablet 1    metFORMIN (GLUCOPHAGE) 500 MG tablet TAKE 2 TABLETS DAILY WITH  BREAKFAST 180 tablet 2    Biotin 49936 MCG TABS Take 5,000 mcg by mouth       Apoaequorin (PREVAGEN EXTRA STRENGTH) 20 MG CAPS Take by mouth       hydroCHLOROthiazide (HYDRODIURIL) 25 MG tablet Take 1 tablet by mouth every morning 90 tablet 3    levothyroxine (SYNTHROID) 50 MCG tablet TAKE 1 TABLET BY MOUTH ONCE DAILY 90 tablet 1    Continuous Blood Gluc  (FREESTYLE DENI 14 DAY READER) GILSON Use as needed 1 Device 5    acetaminophen (APAP EXTRA STRENGTH) 500 MG tablet Take 2 tablets by mouth every 6 hours as needed for Pain (Patient taking differently: Take 200 mg by mouth every 6 hours as needed for Pain ) 120 tablet 3    Continuous Blood Gluc Sensor (FREESTYLE DENI 14 DAY SENSOR) MISC CHECK BLOOD SUGARS THREE TIMES DAILY 2 each 5    Continuous Blood Gluc  (FREESTYLE DEIN 14 DAY READER) GILSON 1 each by Does not apply route 3 times daily 1 Device 5    GNP Alcohol Swabs 70 % PADS       UNABLE TO FIND Brain Strong (Memory Support)PREVAGEN (Patient not taking: Reported on 6/24/2022)       No current facility-administered medications for this visit. Return in about 4 weeks (around 7/22/2022).   Hypertension, lab work  An After Visit Summary was printed and given to the patient. Documentation was done using voice recognition dragon software. Every effort was made to ensure accuracy; however, inadvertent  Unintentional computerized transcription errors may be present.

## 2022-07-22 ENCOUNTER — OFFICE VISIT (OUTPATIENT)
Dept: INTERNAL MEDICINE CLINIC | Age: 77
End: 2022-07-22
Payer: MEDICARE

## 2022-07-22 VITALS — SYSTOLIC BLOOD PRESSURE: 146 MMHG | DIASTOLIC BLOOD PRESSURE: 90 MMHG | WEIGHT: 148.6 LBS | BODY MASS INDEX: 28.08 KG/M2

## 2022-07-22 DIAGNOSIS — I10 ESSENTIAL HYPERTENSION: Primary | ICD-10-CM

## 2022-07-22 PROCEDURE — 99213 OFFICE O/P EST LOW 20 MIN: CPT | Performed by: INTERNAL MEDICINE

## 2022-07-22 PROCEDURE — 1123F ACP DISCUSS/DSCN MKR DOCD: CPT | Performed by: INTERNAL MEDICINE

## 2022-07-22 ASSESSMENT — ENCOUNTER SYMPTOMS
CHEST TIGHTNESS: 0
TROUBLE SWALLOWING: 0
VOICE CHANGE: 0
WHEEZING: 0
SHORTNESS OF BREATH: 0

## 2022-07-22 NOTE — PROGRESS NOTES
Maribell Salmon (:  1945) is a 69 y.o. female,Established patient, here for evaluation of the following chief complaint(s):  1 Month Follow-Up, Hypertension, and Dizziness (Patient reporting chronic dizziness for several months due to left ear pain and ringing)         ASSESSMENT/PLAN:  1. Essential hypertension  Patient report home blood pressure has been excellent. She will continue current need for statin, clonidine, hydrochlorothiazide  She is advised to bring all medications with her at next office visit. Return in about 3 months (around 10/22/2022). Subjective   SUBJECTIVE/OBJECTIVE:  HPI  Patient report her blood pressure was 128/78 at home today. She reports every time she goes to doctors office her blood pressure goes home. She continues to complain of tinnitus and decreased hearing in vertigo-like symptoms mostly affecting the left side. She has a follow-up appointment with ENT next Monday. Review of Systems   Constitutional:  Negative for diaphoresis and unexpected weight change. HENT:  Negative for trouble swallowing and voice change. Respiratory:  Negative for chest tightness, shortness of breath and wheezing. Cardiovascular:  Negative for chest pain, palpitations and leg swelling. Neurological:  Negative for tremors, syncope, facial asymmetry, speech difficulty, weakness, light-headedness and headaches. Psychiatric/Behavioral:  Negative for agitation, behavioral problems and confusion. Objective   Physical Exam  Vitals and nursing note reviewed. Constitutional:       Appearance: Normal appearance. Cardiovascular:      Rate and Rhythm: Normal rate and regular rhythm. Pulses: Normal pulses. Heart sounds: Normal heart sounds. Pulmonary:      Effort: Pulmonary effort is normal.      Breath sounds: Normal breath sounds. Neurological:      General: No focal deficit present.       Mental Status: She is alert and oriented to person, place, and time. Mental status is at baseline. An After Visit Summary was printed and given to the patient. An electronic signature was used to authenticate this note.     --Mahi Urbina MD

## 2022-07-25 ENCOUNTER — OFFICE VISIT (OUTPATIENT)
Dept: ENT CLINIC | Age: 77
End: 2022-07-25
Payer: MEDICARE

## 2022-07-25 VITALS — DIASTOLIC BLOOD PRESSURE: 81 MMHG | SYSTOLIC BLOOD PRESSURE: 195 MMHG | TEMPERATURE: 96.9 F | HEART RATE: 76 BPM

## 2022-07-25 DIAGNOSIS — R42 DIZZINESS: ICD-10-CM

## 2022-07-25 DIAGNOSIS — F03.90 DEMENTIA WITHOUT BEHAVIORAL DISTURBANCE, UNSPECIFIED DEMENTIA TYPE: ICD-10-CM

## 2022-07-25 DIAGNOSIS — J31.0 CHRONIC RHINITIS: ICD-10-CM

## 2022-07-25 DIAGNOSIS — H90.A32 MIXED CONDUCTIVE AND SENSORINEURAL HEARING LOSS OF LEFT EAR WITH RESTRICTED HEARING OF RIGHT EAR: Primary | ICD-10-CM

## 2022-07-25 PROCEDURE — 99214 OFFICE O/P EST MOD 30 MIN: CPT | Performed by: STUDENT IN AN ORGANIZED HEALTH CARE EDUCATION/TRAINING PROGRAM

## 2022-07-25 PROCEDURE — 1123F ACP DISCUSS/DSCN MKR DOCD: CPT | Performed by: STUDENT IN AN ORGANIZED HEALTH CARE EDUCATION/TRAINING PROGRAM

## 2022-07-25 RX ORDER — IPRATROPIUM BROMIDE 42 UG/1
2 SPRAY, METERED NASAL 2 TIMES DAILY
Qty: 15 ML | Refills: 1 | Status: SHIPPED | OUTPATIENT
Start: 2022-07-25 | End: 2022-09-19 | Stop reason: SDUPTHER

## 2022-07-25 RX ORDER — CIPROFLOXACIN AND DEXAMETHASONE 3; 1 MG/ML; MG/ML
4 SUSPENSION/ DROPS AURICULAR (OTIC) 2 TIMES DAILY
Qty: 7.5 ML | Refills: 1 | Status: SHIPPED | OUTPATIENT
Start: 2022-07-25 | End: 2022-08-01

## 2022-07-26 NOTE — PROGRESS NOTES
[address] [facility] NECK SURGERY  CONSULT      Louise Zuñiga (:  1945) is a 69 y.o. female, here for evaluation of the following chief complaint(s):  Ear Problem (Can't hear anything out of the Rt ear, some ear pain)      ASSESSMENT/PLAN:  1. Mixed conductive and sensorineural hearing loss of left ear with restricted hearing of right ear  2. Dementia without behavioral disturbance, unspecified dementia type ([address]  3. Dizziness      This is a very pleasant 69 y.o. female here today for evaluation of the the above-noted complaints.      -Audiogram from her prior visit reviewed. No change in hearing. No indication for repeat audiogram.  -Previously treated for dizziness with multiple medications including gabapentin and alprazolam.  Do not feel that these are warranted at this time  -We will start Cortisporin drops for her intermittent otorrhea. We discussed hearing aid holidays.  -Discussed that her hearing aids may be under powered based on her degree of hearing. Recommended behind the ear hearing aid.  -Trial of Atrovent for her chronic rhinitis. -I do feel as if a component of her issues are related to her underlying dementia. The patient has difficulties at times accurately relaying what her specific complaints are prior        Medical Decision Making: The following items were considered in medical decision making:  Independent review of images  Review / order clinical lab tests  Review / order radiology tests  Decision to obtain old records  Review and summation of old records as accessed through [facility] if applicable    SUBJECTIVE/OBJECTIVE:  HPI    Louise Zuñiga is here today for evaluation of issues related to ears. The patient states that she was previously evaluated by my former partner Dr. Bridget Cole. Most recently she was evaluated by current partner Dr. Pavel Flores for issues related to ears.     The patient states that she has issues related to intermittent ear fullness. She feels like she will get fluid draining from her ears. She has difficulty tolerating her hearing aids at times. Patient also has persistent anterior clear rhinorrhea. This is exacerbated by eating. Denies frequent sinus infections. REVIEW OF SYSTEMS  The following systems were reviewed and revealed the following in addition to any already discussed in the HPI:    PHYSICAL EXAM    GENERAL: No acute distress, alert and oriented, no hoarseness, strong voice  EYES: EOMI, Anti-icteric  HENT:   Head: Normocephalic and atraumatic. Face:  Symmetric, facial nerve intact  Right Ear: Normal external ear, normal external auditory canal, intact tympanic membrane with normal mobility and aerated middle ear  Left Ear: Normal external ear, normal external auditory canal, intact tympanic membrane with normal mobility and aerated middle ear    External nasal appearance normal.  No evidence of masses in the nasal cavities. No evidence of anterior rhinorrhea. This note was generated completely or in part utilizing Dragon dictation speech recognition software. Occasionally, words are mistranscribed and despite editing, the text may contain inaccuracies due to incorrect word recognition. If further clarification is needed please contact the office at [phone]. An electronic signature was used to authenticate this note.     --Zoe Oconnor MD

## 2022-08-03 ENCOUNTER — TELEPHONE (OUTPATIENT)
Dept: ENT CLINIC | Age: 77
End: 2022-08-03

## 2022-08-03 NOTE — TELEPHONE ENCOUNTER
Patient came into the office this afternoon,  she is a current patient at [facility], and has been for one year    She recently got HA's there on July 28, 2022,  In talking with Jena Canas, the Hearing Instrument Specialist ,   she was told her that she was a patient  here at [facility], and see's our ENT Doctor's   she was a little confusing on trying to explain what she is seen here for regarding her ears    I spoke with Andrade Nunes on the phone , she just want's to be on the same page with the patient's care,  and want's to make sure there is nothing going on that she is not aware of,  please advise    [id number] [facility] [phone]    Patient did sign a Communication/Hippaa form

## 2022-09-19 DIAGNOSIS — I10 ESSENTIAL HYPERTENSION: ICD-10-CM

## 2022-09-19 RX ORDER — LEVOTHYROXINE SODIUM 0.05 MG/1
TABLET ORAL
Qty: 90 TABLET | Refills: 0 | Status: SHIPPED | OUTPATIENT
Start: 2022-09-19

## 2022-09-19 RX ORDER — CLONIDINE HYDROCHLORIDE 0.3 MG/1
0.3 TABLET ORAL 3 TIMES DAILY
Qty: 90 TABLET | Refills: 0 | Status: SHIPPED | OUTPATIENT
Start: 2022-09-19 | End: 2022-10-06

## 2022-09-19 RX ORDER — IPRATROPIUM BROMIDE 42 UG/1
2 SPRAY, METERED NASAL 2 TIMES DAILY
Qty: 15 ML | Refills: 1 | Status: SHIPPED | OUTPATIENT
Start: 2022-09-19

## 2022-09-19 RX ORDER — CLONIDINE HYDROCHLORIDE 0.3 MG/1
TABLET ORAL
Qty: 270 TABLET | Refills: 1 | OUTPATIENT
Start: 2022-09-19

## 2022-09-19 RX ORDER — MEMANTINE HYDROCHLORIDE 5 MG/1
TABLET ORAL
Qty: 90 TABLET | Refills: 0 | Status: SHIPPED | OUTPATIENT
Start: 2022-09-19

## 2022-09-19 RX ORDER — LEVOTHYROXINE SODIUM 0.05 MG/1
TABLET ORAL
Qty: 90 TABLET | Refills: 1 | OUTPATIENT
Start: 2022-09-19

## 2022-09-22 DIAGNOSIS — H81.02 MENIERE'S DISEASE OF LEFT EAR: ICD-10-CM

## 2022-09-22 DIAGNOSIS — E11.9 TYPE 2 DIABETES MELLITUS WITHOUT COMPLICATION, WITHOUT LONG-TERM CURRENT USE OF INSULIN (HCC): ICD-10-CM

## 2022-09-22 RX ORDER — HYDROCHLOROTHIAZIDE 25 MG/1
25 TABLET ORAL EVERY MORNING
Qty: 90 TABLET | Refills: 1 | Status: SHIPPED | OUTPATIENT
Start: 2022-09-22

## 2022-10-03 ENCOUNTER — OFFICE VISIT (OUTPATIENT)
Dept: INTERNAL MEDICINE CLINIC | Age: 77
End: 2022-10-03
Payer: MEDICARE

## 2022-10-03 VITALS
HEIGHT: 61 IN | DIASTOLIC BLOOD PRESSURE: 76 MMHG | BODY MASS INDEX: 28.32 KG/M2 | WEIGHT: 150 LBS | HEART RATE: 75 BPM | OXYGEN SATURATION: 98 % | SYSTOLIC BLOOD PRESSURE: 188 MMHG

## 2022-10-03 DIAGNOSIS — I10 ESSENTIAL HYPERTENSION: ICD-10-CM

## 2022-10-03 DIAGNOSIS — L30.9 DERMATITIS: Primary | ICD-10-CM

## 2022-10-03 DIAGNOSIS — R58 ECCHYMOSIS: ICD-10-CM

## 2022-10-03 PROCEDURE — 1123F ACP DISCUSS/DSCN MKR DOCD: CPT | Performed by: INTERNAL MEDICINE

## 2022-10-03 PROCEDURE — 99213 OFFICE O/P EST LOW 20 MIN: CPT | Performed by: INTERNAL MEDICINE

## 2022-10-03 ASSESSMENT — ENCOUNTER SYMPTOMS
COLOR CHANGE: 1
WHEEZING: 0
SHORTNESS OF BREATH: 0

## 2022-10-03 NOTE — PROGRESS NOTES
Niranjankodakana maria Rauschst  1945  female  69 y.o. SUBJECTIVE:       Chief Complaint   Patient presents with    Other     SPOTS ON ARM, left, severity went down, no refills needed    Discuss Medications     Having issues w hearing aids, would like to discuss if medications are necessary feels like she's on too many        HPI:  During visit. Patient reports some skin discoloration of the left forearm few days ago. By this time that discoloration has been resolving. Denies any trauma denies local redness drainage. Patient reports she is taking all 3 blood pressure medicine. Though she did not take any medicine this morning. History of chronic hearing loss as well as vertigo. .  Patient continue to have same symptoms. Past Medical History:   Diagnosis Date    Alopecia     Anxiety and depression     Cancer ([address]     uterine    Chronic kidney disease (CKD) stage G3b/A1, moderately decreased glomerular filtration rate (GFR) between 30-44 mL/min/1.73 square meter and albuminuria creatinine ratio less than 30 mg/g ([facility]) 7/19/2021    CTS (carpal tunnel syndrome)      History of rheumatic fever     Hypertension     Hypothyroidism     Meniere disease     bilateral ears    Mixed hyperlipidemia 7/19/2021    Nausea     associated with Meniere's dz    Obesity     Reflux esophagitis     Renal impairment     Sleep apnea     no longer needs cpap (per pt)    Type II or unspecified type diabetes mellitus without mention of complication, not stated as uncontrolled      Past Surgical History:   Procedure Laterality Date    ANKLE SURGERY Right     repair of fracture     APPENDECTOMY      BREAST SURGERY      rt  bx negative    COLONOSCOPY  4/15/11    HYSTERECTOMY (CERVIX STATUS UNKNOWN)      at age 35    MASTOID SURGERY      at age 26    [address] ENDOSCOPY  4/14/11    with dilitation and biopsy     Social History     Socioeconomic History    Marital status:       Spouse name: None Number of children: 3    Years of education: None    Highest education level: None   Occupational History    Occupation: retired \"patino secretary\"      Comment: [employer]   Tobacco Use    Smoking status: Never    Smokeless tobacco: Never   Substance and Sexual Activity    Alcohol use: No    Drug use: No    Sexual activity: Not Currently   Social History Narrative    Her   in 2017. She has three children who live locally-2020      Social Determinants of Health     Financial Resource Strain: Low Risk     Difficulty of Paying Living Expenses: Not hard at all   Food Insecurity: No Food Insecurity    Worried About [address] in the Last Year: Never true    [facility] in the Last Year: Never true   Transportation Needs: No Transportation Needs    Lack of Transportation (Medical): No    Lack of Transportation (Non-Medical): No   Physical Activity: Inactive    Days of Exercise per Week: 0 days    Minutes of Exercise per Session: 0 min   Stress: No Stress Concern Present    Feeling of Stress : Not at all   Social Connections: Socially Isolated    Frequency of Communication with Friends and Family: Once a week    Frequency of Social Gatherings with Friends and Family: Once a week    Attends Sikh Services: 1 to 4 times per year    Active Member of [address] [place] [address] [employer] or Organizations: No    Attends Club or Organization Meetings: Never    Marital Status:     Housing Stability: Low Risk     Unable to Pay for Housing in the Last Year: No    Number of Places Lived in the Last Year: 1    Unstable Housing in the Last Year: No     Family History   Problem Relation Age of Onset    Heart Disease Mother     Diabetes Mother     Cancer Mother         breast     Heart Failure Mother     Diabetes Father     Kidney Disease Father     Heart Disease Father     Heart Disease Sister     Diabetes Maternal Grandmother     Crohn's Disease Child     Crohn's Disease Grandchild        Review of Systems   Constitutional: Negative for diaphoresis and unexpected weight change. Respiratory:  Negative for shortness of breath and wheezing. Cardiovascular:  Negative for chest pain and palpitations. Skin:  Positive for color change. Neurological:  Negative for syncope, facial asymmetry and headaches. OBJECTIVE:  Pulse Readings from Last 4 Encounters:   10/03/22 75   07/25/22 76   06/24/22 55   06/06/22 75     Wt Readings from Last 4 Encounters:   10/03/22 150 lb (68 kg)   07/22/22 148 lb 9.6 oz (67.4 kg)   06/24/22 149 lb (67.6 kg)   06/06/22 144 lb (65.3 kg)     BP Readings from Last 4 Encounters:   10/03/22 (!) 188/76   07/25/22 (!) 195/81   07/22/22 (!) 146/90   06/24/22 (!) 184/80     Physical Exam  Vitals and nursing note reviewed. Constitutional:       Appearance: Normal appearance. HENT:      Head:      Comments: Hearing aide  Neck:      Vascular: No carotid bruit. Cardiovascular:      Rate and Rhythm: Normal rate and regular rhythm. Pulses: Normal pulses. Heart sounds: Normal heart sounds. Pulmonary:      Effort: Pulmonary effort is normal.      Breath sounds: Normal breath sounds. Skin:     Comments: Two fading ecchymosis at the left forearm extensor surface. Slight dry skin. Neurological:      General: No focal deficit present. Mental Status: She is alert. Cranial Nerves: No cranial nerve deficit.       Gait: Gait normal.       CBC:   Lab Results   Component Value Date/Time    WBC 5.9 02/21/2022 09:48 AM    HGB 14.0 02/21/2022 09:48 AM    HCT 41.1 02/21/2022 09:48 AM     02/21/2022 09:48 AM     CMP:  Lab Results   Component Value Date/Time     02/21/2022 09:48 AM    K 3.9 02/21/2022 09:48 AM    CL 98 02/21/2022 09:48 AM    CO2 24 02/21/2022 09:48 AM    ANIONGAP 18 02/21/2022 09:48 AM    GLUCOSE 113 02/21/2022 09:48 AM    BUN 31 02/21/2022 09:48 AM    CREATININE 1.6 02/21/2022 09:48 AM    GFRAA 38 02/21/2022 09:48 AM    GFRAA 53 04/10/2013 09:07 AM    CALCIUM 10.3 02/21/2022 09:48 AM    PROT 7.4 02/21/2022 09:48 AM    PROT 7.4 06/06/2012 12:56 PM    LABALBU 4.8 02/21/2022 09:48 AM    AGRATIO 1.8 02/21/2022 09:48 AM    BILITOT 0.4 02/21/2022 09:48 AM    ALKPHOS 73 02/21/2022 09:48 AM    ALT 20 02/21/2022 09:48 AM    AST 22 02/21/2022 09:48 AM    GLOB 2.9 10/19/2021 12:45 PM     URINALYSIS:  Lab Results   Component Value Date/Time    GLUCOSEU neg 12/28/2020 12:32 PM    GLUCOSEU NEGATIVE 04/02/2011 10:54 AM    KETUA trace 12/28/2020 12:32 PM    KETUA NEGATIVE 04/02/2011 10:54 AM    SPECGRAV 10.20 12/28/2020 12:32 PM    SPECGRAV <=1.005 04/02/2011 10:54 AM    BLOODU trace 12/28/2020 12:32 PM    BLOODU NEGATIVE 04/02/2011 10:54 AM    PHUR 5.5 12/28/2020 12:32 PM    PHUR 6.0 04/02/2011 10:54 AM    PROTEINU trace 12/28/2020 12:32 PM    PROTEINU NEGATIVE 04/02/2011 10:54 AM    NITRU NEGATIVE 04/02/2011 10:54 AM    LEUKOCYTESUR 3+ 12/28/2020 12:32 PM    LEUKOCYTESUR NEGATIVE 04/02/2011 10:54 AM    LABMICR 3.10 02/23/2022 10:34 AM     HBA1C:   Lab Results   Component Value Date/Time    LABA1C 5.9 02/21/2022 09:48 AM    .6 02/21/2022 09:48 AM     MICRO/ALB:   Lab Results   Component Value Date/Time    LABMICR 3.10 02/23/2022 10:34 AM    LABCREA 167.7 02/23/2022 10:34 AM    MALBCR 18.5 02/23/2022 10:34 AM     LIPID:  Lab Results   Component Value Date/Time    CHOL 153 02/21/2022 09:48 AM    TRIG 183 02/21/2022 09:48 AM    HDL 49 02/21/2022 09:48 AM    HDL 42 06/06/2012 12:56 PM    LDLCALC 67 02/21/2022 09:48 AM    LABVLDL 37 02/21/2022 09:48 AM     TSH:   Lab Results   Component Value Date/Time    TSHREFLEX 0.81 10/19/2021 12:45 PM         ASSESSMENT/PLAN:  Assessment/Plan:  Conor Khan was seen today for other and discuss medications. Diagnoses and all orders for this visit:    Dermatitis  Ecchymosis  May use over-the-counter moisturizing lotions locally. Benign finding. Essential hypertension  Uncontrolled blood pressure.   Patient have no symptoms of hypertensive emergency. Moreover she missed morning dose of clonidine. I advised her to bring all the medications with her at next visit. She is advised to call ENT for chronic dizziness vertigo and hearing loss        No orders of the defined types were placed in this encounter.     Current Outpatient Medications   Medication Sig Dispense Refill    hydroCHLOROthiazide (HYDRODIURIL) 25 MG tablet Take 1 tablet by mouth every morning 90 tablet 1    metFORMIN (GLUCOPHAGE) 500 MG tablet TAKE 2 TABLETS DAILY WITH  BREAKFAST 180 tablet 1    memantine (NAMENDA) 5 MG tablet TAKE 1 TABLET DAILY 90 tablet 0    cloNIDine (CATAPRES) 0.3 MG tablet Take 1 tablet by mouth 3 times daily 90 tablet 0    ipratropium (ATROVENT) 0.06 % nasal spray 2 sprays by Each Nostril route 2 times daily 15 mL 1    levothyroxine (SYNTHROID) 50 MCG tablet TAKE 1 TABLET BY MOUTH ONCE DAILY 90 tablet 0    Cinnamon 500 MG CAPS Take 1,000 mg by mouth      irbesartan (AVAPRO) 150 MG tablet Take 1 tablet by mouth daily 90 tablet 1    rosuvastatin (CRESTOR) 20 MG tablet Take 1 tablet by mouth once daily 90 tablet 3    aspirin 325 mg tablet Take 325 mg by mouth daily      Cholecalciferol (VITAMIN D3) 50 MCG (2000 UT) CAPS Take by mouth      donepezil (ARICEPT) 10 MG tablet Take 1 tablet by mouth nightly 90 tablet 1    sertraline (ZOLOFT) 100 MG tablet TAKE 1 TABLET BY MOUTH EVERY MORNING (TOTAL 125MG) 90 tablet 1    Biotin 23041 MCG TABS Take 5,000 mcg by mouth       Apoaequorin (PREVAGEN EXTRA STRENGTH) 20 MG CAPS Take by mouth       Continuous Blood Gluc Sensor (FREESTYLE DENI 14 DAY SENSOR) MISC CHECK BLOOD SUGARS THREE TIMES DAILY 2 each 5    Continuous Blood Gluc  (FREESTYLE DENI 14 DAY READER) GILSON 1 each by Does not apply route 3 times daily 1 Device 5    Continuous Blood Gluc  (FREESTYLE DENI 14 DAY READER) GILSON Use as needed 1 Device 5    GNP Alcohol Swabs 70 % PADS       UNABLE TO FIND Brain Strong (Memory Support)PREVAGEN acetaminophen (APAP EXTRA STRENGTH) 500 MG tablet Take 2 tablets by mouth every 6 hours as needed for Pain (Patient taking differently: Take 200 mg by mouth every 6 hours as needed for Pain) 120 tablet 3     No current facility-administered medications for this visit. Return in about 3 weeks (around 10/24/2022) for HTN. An After Visit Summary was printed and given to the patient. Documentation was done using voice recognition dragon software. Every effort was made to ensure accuracy; however, inadvertent  Unintentional computerized transcription errors may be present.

## 2022-10-03 NOTE — PATIENT INSTRUCTIONS
Bring all the medicine bottles at next visit. Take your your morning medicine before you come here.   Call Dr Sampson Christie for hearing/balance issues

## 2022-10-06 DIAGNOSIS — I10 ESSENTIAL HYPERTENSION: ICD-10-CM

## 2022-10-06 RX ORDER — CLONIDINE HYDROCHLORIDE 0.3 MG/1
TABLET ORAL
Qty: 90 TABLET | Refills: 3 | Status: SHIPPED | OUTPATIENT
Start: 2022-10-06

## 2022-10-24 ENCOUNTER — TELEPHONE (OUTPATIENT)
Dept: FAMILY MEDICINE CLINIC | Age: 77
End: 2022-10-24

## 2022-10-24 ENCOUNTER — APPOINTMENT (OUTPATIENT)
Dept: CT IMAGING | Age: 77
End: 2022-10-24
Payer: MEDICARE

## 2022-10-24 ENCOUNTER — HOSPITAL ENCOUNTER (EMERGENCY)
Age: 77
Discharge: HOME | End: 2022-10-24
Payer: MEDICARE

## 2022-10-24 VITALS
RESPIRATION RATE: 20 BRPM | WEIGHT: 150 LBS | OXYGEN SATURATION: 98 % | SYSTOLIC BLOOD PRESSURE: 175 MMHG | DIASTOLIC BLOOD PRESSURE: 63 MMHG | HEART RATE: 61 BPM | HEIGHT: 62 IN | TEMPERATURE: 99.1 F | BODY MASS INDEX: 27.6 KG/M2

## 2022-10-24 DIAGNOSIS — S09.90XA INJURY OF HEAD, INITIAL ENCOUNTER: ICD-10-CM

## 2022-10-24 DIAGNOSIS — S01.81XA LACERATION OF FOREHEAD, INITIAL ENCOUNTER: Primary | ICD-10-CM

## 2022-10-24 PROCEDURE — 90714 TD VACC NO PRESV 7 YRS+ IM: CPT

## 2022-10-24 PROCEDURE — 70450 CT HEAD/BRAIN W/O DYE: CPT

## 2022-10-24 PROCEDURE — 99284 EMERGENCY DEPT VISIT MOD MDM: CPT

## 2022-10-24 PROCEDURE — 6360000002 HC RX W HCPCS

## 2022-10-24 PROCEDURE — 12013 RPR F/E/E/N/L/M 2.6-5.0 CM: CPT

## 2022-10-24 PROCEDURE — 90471 IMMUNIZATION ADMIN: CPT

## 2022-10-24 RX ORDER — LIDOCAINE HYDROCHLORIDE AND EPINEPHRINE 10; 10 MG/ML; UG/ML
20 INJECTION, SOLUTION INFILTRATION; PERINEURAL ONCE
Status: DISCONTINUED | OUTPATIENT
Start: 2022-10-24 | End: 2022-10-24 | Stop reason: HOSPADM

## 2022-10-24 RX ORDER — LIDOCAINE HYDROCHLORIDE AND EPINEPHRINE BITARTRATE 20; .01 MG/ML; MG/ML
INJECTION, SOLUTION SUBCUTANEOUS
Status: DISCONTINUED
Start: 2022-10-24 | End: 2022-10-24 | Stop reason: HOSPADM

## 2022-10-24 RX ADMIN — CLOSTRIDIUM TETANI TOXOID ANTIGEN (FORMALDEHYDE INACTIVATED) AND CORYNEBACTERIUM DIPHTHERIAE TOXOID ANTIGEN (FORMALDEHYDE INACTIVATED) 0.5 ML: 5; 2 INJECTION, SUSPENSION INTRAMUSCULAR at 11:19

## 2022-10-24 ASSESSMENT — PAIN SCALES - GENERAL: PAINLEVEL_OUTOF10: 10

## 2022-10-24 ASSESSMENT — PAIN DESCRIPTION - LOCATION: LOCATION: HEAD

## 2022-10-24 ASSESSMENT — PAIN - FUNCTIONAL ASSESSMENT: PAIN_FUNCTIONAL_ASSESSMENT: 0-10

## 2022-10-24 ASSESSMENT — PAIN DESCRIPTION - PAIN TYPE: TYPE: ACUTE PAIN

## 2022-10-24 ASSESSMENT — PAIN DESCRIPTION - DESCRIPTORS: DESCRIPTORS: THROBBING

## 2022-10-24 NOTE — ED PROVIDER NOTES
[facility]        Pt Name: Chantelle Lino  MRN: [id number]  [place] 1945  Date of evaluation: 10/24/2022  Provider: Kathrine Arriaza PA-C  PCP: Cari Lawrence MD  Note Started: 11:20 AM EDT       HUMA. I have evaluated this patient. My supervising physician was available for consultation. CHIEF COMPLAINT       Chief Complaint   Patient presents with    Head Laceration     Arrived per family d/t hit in head with trash can lid and received a lac to right forehead; pt concerned for fx skull       HISTORY OF PRESENT ILLNESS   (Location, Timing/Onset, Context/Setting, Quality, Duration, Modifying Factors, Severity, Associated Signs and Symptoms)  Note limiting factors. Chief Complaint: Forehead laceration    Chantelle Lino is a 69 y.o. female who presents complaining of forehead laceration just prior to arrival.  Patient hit her head on the edge of trash can lid causing a laceration to the right forehead. Unsure of last tetanus, states many years ago. Patient is concerned for skull fracture, has mild headache, area, throbbing, nonradiating, no alleviating or factors. Denies LOC, vomiting, neck pain, changes in vision, weakness, paresthesia, other injuries. Nursing Notes were all reviewed and agreed with or any disagreements were addressed in the HPI. REVIEW OF SYSTEMS    (2-9 systems for level 4, 10 or more for level 5)     Review of Systems   All other systems reviewed and are negative. Positives and Pertinent negatives as per HPI. Except as noted above in the ROS, all other systems were reviewed and negative.        PAST MEDICAL HISTORY     Past Medical History:   Diagnosis Date    Alopecia     Anxiety and depression     Cancer ([facility] [address]     uterine    Chronic kidney disease (CKD) stage G3b/A1, moderately decreased glomerular filtration rate (GFR) between 30-44 mL/min/1.73 square meter and albuminuria creatinine ratio less than 30 mg/g ([facility]) 7/19/2021    CTS (carpal tunnel syndrome)      History of rheumatic fever     Hypertension     Hypothyroidism     Meniere disease     bilateral ears    Mixed hyperlipidemia 7/19/2021    Nausea     associated with Meniere's dz    Obesity     Reflux esophagitis     Renal impairment     Sleep apnea     no longer needs cpap (per pt)    Type II or unspecified type diabetes mellitus without mention of complication, not stated as uncontrolled          SURGICAL HISTORY     Past Surgical History:   Procedure Laterality Date    ANKLE SURGERY Right     repair of fracture     APPENDECTOMY      BREAST SURGERY      rt  bx negative    COLONOSCOPY  4/15/11    HYSTERECTOMY (CERVIX STATUS UNKNOWN)      at age 35    MASTOID SURGERY      at age 26    [address] ENDOSCOPY  4/14/11    with dilitation and biopsy         CURRENTMEDICATIONS       Discharge Medication List as of 10/24/2022  1:01 PM        CONTINUE these medications which have NOT CHANGED    Details   cloNIDine (CATAPRES) 0.3 MG tablet TAKE 1 TABLET 3 TIMES A DAY, Disp-90 tablet, R-3Normal      hydroCHLOROthiazide (HYDRODIURIL) 25 MG tablet Take 1 tablet by mouth every morning, Disp-90 tablet, R-1Normal      metFORMIN (GLUCOPHAGE) 500 MG tablet TAKE 2 TABLETS DAILY WITH  BREAKFAST, Disp-180 tablet, R-1Normal      memantine (NAMENDA) 5 MG tablet TAKE 1 TABLET DAILY, Disp-90 tablet, R-0Normal      ipratropium (ATROVENT) 0.06 % nasal spray 2 sprays by Each Nostril route 2 times daily, Disp-15 mL, R-1Normal      levothyroxine (SYNTHROID) 50 MCG tablet TAKE 1 TABLET BY MOUTH ONCE DAILY, Disp-90 tablet, R-0Normal      Cinnamon 500 MG CAPS Take 1,000 mg by mouthHistorical Med      irbesartan (AVAPRO) 150 MG tablet Take 1 tablet by mouth daily, Disp-90 tablet, R-1Normal      rosuvastatin (CRESTOR) 20 MG tablet Take 1 tablet by mouth once daily, Disp-90 tablet, R-3Normal      aspirin 325 mg tablet Take 325 mg by mouth dailyHistorical Med      Cholecalciferol (VITAMIN D3) 50 MCG (2000 UT) CAPS Take by mouthHistorical Med      donepezil (ARICEPT) 10 MG tablet Take 1 tablet by mouth nightly, Disp-90 tablet, R-1Normal      sertraline (ZOLOFT) 100 MG tablet TAKE 1 TABLET BY MOUTH EVERY MORNING (TOTAL 125MG), Disp-90 tablet, R-1Normal      Biotin 11402 MCG TABS Take 5,000 mcg by mouth Historical Med      Apoaequorin (PREVAGEN EXTRA STRENGTH) 20 MG CAPS Take by mouth Historical Med      Continuous Blood Gluc Sensor (FREESTYLE DENI 14 DAY SENSOR) MISC CHECK BLOOD SUGARS THREE TIMES DAILY, Disp-2 each, R-5Normal      !! Continuous Blood Gluc  (FREESTYLE DENI 14 DAY READER) GILSON 1 each by Does not apply route 3 times daily, Disp-1 Device, R-5Normal      !! Continuous Blood Gluc  (FREESTYLE DENI 14 DAY READER) GILSON Use as needed, Disp-1 Device, R-5Normal      GNP Alcohol Swabs 70 % PADS Starting Tue 8/4/2020, Historical Med      UNABLE TO FIND Brain Strong (Memory Support)PREVAGENHistorical Med      acetaminophen (APAP EXTRA STRENGTH) 500 MG tablet Take 2 tablets by mouth every 6 hours as needed for Pain, Disp-120 tablet, R-3Normal       !! - Potential duplicate medications found. Please discuss with provider. ALLERGIES     Patient has no known allergies.     FAMILYHISTORY       Family History   Problem Relation Age of Onset    Heart Disease Mother     Diabetes Mother     Cancer Mother         breast     Heart Failure Mother     Diabetes Father     Kidney Disease Father     Heart Disease Father     Heart Disease Sister     Diabetes Maternal Grandmother     Crohn's Disease Child     Crohn's Disease Grandchild           SOCIAL HISTORY       Social History     Tobacco Use    Smoking status: Never    Smokeless tobacco: Never   Substance Use Topics    Alcohol use: No    Drug use: No       SCREENINGS    [place] Coma Scale  Eye Opening: Spontaneous  Best Verbal Response: Oriented  Best Motor Response: Obeys commands  [place] Coma Scale Score: 15        PHYSICAL EXAM    (up to 7 for level 4, 8 or more for level 5)     ED Triage Vitals [10/24/22 1108]   BP Temp Temp Source Heart Rate Resp SpO2 Height Weight   (!) 175/63 99.1 °F (37.3 °C) Oral 61 20 98 % 5' 2\" (1.575 m) 150 lb (68 kg)       Physical Exam  Vitals and nursing note reviewed. Constitutional:       General: She is not in acute distress. Appearance: She is not ill-appearing or toxic-appearing. HENT:      Head: Normocephalic. Comments: Right forehead with 3 cm laceration. No bony deformity. Right Ear: External ear normal.      Left Ear: External ear normal.      Nose: Nose normal.      Mouth/Throat:      Mouth: Mucous membranes are moist.      Pharynx: Oropharynx is clear. Eyes:      Extraocular Movements: Extraocular movements intact. Conjunctiva/sclera: Conjunctivae normal.      Pupils: Pupils are equal, round, and reactive to light. Cardiovascular:      Rate and Rhythm: Normal rate and regular rhythm. Pulses: Normal pulses. Heart sounds: Normal heart sounds. Pulmonary:      Effort: Pulmonary effort is normal. No respiratory distress. Breath sounds: Normal breath sounds. Musculoskeletal:         General: Normal range of motion. Cervical back: Normal range of motion and neck supple. No tenderness. Skin:     General: Skin is warm and dry. Capillary Refill: Capillary refill takes less than 2 seconds. Neurological:      General: No focal deficit present. Mental Status: She is alert and oriented to person, place, and time. Cranial Nerves: No cranial nerve deficit. Sensory: No sensory deficit. Motor: No weakness. Coordination: Coordination normal.      Gait: Gait normal.   Psychiatric:         Mood and Affect: Mood normal.         Behavior: Behavior normal.       DIAGNOSTIC RESULTS   LABS:    Labs Reviewed - No data to display    When ordered only abnormal lab results are displayed. All other labs were within normal range or not returned as of this dictation. EKG: When ordered, EKG's are interpreted by the Emergency Department Physician in the absence of a cardiologist.  Please see their note for interpretation of EKG. RADIOLOGY:   Non-plain film images such as CT, Ultrasound and MRI are read by the radiologist. Plain radiographic images are visualized and preliminarily interpreted by the ED Provider with the below findings:        Interpretation per the Radiologist below, if available at the time of this note:    CT Head W/O Contrast   Final Result   Scalp soft tissue laceration hematoma. No acute traumatic intracranial   abnormality      Atrophy and small-vessel ischemic change, increased compared to 2009           No results found.         PROCEDURES   Unless otherwise noted below, none     Lac Repair    Date/Time: 10/24/2022 1:19 PM  Performed by: Alicia Li PA-C  Authorized by: Alicia Li PA-C     Anesthesia:     Anesthesia method:  Local infiltration    Local anesthetic:  Lidocaine 1% WITH epi  Laceration details:     Location:  Face    Face location:  Forehead    Length (cm):  3    Depth (mm):  5  Pre-procedure details:     Preparation:  Patient was prepped and draped in usual sterile fashion  Exploration:     Limited defect created (wound extended): no      Hemostasis achieved with:  Direct pressure    Wound exploration: wound explored through full range of motion and entire depth of wound visualized      Contaminated: no    Treatment:     Area cleansed with:  Saline    Amount of cleaning:  Standard    Irrigation solution:  Sterile saline    Irrigation volume:  1L    Irrigation method:  Pressure wash    Debridement:  None    Undermining:  None    Scar revision: no    Skin repair:     Repair method:  Sutures    Suture size:  5-0    Suture material:  Nylon    Suture technique:  Simple interrupted    Number of sutures:  3  Approximation:     Approximation: Close  Repair type:     Repair type:  Simple  Post-procedure details:     Dressing:  Open (no dressing)    Procedure completion:  Tolerated well, no immediate complications    CRITICAL CARE TIME       CONSULTS:  None      EMERGENCY DEPARTMENT COURSE and DIFFERENTIAL DIAGNOSIS/MDM:   Vitals:    Vitals:    10/24/22 1108   BP: (!) 175/63   Pulse: 61   Resp: 20   Temp: 99.1 °F (37.3 °C)   TempSrc: Oral   SpO2: 98%   Weight: 150 lb (68 kg)   Height: 5' 2\" (1.575 m)       Patient was given the following medications:  Medications   lidocaine-EPINEPHrine 1 %-1:018958 injection 20 mL (has no administration in time range)   lidocaine-EPINEPHrine 2 percent-1:005995 injection (has no administration in time range)   tetanus & diphtheria toxoids (adult) 5-2 LFU injection 0.5 mL (has no administration in time range)   tetanus & diphtheria toxoids (adult) 5-2 LFU injection (0.5 mLs IntraMUSCular Given 10/24/22 1119)         Is this patient to be included in the SEP-1 Core Measure due to severe sepsis or septic shock? No   Exclusion criteria - the patient is NOT to be included for SEP-1 Core Measure due to: Infection is not suspected    MDM -patient will have black, cleaned and closed. CT head without acute finding. No signs of basilar skull fracture. No neck pain, midline tenderness. Patient neuro intact without other injuries and has normal gait here. Instructed follow-up for suture removal in 7 days, return for any new or worsening symptoms. FINAL IMPRESSION      1. Laceration of forehead, initial encounter    2. Injury of head, initial encounter          DISPOSITION/PLAN   DISPOSITION Decision To Discharge 10/24/2022 01:01:04 PM      PATIENT REFERRED TO:  Elden Cheadle, MD  [address]  [phone]    In 1 week  Follow-up for suture removal in 7 days. Return for any new or worsening symptoms.     DISCHARGE MEDICATIONS:  Discharge Medication List as of 10/24/2022  1:01 PM DISCONTINUED MEDICATIONS:  Discharge Medication List as of 10/24/2022  1:01 PM                 (Please note that portions of this note were completed with a voice recognition program.  Efforts were made to edit the dictations but occasionally words are mis-transcribed. )    Jose Yeager PA-C (electronically signed)            Jose Yeager PA-C  10/24/22 1594 [facility], [place]  10/24/22 1327

## 2022-10-24 NOTE — TELEPHONE ENCOUNTER
Pt hit her head this morning when she fell while taking out the trash. Pt got some stitches on her forehead today and came in the office today to just let christian know.

## 2022-11-04 ENCOUNTER — OFFICE VISIT (OUTPATIENT)
Dept: FAMILY MEDICINE CLINIC | Age: 77
End: 2022-11-04
Payer: MEDICARE

## 2022-11-04 VITALS
BODY MASS INDEX: 27.44 KG/M2 | SYSTOLIC BLOOD PRESSURE: 192 MMHG | WEIGHT: 150 LBS | TEMPERATURE: 98.4 F | DIASTOLIC BLOOD PRESSURE: 88 MMHG | HEART RATE: 78 BPM | OXYGEN SATURATION: 95 %

## 2022-11-04 DIAGNOSIS — R05.1 ACUTE COUGH: Primary | ICD-10-CM

## 2022-11-04 DIAGNOSIS — I10 HYPERTENSION, ESSENTIAL: ICD-10-CM

## 2022-11-04 DIAGNOSIS — R49.0 HOARSENESS OF VOICE: ICD-10-CM

## 2022-11-04 DIAGNOSIS — J04.0 LARYNGITIS: ICD-10-CM

## 2022-11-04 PROCEDURE — 1123F ACP DISCUSS/DSCN MKR DOCD: CPT | Performed by: FAMILY MEDICINE

## 2022-11-04 PROCEDURE — 3074F SYST BP LT 130 MM HG: CPT | Performed by: FAMILY MEDICINE

## 2022-11-04 PROCEDURE — 99214 OFFICE O/P EST MOD 30 MIN: CPT | Performed by: FAMILY MEDICINE

## 2022-11-04 PROCEDURE — 3078F DIAST BP <80 MM HG: CPT | Performed by: FAMILY MEDICINE

## 2022-11-04 RX ORDER — DOXYCYCLINE HYCLATE 100 MG
100 TABLET ORAL 2 TIMES DAILY
Qty: 20 TABLET | Refills: 0 | Status: SHIPPED | OUTPATIENT
Start: 2022-11-04 | End: 2022-11-14

## 2022-11-04 NOTE — PROGRESS NOTES
[address] Note    Date: 11/4/2022                                               [place]:     Chief Complaint   Patient presents with    Cough     Cough and loss of voice x3 days    Laryngitis       HPI  Cough and loss of voice x3 days. No SOB. No fever. Getting better overall. Pt has hx of HTN. Takes HCTZ and Irbesartan and Clonidine but did not take it this morning. Denies HA or CP.          Patient Active Problem List    Diagnosis Date Noted    Right-sided lacunar infarction ([facility] 04/04/2022    Chronic kidney disease (CKD) stage G3b/A1, moderately decreased glomerular filtration rate (GFR) between 30-44 mL/min/1.73 square meter and albuminuria creatinine ratio less than 30 mg/g ([facility] 07/19/2021    Mixed hyperlipidemia 07/19/2021    Mixed conductive and sensorineural hearing loss of left ear with restricted hearing of right ear 05/03/2021    Dementia without behavioral disturbance ([facility] 09/11/2020    White coat syndrome with diagnosis of hypertension 01/31/2020    Type 2 diabetes mellitus with chronic kidney disease ([facility] 02/19/2018    Disorder of right eustachian tube 01/08/2018    Allergic sinusitis 01/08/2018    Chronic eczematous otitis externa of right ear 05/02/2017    Hypothyroidism 11/16/2016    Sleep apnea     Meniere disease        Past Medical History:   Diagnosis Date    Alopecia     Anxiety and depression     Cancer ([facility]     uterine    Chronic kidney disease (CKD) stage G3b/A1, moderately decreased glomerular filtration rate (GFR) between 30-44 mL/min/1.73 square meter and albuminuria creatinine ratio less than 30 mg/g ([facility]) 7/19/2021    CTS (carpal tunnel syndrome)      History of rheumatic fever     Hypertension     Hypothyroidism     Meniere disease     bilateral ears    Mixed hyperlipidemia 7/19/2021    Nausea     associated with Meniere's dz    Obesity     Reflux esophagitis     Renal impairment     Sleep apnea     no longer needs cpap (per pt)    Type II or unspecified type diabetes mellitus without mention of complication, not stated as uncontrolled        Current Outpatient Medications   Medication Sig Dispense Refill    doxycycline hyclate (VIBRA-TABS) 100 MG tablet Take 1 tablet by mouth 2 times daily for 10 days 20 tablet 0    cloNIDine (CATAPRES) 0.3 MG tablet TAKE 1 TABLET 3 TIMES A DAY 90 tablet 3    hydroCHLOROthiazide (HYDRODIURIL) 25 MG tablet Take 1 tablet by mouth every morning 90 tablet 1    metFORMIN (GLUCOPHAGE) 500 MG tablet TAKE 2 TABLETS DAILY WITH  BREAKFAST 180 tablet 1    memantine (NAMENDA) 5 MG tablet TAKE 1 TABLET DAILY 90 tablet 0    ipratropium (ATROVENT) 0.06 % nasal spray 2 sprays by Each Nostril route 2 times daily 15 mL 1    levothyroxine (SYNTHROID) 50 MCG tablet TAKE 1 TABLET BY MOUTH ONCE DAILY 90 tablet 0    Cinnamon 500 MG CAPS Take 1,000 mg by mouth      irbesartan (AVAPRO) 150 MG tablet Take 1 tablet by mouth daily 90 tablet 1    rosuvastatin (CRESTOR) 20 MG tablet Take 1 tablet by mouth once daily 90 tablet 3    aspirin 325 mg tablet Take 325 mg by mouth daily      Cholecalciferol (VITAMIN D3) 50 MCG (2000 UT) CAPS Take by mouth      donepezil (ARICEPT) 10 MG tablet Take 1 tablet by mouth nightly 90 tablet 1    sertraline (ZOLOFT) 100 MG tablet TAKE 1 TABLET BY MOUTH EVERY MORNING (TOTAL 125MG) 90 tablet 1    Biotin 73224 MCG TABS Take 5,000 mcg by mouth       Apoaequorin (PREVAGEN EXTRA STRENGTH) 20 MG CAPS Take by mouth       Continuous Blood Gluc Sensor (FREESTYLE DENI 14 DAY SENSOR) MISC CHECK BLOOD SUGARS THREE TIMES DAILY 2 each 5    Continuous Blood Gluc  (FREESTYLE DENI 14 DAY READER) GILSON 1 each by Does not apply route 3 times daily 1 Device 5    Continuous Blood Gluc  (FREESTYLE DENI 14 DAY READER) GILSON Use as needed 1 Device 5    GNP Alcohol Swabs 70 % PADS       UNABLE TO FIND Brain Strong (Memory Support)PREVAGEN      acetaminophen (APAP EXTRA STRENGTH) 500 MG tablet Take 2 tablets by mouth every 6 hours as needed for Pain (Patient taking differently: Take 200 mg by mouth every 6 hours as needed for Pain) 120 tablet 3     No current facility-administered medications for this visit. No Known Allergies    Review of Systems  No rash, no bruise    Vitals:  BP (!) 192/88   Pulse 78   Temp 98.4 °F (36.9 °C)   Wt 150 lb (68 kg)   SpO2 95%   BMI 27.44 kg/m²     Wt Readings from Last 3 Encounters:   11/04/22 150 lb (68 kg)   10/24/22 150 lb (68 kg)   10/03/22 150 lb (68 kg)        Physical Exam  General:  +acutely ill, NAD, alert, non-toxic  HEENT:  Normocephalic, atraumatic. Pupils equal and round. Moist mucous membranes. Normal dentition. No pharyngeal erythema, no exudates  NECK:  Supple, normal range of motion, no LAD, no meningeal signs  CHEST/LUNGS: CTAB, no crackles, no wheeze, no rhonchi. Symmetric rise  CARDIOVASCULAR: RRR,  no murmur, no rub, pulses 2+  EXTREMETIES: Normal movement of all extremities. No edema. No joint swelling. NEURO:  GCS 15, CN2-12 grossly intact, no focal motor/sensory deficits, no cerebellar deficits, normal gait, normal speech      Assessment/Plan     67-year-old female with acute cough and laryngitis. Given hoarse voice, is likely viral infection with viral bronchitis. Recommend rest, fluids, expectant management. Take doxycycline if symptoms do not improve in 2 days, or if they worsen in any way. Patient refused COVID testing. I strongly recommend that she isolate until she has had symptoms for 5 days, and there is clinical improvement without fever for 24 hours. Patient blood pressure is elevated. Encouraged her to go home and take all her blood pressure medicine. We will recheck at next visit. Discussed with patient medication/s dosage, instructions, potential S/E, A/R and Drug Interaction  Tylenol or Motrin as needed for pain or fever  Advise to return here if worse or go to nearest ER  Encourage fluids  Pt discharged in fair condition at 10:18      1. Acute cough  -     doxycycline hyclate (VIBRA-TABS) 100 MG tablet; Take 1 tablet by mouth 2 times daily for 10 days, Disp-20 tablet, R-0Normal  2. Hoarseness of voice  3. Laryngitis  4. Hypertension, essential     No orders of the defined types were placed in this encounter. No follow-ups on file.     Dk Aguila MD, MD    11/4/2022  10:17 AM

## 2022-12-05 DIAGNOSIS — F32.A DEPRESSION, UNSPECIFIED DEPRESSION TYPE: ICD-10-CM

## 2022-12-06 RX ORDER — LEVOTHYROXINE SODIUM 0.05 MG/1
TABLET ORAL
Qty: 90 TABLET | Refills: 0 | Status: SHIPPED | OUTPATIENT
Start: 2022-12-06 | End: 2023-01-09 | Stop reason: SDUPTHER

## 2022-12-06 RX ORDER — SERTRALINE HYDROCHLORIDE 100 MG/1
TABLET, FILM COATED ORAL
Qty: 90 TABLET | Refills: 0 | Status: SHIPPED | OUTPATIENT
Start: 2022-12-06 | End: 2023-01-09 | Stop reason: SDUPTHER

## 2022-12-06 RX ORDER — MEMANTINE HYDROCHLORIDE 5 MG/1
TABLET ORAL
Qty: 90 TABLET | Refills: 0 | Status: SHIPPED | OUTPATIENT
Start: 2022-12-06 | End: 2023-01-09 | Stop reason: SDUPTHER

## 2022-12-28 RX ORDER — IRBESARTAN 150 MG/1
TABLET ORAL
Qty: 90 TABLET | Refills: 1 | OUTPATIENT
Start: 2022-12-28

## 2023-01-03 ENCOUNTER — OFFICE VISIT (OUTPATIENT)
Dept: FAMILY MEDICINE CLINIC | Age: 78
End: 2023-01-03
Payer: MEDICARE

## 2023-01-03 VITALS
SYSTOLIC BLOOD PRESSURE: 147 MMHG | BODY MASS INDEX: 27.6 KG/M2 | DIASTOLIC BLOOD PRESSURE: 66 MMHG | HEIGHT: 62 IN | HEART RATE: 82 BPM | WEIGHT: 150 LBS

## 2023-01-03 DIAGNOSIS — H81.02 MENIERE'S DISEASE OF LEFT EAR: ICD-10-CM

## 2023-01-03 DIAGNOSIS — Z00.00 ENCOUNTER FOR MEDICAL EXAMINATION TO ESTABLISH CARE: Primary | ICD-10-CM

## 2023-01-03 DIAGNOSIS — E78.2 MIXED HYPERLIPIDEMIA: ICD-10-CM

## 2023-01-03 DIAGNOSIS — L30.9 DERMATITIS: ICD-10-CM

## 2023-01-03 DIAGNOSIS — F03.90 DEMENTIA WITHOUT BEHAVIORAL DISTURBANCE (HCC): ICD-10-CM

## 2023-01-03 DIAGNOSIS — N18.32 CHRONIC KIDNEY DISEASE (CKD) STAGE G3B/A1, MODERATELY DECREASED GLOMERULAR FILTRATION RATE (GFR) BETWEEN 30-44 ML/MIN/1.73 SQUARE METER AND ALBUMINURIA CREATININE RATIO LESS THAN 30 MG/G (HCC): ICD-10-CM

## 2023-01-03 DIAGNOSIS — I10 HYPERTENSION, ESSENTIAL: ICD-10-CM

## 2023-01-03 DIAGNOSIS — E11.22 TYPE 2 DIABETES MELLITUS WITH CHRONIC KIDNEY DISEASE, WITHOUT LONG-TERM CURRENT USE OF INSULIN, UNSPECIFIED CKD STAGE (HCC): ICD-10-CM

## 2023-01-03 DIAGNOSIS — E03.9 ACQUIRED HYPOTHYROIDISM: ICD-10-CM

## 2023-01-03 LAB
A/G RATIO: 1.6 (ref 1.1–2.2)
ALBUMIN SERPL-MCNC: 4.5 G/DL (ref 3.4–5)
ALP BLD-CCNC: 91 U/L (ref 40–129)
ALT SERPL-CCNC: 18 U/L (ref 10–40)
ANION GAP SERPL CALCULATED.3IONS-SCNC: 15 MMOL/L (ref 3–16)
AST SERPL-CCNC: 25 U/L (ref 15–37)
BASOPHILS ABSOLUTE: 0 K/UL (ref 0–0.2)
BASOPHILS RELATIVE PERCENT: 0.3 %
BILIRUB SERPL-MCNC: 0.4 MG/DL (ref 0–1)
BUN BLDV-MCNC: 23 MG/DL (ref 7–20)
CALCIUM SERPL-MCNC: 10.1 MG/DL (ref 8.3–10.6)
CHLORIDE BLD-SCNC: 99 MMOL/L (ref 99–110)
CHOLESTEROL, TOTAL: 323 MG/DL (ref 0–199)
CO2: 24 MMOL/L (ref 21–32)
CREAT SERPL-MCNC: 1.9 MG/DL (ref 0.6–1.2)
EOSINOPHILS ABSOLUTE: 0.2 K/UL (ref 0–0.6)
EOSINOPHILS RELATIVE PERCENT: 4.4 %
GFR SERPL CREATININE-BSD FRML MDRD: 27 ML/MIN/{1.73_M2}
GLUCOSE BLD-MCNC: 125 MG/DL (ref 70–99)
HCT VFR BLD CALC: 45.3 % (ref 36–48)
HDLC SERPL-MCNC: 40 MG/DL (ref 40–60)
HEMOGLOBIN: 14.7 G/DL (ref 12–16)
LDL CHOLESTEROL CALCULATED: ABNORMAL MG/DL
LDL CHOLESTEROL DIRECT: 212 MG/DL
LYMPHOCYTES ABSOLUTE: 0.9 K/UL (ref 1–5.1)
LYMPHOCYTES RELATIVE PERCENT: 17.2 %
MCH RBC QN AUTO: 26.9 PG (ref 26–34)
MCHC RBC AUTO-ENTMCNC: 32.4 G/DL (ref 31–36)
MCV RBC AUTO: 83.2 FL (ref 80–100)
MONOCYTES ABSOLUTE: 0.4 K/UL (ref 0–1.3)
MONOCYTES RELATIVE PERCENT: 6.5 %
NEUTROPHILS ABSOLUTE: 3.9 K/UL (ref 1.7–7.7)
NEUTROPHILS RELATIVE PERCENT: 71.6 %
PDW BLD-RTO: 14.6 % (ref 12.4–15.4)
PLATELET # BLD: 167 K/UL (ref 135–450)
PMV BLD AUTO: 9.2 FL (ref 5–10.5)
POTASSIUM SERPL-SCNC: 4.4 MMOL/L (ref 3.5–5.1)
RBC # BLD: 5.44 M/UL (ref 4–5.2)
SODIUM BLD-SCNC: 138 MMOL/L (ref 136–145)
TOTAL PROTEIN: 7.3 G/DL (ref 6.4–8.2)
TRIGL SERPL-MCNC: 375 MG/DL (ref 0–150)
TSH REFLEX: 2.14 UIU/ML (ref 0.27–4.2)
VLDLC SERPL CALC-MCNC: ABNORMAL MG/DL
WBC # BLD: 5.4 K/UL (ref 4–11)

## 2023-01-03 PROCEDURE — 3078F DIAST BP <80 MM HG: CPT | Performed by: STUDENT IN AN ORGANIZED HEALTH CARE EDUCATION/TRAINING PROGRAM

## 2023-01-03 PROCEDURE — 99214 OFFICE O/P EST MOD 30 MIN: CPT | Performed by: STUDENT IN AN ORGANIZED HEALTH CARE EDUCATION/TRAINING PROGRAM

## 2023-01-03 PROCEDURE — 1123F ACP DISCUSS/DSCN MKR DOCD: CPT | Performed by: STUDENT IN AN ORGANIZED HEALTH CARE EDUCATION/TRAINING PROGRAM

## 2023-01-03 PROCEDURE — 3077F SYST BP >= 140 MM HG: CPT | Performed by: STUDENT IN AN ORGANIZED HEALTH CARE EDUCATION/TRAINING PROGRAM

## 2023-01-03 RX ORDER — CHOLECALCIFEROL (VITAMIN D3) 125 MCG
500 CAPSULE ORAL DAILY
COMMUNITY

## 2023-01-03 ASSESSMENT — PATIENT HEALTH QUESTIONNAIRE - PHQ9
8. MOVING OR SPEAKING SO SLOWLY THAT OTHER PEOPLE COULD HAVE NOTICED. OR THE OPPOSITE, BEING SO FIGETY OR RESTLESS THAT YOU HAVE BEEN MOVING AROUND A LOT MORE THAN USUAL: 0
SUM OF ALL RESPONSES TO PHQ9 QUESTIONS 1 & 2: 0
9. THOUGHTS THAT YOU WOULD BE BETTER OFF DEAD, OR OF HURTING YOURSELF: 0
6. FEELING BAD ABOUT YOURSELF - OR THAT YOU ARE A FAILURE OR HAVE LET YOURSELF OR YOUR FAMILY DOWN: 0
5. POOR APPETITE OR OVEREATING: 0
10. IF YOU CHECKED OFF ANY PROBLEMS, HOW DIFFICULT HAVE THESE PROBLEMS MADE IT FOR YOU TO DO YOUR WORK, TAKE CARE OF THINGS AT HOME, OR GET ALONG WITH OTHER PEOPLE: 0
1. LITTLE INTEREST OR PLEASURE IN DOING THINGS: 0
SUM OF ALL RESPONSES TO PHQ QUESTIONS 1-9: 0
2. FEELING DOWN, DEPRESSED OR HOPELESS: 0
3. TROUBLE FALLING OR STAYING ASLEEP: 0
4. FEELING TIRED OR HAVING LITTLE ENERGY: 0
7. TROUBLE CONCENTRATING ON THINGS, SUCH AS READING THE NEWSPAPER OR WATCHING TELEVISION: 0
SUM OF ALL RESPONSES TO PHQ QUESTIONS 1-9: 0

## 2023-01-03 NOTE — PROGRESS NOTES
[address] [facility] Note    Date: 1/3/2023      Assessment/Plan:     1. Encounter for medical examination to establish care  2. Mixed hyperlipidemia- on statin, monitor  -     Lipid Panel; Future  3. Dementia without behavioral disturbance [facility])-   Discussed referral to care coordinator for help, patient declined all referrals  Namenda and donepezil  4. Type 2 diabetes mellitus with chronic kidney disease, without long-term current use of insulin, unspecified CKD stage (HCC)  Controlled, check labs, may need to officially DC metformin pending blood work for kidney function though unsure she is taking metformin  -     CBC with Auto Differential; Future  -     Comprehensive Metabolic Panel; Future  -     Lipid Panel; Future  -     Hemoglobin A1C; Future  -     TSH with Reflex; Future  5. Chronic kidney disease (CKD) stage G3b/A1, moderately decreased glomerular filtration rate (GFR) between 30-44 mL/min/1.73 square meter and albuminuria creatinine ratio less than 30 mg/g ([facility])  Labwork and then consider nephro referral  -     CBC with Auto Differential; Future  -     Comprehensive Metabolic Panel; Future  -     Lipid Panel; Future  -     Hemoglobin A1C; Future  -     TSH with Reflex; Future  6. Acquired hypothyroidism Controlled, continue current management  7. Meniere's disease of left ear following w/ ENT  8. Hypertension, essential  Borderline/high today but to take morning medications when she gets home  -     CBC with Auto Differential; Future  -     Comprehensive Metabolic Panel; Future  -     Lipid Panel; Future  -     Hemoglobin A1C; Future  -     TSH with Reflex; Future  9. Dermatitis  Triamcinalone sparingly  Aquaphor or vaseline or cerave lotion couple times a day  Declined dermatology referral, discussed need for annual skin check and possibly spots that would need frozen off, she declined.  She does not like seeing a lot of different providers    Shingles vaccine at pharmacy    Orders Placed This Encounter   Procedures    CBC with Auto Differential    Comprehensive Metabolic Panel    Lipid Panel    Hemoglobin A1C    TSH with Reflex     Orders Placed This Encounter   Medications    triamcinolone (KENALOG) 0.1 % ointment     Sig: Apply topically 2 times daily as needed. Dispense:  80 g     Refill:  0     Return in about 3 months (around 4/3/2023). Discussed medication(s) risks, benefits, side effects, adverse reactions and interactions with patient. Patient voiced understanding. Subjective/Objective:     Chief Complaint   Patient presents with    New Patient       HPI  See Assessment/Plan for further HPI info  Her PCP of several years passed away    Hand spots and hand pain, previously called dermatitis, refusing to see dermatology    Uterine cancer: several years ago per patient, at one point said follows with gynecology then said she forgets  S/p hysterectomy  Lives alone w/ her dog    HTN  Rx: clonidine, hctz, irbesartan  Didn't take BP meds yet today. Usually take some in the morning but didn't yet today  Sx denies headache, vision changes, lightheadedness, chest pain, shortness of breath, orthopnea, PND, syncope. Discussed DASH diet, exercise, weight loss     Dementia:  Dizzines w/ namenda bid in the past  On namenda daily  Donepezil    DM2  A1c 5.9 2/2022    Meniere: Follows w/ ENT      New PT-  Reviewed pmhx, medications, allergies, surgeries, family hx, social hx with patient and chart record    Discussed smoking, alcohol, drugs hx; see chart   Living situation-lives alone w/ dog  /kids- have a brother and sister in [address] [place] [address], have children in the area but doesn't see them much. Lives alone w/ dog;   Occupation-used to work at the hospital  Diet/exercise-  HM-      Wt Readings from Last 3 Encounters:   01/03/23 150 lb (68 kg)   11/04/22 150 lb (68 kg)   10/24/22 150 lb (68 kg)     Body mass index is 27.44 kg/m².   BP Readings from Last 3 Encounters:   01/03/23 (!) 147/66   11/04/22 (!) 192/88   10/24/22 (!) 175/63     The 10-year ASCVD risk score (Francisco MUÑIZ, et al., 2019) is: 48.5%    Values used to calculate the score:      Age: 69 years      Sex: Female      Is Non- : No      Diabetic: Yes      Tobacco smoker: No      Systolic Blood Pressure: 361 mmHg      Is BP treated: Yes      HDL Cholesterol: 49 mg/dL      Total Cholesterol: 153 mg/dL    ROS: denies nausea/vomiting, fevers, chills, chest pain, shortness of breath, diarrhea, constipation, blood in the urine or stool         Patient Active Problem List   Diagnosis    Sleep apnea    Meniere disease    Hypothyroidism    Chronic eczematous otitis externa of right ear    Disorder of right eustachian tube    Allergic sinusitis    Type 2 diabetes mellitus with chronic kidney disease (HCC)    White coat syndrome with diagnosis of hypertension    Dementia without behavioral disturbance (HCC)    Mixed conductive and sensorineural hearing loss of left ear with restricted hearing of right ear    Chronic kidney disease (CKD) stage G3b/A1, moderately decreased glomerular filtration rate (GFR) between 30-44 mL/min/1.73 square meter and albuminuria creatinine ratio less than 30 mg/g (HCC)    Mixed hyperlipidemia    Right-sided lacunar infarction [facility])     Past Medical History:   Diagnosis Date    Alopecia     Anxiety and depression     Cancer ([facility]     uterine, several years ago; follows w/ gyn yearly    Chronic kidney disease (CKD) stage G3b/A1, moderately decreased glomerular filtration rate (GFR) between 30-44 mL/min/1.73 square meter and albuminuria creatinine ratio less than 30 mg/g (HCC) 07/19/2021    CTS (carpal tunnel syndrome)      History of rheumatic fever     Hypertension     Hypothyroidism     Meniere disease     bilateral ears    Mixed hyperlipidemia 07/19/2021    Nausea     associated with Meniere's dz    Obesity     Reflux esophagitis     Renal impairment     Sleep apnea no longer needs cpap (per pt)    Type II or unspecified type diabetes mellitus without mention of complication, not stated as uncontrolled        Past Surgical History:   Procedure Laterality Date    ANKLE SURGERY Right     repair of fracture     APPENDECTOMY      BREAST SURGERY      rt  bx negative    COLONOSCOPY  4/15/11    HYSTERECTOMY (CERVIX STATUS UNKNOWN)      at age 35    MASTOID SURGERY      at age 26    TONSILLECTOMY      UPPER GASTROINTESTINAL ENDOSCOPY  4/14/11    with dilitation and biopsy       Current Outpatient Medications   Medication Sig Dispense Refill    vitamin B-12 (CYANOCOBALAMIN) 500 MCG tablet Take 500 mcg by mouth daily      triamcinolone (KENALOG) 0.1 % ointment Apply topically 2 times daily as needed.  80 g 0    sertraline (ZOLOFT) 100 MG tablet TAKE 1 TABLET EVERY MORNING(TOTAL 125MG) 90 tablet 0    memantine (NAMENDA) 5 MG tablet TAKE 1 TABLET DAILY 90 tablet 0    levothyroxine (SYNTHROID) 50 MCG tablet TAKE 1 TABLET ONCE DAILY 90 tablet 0    cloNIDine (CATAPRES) 0.3 MG tablet TAKE 1 TABLET 3 TIMES A DAY 90 tablet 3    hydroCHLOROthiazide (HYDRODIURIL) 25 MG tablet Take 1 tablet by mouth every morning 90 tablet 1    metFORMIN (GLUCOPHAGE) 500 MG tablet TAKE 2 TABLETS DAILY WITH  BREAKFAST 180 tablet 1    ipratropium (ATROVENT) 0.06 % nasal spray 2 sprays by Each Nostril route 2 times daily 15 mL 1    Cinnamon 500 MG CAPS Take 1,000 mg by mouth      irbesartan (AVAPRO) 150 MG tablet Take 1 tablet by mouth daily 90 tablet 1    rosuvastatin (CRESTOR) 20 MG tablet Take 1 tablet by mouth once daily 90 tablet 3    aspirin 325 mg tablet Take 81 mg by mouth daily      donepezil (ARICEPT) 10 MG tablet Take 1 tablet by mouth nightly 90 tablet 1    Biotin 38011 MCG TABS Take 5,000 mcg by mouth       Apoaequorin (PREVAGEN EXTRA STRENGTH) 20 MG CAPS Take by mouth       Continuous Blood Gluc Sensor (FREESTYLE DENI 14 DAY SENSOR) MISC CHECK BLOOD SUGARS THREE TIMES DAILY 2 each 5    Continuous Blood Gluc  (AvimotoSTYLE DENI 14 DAY READER) GILSON 1 each by Does not apply route 3 times daily 1 Device 5    Continuous Blood Gluc  (FREESTYLE DENI 14 DAY READER) GILSON Use as needed 1 Device 5    GNP Alcohol Swabs 70 % PADS       UNABLE TO FIND Brain Strong (Memory Support)PREVAGEN      acetaminophen (APAP EXTRA STRENGTH) 500 MG tablet Take 2 tablets by mouth every 6 hours as needed for Pain (Patient taking differently: Take 200 mg by mouth every 6 hours as needed for Pain) 120 tablet 3     No current facility-administered medications for this visit. No Known Allergies    Social History     Socioeconomic History    Marital status:      Spouse name: None    Number of children: 3    Years of education: None    Highest education level: None   Occupational History    Occupation: retired \"patino secretary\"      Comment: [employer]   Tobacco Use    Smoking status: Never    Smokeless tobacco: Never   Substance and Sexual Activity    Alcohol use: No    Drug use: No    Sexual activity: Not Currently   Social History Narrative    Her   in 2017. She has three children who live locally-2020      Social Determinants of Health     Financial Resource Strain: Low Risk     Difficulty of Paying Living Expenses: Not hard at all   Food Insecurity: No Food Insecurity    Worried About [address] in the Last Year: Never true    [address] in the Last Year: Never true   Transportation Needs: No Transportation Needs    Lack of Transportation (Medical): No    Lack of Transportation (Non-Medical):  No   Physical Activity: Inactive    Days of Exercise per Week: 0 days    Minutes of Exercise per Session: 0 min   Stress: No Stress Concern Present    Feeling of Stress : Not at all   Social Connections: Socially Isolated    Frequency of Communication with Friends and Family: Once a week    Frequency of Social Gatherings with Friends and Family: Once a week    Attends Confucianism Services: 1 to 4 times per year    Active Member of Clubs or Organizations: No    Attends Club or Organization Meetings: Never    Marital Status:    Housing Stability: Low Risk     Unable to Pay for Housing in the Last Year: No    Number of Places Lived in the Last Year: 1    Unstable Housing in the Last Year: No     Family History   Problem Relation Age of Onset    Heart Disease Mother     Diabetes Mother     Cancer Mother         breast     Heart Failure Mother     Diabetes Father     Kidney Disease Father     Heart Disease Father     Heart Disease Sister     Diabetes Maternal Grandmother     Crohn's Disease Child     Crohn's Disease Grandchild          Vitals:  BP (!) 147/66   Pulse 82   Ht 5' 2\" (1.575 m)   Wt 150 lb (68 kg)   BMI 27.44 kg/m²     Physical Exam   General:  Well-appearing, no acute distress, alert, non-toxic  HEENT:  Normocephalic, atraumatic, without lymphadenopathy, EOMI, neck supple  Cardiovascular: normal heart rate, normal rhythm, no murmurs, rubs or gallops  Respiratory: normal breath sounds, good air movement, no respiratory distress, no wheezing, rales or rhonchi  GI: bowel sounds normal, soft, non-distended, no tenderness, no masses or peritoneal signs  Extremities: intact distal pulses, warm, dry, well perfused, without clubbing, cyanosis or edema, normal movement of all extremities. No joint swelling, deformity or tenderness. Skin:   warm and dry, maculopapulary rough dry areas of skin on dorsal surfaces of hands  PSYCH:  alert and oriented x 3; normal affect  NEURO:  CN2-12 grossly intact, normal motor function, normal sensory function, normal speech, no gross focal deficits noted, gait within normal  Seems forgetful at times    Luis Pablo MD    1/3/2023 12:26 PM    Documentation was done using voice recognition dragon software. Every effort was made to ensure accuracy; however, inadvertent, unintentional computerized transcription errors may be present.

## 2023-01-04 LAB
ESTIMATED AVERAGE GLUCOSE: 128.4 MG/DL
HBA1C MFR BLD: 6.1 %

## 2023-01-05 ENCOUNTER — TELEPHONE (OUTPATIENT)
Dept: NEPHROLOGY | Age: 78
End: 2023-01-05

## 2023-01-05 DIAGNOSIS — N18.4 CKD (CHRONIC KIDNEY DISEASE) STAGE 4, GFR 15-29 ML/MIN (HCC): Primary | ICD-10-CM

## 2023-01-05 NOTE — TELEPHONE ENCOUNTER
Alexx Camara a PCP from [facility] contatced Dr. Kinza Prince Requesting pt to be seen due to her Creatinine 1.9 , GFR 27 .    Pt was called and asked to come in for appt , pt refused and said she is fine she dont need to see a kidney doctor.

## 2023-01-09 DIAGNOSIS — I10 ESSENTIAL HYPERTENSION: ICD-10-CM

## 2023-01-09 DIAGNOSIS — N18.32 CHRONIC KIDNEY DISEASE (CKD) STAGE G3B/A1, MODERATELY DECREASED GLOMERULAR FILTRATION RATE (GFR) BETWEEN 30-44 ML/MIN/1.73 SQUARE METER AND ALBUMINURIA CREATININE RATIO LESS THAN 30 MG/G (HCC): Primary | ICD-10-CM

## 2023-01-09 DIAGNOSIS — F32.A DEPRESSION, UNSPECIFIED DEPRESSION TYPE: ICD-10-CM

## 2023-01-09 RX ORDER — SERTRALINE HYDROCHLORIDE 100 MG/1
TABLET, FILM COATED ORAL
Qty: 90 TABLET | Refills: 0 | Status: ON HOLD | OUTPATIENT
Start: 2023-01-09

## 2023-01-09 RX ORDER — CLONIDINE HYDROCHLORIDE 0.3 MG/1
TABLET ORAL
Qty: 90 TABLET | Refills: 0 | Status: SHIPPED | OUTPATIENT
Start: 2023-01-09 | End: 2023-01-24 | Stop reason: SDUPTHER

## 2023-01-09 RX ORDER — LEVOTHYROXINE SODIUM 0.05 MG/1
TABLET ORAL
Qty: 90 TABLET | Refills: 0 | Status: ON HOLD | OUTPATIENT
Start: 2023-01-09

## 2023-01-09 RX ORDER — AMPICILLIN TRIHYDRATE 250 MG
1000 CAPSULE ORAL
OUTPATIENT
Start: 2023-01-09

## 2023-01-09 RX ORDER — IRBESARTAN 150 MG/1
150 TABLET ORAL DAILY
Qty: 90 TABLET | Refills: 0 | Status: SHIPPED | OUTPATIENT
Start: 2023-01-09 | End: 2023-01-24 | Stop reason: SDUPTHER

## 2023-01-09 RX ORDER — MEMANTINE HYDROCHLORIDE 5 MG/1
TABLET ORAL
Qty: 90 TABLET | Refills: 0 | Status: ON HOLD | OUTPATIENT
Start: 2023-01-09

## 2023-01-09 NOTE — TELEPHONE ENCOUNTER
Medication and Quantity requested:      levothyroxine (SYNTHROID) 50 MCG tablet [[id number]    AND  irbesartan (AVAPRO) 150 MG tablet [[id number]]    AND  sertraline (ZOLOFT) 100 MG tablet [[id number]]    AND  cloNIDine (CATAPRES) 0.3 MG tablet [[id number]]    AND  memantine (NAMENDA) 10 MG tablet [[id number]     AND  Carroll asprin 81 mg daily     AND    [id number] MG CAPS [[id number]]     Last Visit  01/03/2023  All 90 day and This is new Parm going forward     Pharmacy and phone number updated in EPIC:  yes    Elizabeth Mario

## 2023-01-24 DIAGNOSIS — I10 ESSENTIAL HYPERTENSION: ICD-10-CM

## 2023-01-24 RX ORDER — CLONIDINE HYDROCHLORIDE 0.3 MG/1
TABLET ORAL
Qty: 60 TABLET | Refills: 0 | Status: ON HOLD | OUTPATIENT
Start: 2023-01-24

## 2023-01-24 RX ORDER — IRBESARTAN 150 MG/1
150 TABLET ORAL DAILY
Qty: 90 TABLET | Refills: 0 | Status: ON HOLD | OUTPATIENT
Start: 2023-01-24

## 2023-01-24 NOTE — PROGRESS NOTES
Pt came into our office without an appointment. Stated that she needs a refill of her Clonidine and Irbesartan. Reports that taking the Clonidine 0.3mg 3x per day is making her feel mallaise and fatigue. Denies any falls. Checked blood pressure and it was 220/60. I told the patient that I recommend she take an ambulance to the hospital but she declined. She states that she took her HCTZ this morning but not her Clonidine or Irbesartan. She had her Clonidine with her so I had her take one in the office. Pt states that she \"will be fine\", she just needs her medicine refills. Denies CP or SOB or HA. Pt left against my advice at 12:27.

## 2023-01-26 ENCOUNTER — HOSPITAL ENCOUNTER (OUTPATIENT)
Dept: WOMENS IMAGING | Age: 78
Discharge: HOME | End: 2023-01-26
Payer: MEDICARE

## 2023-01-26 VITALS — WEIGHT: 145 LBS | BODY MASS INDEX: 26.52 KG/M2

## 2023-01-26 DIAGNOSIS — Z12.31 VISIT FOR SCREENING MAMMOGRAM: ICD-10-CM

## 2023-01-26 PROCEDURE — 77063 BREAST TOMOSYNTHESIS BI: CPT

## 2023-01-28 ENCOUNTER — APPOINTMENT (OUTPATIENT)
Dept: CT IMAGING | Age: 78
DRG: 641 | End: 2023-01-28
Payer: MEDICARE

## 2023-01-28 ENCOUNTER — HOSPITAL ENCOUNTER (INPATIENT)
Age: 78
LOS: 1 days | Discharge: HOME HEALTH | DRG: 641 | End: 2023-01-30
Attending: EMERGENCY MEDICINE | Admitting: INTERNAL MEDICINE
Payer: MEDICARE

## 2023-01-28 ENCOUNTER — APPOINTMENT (OUTPATIENT)
Dept: GENERAL RADIOLOGY | Age: 78
DRG: 641 | End: 2023-01-28
Payer: MEDICARE

## 2023-01-28 DIAGNOSIS — R26.0 ATAXIC GAIT: Primary | ICD-10-CM

## 2023-01-28 DIAGNOSIS — E87.1 HYPONATREMIA: ICD-10-CM

## 2023-01-28 DIAGNOSIS — F03.90 DEMENTIA WITHOUT BEHAVIORAL DISTURBANCE (HCC): ICD-10-CM

## 2023-01-28 DIAGNOSIS — I10 ACCELERATED HYPERTENSION: ICD-10-CM

## 2023-01-28 DIAGNOSIS — E04.1 THYROID NODULE: ICD-10-CM

## 2023-01-28 PROBLEM — R27.0 ATAXIA: Status: ACTIVE | Noted: 2023-01-28

## 2023-01-28 LAB
A/G RATIO: 1.5 (ref 1.1–2.2)
ALBUMIN SERPL-MCNC: 4.4 G/DL (ref 3.4–5)
ALP BLD-CCNC: 85 U/L (ref 40–129)
ALT SERPL-CCNC: 20 U/L (ref 10–40)
ANION GAP SERPL CALCULATED.3IONS-SCNC: 14 MMOL/L (ref 3–16)
ANION GAP SERPL CALCULATED.3IONS-SCNC: 15 MMOL/L (ref 3–16)
AST SERPL-CCNC: 32 U/L (ref 15–37)
BACTERIA: NORMAL /HPF
BASOPHILS ABSOLUTE: 0 K/UL (ref 0–0.2)
BASOPHILS RELATIVE PERCENT: 0.2 %
BILIRUB SERPL-MCNC: 0.7 MG/DL (ref 0–1)
BILIRUBIN URINE: NEGATIVE
BLOOD, URINE: NEGATIVE
BUN BLDV-MCNC: 23 MG/DL (ref 7–20)
BUN BLDV-MCNC: 26 MG/DL (ref 7–20)
CALCIUM SERPL-MCNC: 9.4 MG/DL (ref 8.3–10.6)
CALCIUM SERPL-MCNC: 9.5 MG/DL (ref 8.3–10.6)
CHLORIDE BLD-SCNC: 83 MMOL/L (ref 99–110)
CHLORIDE BLD-SCNC: 89 MMOL/L (ref 99–110)
CLARITY: CLEAR
CO2: 22 MMOL/L (ref 21–32)
CO2: 24 MMOL/L (ref 21–32)
COLOR: YELLOW
CREAT SERPL-MCNC: 1.4 MG/DL (ref 0.6–1.2)
CREAT SERPL-MCNC: 1.5 MG/DL (ref 0.6–1.2)
EKG ATRIAL RATE: 49 BPM
EKG DIAGNOSIS: NORMAL
EKG P AXIS: 53 DEGREES
EKG P-R INTERVAL: 198 MS
EKG Q-T INTERVAL: 478 MS
EKG QRS DURATION: 84 MS
EKG QTC CALCULATION (BAZETT): 431 MS
EKG R AXIS: 16 DEGREES
EKG T AXIS: 69 DEGREES
EKG VENTRICULAR RATE: 49 BPM
EOSINOPHILS ABSOLUTE: 0.2 K/UL (ref 0–0.6)
EOSINOPHILS RELATIVE PERCENT: 2.1 %
EPITHELIAL CELLS, UA: 0 /HPF (ref 0–5)
GFR SERPL CREATININE-BSD FRML MDRD: 36 ML/MIN/{1.73_M2}
GFR SERPL CREATININE-BSD FRML MDRD: 39 ML/MIN/{1.73_M2}
GLUCOSE BLD-MCNC: 122 MG/DL (ref 70–99)
GLUCOSE BLD-MCNC: 131 MG/DL (ref 70–99)
GLUCOSE BLD-MCNC: 156 MG/DL (ref 70–99)
GLUCOSE BLD-MCNC: 180 MG/DL (ref 70–99)
GLUCOSE URINE: NEGATIVE MG/DL
HCT VFR BLD CALC: 42 % (ref 36–48)
HEMOGLOBIN: 14.6 G/DL (ref 12–16)
HYALINE CASTS: 0 /LPF (ref 0–8)
INR BLD: 1.01 (ref 0.87–1.14)
KETONES, URINE: NEGATIVE MG/DL
LEUKOCYTE ESTERASE, URINE: NEGATIVE
LYMPHOCYTES ABSOLUTE: 0.8 K/UL (ref 1–5.1)
LYMPHOCYTES RELATIVE PERCENT: 9.6 %
MCH RBC QN AUTO: 27.3 PG (ref 26–34)
MCHC RBC AUTO-ENTMCNC: 34.8 G/DL (ref 31–36)
MCV RBC AUTO: 78.6 FL (ref 80–100)
MICROSCOPIC EXAMINATION: YES
MONOCYTES ABSOLUTE: 0.5 K/UL (ref 0–1.3)
MONOCYTES RELATIVE PERCENT: 6.1 %
NEUTROPHILS ABSOLUTE: 6.5 K/UL (ref 1.7–7.7)
NEUTROPHILS RELATIVE PERCENT: 82 %
NITRITE, URINE: NEGATIVE
OSMOLALITY: 276 MOSM/KG (ref 280–301)
PDW BLD-RTO: 14.6 % (ref 12.4–15.4)
PERFORMED ON: ABNORMAL
PERFORMED ON: ABNORMAL
PH UA: 7 (ref 5–8)
PLATELET # BLD: 168 K/UL (ref 135–450)
PMV BLD AUTO: 8.2 FL (ref 5–10.5)
POTASSIUM REFLEX MAGNESIUM: 4.2 MMOL/L (ref 3.5–5.1)
POTASSIUM SERPL-SCNC: 4.4 MMOL/L (ref 3.5–5.1)
PROTEIN UA: 100 MG/DL
PROTHROMBIN TIME: 13.2 SEC (ref 11.7–14.5)
RBC # BLD: 5.34 M/UL (ref 4–5.2)
RBC UA: 0 /HPF (ref 0–4)
SARS-COV-2, NAAT: NOT DETECTED
SODIUM BLD-SCNC: 121 MMOL/L (ref 136–145)
SODIUM BLD-SCNC: 126 MMOL/L (ref 136–145)
SPECIFIC GRAVITY UA: <=1.005 (ref 1–1.03)
TOTAL PROTEIN: 7.4 G/DL (ref 6.4–8.2)
TROPONIN: <0.01 NG/ML
TSH REFLEX FT4: 3.1 UIU/ML (ref 0.27–4.2)
URINE REFLEX TO CULTURE: ABNORMAL
URINE TYPE: ABNORMAL
UROBILINOGEN, URINE: 0.2 E.U./DL
WBC # BLD: 8 K/UL (ref 4–11)
WBC UA: 1 /HPF (ref 0–5)

## 2023-01-28 PROCEDURE — 6370000000 HC RX 637 (ALT 250 FOR IP): Performed by: EMERGENCY MEDICINE

## 2023-01-28 PROCEDURE — 84300 ASSAY OF URINE SODIUM: CPT

## 2023-01-28 PROCEDURE — 81001 URINALYSIS AUTO W/SCOPE: CPT

## 2023-01-28 PROCEDURE — 84484 ASSAY OF TROPONIN QUANT: CPT

## 2023-01-28 PROCEDURE — 87635 SARS-COV-2 COVID-19 AMP PRB: CPT

## 2023-01-28 PROCEDURE — 96361 HYDRATE IV INFUSION ADD-ON: CPT

## 2023-01-28 PROCEDURE — 85610 PROTHROMBIN TIME: CPT

## 2023-01-28 PROCEDURE — 71045 X-RAY EXAM CHEST 1 VIEW: CPT

## 2023-01-28 PROCEDURE — 36415 COLL VENOUS BLD VENIPUNCTURE: CPT

## 2023-01-28 PROCEDURE — 6360000002 HC RX W HCPCS: Performed by: INTERNAL MEDICINE

## 2023-01-28 PROCEDURE — 84443 ASSAY THYROID STIM HORMONE: CPT

## 2023-01-28 PROCEDURE — 2580000003 HC RX 258: Performed by: EMERGENCY MEDICINE

## 2023-01-28 PROCEDURE — 70498 CT ANGIOGRAPHY NECK: CPT

## 2023-01-28 PROCEDURE — 70450 CT HEAD/BRAIN W/O DYE: CPT

## 2023-01-28 PROCEDURE — 80053 COMPREHEN METABOLIC PANEL: CPT

## 2023-01-28 PROCEDURE — 93010 ELECTROCARDIOGRAM REPORT: CPT | Performed by: INTERNAL MEDICINE

## 2023-01-28 PROCEDURE — 83036 HEMOGLOBIN GLYCOSYLATED A1C: CPT

## 2023-01-28 PROCEDURE — 96374 THER/PROPH/DIAG INJ IV PUSH: CPT

## 2023-01-28 PROCEDURE — 93005 ELECTROCARDIOGRAM TRACING: CPT | Performed by: EMERGENCY MEDICINE

## 2023-01-28 PROCEDURE — 6370000000 HC RX 637 (ALT 250 FOR IP): Performed by: INTERNAL MEDICINE

## 2023-01-28 PROCEDURE — 83935 ASSAY OF URINE OSMOLALITY: CPT

## 2023-01-28 PROCEDURE — 83930 ASSAY OF BLOOD OSMOLALITY: CPT

## 2023-01-28 PROCEDURE — 74177 CT ABD & PELVIS W/CONTRAST: CPT

## 2023-01-28 PROCEDURE — G0378 HOSPITAL OBSERVATION PER HR: HCPCS

## 2023-01-28 PROCEDURE — 6360000002 HC RX W HCPCS: Performed by: EMERGENCY MEDICINE

## 2023-01-28 PROCEDURE — 96372 THER/PROPH/DIAG INJ SC/IM: CPT

## 2023-01-28 PROCEDURE — 2580000003 HC RX 258: Performed by: INTERNAL MEDICINE

## 2023-01-28 PROCEDURE — 85025 COMPLETE CBC W/AUTO DIFF WBC: CPT

## 2023-01-28 PROCEDURE — 6360000004 HC RX CONTRAST MEDICATION: Performed by: EMERGENCY MEDICINE

## 2023-01-28 PROCEDURE — 96376 TX/PRO/DX INJ SAME DRUG ADON: CPT

## 2023-01-28 PROCEDURE — 99285 EMERGENCY DEPT VISIT HI MDM: CPT

## 2023-01-28 RX ORDER — LOSARTAN POTASSIUM 25 MG/1
50 TABLET ORAL DAILY
Status: DISCONTINUED | OUTPATIENT
Start: 2023-01-28 | End: 2023-01-30 | Stop reason: HOSPADM

## 2023-01-28 RX ORDER — ROSUVASTATIN CALCIUM 10 MG/1
20 TABLET, COATED ORAL NIGHTLY
Status: DISCONTINUED | OUTPATIENT
Start: 2023-01-28 | End: 2023-01-30 | Stop reason: HOSPADM

## 2023-01-28 RX ORDER — ACETAMINOPHEN 325 MG/1
650 TABLET ORAL ONCE
Status: COMPLETED | OUTPATIENT
Start: 2023-01-28 | End: 2023-01-28

## 2023-01-28 RX ORDER — ACETAMINOPHEN 325 MG/1
650 TABLET ORAL EVERY 6 HOURS PRN
Status: DISCONTINUED | OUTPATIENT
Start: 2023-01-28 | End: 2023-01-30 | Stop reason: HOSPADM

## 2023-01-28 RX ORDER — INSULIN LISPRO 100 [IU]/ML
0-4 INJECTION, SOLUTION INTRAVENOUS; SUBCUTANEOUS
Status: DISCONTINUED | OUTPATIENT
Start: 2023-01-28 | End: 2023-01-30 | Stop reason: HOSPADM

## 2023-01-28 RX ORDER — SODIUM CHLORIDE 9 MG/ML
INJECTION, SOLUTION INTRAVENOUS CONTINUOUS
Status: DISCONTINUED | OUTPATIENT
Start: 2023-01-28 | End: 2023-01-29

## 2023-01-28 RX ORDER — MECLIZINE HCL 12.5 MG/1
25 TABLET ORAL ONCE
Status: COMPLETED | OUTPATIENT
Start: 2023-01-28 | End: 2023-01-28

## 2023-01-28 RX ORDER — HYDRALAZINE HYDROCHLORIDE 20 MG/ML
5 INJECTION INTRAMUSCULAR; INTRAVENOUS ONCE
Status: COMPLETED | OUTPATIENT
Start: 2023-01-28 | End: 2023-01-28

## 2023-01-28 RX ORDER — ONDANSETRON 4 MG/1
4 TABLET, ORALLY DISINTEGRATING ORAL EVERY 8 HOURS PRN
Status: DISCONTINUED | OUTPATIENT
Start: 2023-01-28 | End: 2023-01-30 | Stop reason: HOSPADM

## 2023-01-28 RX ORDER — HYDRALAZINE HYDROCHLORIDE 20 MG/ML
10 INJECTION INTRAMUSCULAR; INTRAVENOUS EVERY 6 HOURS PRN
Status: DISCONTINUED | OUTPATIENT
Start: 2023-01-28 | End: 2023-01-30 | Stop reason: HOSPADM

## 2023-01-28 RX ORDER — ASPIRIN 81 MG/1
81 TABLET, CHEWABLE ORAL DAILY
Status: DISCONTINUED | OUTPATIENT
Start: 2023-01-29 | End: 2023-01-30 | Stop reason: HOSPADM

## 2023-01-28 RX ORDER — ASPIRIN 81 MG/1
324 TABLET, CHEWABLE ORAL ONCE
Status: COMPLETED | OUTPATIENT
Start: 2023-01-28 | End: 2023-01-28

## 2023-01-28 RX ORDER — LEVOTHYROXINE SODIUM 0.03 MG/1
50 TABLET ORAL DAILY
Status: DISCONTINUED | OUTPATIENT
Start: 2023-01-28 | End: 2023-01-30 | Stop reason: HOSPADM

## 2023-01-28 RX ORDER — MEMANTINE HYDROCHLORIDE 5 MG/1
5 TABLET ORAL DAILY
Status: DISCONTINUED | OUTPATIENT
Start: 2023-01-28 | End: 2023-01-30 | Stop reason: HOSPADM

## 2023-01-28 RX ORDER — HEPARIN SODIUM 5000 [USP'U]/ML
5000 INJECTION, SOLUTION INTRAVENOUS; SUBCUTANEOUS EVERY 8 HOURS SCHEDULED
Status: DISCONTINUED | OUTPATIENT
Start: 2023-01-28 | End: 2023-01-30 | Stop reason: HOSPADM

## 2023-01-28 RX ORDER — 0.9 % SODIUM CHLORIDE 0.9 %
1000 INTRAVENOUS SOLUTION INTRAVENOUS ONCE
Status: COMPLETED | OUTPATIENT
Start: 2023-01-28 | End: 2023-01-28

## 2023-01-28 RX ORDER — ONDANSETRON 2 MG/ML
4 INJECTION INTRAMUSCULAR; INTRAVENOUS EVERY 6 HOURS PRN
Status: DISCONTINUED | OUTPATIENT
Start: 2023-01-28 | End: 2023-01-30 | Stop reason: HOSPADM

## 2023-01-28 RX ORDER — INSULIN LISPRO 100 [IU]/ML
0-4 INJECTION, SOLUTION INTRAVENOUS; SUBCUTANEOUS NIGHTLY
Status: DISCONTINUED | OUTPATIENT
Start: 2023-01-28 | End: 2023-01-30 | Stop reason: HOSPADM

## 2023-01-28 RX ORDER — DONEPEZIL HYDROCHLORIDE 5 MG/1
10 TABLET, FILM COATED ORAL NIGHTLY
Status: DISCONTINUED | OUTPATIENT
Start: 2023-01-28 | End: 2023-01-30 | Stop reason: HOSPADM

## 2023-01-28 RX ORDER — ACETAMINOPHEN 650 MG/1
650 SUPPOSITORY RECTAL EVERY 6 HOURS PRN
Status: DISCONTINUED | OUTPATIENT
Start: 2023-01-28 | End: 2023-01-30 | Stop reason: HOSPADM

## 2023-01-28 RX ORDER — DEXTROSE MONOHYDRATE 100 MG/ML
INJECTION, SOLUTION INTRAVENOUS CONTINUOUS PRN
Status: DISCONTINUED | OUTPATIENT
Start: 2023-01-28 | End: 2023-01-30 | Stop reason: HOSPADM

## 2023-01-28 RX ORDER — CLONIDINE HYDROCHLORIDE 0.1 MG/1
0.2 TABLET ORAL 3 TIMES DAILY
Status: DISCONTINUED | OUTPATIENT
Start: 2023-01-28 | End: 2023-01-30 | Stop reason: HOSPADM

## 2023-01-28 RX ADMIN — LEVOTHYROXINE SODIUM 50 MCG: 0.03 TABLET ORAL at 16:46

## 2023-01-28 RX ADMIN — CLONIDINE HYDROCHLORIDE 0.2 MG: 0.1 TABLET ORAL at 16:45

## 2023-01-28 RX ADMIN — ASPIRIN 324 MG: 81 TABLET, CHEWABLE ORAL at 11:52

## 2023-01-28 RX ADMIN — DONEPEZIL HYDROCHLORIDE 10 MG: 5 TABLET, FILM COATED ORAL at 21:26

## 2023-01-28 RX ADMIN — IOPAMIDOL 120 ML: 755 INJECTION, SOLUTION INTRAVENOUS at 09:08

## 2023-01-28 RX ADMIN — HYDRALAZINE HYDROCHLORIDE 5 MG: 20 INJECTION INTRAMUSCULAR; INTRAVENOUS at 12:33

## 2023-01-28 RX ADMIN — SODIUM CHLORIDE: 9 INJECTION, SOLUTION INTRAVENOUS at 16:14

## 2023-01-28 RX ADMIN — ROSUVASTATIN 20 MG: 10 TABLET, FILM COATED ORAL at 21:37

## 2023-01-28 RX ADMIN — HEPARIN SODIUM 5000 UNITS: 5000 INJECTION INTRAVENOUS; SUBCUTANEOUS at 21:26

## 2023-01-28 RX ADMIN — MEMANTINE 5 MG: 5 TABLET ORAL at 16:46

## 2023-01-28 RX ADMIN — LOSARTAN POTASSIUM 50 MG: 25 TABLET, FILM COATED ORAL at 16:45

## 2023-01-28 RX ADMIN — CLONIDINE HYDROCHLORIDE 0.2 MG: 0.1 TABLET ORAL at 21:26

## 2023-01-28 RX ADMIN — SODIUM CHLORIDE 1000 ML: 9 INJECTION, SOLUTION INTRAVENOUS at 08:40

## 2023-01-28 RX ADMIN — HYDRALAZINE HYDROCHLORIDE 5 MG: 20 INJECTION INTRAMUSCULAR; INTRAVENOUS at 08:41

## 2023-01-28 RX ADMIN — ACETAMINOPHEN 650 MG: 325 TABLET ORAL at 11:53

## 2023-01-28 RX ADMIN — MECLIZINE 25 MG: 12.5 TABLET ORAL at 08:46

## 2023-01-28 ASSESSMENT — LIFESTYLE VARIABLES: HOW OFTEN DO YOU HAVE A DRINK CONTAINING ALCOHOL: NEVER

## 2023-01-28 ASSESSMENT — PAIN SCALES - GENERAL
PAINLEVEL_OUTOF10: 8
PAINLEVEL_OUTOF10: 5
PAINLEVEL_OUTOF10: 5

## 2023-01-28 ASSESSMENT — PAIN DESCRIPTION - LOCATION
LOCATION: HEAD
LOCATION: HEAD

## 2023-01-28 ASSESSMENT — PAIN - FUNCTIONAL ASSESSMENT: PAIN_FUNCTIONAL_ASSESSMENT: 0-10

## 2023-01-28 NOTE — H&P
Hospital Medicine History & Physical        Date of Service: Pt seen/examined on 1/28/23 and admitted on 1/28/23 to Observation    Chief Complaint   Patient presents with    Headache     [facility] Head[facility], generalized weakness x 2 days. Dx dementia, hard of hearing. History Of Present Illness: The patient is a 69 y.o. female with PMH below, presents with son/daughter from home where she lives alone; dx with dementia \"fairly recently\" started on meds, still drives, does not take her medications as Rx; c/o Bifrontal H/A, without photo/phonophobia, unable to ambulate in ED d/t ataxia; typically walks and drives; A&O x 2 which is baseline but with difficulty following simple commands; left PCP AMA 4 days prior as was recommended to go to ED at that time d/t hypertensive urgency.     Past Medical History:        Diagnosis Date    Alopecia     Anxiety and depression     Cancer ([facility] [address]     uterine, several years ago; follows w/ gyn yearly    Chronic kidney disease (CKD) stage G3b/A1, moderately decreased glomerular filtration rate (GFR) between 30-44 mL/min/1.73 square meter and albuminuria creatinine ratio less than 30 mg/g ([facility]) 07/19/2021    CTS (carpal tunnel syndrome)      History of rheumatic fever     Hypertension     Hypothyroidism     Meniere disease     bilateral ears    Mixed hyperlipidemia 07/19/2021    Nausea     associated with Meniere's dz    Obesity     Reflux esophagitis     Renal impairment     Sleep apnea     no longer needs cpap (per pt)    Type II or unspecified type diabetes mellitus without mention of complication, not stated as uncontrolled        Past Surgical History:        Procedure Laterality Date    ANKLE SURGERY Right     repair of fracture     APPENDECTOMY      BREAST SURGERY Right     benign    COLONOSCOPY  04/15/2011    HYSTERECTOMY (CERVIX STATUS UNKNOWN) Bilateral 1975    SHARYN for ovarian CA    MASTOID SURGERY      at age 26    TONSILLECTOMY UPPER GASTROINTESTINAL ENDOSCOPY  04/14/2011    with dilitation and biopsy       Social History:    TOBACCO:   reports that she has never smoked. She has never used smokeless tobacco.  ETOH:   reports no history of alcohol use. Family History:  Reviewed in detail     Medications Prior to Admission:    Prior to Admission medications    Medication Sig Start Date End Date Taking? Authorizing Provider   irbesartan (AVAPRO) 150 MG tablet Take 1 tablet by mouth daily 1/24/23   Philippe Chun MD   cloNIDine (CATAPRES) 0.3 MG tablet TAKE 1 TABLET 3 TIMES A DAY 1/24/23   Philippe Chun MD   levothyroxine (SYNTHROID) 50 MCG tablet TAKE 1 TABLET ONCE DAILY 1/9/23   Gonzalez Nickolas, APRN - CNP   sertraline (ZOLOFT) 100 MG tablet TAKE 1 TABLET EVERY MORNING(TOTAL 125MG) 1/9/23   Gonzaelz Olmos, APRN - CNP   memantine (NAMENDA) 5 MG tablet TAKE 1 TABLET DAILY 1/9/23   Gonzalez Olmos, APRN - CNP   vitamin B-12 (CYANOCOBALAMIN) 500 MCG tablet Take 500 mcg by mouth daily    Historical Provider, MD   triamcinolone (KENALOG) 0.1 % ointment Apply topically 2 times daily as needed.  1/3/23   Cynthia Carranza MD   hydroCHLOROthiazide (HYDRODIURIL) 25 MG tablet Take 1 tablet by mouth every morning 9/22/22   Matt Kelley MD   ipratropium (ATROVENT) 0.06 % nasal spray 2 sprays by Each Nostril route 2 times daily 9/19/22   Balaji Piedra MD   Cinnamon 500 MG CAPS Take 1,000 mg by mouth    Historical Provider, MD   rosuvastatin (CRESTOR) 20 MG tablet Take 1 tablet by mouth once daily 6/24/22   Matt Kelley MD   aspirin 325 mg tablet Take 81 mg by mouth daily    Historical Provider, MD   donepezil (ARICEPT) 10 MG tablet Take 1 tablet by mouth nightly 6/6/22   M Hall Lanes, MD   Biotin 57556 MCG TABS Take 5,000 mcg by mouth     Historical Provider, MD   Apoaequorin (PREVAGEN EXTRA STRENGTH) 20 MG CAPS Take by mouth     Historical Provider, MD   Continuous Blood Gluc Sensor (FREESTYLE DENI 14 DAY SENSOR) [facility] BLOOD SUGARS THREE TIMES DAILY 12/20/21   Anoop Bruce MD   Continuous Blood Gluc  (FREESTYLE DENI 14 DAY READER) GILSON 1 each by Does not apply route 3 times daily 7/20/21   Anoop Bruce MD   Continuous Blood Gluc  (FREESTYLE DENI 14 DAY READER) GILSON Use as needed 7/20/21   Anoop Bruce MD   hydroCHLOROthiazide (HYDRODIURIL) 25 MG tablet Take 1 tablet by mouth every morning 3/21/21   Anoop Bruce MD   rosuvastatin (CRESTOR) 20 MG tablet Take 1 tablet by mouth daily 3/15/21   Anoop Bruce MD   [facility] Alcohol Swabs 70 % PADS  8/4/20   Historical Provider, MD   UNABLE TO FIND Brain Strong (Memory Support)PREVAGEN    Historical Provider, MD   acetaminophen (APAP EXTRA STRENGTH) 500 MG tablet Take 2 tablets by mouth every 6 hours as needed for Pain  Patient taking differently: Take 200 mg by mouth every 6 hours as needed for Pain 1/22/20   Billy ReaderMD felicitas       Allergies:  Patient has no known allergies. REVIEW OF SYSTEMS:   Pertinent positives/negatives as follows: H/A, Ataxia, weakness, fatigue and as discussed in HPI, otherwise a complete ROS performed and all other systems are negative  PHYSICAL EXAM PERFORMED:    /77   Pulse 56   Temp 97.9 °F (36.6 °C) (Oral)   Resp 13   Ht 5' 2\" (1.575 m)   Wt 145 lb (65.8 kg)   SpO2 96%   BMI 26.52 kg/m²     GEN:  A&Ox2, difficulty following simple commands  HEENT:  NC/AT,EOMI, MMM, no erythema/exudates or visible masses. CVS:  Normal S1,S2. RRR. Without M/G/R.   LUNG:   CTA-B. no wheezes, rales or rhonchi  ABD:  Soft, ND/NT, BS+ x4. Without G/R.  EXT: 2+ pulses, no c/c/e. Brisk cap refill. PSY:  Thought process intact, affect appropriate. GERONIMO:  CN III-XII intact, moves all 4 spontaneously, sensory grossly intact. SKIN: No rash or lesions on visible skin.         ASSESSMENT/PLAN:  Ataxia - suspect 2/2 hyponatremia, and chronic occlusion of R proximal PCA, and hypertensive urgency; Obs, PT, OT, MRI, continue ASA, Statin, consult Neuro  Acute Hyponatremia - suspect hypovolemic d/t HCTZ, r/o euvolemia and SIADH d/t SSRI; hold HCTZ/Zoloft, check Vilma, Uosmo, Osmo, gentle IVF currently  Hypertensive urgency - continue ARB, Clonidine at lower dosage d/t bradycardia, may need to switch to TTS d/t medical noncompliance, IV hydralazine PRN  DM type 2 - per Hx, not on Rx, diabetic diet, check A1C, low dose SSI with hypoglycemic protocol until ruled out  Hypothyroidism - check TFTs, continue synthroid  Hypercholesterolemia - continue Statin  CKD stage 3b - at baseline, continue ARB, avoid nephrotoxins, monitor  Dementia - continue Namenda/Aricept  DVT prophylaxis - Heparin subQ        Belia Mcguire, DO    Thank you Dar Delgado MD for the opportunity to be involved in this patient's care. If you have any questions or concerns please feel free to contact me via the [employer] at [phone].

## 2023-01-28 NOTE — ED PROVIDER NOTES
EMERGENCY DEPARTMENT PROVIDER NOTE    Patient Identification  Pt Name: Dilshad Laureano  MRN: [id number]  [place] 1945  Date of evaluation: 1/28/2023  Provider: Enrico Heimlich, DO  PCP: Tono Palomo MD    Chief Complaint  Headache ([facility] Headache, generalized weakness x 2 days. Dx dementia, hard of hearing.)      HPI  (History provided by patient, daughter)  This is a 69 y.o. female with pertinent past medical history of dementia, hypertension, Ménière's disease who was brought in by EMS transportation for headache ongoing for the past 5 days. Headache is aching and bifrontal, nothing clearly seems to make the symptoms any better or worse. Patient is also had 2 days of difficulty walking, family noticed today the patient was unable to walk without assistance which is highly unusual for her. Patient does not use a walker or cane at baseline, still drives her car. Patient denies any fevers, chills or chest pain. She does report cramping diffuse abdominal pain. Denies  symptoms. I have reviewed the following nursing documentation:  Allergies: Patient has no known allergies.     Past medical history:   Past Medical History:   Diagnosis Date    Alopecia     Anxiety and depression     Cancer ([facility] [address]     uterine, several years ago; follows w/ gyn yearly    Chronic kidney disease (CKD) stage G3b/A1, moderately decreased glomerular filtration rate (GFR) between 30-44 mL/min/1.73 square meter and albuminuria creatinine ratio less than 30 mg/g ([facility]) 07/19/2021    CTS (carpal tunnel syndrome)      History of rheumatic fever     Hypertension     Hypothyroidism     Meniere disease     bilateral ears    Mixed hyperlipidemia 07/19/2021    Nausea     associated with Meniere's dz    Obesity     Reflux esophagitis     Renal impairment     Sleep apnea     no longer needs cpap (per pt)    Type II or unspecified type diabetes mellitus without mention of complication, not stated as uncontrolled      Past surgical history:   Past Surgical History:   Procedure Laterality Date    ANKLE SURGERY Right     repair of fracture     APPENDECTOMY      BREAST SURGERY Right     benign    COLONOSCOPY  04/15/2011    HYSTERECTOMY (CERVIX STATUS UNKNOWN) Bilateral 1975    SHARYN for ovarian CA    MASTOID SURGERY      at age 26    TONSILLECTOMY      UPPER GASTROINTESTINAL ENDOSCOPY  04/14/2011    with dilitation and biopsy       Home medications:   Previous Medications    ACETAMINOPHEN (APAP EXTRA STRENGTH) 500 MG TABLET    Take 2 tablets by mouth every 6 hours as needed for Pain    APOAEQUORIN (PREVAGEN EXTRA STRENGTH) 20 MG CAPS    Take by mouth     ASPIRIN 325 MG TABLET    Take 81 mg by mouth daily    BIOTIN 43488 MCG TABS    Take 5,000 mcg by mouth     CINNAMON 500 MG CAPS    Take 1,000 mg by mouth    CLONIDINE (CATAPRES) 0.3 MG TABLET    TAKE 1 TABLET 3 TIMES A DAY    CONTINUOUS BLOOD GLUC  (FREESTYLE DENI 14 DAY READER) GILSON    1 each by Does not apply route 3 times daily    CONTINUOUS BLOOD GLUC  (FREESTYLE DENI 14 DAY READER) GILSON    Use as needed    CONTINUOUS BLOOD GLUC SENSOR (FREESTYLE DENI 14 DAY SENSOR) MISC    CHECK BLOOD SUGARS THREE TIMES DAILY    DONEPEZIL (ARICEPT) 10 MG TABLET    Take 1 tablet by mouth nightly    GNP ALCOHOL SWABS 70 % PADS        HYDROCHLOROTHIAZIDE (HYDRODIURIL) 25 MG TABLET    Take 1 tablet by mouth every morning    IPRATROPIUM (ATROVENT) 0.06 % NASAL SPRAY    2 sprays by Each Nostril route 2 times daily    IRBESARTAN (AVAPRO) 150 MG TABLET    Take 1 tablet by mouth daily    LEVOTHYROXINE (SYNTHROID) 50 MCG TABLET    TAKE 1 TABLET ONCE DAILY    MEMANTINE (NAMENDA) 5 MG TABLET    TAKE 1 TABLET DAILY    ROSUVASTATIN (CRESTOR) 20 MG TABLET    Take 1 tablet by mouth once daily    SERTRALINE (ZOLOFT) 100 MG TABLET    TAKE 1 TABLET EVERY MORNING(TOTAL 125MG)    TRIAMCINOLONE (KENALOG) 0.1 % OINTMENT    Apply topically 2 times daily as needed.     UNABLE TO FIND Brain Strong (Memory Support)PREVAGEN    VITAMIN B-12 (CYANOCOBALAMIN) 500 MCG TABLET    Take 500 mcg by mouth daily       Social history:  reports that she has never smoked. She has never used smokeless tobacco. She reports that she does not drink alcohol and does not use drugs. Family history:    Family History   Problem Relation Age of Onset    Breast Cancer Mother     Heart Disease Mother     Diabetes Mother     Heart Failure Mother     Diabetes Father     Kidney Disease Father     Heart Disease Father     Heart Disease Sister     Diabetes Maternal Grandmother     Crohn's Disease Child     Crohn's Disease Grandchild     Ovarian Cancer Neg Hx          Exam  ED Triage Vitals [01/28/23 0740]   BP Temp Temp Source Heart Rate Resp SpO2 Height Weight   (!) 232/64 97.9 °F (36.6 °C) Oral 50 16 98 % 5' 2\" (1.575 m) 145 lb (65.8 kg)     Nursing note and vitals reviewed. Constitutional: Well developed, well nourished. Non-toxic in appearance. HENT:      Head: Normocephalic and atraumatic. Ears: External ears normal.  Bilateral tympanic membrane's intact, nonerythematous, not bulging. Scarring of left tympanic membrane noted which appears chronic. Normal appearance of EACs. Nose: Nose normal.     Mouth: Membrane mucosa dry and pink. Eyes: Anicteric sclera. No discharge. PERRL, no nystagmus, normal test of skew. Neck: Supple. Trachea midline. No meningismus. Cardiovascular: Bradycardia, regular rhythm; no murmurs, rubs, or gallops. Pulmonary/Chest: Effort normal. No respiratory distress. CTAB. No stridor. No wheezes. No rales. Abdominal: Soft. No distension. Mild tenderness palpation diffusely, no rebound, no rigidity, no guarding. No CVA tenderness. Musculoskeletal: Moves all extremities. No gross deformity. Neurological: Alert and oriented to person and place. Correctly names month, as January, however unable to name year or president. . Face symmetric. Speech is clear. A&Ox4.  Speech clear, face symmetric. CN 2-12 intact. 5/5 motor and sensation grossly intact all extremities. No pronator drift. Normal finger to nose, normal heel to shin. Patient unable to ambulate without assistance due to gait ataxia. Skin: Warm and dry. No rash. Procedures      EKG    EKG was reviewed by emergency department physician in the absence of a cardiologist    Narrow complex sinus rhythm, rate 49, normal axis, normal MI and QRS intervals, normal Qtc, no specific ST elevations or depressions, normal t-wave morphology, impression sinus bradycardia, no STEMI      Radiology  CTA HEAD NECK W CONTRAST   Final Result   1. No flow-limiting stenosis or occlusion within the neck. 2. Severe luminal narrowing involving the left M1 segment. 3. Chronic occlusion/aplasic right proximal PCA with diminutive right   posterior communicating artery supplying the right PCA territory. 4. 18 mm heterogeneous nodule involving the thyroid isthmus. Nonemergent   thyroid ultrasound recommended for further evaluation on an outpatient basis. CT ABDOMEN PELVIS W IV CONTRAST Additional Contrast? None   Final Result   No acute abdominopelvic abnormality. CT Head W/O Contrast   Final Result   No acute intracranial abnormality. XR CHEST PORTABLE   Final Result   Stable borderline cardiomegaly. No acute abnormalities             Any applicable radiology studies including x-ray, CT, MRI, and/or ultrasound, were reviewed independently by me in addition to the radiologist.  I reviewed all radiology images and reports as well from this evaluation.     Labs  Results for orders placed or performed during the hospital encounter of 01/28/23   COVID-19, Rapid    Specimen: Nasopharyngeal Swab   Result Value Ref Range    SARS-CoV-2, NAAT Not Detected Not Detected   CBC with Auto Differential   Result Value Ref Range    WBC 8.0 4.0 - 11.0 K/uL    RBC 5.34 (H) 4.00 - 5.20 M/uL    Hemoglobin 14.6 12.0 - 16.0 g/dL    Hematocrit 42.0 36.0 - 48.0 %    MCV 78.6 (L) 80.0 - 100.0 fL    MCH 27.3 26.0 - 34.0 pg    MCHC 34.8 31.0 - 36.0 g/dL    RDW 14.6 12.4 - 15.4 %    Platelets 818 675 - 246 K/uL    MPV 8.2 5.0 - 10.5 fL    Neutrophils % 82.0 %    Lymphocytes % 9.6 %    Monocytes % 6.1 %    Eosinophils % 2.1 %    Basophils % 0.2 %    Neutrophils Absolute 6.5 1.7 - 7.7 K/uL    Lymphocytes Absolute 0.8 (L) 1.0 - 5.1 K/uL    Monocytes Absolute 0.5 0.0 - 1.3 K/uL    Eosinophils Absolute 0.2 0.0 - 0.6 K/uL    Basophils Absolute 0.0 0.0 - 0.2 K/uL   CMP w/ Reflex to MG   Result Value Ref Range    Sodium 121 (L) 136 - 145 mmol/L    Potassium reflex Magnesium 4.2 3.5 - 5.1 mmol/L    Chloride 83 (L) 99 - 110 mmol/L    CO2 24 21 - 32 mmol/L    Anion Gap 14 3 - 16    Glucose 156 (H) 70 - 99 mg/dL    BUN 26 (H) 7 - 20 mg/dL    Creatinine 1.4 (H) 0.6 - 1.2 mg/dL    Est, Glom Filt Rate 39 (A) >60    Calcium 9.5 8.3 - 10.6 mg/dL    Total Protein 7.4 6.4 - 8.2 g/dL    Albumin 4.4 3.4 - 5.0 g/dL    Albumin/Globulin Ratio 1.5 1.1 - 2.2    Total Bilirubin 0.7 0.0 - 1.0 mg/dL    Alkaline Phosphatase 85 40 - 129 U/L    ALT 20 10 - 40 U/L    AST 32 15 - 37 U/L   Urinalysis with Reflex to Culture    Specimen: Urine   Result Value Ref Range    Color, UA Yellow Straw/Yellow    Clarity, UA Clear Clear    Glucose, Ur Negative Negative mg/dL    Bilirubin Urine Negative Negative    Ketones, Urine Negative Negative mg/dL    Specific Gravity, UA <=1.005 1.005 - 1.030    Blood, Urine Negative Negative    pH, UA 7.0 5.0 - 8.0    Protein,  (A) Negative mg/dL    Urobilinogen, Urine 0.2 <2.0 E.U./dL    Nitrite, Urine Negative Negative    Leukocyte Esterase, Urine Negative Negative    Microscopic Examination YES     Urine Type NotGiven     Urine Reflex to Culture Not Indicated    Troponin   Result Value Ref Range    Troponin <0.01 <0.01 ng/mL   Protime-INR   Result Value Ref Range    Protime 13.2 11.7 - 14.5 sec    INR 1.01 0.87 - 1.14   Microscopic Urinalysis   Result Value Ref Range Bacteria, UA None Seen None Seen /HPF    Hyaline Casts, UA 0 0 - 8 /LPF    WBC, UA 1 0 - 5 /HPF    RBC, UA 0 0 - 4 /HPF    Epithelial Cells, UA 0 0 - 5 /HPF   EKG 12 Lead   Result Value Ref Range    Ventricular Rate 49 BPM    Atrial Rate 49 BPM    P-R Interval 198 ms    QRS Duration 84 ms    Q-T Interval 478 ms    QTc Calculation (Bazett) 431 ms    P Axis 53 degrees    R Axis 16 degrees    T Axis 69 degrees    Diagnosis       Marked sinus bradycardiaST abnormality, possible digitalis effectAbnormal ECG       Screenings  NIH Stroke Scale  Interval: Baseline  Level of Consciousness (1a): Alert  LOC Questions (1b): Answers both correctly  LOC Commands (1c): Performs both tasks correctly  Best Gaze (2): Normal  Visual (3): No visual loss  Facial Palsy (4): Normal symmetrical movement  Motor Arm, Left (5a): No drift  Motor Arm, Right (5b): No drift  Motor Leg, Left (6a): No drift  Motor Leg, Right (6b): No drift  Limb Ataxia (7): Absent  Sensory (8): Normal  Best Language (9): No aphasia  Dysarthria (10): Normal  Extinction and Inattention (11): No abnormality  Total: 0Glasgow Coma Scale  Eye Opening: Spontaneous  Best Verbal Response: Confused (Oriented to person, place, and situation. Disoriented to time.)  Best Motor Response: Obeys commands  Godwin Coma Scale Score: 14       Is this patient to be included in the SEP-1 Core Measure due to severe sepsis or septic shock? No   Exclusion criteria - the patient is NOT to be included for SEP-1 Core Measure due to: Infection is not suspected      MDM and ED Course    Patient afebrile and nontoxic. She arrives alert and protecting her airway, no painful or respiratory distress. No hypoglycemia. EKG with sinus bradycardia, no STEMI, troponin normal, no chest pain, doubt ACS. No observed malignant dysrhythmia. Neuro exam without focal deficits, however patient with gait ataxia and unable to take more than a single step at bedside without assistance.   Symptom onset >2 days from presentation to the emergency department, no indication for stroke alert, patient does not a candidate for consideration of TNK as she is outside the time window. CT head without evidence of hemorrhage or mass-effect. CTA with chronic cerebrovascular stenosis, no acute large vessel occlusion. There is incidentally noted thyroid nodule. Laboratory work-up without evidence of acute endorgan dysfunction, patient does have hyponatremia which appears acute and likely contributing to the weakness, suspect hypovolemia. Patient received IV fluids and aspirin. Will also trial meclizine although did not have much improvement with this medication. Blood pressure also to be markedly elevated, patient received intravenous hydralazine to improvement. Patient also reported improvement in her headache with blood pressure control, I have overall low suspicion for occult SAH/ICH and do not anticipate any benefit from urgent lumbar puncture at this time to justify risk. Nothing to suggest CNS infection or other infectious etiology. Given patient's hyponatremia and gait ataxia, will benefit from admission for further evaluation and care, rule out possible CVA. Case discussed with internal medicine team who will admit. Patient alert, hemodynamically stable and in no distress at time of admission.     During the patient's ED course, the patient was given:  Medications   aspirin chewable tablet 324 mg (has no administration in time range)   hydrALAZINE (APRESOLINE) injection 5 mg (has no administration in time range)   acetaminophen (TYLENOL) tablet 650 mg (has no administration in time range)   0.9 % sodium chloride bolus (0 mLs IntraVENous Stopped 1/28/23 0941)   hydrALAZINE (APRESOLINE) injection 5 mg (5 mg IntraVENous Given 1/28/23 0841)   iopamidol (ISOVUE-370) 76 % injection 120 mL (120 mLs IntraVENous Given 1/28/23 0908)   meclizine (ANTIVERT) tablet 25 mg (25 mg Oral Given 1/28/23 0846)        Consults:  None History obtained from: Patient and Family (daughter)    Pertinent social determinants of health: Mental disability (acquired or congenital)    Chronic conditions potentially affecting care:  HTN    Review of other records:  None    Reassessment:  See MDM for details of medications given and reassessment      I Dr. Tete Rm am the primary clinician of record. Critical Care Time    Upon my evaluation, this patient had a high probability of imminent or life-threatening deterioration due to accelerated hypertension requiring intravenous antihypertensive medications, acute gait ataxia which required my direct attention, intervention, and personal management. The total critical care time personally spent while evaluating and treating this patient was 35 minutes exclusive of any time spent doing separately billable procedures. This includes time at the bedside, data interpretation, medication management, monitoring for potential decompensation and physician consultation. Specifics of interventions taken and potentially life-threatening diagnostic considerations are listed above in the medical decision making. Final Impression  1. Ataxic gait    2. Accelerated hypertension    3. Hyponatremia    4. Thyroid nodule        Blood pressure 101/68, pulse 59, temperature 97.9 °F (36.6 °C), temperature source Oral, resp. rate 13, height 5' 2\" (1.575 m), weight 145 lb (65.8 kg), SpO2 97 %, not currently breastfeeding. Disposition:  DISPOSITION Decision To Admit 01/28/2023 11:30:26 AM      Patient Referrals:  No follow-up provider specified. Discharge Medications:  New Prescriptions    No medications on file       Discontinued Medications:  Discontinued Medications    No medications on file       This chart was generated using the [address] [place] [address] [address] dictation system. I created this record but it may contain dictation errors given the limitations of this technology.     Lynn Wolfe DO (electronically signed)  Attending Emergency Physician       Daniel Le DO  01/28/23 9778

## 2023-01-29 LAB
ANION GAP SERPL CALCULATED.3IONS-SCNC: 11 MMOL/L (ref 3–16)
BUN BLDV-MCNC: 26 MG/DL (ref 7–20)
CALCIUM SERPL-MCNC: 9.1 MG/DL (ref 8.3–10.6)
CHLORIDE BLD-SCNC: 93 MMOL/L (ref 99–110)
CO2: 23 MMOL/L (ref 21–32)
CREAT SERPL-MCNC: 1.6 MG/DL (ref 0.6–1.2)
ESTIMATED AVERAGE GLUCOSE: 137 MG/DL
GFR SERPL CREATININE-BSD FRML MDRD: 33 ML/MIN/{1.73_M2}
GLUCOSE BLD-MCNC: 103 MG/DL (ref 70–99)
GLUCOSE BLD-MCNC: 149 MG/DL (ref 70–99)
GLUCOSE BLD-MCNC: 156 MG/DL (ref 70–99)
GLUCOSE BLD-MCNC: 161 MG/DL (ref 70–99)
GLUCOSE BLD-MCNC: 173 MG/DL (ref 70–99)
HBA1C MFR BLD: 6.4 %
OSMOLALITY URINE: 227 MOSM/KG (ref 390–1070)
PERFORMED ON: ABNORMAL
POTASSIUM REFLEX MAGNESIUM: 4 MMOL/L (ref 3.5–5.1)
SODIUM BLD-SCNC: 127 MMOL/L (ref 136–145)
SODIUM BLD-SCNC: 128 MMOL/L (ref 136–145)
SODIUM BLD-SCNC: 130 MMOL/L (ref 136–145)
SODIUM BLD-SCNC: 132 MMOL/L (ref 136–145)
SODIUM URINE: 41 MMOL/L

## 2023-01-29 PROCEDURE — 96372 THER/PROPH/DIAG INJ SC/IM: CPT

## 2023-01-29 PROCEDURE — 96361 HYDRATE IV INFUSION ADD-ON: CPT

## 2023-01-29 PROCEDURE — 99221 1ST HOSP IP/OBS SF/LOW 40: CPT | Performed by: NEUROMUSCULOSKELETAL MEDICINE & OMM

## 2023-01-29 PROCEDURE — 97161 PT EVAL LOW COMPLEX 20 MIN: CPT

## 2023-01-29 PROCEDURE — 36415 COLL VENOUS BLD VENIPUNCTURE: CPT

## 2023-01-29 PROCEDURE — 80048 BASIC METABOLIC PNL TOTAL CA: CPT

## 2023-01-29 PROCEDURE — 6360000002 HC RX W HCPCS: Performed by: INTERNAL MEDICINE

## 2023-01-29 PROCEDURE — 84295 ASSAY OF SERUM SODIUM: CPT

## 2023-01-29 PROCEDURE — 2580000003 HC RX 258: Performed by: NURSE PRACTITIONER

## 2023-01-29 PROCEDURE — 97530 THERAPEUTIC ACTIVITIES: CPT

## 2023-01-29 PROCEDURE — 6370000000 HC RX 637 (ALT 250 FOR IP): Performed by: INTERNAL MEDICINE

## 2023-01-29 PROCEDURE — 97116 GAIT TRAINING THERAPY: CPT

## 2023-01-29 PROCEDURE — G0378 HOSPITAL OBSERVATION PER HR: HCPCS

## 2023-01-29 PROCEDURE — 97165 OT EVAL LOW COMPLEX 30 MIN: CPT

## 2023-01-29 PROCEDURE — 96376 TX/PRO/DX INJ SAME DRUG ADON: CPT

## 2023-01-29 RX ORDER — FUROSEMIDE 20 MG/1
20 TABLET ORAL 2 TIMES DAILY
Status: DISCONTINUED | OUTPATIENT
Start: 2023-01-29 | End: 2023-01-30

## 2023-01-29 RX ORDER — SODIUM CHLORIDE 1000 MG
1 TABLET, SOLUBLE MISCELLANEOUS 2 TIMES DAILY WITH MEALS
Status: DISCONTINUED | OUTPATIENT
Start: 2023-01-29 | End: 2023-01-30 | Stop reason: HOSPADM

## 2023-01-29 RX ORDER — DEXTROSE AND SODIUM CHLORIDE 5; .45 G/100ML; G/100ML
INJECTION, SOLUTION INTRAVENOUS CONTINUOUS
Status: DISCONTINUED | OUTPATIENT
Start: 2023-01-29 | End: 2023-01-29

## 2023-01-29 RX ADMIN — FUROSEMIDE 20 MG: 20 TABLET ORAL at 11:00

## 2023-01-29 RX ADMIN — HEPARIN SODIUM 5000 UNITS: 5000 INJECTION INTRAVENOUS; SUBCUTANEOUS at 07:02

## 2023-01-29 RX ADMIN — ASPIRIN 81 MG: 81 TABLET, CHEWABLE ORAL at 08:18

## 2023-01-29 RX ADMIN — DEXTROSE AND SODIUM CHLORIDE: 5; 450 INJECTION, SOLUTION INTRAVENOUS at 02:09

## 2023-01-29 RX ADMIN — ACETAMINOPHEN 650 MG: 325 TABLET ORAL at 02:09

## 2023-01-29 RX ADMIN — LEVOTHYROXINE SODIUM 50 MCG: 0.03 TABLET ORAL at 07:02

## 2023-01-29 RX ADMIN — SODIUM CHLORIDE TAB 1 GM 1 G: 1 TAB at 21:39

## 2023-01-29 RX ADMIN — HEPARIN SODIUM 5000 UNITS: 5000 INJECTION INTRAVENOUS; SUBCUTANEOUS at 16:37

## 2023-01-29 RX ADMIN — ROSUVASTATIN 20 MG: 10 TABLET, FILM COATED ORAL at 21:42

## 2023-01-29 RX ADMIN — CLONIDINE HYDROCHLORIDE 0.2 MG: 0.1 TABLET ORAL at 21:41

## 2023-01-29 RX ADMIN — HYDRALAZINE HYDROCHLORIDE 10 MG: 20 INJECTION INTRAMUSCULAR; INTRAVENOUS at 16:48

## 2023-01-29 RX ADMIN — LOSARTAN POTASSIUM 50 MG: 25 TABLET, FILM COATED ORAL at 08:18

## 2023-01-29 RX ADMIN — CLONIDINE HYDROCHLORIDE 0.2 MG: 0.1 TABLET ORAL at 16:36

## 2023-01-29 RX ADMIN — DONEPEZIL HYDROCHLORIDE 10 MG: 5 TABLET, FILM COATED ORAL at 21:42

## 2023-01-29 RX ADMIN — MEMANTINE 5 MG: 5 TABLET ORAL at 08:18

## 2023-01-29 RX ADMIN — SODIUM CHLORIDE TAB 1 GM 1 G: 1 TAB at 11:00

## 2023-01-29 RX ADMIN — CLONIDINE HYDROCHLORIDE 0.2 MG: 0.1 TABLET ORAL at 08:18

## 2023-01-29 RX ADMIN — HEPARIN SODIUM 5000 UNITS: 5000 INJECTION INTRAVENOUS; SUBCUTANEOUS at 22:35

## 2023-01-29 ASSESSMENT — PAIN DESCRIPTION - LOCATION
LOCATION: HEAD

## 2023-01-29 ASSESSMENT — PAIN DESCRIPTION - DESCRIPTORS
DESCRIPTORS: ACHING

## 2023-01-29 ASSESSMENT — PAIN SCALES - GENERAL
PAINLEVEL_OUTOF10: 8
PAINLEVEL_OUTOF10: 0
PAINLEVEL_OUTOF10: 8
PAINLEVEL_OUTOF10: 8

## 2023-01-29 NOTE — PROGRESS NOTES
[facility] Department   Phone: [phone]    Physical Therapy    [x] Initial Evaluation            [] Daily Treatment Note         [] Discharge Summary      Patient: Silvestre Camilo   : 1945   MRN: [id number]   Date of Service:  2023  Admitting Diagnosis: Ataxia  Current Admission Summary: Admitted with weakness and headache. Recently diagnosed with dementia (daughter at bedside does not think she is demented. States brain had some atrophy on imaging but does report patient had some confusion with meds). Patient very active, lives alone, and drives. Past Medical History:  has a past medical history of Alopecia, Anxiety and depression, Cancer ([facility] [address] Chronic kidney disease (CKD) stage G3b/A1, moderately decreased glomerular filtration rate (GFR) between 30-44 mL/min/1.73 square meter and albuminuria creatinine ratio less than 30 mg/g (HCC), CTS (carpal tunnel syndrome), History of rheumatic fever, Hypertension, Hypothyroidism, Meniere disease, Mixed hyperlipidemia, Nausea, Obesity, Reflux esophagitis, Renal impairment, Sleep apnea, and Type II or unspecified type diabetes mellitus without mention of complication, not stated as uncontrolled. Past Surgical History:  has a past surgical history that includes Hysterectomy (Bilateral, ); Mastoid surgery; Tonsillectomy; Appendectomy; Ankle surgery (Right); Upper gastrointestinal endoscopy (2011); Colonoscopy (04/15/2011); and Breast surgery (Right). Discharge Recommendations: Silvestre Camilo scored a 18/24 on the AM-PAC short mobility form. Current research shows that an AM-PAC score of 18 or greater is typically associated with a discharge to the patient's home setting. Based on the patient's AM-PAC score and their current functional mobility deficits, it is recommended that the patient have 2-3 sessions per week of Physical Therapy at d/c to increase the patient's independence.   At this time, this patient demonstrates the endurance and safety to discharge home with HHPT and a follow up treatment frequency of 2-3x/wk.  Please see assessment section for further patient specific details.    If patient discharges prior to next session this note will serve as a discharge summary.  Please see below for the latest assessment towards goals.     HOME HEALTH CARE: LEVEL 1 STANDARD    - Initial home health evaluation to occur within 24-48 hours, in patient home   - Therapy to evaluate with goal of regaining prior level of functioning   - Therapy to evaluate if patient has Home Health Aide needs for personal care    *Patient presents with cognitive deficits that warrant 24 hour supervision at discharge.    DME Required For Discharge: no DME required at discharge  Precautions/Restrictions: high fall risk, up as tolerated  Weight Bearing Restrictions: no restrictions  Required Braces/Orthotics: no braces required  Positional Restrictions:no positional restrictions    Pre-Admission Information   Lives With: alone, has a small dog                 Type of Home: house  Home Layout: one level, laundry in basement  Home Access:  2 step to enter with handrail.  Handrails are located on L side. - has a ramp in front but usually enters through the garage   Bathroom Layout: walk in shower nad tub - typically takes a bath, has grab bars   Bathroom Equipment: grab bars in shower  Toilet Height: standard height   Home Equipment:  no equipment   Transfer Assistance: Independent without use of device  Ambulation Assistance:Independent without use of device  ADL Assistance: independent with all ADL's  IADL Assistance: independent with homemaking tasks  Active :        [x] Yes                 [] No  Hand Dominance: [] Left                 [x] Right  Current Employment: retired.   Hobbies: takes care of dogQing   Recent Falls: no recent falls     Examination   Vision:   Vision Gross Assessment: Impaired - wears glasses in reading -  having trouble with vision in both eyes - reports it has resolved   Hearing:   hard of hearing, left hearing aid, right hearing aid  Observation:   General Observation:  Pt on RA on arrival.  Posture:   Mild forward head, rounded shoulders, and increased thoracic kyphosis in sitting and standing. Sensation:   denies numbness and tingling  ROM:   (B) LE AROM WFL  Strength:   (B) LE strength grossly WFL  Therapist Clinical Decision Making (Complexity): low complexity  Clinical Presentation: stable      Subjective  General: Pt supine in bed on arrival, agreeable to participate in PT/OT initial evaluation. Pain: 0/10  Pain Interventions: not applicable     Functional Mobility  Bed Mobility  Supine to Sit: stand by assistance  Scooting: stand by assistance- toward EOB  Comments: Supine to sit: HOB elevated  Transfers  Sit to stand transfer: stand by assistance  Stand to sit transfer: stand by assistance  Comments:  Ambulation  Surface:level surface  Assistive Device: no device  Assistance: stand by assistance  Distance: 150'  Gait Mechanics: Near normal, slightly decreased speed  Comments:    Stair Mobility  Number of Steps: 10  Step Height: 6 inch  Hand Rails: (R) ascending handrail  Device: no device  Assistance: stand by assistance  Comments: Reciprocal pattern on ascent and descent  Wheelchair Mobility:  No w/c mobility completed on this date. Comments:  Balance  Static Sitting Balance: good: independent with functional balance in unsupported position  Dynamic Sitting Balance: fair (+): maintains balance at SBA/supervision without use of UE support  Static Standing Balance: fair (+): maintains balance at SBA/supervision without use of UE support  Dynamic Standing Balance: fair (+): maintains balance at SBA/supervision without use of UE support  Comments:    Other Therapeutic Interventions  Pt participated in ADLs with OT. See OT note for assist levels.     Functional Outcomes  AM-PAC Inpatient Mobility Raw Score : 18              Cognition  Overall Cognitive Status: Impaired  Arousal/Alterness: appropriate responses to stimuli  Following Commands: follows one step commands consistently  Attention Span: attends with cues to redirect  Memory: decreased recall of biographical information, decreased recall of recent events, decreased short term memory  Safety Judgement: decreased awareness of need for assistance, decreased awareness of need for safety  Problem Solving: assistance required to generate solutions, assistance required to implement solutions  Insights: decreased awareness of deficits  Initiation: requires cues for some     Demonstrates word finding difficulties during PLOF interview and conversation   Impulsive with mobility  No awareness of short term memory deficits or how they may impact safety at home. Daughter at bedside and also appears unconcerned regarding cognitive deficits and impact of deficits on living on her own, attributing them to medications. Orientation:    oriented to person, oriented to place, and oriented to situation, oriented to year and month  Command Following:     Education  Barriers To Learning: cognition and hearing  Patient Education: patient educated on goals, PT role and benefits, plan of care, general safety, functional mobility training, discharge recommendations  Learning Assessment:  patient will require reinforcement due to cognitive deficits, patient is not an independent learner    Assessment  Activity Tolerance: Pt tolerataed the PT/OT initial evaluation well with no significant limitations. Pts BP remained WFL throughout. Impairments Requiring Therapeutic Intervention: decreased functional mobility, decreased safety awareness, decreased cognition, decreased endurance, decreased balance  Prognosis: good  Clinical Assessment: Pt is a 68 y/o female who presents to the hospital with weakness and a headache.  Prior to admission to the hospital, the pt was independent for performance of functional mobility tasks without the use of an AD. Today, the pt presented slightly below her baseline level of function and will benefit from skilled PT to facilitate return to PLOF and to promote independence. Pt has cognitive deficits that would warrant 24 hour supervision at home as well. Safety Interventions: patient left in chair, chair alarm in place, call light within reach, nurse notified, and family/caregiver present    Plan  Frequency: 3-5 x/per week  Current Treatment Recommendations: strengthening, balance training, functional mobility training, transfer training, gait training, stair training, endurance training, neuromuscular re-education, patient/caregiver education, home exercise program, and safety education    Goals  Patient Goals:  To return home   Short Term Goals:  Time Frame: By discharge  Patient will complete bed mobility at Independent   Patient will complete transfers at Independent   Patient will ambulate 150 ft with use of no device at Independent  Patient will ascend/descend 2 stairs with (L) ascending handrail at [facility] independent    Therapy Session Time      Individual Group Co-treatment   Time In     1028   Time Out     1108   Minutes     40     Timed Code Treatment Minutes: 25 Minutes  Total Treatment Minutes: 40 Minutes      *Units split with OT due to observation status    Electronically Signed By: Leyla Kaufman, PT  Ammy Gunderson PT, DPT [id number]

## 2023-01-29 NOTE — PLAN OF CARE
Shift assessment complete. BP elevated. Other VSS. Evening medications administered per MAR. Pt is [place]. Has a hearing aide in left ear, none in right ear. Pt is alert and oriented x4. Call light within reach. Bed alarm engaged. Pt encouraged to call for assistance. No signs of distress. Pt updated on plan of care. Problem: Discharge Planning  Goal: Discharge to home or other facility with appropriate resources  Outcome: Progressing     Problem: Pain  Goal: Verbalizes/displays adequate comfort level or baseline comfort level  Outcome: Progressing     Problem: Skin/Tissue Integrity  Goal: Absence of new skin breakdown  Description: 1. Monitor for areas of redness and/or skin breakdown  2. Assess vascular access sites hourly  3. Every 4-6 hours minimum:  Change oxygen saturation probe site  4. Every 4-6 hours:  If on nasal continuous positive airway pressure, respiratory therapy assess nares and determine need for appliance change or resting period.   Outcome: Progressing     Problem: ABCDS Injury Assessment  Goal: Absence of physical injury  Outcome: Progressing     Problem: Safety - Adult  Goal: Free from fall injury  Outcome: Progressing

## 2023-01-29 NOTE — CONSULTS
Neurology Consult       Patient Identification  Pt Name: Rolf Sorensen  MRN: [id number]  [place] 1945  Date of evaluation: 1/29/2023  Provider: Mendy Connolly  Requesting provider: Gabrielle Bamberger  Reason: r/o CVA     HPI:  Patient was seen this morning for a neurological consultation She was admitted 28/1/23 morning for generalized weakness, unsteady gait, headache and abdominal cramps. She was found to have hyponatremia (), hyperglycemia, and uncontrolled HTN with significant sinus bradycardia of 49. Her initial stroke work up was negative ( CT and CTA). She is 69years old right handed lady with known prior hx of mild cognitive impairment, Ebonie's disease, HTN and glucose intolerance with significant hearing impairment. Patient is referring to some medications mix up at home and delay in the delivery of her meds. .    She is feeling a lot better today after NA correction and BP control. Offers no complaints and wondering if she can go home. She has been ambulating with staff assistance and eating her meals on her own without dysphagia. She reports having a headache on and off for the past 3 days. No fevers or chills or cough. No nausea or vomiting. Has no prior clinical; history of stroke in the past ( a small old lacune was noted in basal ganglia on CT scan). Chronic       PMH:    Chronic renal insufficiency  Mild Dementia/ cognitive impairment  Hearing impairment  Ebonie's  Anxiety and depression  HTN  Hypothyroidism  HLD  GERD  Sleep apnea  Diet controlled DM  Uterine cancer (remote)    Current Medications:  Home Medications[]Expand by Default           Prior to Admission medications    Medication Sig Start Date End Date Taking?  Authorizing Provider   irbesartan (AVAPRO) 150 MG tablet Take 1 tablet by mouth daily 1/24/23     Matias Whitlock MD   cloNIDine (CATAPRES) 0.3 MG tablet TAKE 1 TABLET 3 TIMES A DAY 1/24/23     Matias Whitlock MD   levothyroxine (SYNTHROID) 50 MCG tablet TAKE 1 TABLET ONCE DAILY 1/9/23     Lesli Route, APRN - CNP   sertraline (ZOLOFT) 100 MG tablet TAKE 1 TABLET EVERY MORNING(TOTAL 125MG) 1/9/23     Lesli Route, APRN - CNP   memantine (NAMENDA) 5 MG tablet TAKE 1 TABLET DAILY 1/9/23     Lesli Route, APRN - CNP   vitamin B-12 (CYANOCOBALAMIN) 500 MCG tablet Take 500 mcg by mouth daily       Historical Provider, MD   triamcinolone (KENALOG) 0.1 % ointment Apply topically 2 times daily as needed.  1/3/23     Tim Banks MD   hydroCHLOROthiazide (HYDRODIURIL) 25 MG tablet Take 1 tablet by mouth every morning 9/22/22     Becky Arauz MD   ipratropium (ATROVENT) 0.06 % nasal spray 2 sprays by Each Nostril route 2 times daily 9/19/22     Rafael Piedra MD   Cinnamon 500 MG CAPS Take 1,000 mg by mouth       Historical Provider, MD   rosuvastatin (CRESTOR) 20 MG tablet Take 1 tablet by mouth once daily 6/24/22     Rafael Piedra MD   aspirin 325 mg tablet Take 81 mg by mouth daily       Historical Provider, MD   donepezil (ARICEPT) 10 MG tablet Take 1 tablet by mouth nightly 6/6/22     M Molly Goldberg MD   Biotin 61609 MCG TABS Take 5,000 mcg by mouth        Historical Provider, MD   Apoaequorin (PREVAGEN EXTRA STRENGTH) 20 MG CAPS Take by mouth        Historical Provider, MD   Continuous Blood Gluc Sensor (FREESTYLE DENI 14 DAY SENSOR) MISC CHECK BLOOD SUGARS THREE TIMES DAILY 12/20/21     Faviola Wade MD   Continuous Blood Gluc  (FREESTYLE DENI 14 DAY READER) GILSON 1 each by Does not apply route 3 times daily 7/20/21     Faviola Wade MD   Continuous Blood Gluc  (FREESTYLE DENI 14 DAY READER) GILSON Use as needed 7/20/21     Faviola Wade MD   hydroCHLOROthiazide (HYDRODIURIL) 25 MG tablet Take 1 tablet by mouth every morning 3/21/21     Faviola Wade MD   rosuvastatin (CRESTOR) 20 MG tablet Take 1 tablet by mouth daily 3/15/21     Faviola Wade MD   [facility] Alcohol Swabs 70 % PADS   8/4/20 Historical Provider, MD   UNABLE TO FIND Brain Strong (Memory Support)PREVAGEN       Historical Provider, MD   acetaminophen (APAP EXTRA STRENGTH) 500 MG tablet Take 2 tablets by mouth every 6 hours as needed for Pain  Patient taking differently: Take 200 mg by mouth every 6 hours as needed for Pain 1/22/20     Nena Saldana MD           Allergies:  Patient has no known allergies. Family History :  She does not smoke cigarettes. she lives at home alone and has been able to ambulate and drive one her own           REVIEW OF SYSTEMS:       Constitutional: Denies fever, sweats, reports generalized weakness, unsteady gait  Eyes: No visual changes or diplopia. No scleral icterus. Ent: positive for hearing loss (wears hearing aid), , and recurrent dizziness and vertigo. No mouth sores or sore throat. Cardiovascular: No chest pain, dyspnea on exertion, palpitations or loss of consciousness. Respiratory: positive for smoker cough. No wheezing, no sputum production. No hemoptysis. Gastrointestinal: cramps and  abdominal pain recently, No appetite loss, blood in stools. No change in bowel habits. Genitourinary: No dysuria, trouble voiding, or hematuria. Musculoskeletal: arthritic knee joints  pain   Integumentary: No rash or pruritus. Neurological: No chx of stroke, hx of mild dementia  Positive for headache for 3 days, no migraine, diplopia. there is poor balance and change in gait, . No lateralized weakness or numbness        Clinical Examination:  Stable vitals no fever. HR 62    She is awake and alert, in NAD, sitting up in bed finishing her breakfast. Hard of hearing, better with hearing aid use. No dysphaiga or dysarthria or aphasia. Follows commands and oriented to self, place and person and tiime  No lateralized weakness or numbness  Intact finger to nose, no nystagmus or ptosis or facial asymmetry. Rest of CN normal except for hearing. Symmetrical DTR; +1.  No atrophy or cog wheeling or drift  Patient is able to get out of bed with minimal assistance, ambulated for few steps, turned and sat on her own, she tends to move quickly and was advised to slow down and take her time upon getting up and moving around  No ataxia. No shuffling  Neck soft and supple no bruits  Heart regular s1 s2  Abd. Soft, + BS no tenderness  Lungs; clear  Ext. No edema    Investigations:    Sinus bradycardia 61 on telemetry    Old brain MRI scan 29/3/22:  1. No acute intracranial abnormality per unenhanced brain MRI. No acute   stroke, intracranial hemorrhage or mass effect. 2.  Symmetric generalized cerebral and cerebellar volume loss. 3.  Mild chronic small vessel ischemic disease. Small right basal ganglia   old lacunar infarct. Head CT scan 28/1/23    EXAMINATION:   CT OF THE HEAD WITHOUT CONTRAST  1/28/2023 9:02 am       TECHNIQUE:   CT of the head was performed without the administration of intravenous   contrast. Automated exposure control, iterative reconstruction, and/or weight   based adjustment of the mA/kV was utilized to reduce the radiation dose to as   low as reasonably achievable. COMPARISON:   10/24/2022       HISTORY:   ORDERING SYSTEM PROVIDED HISTORY: atypical headache, gait ataxia   TECHNOLOGIST PROVIDED HISTORY:   Reason for exam:->atypical headache, gait ataxia   Has a \"code stroke\" or \"stroke alert\" been called? ->No   Decision Support Exception - unselect if not a suspected or confirmed   emergency medical condition->Emergency Medical Condition (MA)   Reason for Exam: atypical headache, gait ataxia       FINDINGS:   BRAIN/VENTRICLES: There is no acute intracranial hemorrhage, mass effect or   midline shift. No abnormal extra-axial fluid collection. The gray-white   differentiation is maintained without evidence of an acute infarct. There is   prominence of the ventricles and sulci due to global parenchymal volume loss.    There are nonspecific areas of hypoattenuation within the periventricular and   subcortical white matter, which likely represent chronic microvascular   ischemic change. ORBITS: The visualized portion of the orbits demonstrate no acute abnormality. SINUSES: The visualized paranasal sinuses and mastoid air cells demonstrate   no acute abnormality. SOFT TISSUES/SKULL: No acute abnormality of the visualized skull or soft   tissues. Impression   No acute intracranial abnormality. CTA OF THE HEAD AND NECK WITH CONTRAST 1/28/2023 9:03 am:       TECHNIQUE:   CTA of the head and neck was performed with the administration of intravenous   contrast. Multiplanar reformatted images are provided for review. MIP images   are provided for review. Stenosis of the internal carotid arteries measured   using NASCET criteria. Automated exposure control, iterative reconstruction,   and/or weight based adjustment of the mA/kV was utilized to reduce the   radiation dose to as low as reasonably achievable. 3D reconstructed images   were performed on a separate workstation and provided for review. COMPARISON:   Concurrent head CT, MR brain 03/29/2022       HISTORY:   ORDERING SYSTEM PROVIDED HISTORY: atypical headache, gait ataxia           FINDINGS:       CTA NECK:       AORTIC ARCH/ARCH VESSELS: No dissection or arterial injury. No significant   stenosis of the brachiocephalic or subclavian arteries. Incidental note made   of bovine aortic arch with common origin of the right innominate artery and   left common carotid artery, anatomical variant. CAROTID ARTERIES: No dissection, arterial injury, or hemodynamically   significant stenosis by NASCET criteria. VERTEBRAL ARTERIES: No dissection, arterial injury, or significant stenosis. SOFT TISSUES: The lung apices are clear. No cervical or superior mediastinal   lymphadenopathy. The larynx and pharynx are unremarkable. No acute   abnormality of the salivary and thyroid glands.   18 mm heterogeneous nodule   involving the thyroid isthmus. BONES: No acute osseous abnormality. CTA HEAD:       ANTERIOR CIRCULATION: Severe luminal narrowing involving the left M1 segment   with improved caliber of the distal MCA branches. The right proximal MCA and   ACAs are patent without high-grade stenosis or occlusion. Intracranial ICAs   are patent without high-grade stenosis or occlusion. No aneurysm. POSTERIOR CIRCULATION: Chronic occlusion/aplasic right proximal PCA with   diminutive right posterior communicating artery supplying the right PCA   territory. No significant stenosis of the vertebral, basilar, or left   posterior cerebral arteries. No aneurysm. OTHER: No dural venous sinus thrombosis on this non-dedicated study. BRAIN: See concurrent head CT. Impression   1. No flow-limiting stenosis or occlusion within the neck. 2. Severe luminal narrowing involving the left M1 segment. 3. Chronic occlusion/aplasic right proximal PCA with diminutive right   posterior communicating artery supplying the right PCA territory. 4. 18 mm heterogeneous nodule involving the thyroid isthmus. Nonemergent   thyroid ultrasound recommended for further evaluation on an outpatient basis. Clinical Examination :       194/89 on admission. /84   Pulse 76   Temp 98 °F (36.7 °C) (Oral)   Resp 24   Wt 155 lb (70.3 kg)   SpO2 98%   BMI 29.29 kg/m²    Sinus bradycardia on telemetry     Patient is awake and alert, eyes open, mild dysarthria, no aphasia. cooperating and answers simple questions and follows commands. she is oriented x3, in NAD   No aphasia  No facial asymmetry, no visual fields cuts to confrontation. Tongue in midline, No restlessness or agitation or tremor  Moving both sides, there is left hemiparesis with drift of left arm, , fair /fist formation.  Can raise foot off bed 4/5 motor  symmetrically, can grasp and make a fist and raise feet above bed level. Full extraocular eye movements. Pupils 2 mm symmetrical and reactive. No facial asymmetry or ptosis or nystagmus  Tongue in midline  Neck supple, ? Soft right carotid bruits  Lungs; decreased air exchange, no wheezing  Abd. Soft, no tenderness  Ext. No edema  Gait, no tested      A+P:     1- Generalized weakness and gait unsteadiness; improved. likely secondary to metabolic reasons of hyponatremia, dehydration. .. 2- Cerebrovascular disease and hx of prior small lacunar stroke. Acute CNS event is not likely.   3- Cognitive impairment with mild dementia per history  4- Ebonie's disease per history  5- HTN, HLD, DM  6- Renal insufficiency        PT/OT to evaluate gait, and safety to return home  Daily baby ASA, Statin  Continue Donepezil and Memantine    Optimize metabolic and medical profile  Check B12 level      Thank you for the consultation    Ryland Donis MD  Neurology  [phone]

## 2023-01-29 NOTE — PROGRESS NOTES
Rx Reply: It's because of her renal function but its been the same for awhile now and she also has been on 20mg for awhile as well. It is fine to give   Status: Done Today 2136 by Mellisa Aguilar, [facility]   Reason: Adjust Times   Message: Im getting a warning saying that 20mg is too high of a dose on this medication and that I need to notify provider, im waiting for his response. Do you know?     Sent: Today 2132 by Reggy Gottron, RN

## 2023-01-29 NOTE — CONSULTS
[facility] 3  Consultation Note    Reason for Consult:  Hyponatremia  Requesting Physician:  Neel CARRILLO    CHIEF COMPLAINT:  Headache    History obtained from records and patient. HISTORY OF PRESENT ILLNESS:                Abdirizak Kothari  is 69 y.o. y.o. female with significant past medical history of Dementia, CKD 3a/b, baseline crea 1.6, Hyponatremia with resolution, HTN, Hypothyroidism, GERD, Sleep Apnea, DM who presents with Headache and admitted for Ataxia and Hyponatremia. Na was 121 on presentation and now 127. SBP elevated. On Sertraline and HCTZ. Initially on NSS, now on 1/2NSS. No nausea. No h/o seizures. No regular NSAID use. Past Medical History:     has a past medical history of Alopecia, Anxiety and depression, Cancer ([facility] [address] Chronic kidney disease (CKD) stage G3b/A1, moderately decreased glomerular filtration rate (GFR) between 30-44 mL/min/1.73 square meter and albuminuria creatinine ratio less than 30 mg/g (HCC), CTS (carpal tunnel syndrome), History of rheumatic fever, Hypertension, Hypothyroidism, Meniere disease, Mixed hyperlipidemia, Nausea, Obesity, Reflux esophagitis, Renal impairment, Sleep apnea, and Type II or unspecified type diabetes mellitus without mention of complication, not stated as uncontrolled. Past Surgical History:     has a past surgical history that includes Hysterectomy (Bilateral, 1975); Mastoid surgery; Tonsillectomy; Appendectomy; Ankle surgery (Right); Upper gastrointestinal endoscopy (04/14/2011); Colonoscopy (04/15/2011); and Breast surgery (Right).    Current Medications:    Current Facility-Administered Medications: dextrose 5 % and 0.45 % sodium chloride infusion, , IntraVENous, Continuous  ondansetron (ZOFRAN-ODT) disintegrating tablet 4 mg, 4 mg, Oral, Q8H PRN **OR** ondansetron (ZOFRAN) injection 4 mg, 4 mg, IntraVENous, Q6H PRN  magnesium hydroxide (MILK OF MAGNESIA) 400 MG/5ML suspension 30 mL, 30 mL, Oral, Daily PRN  acetaminophen (TYLENOL) tablet 650 mg, 650 mg, Oral, Q6H PRN **OR** acetaminophen (TYLENOL) suppository 650 mg, 650 mg, Rectal, Q6H PRN  heparin (porcine) injection 5,000 Units, 5,000 Units, SubCUTAneous, 3 times per day  0.9 % sodium chloride infusion, , IntraVENous, Continuous  aspirin chewable tablet 81 mg, 81 mg, Oral, Daily  donepezil (ARICEPT) tablet 10 mg, 10 mg, Oral, Nightly  levothyroxine (SYNTHROID) tablet 50 mcg, 50 mcg, Oral, Daily  memantine (NAMENDA) tablet 5 mg, 5 mg, Oral, Daily  rosuvastatin (CRESTOR) tablet 20 mg, 20 mg, Oral, Nightly  losartan (COZAAR) tablet 50 mg, 50 mg, Oral, Daily  cloNIDine (CATAPRES) tablet 0.2 mg, 0.2 mg, Oral, TID  hydrALAZINE (APRESOLINE) injection 10 mg, 10 mg, IntraVENous, Q6H PRN  dextrose bolus 10% 125 mL, 125 mL, IntraVENous, PRN **OR** dextrose bolus 10% 250 mL, 250 mL, IntraVENous, PRN  glucagon (rDNA) injection 1 mg, 1 mg, SubCUTAneous, PRN  dextrose 10 % infusion, , IntraVENous, Continuous PRN  insulin lispro (HUMALOG) injection vial 0-4 Units, 0-4 Units, SubCUTAneous, TID WC  insulin lispro (HUMALOG) injection vial 0-4 Units, 0-4 Units, SubCUTAneous, Nightly  Allergies:    Patient has no known allergies. Social History:     reports that she has never smoked. She has never used smokeless tobacco. She reports that she does not drink alcohol and does not use drugs. Family History:   family history includes Breast Cancer in her mother; Crohn's Disease in her child and grandchild; Diabetes in her father, maternal grandmother, and mother; Heart Disease in her father, mother, and sister; Heart Failure in her mother; Kidney Disease in her father.      REVIEW OF SYSTEMS:    System review was done , pertinent positives are mentioned in the HPI    Positive fatigue  No chest pain nor palpitations  No SOB, coughing nor hemoptysis  No abdominal pain, nausea, vomiting nor diarrhea  No fever/chills  No leg swelling  No change in urine output nor gross hematuria    PHYSICAL EXAM:    Vitals:  BP (!) 148/48   Pulse 52   Temp 97.3 °F (36.3 °C) (Oral)   Resp 14   Ht 5' 2\" (1.575 m)   Wt 150 lb 3.2 oz (68.1 kg)   SpO2 98%   BMI 27.47 kg/m²   CONSTITUTIONAL:  awake, alert, cooperative, no apparent distress, and appears stated age  EYES:  Lids and lashes normal, pupils equal, round   NECK:  Supple, symmetrical, trachea midline, no adenopathy, thyroid symmetric, not enlarged and no tenderness, skin normal  HEMATOLOGIC/LYMPHATICS:  no cervical lymphadenopathy  LUNGS:  No increased work of breathing, good air exchange, clear to auscultation bilaterally, no crackles or wheezing  CARDIOVASCULAR:  Normal apical impulse, regular rate and rhythm, normal S1 and S2  ABDOMEN:  Normal bowel sounds, soft, non-distended, non-tender, no masses palpated, no hepatosplenomegaly  MUSCULOSKELETAL:  There is no redness, warmth, or swelling of the joints. No edema. NEUROLOGIC:  Awake, alert, oriented to name, place and time.     SKIN:  no bruising or bleeding    DATA:    CBC:   Lab Results   Component Value Date/Time    WBC 8.0 01/28/2023 08:07 AM    RBC 5.34 01/28/2023 08:07 AM    HGB 14.6 01/28/2023 08:07 AM    HCT 42.0 01/28/2023 08:07 AM    MCV 78.6 01/28/2023 08:07 AM    MCH 27.3 01/28/2023 08:07 AM    MCHC 34.8 01/28/2023 08:07 AM    RDW 14.6 01/28/2023 08:07 AM     01/28/2023 08:07 AM    MPV 8.2 01/28/2023 08:07 AM     BMP:   Lab Results   Component Value Date/Time     01/29/2023 04:17 AM    K 4.0 01/29/2023 04:17 AM    CL 93 01/29/2023 04:17 AM    CO2 23 01/29/2023 04:17 AM    BUN 26 01/29/2023 04:17 AM    CREATININE 1.6 01/29/2023 04:17 AM    CALCIUM 9.1 01/29/2023 04:17 AM    GFRAA 38 02/21/2022 09:48 AM    GFRAA 53 04/10/2013 09:07 AM    LABGLOM 33 01/29/2023 04:17 AM    GLUCOSE 173 01/29/2023 04:17 AM   Magnesium:    Lab Results   Component Value Date/Time    MG 1.90 03/02/2020 12:07 PM   Phosphorus:    Lab Results   Component Value Date/Time    PHOS 3.9 03/12/2021 11:24 AM     TSH WNL  Sosm 276  Uosm 227  Peggy 41    UA 100protein and neg blood    CT Head-no acute process    Cxray-borderline cardiomegaly    IMPRESSION/RECOMMENDATIONS:    CKD 3a-stable. Baseline crea 1.5. Quantify urine protein. Hyponatremia- increase ADH in setting of headache+thiazide. 1.5L/24H fluid restriction. Na 121 to 127, rate of correction acceptable. NaCl tabs bid. With higher bp, will add Lasix 20mg po bid. Avoid thiazides. Recheck Na at 3pm.   HTN-better. H/O Dementia  H/O DM  Headache-w/u and control per Medicine  DNR-CCA  Dispo-once Na 130 and above and crea stable. Thank you for the consult. We will follow this patient along the hospitalization.     Afshan Vicente MD

## 2023-01-29 NOTE — PROGRESS NOTES
[address] [facility],[address] Msb Department   Phone: [phone]    Occupational Therapy    [x] Initial Evaluation            [] Daily Treatment Note         [] Discharge Summary      Patient: Lezlie Primrose   : 1945   MRN: [id number]   Date of Service:  2023    Admitting Diagnosis:  Ataxia  Current Admission Summary: Admitted with weakness and headache. Recently diagnosed with dementia (daughter at bedside does not think she is demented. States brain had some atrophy on imaging but does report patient had some confusion with meds). Patient very active, lives alone, and drives. Diagnosed with hyponatremia which is being attributed to difficulty with taking medications appropriately at home. Past Medical History:  has a past medical history of Alopecia, Anxiety and depression, Cancer ([facility] [address] Chronic kidney disease (CKD) stage G3b/A1, moderately decreased glomerular filtration rate (GFR) between 30-44 mL/min/1.73 square meter and albuminuria creatinine ratio less than 30 mg/g (HCC), CTS (carpal tunnel syndrome), History of rheumatic fever, Hypertension, Hypothyroidism, Meniere disease, Mixed hyperlipidemia, Nausea, Obesity, Reflux esophagitis, Renal impairment, Sleep apnea, and Type II or unspecified type diabetes mellitus without mention of complication, not stated as uncontrolled. Past Surgical History:  has a past surgical history that includes Hysterectomy (Bilateral, ); Mastoid surgery; Tonsillectomy; Appendectomy; Ankle surgery (Right); Upper gastrointestinal endoscopy (2011); Colonoscopy (04/15/2011); and Breast surgery (Right). Discharge Recommendations: Lezlie Primrose scored a 19/24 on the AM-PAC ADL Inpatient form. Current research shows that an AM-PAC score of 18 or greater is typically associated with a discharge to the patient's home setting.  Based on the patient's AM-PAC score, and their current ADL deficits, it is recommended that the patient have 2-3 sessions per week of Occupational Therapy at d/c to increase the patient's independence. At this time, this patient demonstrates the endurance and safety to discharge home with [facility] and a follow up treatment frequency of 2-3x/wk. Please see assessment section for further patient specific details. If patient discharges prior to next session this note will serve as a discharge summary. Please see below for the latest assessment towards goals. Patient presents with cognitive deficits that warrant 24 hour supervision at discharge. DME Required For Discharge: fall alert device    Precautions/Restrictions: high fall risk  Weight Bearing Restrictions: no restrictions  [] Right Upper Extremity  [] Left Upper Extremity [] Right Lower Extremity  [] Left Lower Extremity     Required Braces/Orthotics: no braces required   [] Right  [] Left  Positional Restrictions:no positional restrictions    Pre-Admission Information   Lives With: alone, has a small dog     Type of Home: house  Home Layout: one level, laundry in basement  Home Access:  2 step to enter with handrail. Handrails are located on L side. - has a ramp in front but usually enters through the garage   Bathroom Layout: walk in shower nad tub - typically takes a bath, has grab bars   Bathroom Equipment: grab bars in shower  Toilet Height: standard height   Home Equipment:  no equipment   Transfer Assistance: Independent without use of device  Ambulation Assistance:Independent without use of device  ADL Assistance: independent with all ADL's  IADL Assistance: independent with homemaking tasks  Active :        [x] Yes  [] No  Hand Dominance: [] Left  [x] Right  Current Employment: retired.    Hobbies: takes care of dogQing   Recent Falls: no recent falls     Examination   Vision:   Vision Gross Assessment: Impaired - wears glasses in reading - having trouble with vision in both eyes - reports it has resolved   Hearing: left hearing aid, right hearing aid - has one at hospital but not charged  Perception:   WFL  Observation:   General Observation:  on telemetry, IV infusing   Posture:   Fair - forward head, rounded shoulders  Sensation:   denies numbness and tingling  Proprioception:    WFL  Tone:   Normotonic  Coordination Testing:   WFL    ROM:   (B) UE AROM WFL  Strength:   (B) UE strength grossly WFL    Therapist Clinical Decision Making (Complexity): low complexity  Clinical Presentation: stable      Subjective  General: pt in bed on arrival with daughter at bedside - no complaint of pain   Pain: 0/10  Pain Interventions: not applicable        Activities of Daily Living  Basic Activities of Daily Living  Upper Extremity Dressing: minimal assistance  General Comments: min A to joaquina shoes at EOB - assist to change gown in bathroom - pt encouraged to brush teeth but declined saying her son was coming     Functional Mobility  Bed Mobility  Supine to Sit: stand by assistance  Comments:  Transfers  Sit to stand transfer:stand by assistance  Stand to sit transfer: stand by assistance  Bed / Chair transfer: stand by assistance. Bed / Chair equipment: no device  Comments:  Functional Mobility:  Standing Balance: stand by assistance.     Standing Balance Comment: pt required assist to manage IV, impulsive with movement at times  Functional Mobility: .  stand by assistance  Functional Mobility Activity: to/from bathroom, in hallway, up/down 10 stairs with one handrail   Functional Mobility Device Use: no device  Functional Mobility Comment: pt SBA during mobility, no LOB, no use of device - pt had IV initially and needed constant reminders to maintain line integrity     Other Therapeutic Interventions    Functional Outcomes  AM-PAC Inpatient Daily Activity Raw Score: 19    Cognition  Overall Cognitive Status: Impaired  Arousal/Alterness: appropriate responses to stimuli  Following Commands: follows one step commands consistently  Attention Span: attends with cues to redirect  Memory: decreased recall of biographical information, decreased recall of recent events, decreased short term memory  Safety Judgement: decreased awareness of need for assistance, decreased awareness of need for safety  Problem Solving: assistance required to generate solutions, assistance required to implement solutions  Insights: decreased awareness of deficits  Initiation: requires cues for some    Demo's Word finding difficulties during PLOF interview and conversation   Impulsive with mobility  No awareness of STM deficits or how they may impact safety at home. Daughter at bedside and also appears unconcerned regarding cognitive deficits and impact of deficits on living on her own, attributing them to medications. Orientation:    oriented to person, oriented to place, and oriented to situation, oriented to year and month  Command Following:     accurately follows one step commands     Education  Barriers To Learning: cognition and hearing - [place] limited communication   Patient Education: patient educated on OT role and benefits, plan of care, precautions, ADL adaptive strategies, family education, discharge recommendations  Learning Assessment:  patient will require reinforcement due to cognitive deficits, patient is not an independent learner    Assessment  Activity Tolerance: good - /49 at beginning of session, 170/51 after session   Impairments Requiring Therapeutic Intervention: decreased functional mobility, decreased ADL status, decreased safety awareness, decreased cognition, decreased IADL  Prognosis: fair  Clinical Assessment: Patient presents below baseline function secondary to hyponatremia. Patient will benefit from OT services to address the above deficits. Patient is typically indep with ADLs and functional mobility. Pt's family reports she has had word finding issues over the last 6 months.   Patient is primarily limited by cognition. Patient will benefit from OT services to maximize safety and independence in ADLs, transfers and functional mobility prior to returning home. Patient currently requiring assistance : SBA/min A ADLs and SBA for functional mobility. Safety Interventions: patient left in chair, chair alarm in place, call light within reach, nurse notified, and family/caregiver present    Plan  Frequency: 3-5 x/per week  Current Treatment Recommendations: balance training, functional mobility training, transfer training, ADL/self-care training, IADL training, home management training, cognitive/perceptual training, safety education, and equipment evaluation/education    Goals  Patient Goals: to return home    Short Term Goals:  Time Frame: discharge  Patient will complete upper body ADL at set up assistance   Patient will complete lower body ADL at set up assistance   Patient will complete toileting at modified independent   Patient will complete functional transfers at modified independent   Patient will complete functional mobility at modified independent   Patient to gather and transport IADL items at stand by assistance     Therapy Session Time     Individual Group Co-treatment   Time In    1028   Time Out    1108   Minutes    40        Timed Code Treatment Minutes:   0  Total Treatment Minutes:  40     Patient in observation status, seen in conjunction with PT, billed for an evaluation unit only.    Electronically Signed By: Maggie Melendez [id number]

## 2023-01-29 NOTE — PROGRESS NOTES
[facility]    1/29/2023 10:15 AM        Name: James Hall . Admitted: 1/28/2023  Primary Care Provider: Fox Saeed MD (Tel: [phone])      Subjective:  . Admitted with weakness and headache. Recently diagnosed with dementia (daughter at bedside does not think she is demented. States brain had some atrophy on imaging but does report patient had some confusion with meds). Patient very active, lives alone, and drives.     Reviewed interval ancillary notes    Current Medications  dextrose 5 % and 0.45 % sodium chloride infusion, Continuous  ondansetron (ZOFRAN-ODT) disintegrating tablet 4 mg, Q8H PRN   Or  ondansetron (ZOFRAN) injection 4 mg, Q6H PRN  magnesium hydroxide (MILK OF MAGNESIA) 400 MG/5ML suspension 30 mL, Daily PRN  acetaminophen (TYLENOL) tablet 650 mg, Q6H PRN   Or  acetaminophen (TYLENOL) suppository 650 mg, Q6H PRN  heparin (porcine) injection 5,000 Units, 3 times per day  0.9 % sodium chloride infusion, Continuous  aspirin chewable tablet 81 mg, Daily  donepezil (ARICEPT) tablet 10 mg, Nightly  levothyroxine (SYNTHROID) tablet 50 mcg, Daily  memantine (NAMENDA) tablet 5 mg, Daily  rosuvastatin (CRESTOR) tablet 20 mg, Nightly  losartan (COZAAR) tablet 50 mg, Daily  cloNIDine (CATAPRES) tablet 0.2 mg, TID  hydrALAZINE (APRESOLINE) injection 10 mg, Q6H PRN  dextrose bolus 10% 125 mL, PRN   Or  dextrose bolus 10% 250 mL, PRN  glucagon (rDNA) injection 1 mg, PRN  dextrose 10 % infusion, Continuous PRN  insulin lispro (HUMALOG) injection vial 0-4 Units, TID WC  insulin lispro (HUMALOG) injection vial 0-4 Units, Nightly        Objective:  BP (!) 148/48   Pulse 52   Temp 97.3 °F (36.3 °C) (Oral)   Resp 14   Ht 5' 2\" (1.575 m)   Wt 150 lb 3.2 oz (68.1 kg)   SpO2 98%   BMI 27.47 kg/m²     Intake/Output Summary (Last 24 hours) at 1/29/2023 1015  Last data filed at 1/28/2023 2323  Gross per 24 hour   Intake 403.21 ml   Output --   Net 403.21 ml      Wt Readings from Last 3 Encounters:   01/29/23 150 lb 3.2 oz (68.1 kg)   01/28/23 145 lb (65.8 kg)   01/26/23 145 lb (65.8 kg)       General appearance:  Appears comfortable  Eyes: Sclera clear. Pupils equal.  ENT: Moist oral mucosa. Trachea midline, no adenopathy. Cardiovascular: Regular rhythm, normal S1, S2. No murmur. No edema in lower extremities  Respiratory: Not using accessory muscles. Good inspiratory effort. Clear to auscultation bilaterally, no wheeze or crackles. GI: Abdomen soft, no tenderness, not distended, normal bowel sounds  Musculoskeletal: No cyanosis in digits, neck supple  Neurology: CN 2-12 grossly intact. No speech or motor deficits  Psych: Normal affect. Alert and oriented in time, place and person  Skin: Warm, dry, normal turgor    Labs and Tests:  CBC:   Recent Labs     01/28/23  0807   WBC 8.0   HGB 14.6        BMP:    Recent Labs     01/28/23  0807 01/28/23  1514 01/29/23  0043 01/29/23  0417   * 126* 130* 127*   K 4.2 4.4  --  4.0   CL 83* 89*  --  93*   CO2 24 22  --  23   BUN 26* 23*  --  26*   CREATININE 1.4* 1.5*  --  1.6*   GLUCOSE 156* 131*  --  173*     Hepatic:   Recent Labs     01/28/23  0807   AST 32   ALT 20   BILITOT 0.7   ALKPHOS 85         Problem List  Principal Problem:    Ataxia  Resolved Problems:    * No resolved hospital problems. *       Assessment & Plan:   Hyponatremia-was on hctz, urine osmolality was elevated. Will consult nephrology. Discussed with Dr Wade Van. Orthostatic hypotension-was symptomatic. Has been getting fluids. Will repeat and check echo. Dizziness-MRI brain has been ordered. Nephrology consulted. CKD-creat 1.6 and appears to be at baseline. Borderline Dm 2-a1c 6. 4. Appears to be diet controlled. Dementia-recent diagnosis. On aricept and namenda. Diet: ADULT DIET;  Regular; 3 carb choices (45 gm/meal)  Code:DNR-CCA  DVT PPX: heparin      Jacobo Shabbir JAVIER Johansen   1/29/2023 10:15 AM

## 2023-01-29 NOTE — PROGRESS NOTES
Pt ambulated to bathroom to urinate around 0014 this morning. Pt stated she felt \"good\". After urinating, pt stated she felt like she \"was about to pass out\". Sara Wilson RN and Doug Paiz RN responded and helped this RN return pt to bed. Pt BP was checked before movement and was 689'Q systolic. Upon returning to bed, BP was 130 systolic. Provider notified. Orthostatic BP's ordered and complete. Positive results. Provider ordered stat sodium level of 130. Provider notified of that also. Pt now in bed. Call light within reach. Bed alarm engaged. States she feels better now that she is semi fowlers. Pt encouraged to call for assistance.

## 2023-01-30 ENCOUNTER — APPOINTMENT (OUTPATIENT)
Dept: MRI IMAGING | Age: 78
DRG: 641 | End: 2023-01-30
Payer: MEDICARE

## 2023-01-30 VITALS
BODY MASS INDEX: 28.19 KG/M2 | TEMPERATURE: 97.6 F | SYSTOLIC BLOOD PRESSURE: 160 MMHG | RESPIRATION RATE: 19 BRPM | WEIGHT: 153.2 LBS | HEART RATE: 62 BPM | OXYGEN SATURATION: 97 % | DIASTOLIC BLOOD PRESSURE: 77 MMHG | HEIGHT: 62 IN

## 2023-01-30 PROBLEM — E87.1 HYPONATREMIA: Status: ACTIVE | Noted: 2023-01-30

## 2023-01-30 LAB
ANION GAP SERPL CALCULATED.3IONS-SCNC: 15 MMOL/L (ref 3–16)
BASOPHILS ABSOLUTE: 0.1 K/UL (ref 0–0.2)
BASOPHILS RELATIVE PERCENT: 0.5 %
BUN BLDV-MCNC: 34 MG/DL (ref 7–20)
CALCIUM SERPL-MCNC: 9.9 MG/DL (ref 8.3–10.6)
CHLORIDE BLD-SCNC: 94 MMOL/L (ref 99–110)
CO2: 24 MMOL/L (ref 21–32)
CREAT SERPL-MCNC: 1.7 MG/DL (ref 0.6–1.2)
EOSINOPHILS ABSOLUTE: 0.3 K/UL (ref 0–0.6)
EOSINOPHILS RELATIVE PERCENT: 2.5 %
GFR SERPL CREATININE-BSD FRML MDRD: 31 ML/MIN/{1.73_M2}
GLUCOSE BLD-MCNC: 141 MG/DL (ref 70–99)
GLUCOSE BLD-MCNC: 173 MG/DL (ref 70–99)
GLUCOSE BLD-MCNC: 201 MG/DL (ref 70–99)
HCT VFR BLD CALC: 42.8 % (ref 36–48)
HEMOGLOBIN: 14.9 G/DL (ref 12–16)
LYMPHOCYTES ABSOLUTE: 2.1 K/UL (ref 1–5.1)
LYMPHOCYTES RELATIVE PERCENT: 19.2 %
MCH RBC QN AUTO: 27.8 PG (ref 26–34)
MCHC RBC AUTO-ENTMCNC: 34.8 G/DL (ref 31–36)
MCV RBC AUTO: 79.9 FL (ref 80–100)
MONOCYTES ABSOLUTE: 0.6 K/UL (ref 0–1.3)
MONOCYTES RELATIVE PERCENT: 5.5 %
NEUTROPHILS ABSOLUTE: 8 K/UL (ref 1.7–7.7)
NEUTROPHILS RELATIVE PERCENT: 72.3 %
PDW BLD-RTO: 15.1 % (ref 12.4–15.4)
PERFORMED ON: ABNORMAL
PERFORMED ON: ABNORMAL
PLATELET # BLD: 229 K/UL (ref 135–450)
PMV BLD AUTO: 8.1 FL (ref 5–10.5)
POTASSIUM REFLEX MAGNESIUM: 3.8 MMOL/L (ref 3.5–5.1)
RBC # BLD: 5.36 M/UL (ref 4–5.2)
SODIUM BLD-SCNC: 133 MMOL/L (ref 136–145)
WBC # BLD: 11 K/UL (ref 4–11)

## 2023-01-30 PROCEDURE — 1200000000 HC SEMI PRIVATE

## 2023-01-30 PROCEDURE — 96372 THER/PROPH/DIAG INJ SC/IM: CPT

## 2023-01-30 PROCEDURE — 6370000000 HC RX 637 (ALT 250 FOR IP): Performed by: INTERNAL MEDICINE

## 2023-01-30 PROCEDURE — 85025 COMPLETE CBC W/AUTO DIFF WBC: CPT

## 2023-01-30 PROCEDURE — 6360000002 HC RX W HCPCS: Performed by: INTERNAL MEDICINE

## 2023-01-30 PROCEDURE — 97530 THERAPEUTIC ACTIVITIES: CPT

## 2023-01-30 PROCEDURE — 70551 MRI BRAIN STEM W/O DYE: CPT

## 2023-01-30 PROCEDURE — 97116 GAIT TRAINING THERAPY: CPT

## 2023-01-30 PROCEDURE — 36415 COLL VENOUS BLD VENIPUNCTURE: CPT

## 2023-01-30 PROCEDURE — 80048 BASIC METABOLIC PNL TOTAL CA: CPT

## 2023-01-30 RX ORDER — SODIUM CHLORIDE 1000 MG
1 TABLET, SOLUBLE MISCELLANEOUS 2 TIMES DAILY WITH MEALS
Qty: 14 TABLET | Refills: 0 | Status: SHIPPED | OUTPATIENT
Start: 2023-01-30 | End: 2023-02-06

## 2023-01-30 RX ADMIN — ASPIRIN 81 MG: 81 TABLET, CHEWABLE ORAL at 09:08

## 2023-01-30 RX ADMIN — LEVOTHYROXINE SODIUM 50 MCG: 0.03 TABLET ORAL at 06:40

## 2023-01-30 RX ADMIN — FUROSEMIDE 20 MG: 20 TABLET ORAL at 09:08

## 2023-01-30 RX ADMIN — MEMANTINE 5 MG: 5 TABLET ORAL at 09:08

## 2023-01-30 RX ADMIN — HEPARIN SODIUM 5000 UNITS: 5000 INJECTION INTRAVENOUS; SUBCUTANEOUS at 06:42

## 2023-01-30 RX ADMIN — CLONIDINE HYDROCHLORIDE 0.2 MG: 0.1 TABLET ORAL at 09:08

## 2023-01-30 RX ADMIN — ACETAMINOPHEN 650 MG: 325 TABLET ORAL at 03:28

## 2023-01-30 RX ADMIN — LOSARTAN POTASSIUM 50 MG: 25 TABLET, FILM COATED ORAL at 09:07

## 2023-01-30 RX ADMIN — SODIUM CHLORIDE TAB 1 GM 1 G: 1 TAB at 09:08

## 2023-01-30 ASSESSMENT — ENCOUNTER SYMPTOMS
VOMITING: 0
EYE REDNESS: 0
BLOOD IN STOOL: 0
EYE DISCHARGE: 0
ABDOMINAL PAIN: 0
SHORTNESS OF BREATH: 0
ABDOMINAL DISTENTION: 0
NAUSEA: 0
PHOTOPHOBIA: 0
CHEST TIGHTNESS: 0
COUGH: 0
DIARRHEA: 0
FACIAL SWELLING: 0
CONSTIPATION: 0

## 2023-01-30 ASSESSMENT — PAIN DESCRIPTION - LOCATION
LOCATION: HEAD
LOCATION: HEAD

## 2023-01-30 ASSESSMENT — PAIN SCALES - GENERAL
PAINLEVEL_OUTOF10: 1
PAINLEVEL_OUTOF10: 3
PAINLEVEL_OUTOF10: 3

## 2023-01-30 ASSESSMENT — PAIN DESCRIPTION - DESCRIPTORS
DESCRIPTORS: ACHING
DESCRIPTORS: ACHING

## 2023-01-30 NOTE — CARE COORDINATION
Discharge Planning Assessment    RN/SW discharge planner met with patient/ (and family member) to discuss reason for admission, current living situation, and potential needs at the time of discharge    Demographics/Insurance verified Yes    Current type of dwelling: Home     Patient from ECF/SW confirmed with: n/a    Living arrangements: Alone    Level of function/Support: Independent      PCP: Daniel Roberson MD    Last Visit to PCP: 1/3/2023    DME: None reported. Active with any community resources/agencies/skilled home care: None reported. Medication compliance issues: None reported. Financial issues that could impact healthcare:  None reported. Tentative discharge plan:OT recommended the patient return home with. [facility]. The SW discussed this with the patient who is agreeable to [facility] and does not have a preference of [facility] agencies. The SW spoke with Jimena Sampson from [facility] who stated they can accept the patient when the patient discharges. [address]  [place], [address] [place] [address]  Phone: [phone]  Fax: [phone]        Discussed with patient and/or family that on the day of discharge home tentative time of discharge will be between 10 AM and noon.     Transportation at the time of discharge: Daughter

## 2023-01-30 NOTE — PLAN OF CARE
Pt remains free from falls & injuries; pt uses call light appropriately. Pt is orientated to self & place; pt is pleasantly confused at times. Pt denies pain, dizziness and shortness of breath. Pt able to ambulate in room, SBA. Pt stated she would like to discharge home and misses her dog. VSS; standard safety precautions in place. Problem: Discharge Planning  Goal: Discharge to home or other facility with appropriate resources  Outcome: Progressing     Problem: Pain  Goal: Verbalizes/displays adequate comfort level or baseline comfort level  Outcome: Progressing     Problem: Skin/Tissue Integrity  Goal: Absence of new skin breakdown  Description: 1. Monitor for areas of redness and/or skin breakdown  2. Assess vascular access sites hourly  3. Every 4-6 hours minimum:  Change oxygen saturation probe site  4. Every 4-6 hours:  If on nasal continuous positive airway pressure, respiratory therapy assess nares and determine need for appliance change or resting period.   Outcome: Progressing     Problem: ABCDS Injury Assessment  Goal: Absence of physical injury  Outcome: Progressing     Problem: Safety - Adult  Goal: Free from fall injury  Outcome: Progressing     Problem: Chronic Conditions and Co-morbidities  Goal: Patient's chronic conditions and co-morbidity symptoms are monitored and maintained or improved  Outcome: Progressing     Problem: Neurosensory - Adult  Goal: Achieves maximal functionality and self care  Outcome: Progressing     Problem: Respiratory - Adult  Goal: Achieves optimal ventilation and oxygenation  Outcome: Progressing     Problem: Cardiovascular - Adult  Goal: Maintains optimal cardiac output and hemodynamic stability  Outcome: Progressing     Problem: Skin/Tissue Integrity - Adult  Goal: Skin integrity remains intact  Outcome: Progressing  Goal: Oral mucous membranes remain intact  Outcome: Progressing     Problem: Musculoskeletal - Adult  Goal: Return mobility to safest level of function  Outcome: Progressing     Problem: Gastrointestinal - Adult  Goal: Minimal or absence of nausea and vomiting  Outcome: Progressing     Problem: Genitourinary - Adult  Goal: Absence of urinary retention  Outcome: Progressing     Problem: Metabolic/Fluid and Electrolytes - Adult  Goal: Electrolytes maintained within normal limits  Outcome: Progressing  Goal: Hemodynamic stability and optimal renal function maintained  Outcome: Progressing  Goal: Glucose maintained within prescribed range  Outcome: Progressing     Problem: Hematologic - Adult  Goal: Maintains hematologic stability  Outcome: Progressing

## 2023-01-30 NOTE — PROGRESS NOTES
[facility] - Inpatient Rehabilitation Department   Phone: [phone]    Physical Therapy    [] Initial Evaluation            [x] Daily Treatment Note         [x] Discharge Summary      Patient: Katia Scott   : 1945   MRN: [id number]   Date of Service:  2023  Admitting Diagnosis: Ataxia  Current Admission Summary: Admitted with weakness and headache. Recently diagnosed with dementia (daughter at bedside does not think she is demented. States brain had some atrophy on imaging but does report patient had some confusion with meds). Patient very active, lives alone, and drives.  Past Medical History:  has a past medical history of Alopecia, Anxiety and depression, Cancer (HCC), Chronic kidney disease (CKD) stage G3b/A1, moderately decreased glomerular filtration rate (GFR) between 30-44 mL/min/1.73 square meter and albuminuria creatinine ratio less than 30 mg/g (HCC), CTS (carpal tunnel syndrome), History of rheumatic fever, Hypertension, Hypothyroidism, Meniere disease, Mixed hyperlipidemia, Nausea, Obesity, Reflux esophagitis, Renal impairment, Sleep apnea, and Type II or unspecified type diabetes mellitus without mention of complication, not stated as uncontrolled.  Past Surgical History:  has a past surgical history that includes Hysterectomy (Bilateral, ); Mastoid surgery; Tonsillectomy; Appendectomy; Ankle surgery (Right); Upper gastrointestinal endoscopy (2011); Colonoscopy (04/15/2011); and Breast surgery (Right).  Discharge Recommendations: Katia Scott scored a 23/24 on the AM-PAC short mobility form.  At this time, no further PT is recommended upon discharge due to independence with functional mobility.  Recommend patient returns to prior setting with prior services.   Patient presents with no physical deficits and independent with functional mobility. However, demonstrates cognitive and safety awareness deficits and thus recommend 24 hour supervision at  discharge. DME Required For Discharge: no DME required at discharge  Precautions/Restrictions: high fall risk, up as tolerated  Weight Bearing Restrictions: no restrictions  Required Braces/Orthotics: no braces required  Positional Restrictions:no positional restrictions    Pre-Admission Information   Lives With: alone, has a small dog                 Type of Home: house  Home Layout: one level, laundry in basement  Home Access:  2 step to enter with handrail. Handrails are located on L side. - has a ramp in front but usually enters through the garage   Bathroom Layout: walk in shower nad tub - typically takes a bath, has grab bars   Bathroom Equipment: grab bars in shower  Toilet Height: standard height   Home Equipment:  no equipment   Transfer Assistance: Independent without use of device  Ambulation Assistance:Independent without use of device  ADL Assistance: independent with all ADL's  IADL Assistance: independent with homemaking tasks  Active :        [x] Yes                 [] No  Hand Dominance: [] Left                 [x] Right  Current Employment: retired. Hobbies: takes care of [employer]   Recent Falls: no recent falls     Examination   Vision:   Vision Gross Assessment: Impaired - wears glasses in reading - having trouble with vision in both eyes - reports it has resolved   Hearing:   hard of hearing, left hearing aid, right hearing aid        Subjective  General: Seated in bedside chair upon arrival with alarm on. States she is just waiting on her mother to come pick her up. Confusion noted and conversation tangential and difficult to follow. Concerned about going home to her dog, but unable to state dog's name. States her home is too much for her to take care of. Wants her sister to come leave with her. Pain: 0/10  Pain Interventions: not applicable     Functional Mobility  Bed Mobility  Bed mobility not completed on this date.   Comments:   Transfers  Sit to stand transfer: Independent  Stand to sit transfer: Independent  Comments:  Ambulation  Surface:level surface  Assistive Device: no device  Assistance: Independent  Distance: 300'  Gait Mechanics: steady gait, decreased michela, able to perform head turns during ambulation without difficulty. Comments:    Stair Mobility  Stair mobility not completed on this date. Comments: demonstrated safety with stairs in previous session. Wheelchair Mobility:  No w/c mobility completed on this date. Comments:  Balance  Static Sitting Balance: good: independent with functional balance in unsupported position  Dynamic Sitting Balance: good: independent with functional balance in unsupported position  Static Standing Balance: good: independent with functional balance in unsupported position  Dynamic Standing Balance: good: independent with functional balance in unsupported position  Comments:    Other Therapeutic Interventions    Functional Outcomes  -PAC Inpatient Mobility Raw Score : 23              Cognition  Overall Cognitive Status: Impaired  Arousal/Alterness: appropriate responses to stimuli  Following Commands: follows one step commands consistently  Attention Span: attends with cues to redirect  Memory: decreased recall of biographical information, decreased recall of recent events, decreased short term memory  Safety Judgement: decreased awareness of need for assistance, decreased awareness of need for safety  Problem Solving: assistance required to generate solutions, assistance required to implement solutions  Insights: decreased awareness of deficits  Initiation: requires cues for some     Demonstrates word finding difficulties during PLOF interview and conversation   No awareness of short term memory deficits or how they may impact safety at home.      Orientation:    oriented to person, disoriented to place (states she is at MRI), and disoriented to situation (states \"they got my meds wrong\"), oriented to year and month  Command Following:     Education  Barriers To Learning: cognition and hearing  Patient Education: patient educated on goals, PT role and benefits, plan of care, general safety, functional mobility training, discharge recommendations  Learning Assessment:  patient will require reinforcement due to cognitive deficits, patient is not an independent learner    Assessment  Activity Tolerance: Good - no fatigue noted  Impairments Requiring Therapeutic Intervention: decreased functional mobility, decreased safety awareness, decreased cognition, decreased endurance, decreased balance  Prognosis: good  Clinical Assessment: Patient has returned to independence with functional mobility. Steady gait demonstrated, however, pt continues to demonstrate cognitive deficits that negatively impact her ability to live alone. Strongly recommend 24 hour supervision for safety at this time. Safety Interventions: patient left in chair, chair alarm in place, call light within reach, and nurse notified    Plan  Discharge    Goals  Patient Goals:  To return home   Short Term Goals:  Time Frame: By discharge  Patient will complete bed mobility at [facility]   Patient will complete transfers at [address] [facility] 1/30  Patient will ambulate 150 ft with use of no device at [facility] 1/30  Patient will ascend/descend 2 stairs with (L) ascending handrail at modified independent    Therapy Session Time      Individual Group Co-treatment   Time In 1006       Time Out 1029       Minutes 23         Timed Code Treatment Minutes: 23 Minutes  Total Treatment Minutes: 23 Minutes          Electronically Signed By: Sneha Maurer PT  Thanks, Sneha Maurer PT, DPT [id number]

## 2023-01-30 NOTE — PROGRESS NOTES
Data- discharge order received, pt verbalized agreement to discharge, disposition to previous residence with the previous Kaitlyn Palencia she had. Action- discharge instructions prepared and given to patient and daughter, pt and daughter verbalized understanding. Medication information packet given r/t NEW and/or CHANGED prescriptions emphasizing name/purpose/side effects, pt and daughter verbalized understanding. Discharge instruction summary: Diet- Regular, Activity- as tolerated, Primary Care Physician as followsButch Franks -[phone] f/u appointment in one week. Prescription medications filled at outpatient [facility]. 1. WEIGHT: Admit Weight: 145 lb (65.8 kg) (01/28/23 0740)        Today  Weight: 153 lb 3.2 oz (69.5 kg) (01/30/23 0700)       2. O2 SAT.: SpO2: 97 % (01/30/23 1100)    Response- Pt belongings gathered, IV removed. Disposition is home, taken to lobby via w/c w/ RN, no complications.

## 2023-01-30 NOTE — PROGRESS NOTES
[facility] Note    Patient Active Problem List   Diagnosis    Sleep apnea    Meniere disease    Hypothyroidism    Chronic eczematous otitis externa of right ear    Disorder of right eustachian tube    Allergic sinusitis    Type 2 diabetes mellitus with chronic kidney disease (HCC)    White coat syndrome with diagnosis of hypertension    Dementia without behavioral disturbance (HCC)    Mixed conductive and sensorineural hearing loss of left ear with restricted hearing of right ear    Chronic kidney disease (CKD) stage G3b/A1, moderately decreased glomerular filtration rate (GFR) between 30-44 mL/min/1.73 square meter and albuminuria creatinine ratio less than 30 mg/g (HCC)    Mixed hyperlipidemia    Right-sided lacunar infarction ([facility] [address]    Ataxia    Hyponatremia       Past Medical History:   has a past medical history of Alopecia, Anxiety and depression, Cancer ([facility] [address] Chronic kidney disease (CKD) stage G3b/A1, moderately decreased glomerular filtration rate (GFR) between 30-44 mL/min/1.73 square meter and albuminuria creatinine ratio less than 30 mg/g ([facility]), CTS (carpal tunnel syndrome), History of rheumatic fever, Hypertension, Hypothyroidism, Meniere disease, Mixed hyperlipidemia, Nausea, Obesity, Reflux esophagitis, Renal impairment, Sleep apnea, and Type II or unspecified type diabetes mellitus without mention of complication, not stated as uncontrolled. Past Social History:   reports that she has never smoked. She has never used smokeless tobacco. She reports that she does not drink alcohol and does not use drugs. Subjective:  Sitting in chair. No complaints. Wants to go home. Dutch Dann Episodes of orthostasis. Review of Systems   Constitutional:  Negative for activity change, appetite change, chills, fatigue, fever and unexpected weight change. HENT:  Negative for congestion and facial swelling.     Eyes:  Negative for photophobia, discharge and redness. Respiratory:  Negative for cough, chest tightness and shortness of breath. Cardiovascular:  Negative for chest pain, palpitations and leg swelling. Gastrointestinal:  Negative for abdominal distention, abdominal pain, blood in stool, constipation, diarrhea, nausea and vomiting. Endocrine: Negative for cold intolerance, heat intolerance and polyuria. Genitourinary:  Negative for decreased urine volume, difficulty urinating, flank pain and hematuria. Musculoskeletal:  Negative for joint swelling and neck pain. Neurological:  Negative for dizziness, seizures, syncope, speech difficulty, light-headedness and headaches. Hematological:  Does not bruise/bleed easily. Psychiatric/Behavioral:  Negative for agitation, confusion and hallucinations. Objective:      BP (!) 159/73   Pulse 76   Temp 97.3 °F (36.3 °C) (Oral)   Resp 18   Ht 5' 2\" (1.575 m)   Wt 153 lb 3.2 oz (69.5 kg)   SpO2 98%   BMI 28.02 kg/m²     Wt Readings from Last 3 Encounters:   01/30/23 153 lb 3.2 oz (69.5 kg)   01/28/23 145 lb (65.8 kg)   01/26/23 145 lb (65.8 kg)       BP Readings from Last 3 Encounters:   01/30/23 (!) 159/73   01/03/23 (!) 147/66   11/04/22 (!) 192/88     Chest- clear  Heart-regular  Abd-soft  Ext- no edema    Labs  Hemoglobin   Date Value Ref Range Status   01/30/2023 14.9 12.0 - 16.0 g/dL Final     Hematocrit   Date Value Ref Range Status   01/30/2023 42.8 36.0 - 48.0 % Final     WBC   Date Value Ref Range Status   01/30/2023 11.0 4.0 - 11.0 K/uL Final     Platelets   Date Value Ref Range Status   01/30/2023 229 135 - 450 K/uL Final     Lab Results   Component Value Date    CREATININE 1.7 (H) 01/30/2023    BUN 34 (H) 01/30/2023     (L) 01/30/2023    K 3.8 01/30/2023    CL 94 (L) 01/30/2023    CO2 24 01/30/2023     Uosm 227  Peggy 41    MRI Brain  Impression:        No acute ischemia. Mild chronic microvascular disease within the periventricular white matter   associated atrophy.      Left temporal lobe encephalomalacia. Old right basal ganglionic lacune. Assessment/Plan:  CKD 3a-stable. Baseline crea 1.5. Quantify urine protein later. Hyponatremia- increase ADH in setting of headache+thiazide. 1.5L/24H fluid restriction. Na 121 to 127, rate of correction acceptable. Now at goal.  Continue NaCl tabs bid for 7 days. Avoid thiazides. Stop Lasix with orthostatic changes. HTN-better. On Losartan and Clonidine. Continue. With orthostatic changes sitting SBP in the 160's acceptable. H/O Dementia  H/O DM  Headache-w/u and control per Medicine  H/O CVA  Seen by Neurology due to Generalized weakness and Gait Unsteadiness  DNR-CCA  Dispo-okay for discharge from renal perspective. Offered to f/u with me in office in 3 weeks but she is unsure if she would go.      Al Mario MD

## 2023-01-30 NOTE — PROGRESS NOTES
CLINICAL PHARMACY NOTE: MEDS TO BEDS    Total # of Prescriptions Filled: 1   The following medications were delivered to the patient:  Sodium chloride     Additional Documentation:  Patient picked up from Outpatient Pharmacy=signed  Dominic Cloud CPhT

## 2023-01-30 NOTE — PROGRESS NOTES
Nutrition Note    RECOMMENDATIONS  PO Diet: Modified diet from 3 to 4 carb choices  ONS: Ordered Ensure Compact BID     NUTRITION ASSESSMENT   Pt triggered for positive nutrition screen indicating wt loss and poor po intake. Hx of dementia, oriented x2 to person/place and no family in room upon visit; information obtained from chart review. Report of abdominal pain upon admission. Wt hx in EMR indicates no significant wt changes. On 3 carb choice diet with 1500 mL fluid restriction per nephrology, no documented intakes. RD will order ONS to offer additional nutrition and will continue to monitor for adequate po intake. Nutrition Related Findings: HbA1c of 6.4% on 1/28. LBM 1/30. +1 non-pitting BLE edema. Wounds: None  Nutrition Education:  Education not indicated   Nutrition Goals: PO intake 50% or greater, by next RD assessment     MALNUTRITION ASSESSMENT   Acute Illness  Malnutrition Status: Insufficient data    NUTRITION DIAGNOSIS   Inadequate oral intake related to inadequate protein-energy intake, pain as evidenced by poor intake prior to admission    CURRENT NUTRITION THERAPIES  ADULT DIET; Regular; 3 carb choices (45 gm/meal); 1500 ml     PO Intake: Unable to assess (no documented intakes)   PO Supplement Intake:None Ordered    ANTHROPOMETRICS  Current Height: 5' 2\" (157.5 cm)  Current Weight: 153 lb 3.2 oz (69.5 kg)    Admission weight: 150 lb (68 kg) (bed wt)  Ideal Body Weight (IBW): 110 lbs  (50 kg)        BMI: 28    COMPARATIVE STANDARDS  Energy (kcal):  3572-9811     Protein (g):         Fluid (mL/day):  2215-9707    The patient will be monitored per nutrition standards of care. Consult dietitian if additional nutrition interventions are needed prior to RD reassessment.      Bucky Coello MS, RD, LD    Contact: [phone]

## 2023-01-30 NOTE — DISCHARGE INSTR - COC
Continuity of Care Form    Patient Name: Gigi Alarcon   :  1945  MRN:  [id number]    Admit date:  2023  Discharge date:  ***    Code Status Order: DNR-CCA   Advance Directives:     Admitting Physician:  Neftali Alicia MD  PCP: Bruce Alva MD    Discharging Nurse: [facility] Unit/Room#: [id number]/5884-61  Discharging Unit Phone Number: ***    Emergency Contact:   Extended Emergency Contact Information  Primary Emergency Contact: [facility] Phone: [phone]  Mobile Phone: [phone]  Relation: Child  Secondary Emergency Contact: [facility] Phone: [phone]  Mobile Phone: [phone]  Relation: Child  Preferred language: English   needed?  No    Past Surgical History:  Past Surgical History:   Procedure Laterality Date    ANKLE SURGERY Right     repair of fracture     APPENDECTOMY      BREAST SURGERY Right     benign    COLONOSCOPY  04/15/2011    HYSTERECTOMY (CERVIX STATUS UNKNOWN) Bilateral 1975    SHARYN for ovarian CA    MASTOID SURGERY      at age 26    TONSILLECTOMY      UPPER GASTROINTESTINAL ENDOSCOPY  2011    with dilitation and biopsy       Immunization History:   Immunization History   Administered Date(s) Administered    COVID-19, MODERNA BLUE border, Primary or Immunocompromised, (age 12y+), IM, 100 mcg/0.5mL 2021, 2021, 2021    Influenza 10/12/2012, 10/21/2013    Influenza Virus Vaccine 10/14/2014    Influenza Whole 10/12/2010    Influenza, FLUAD, (age 72 y+), Adjuvanted, 0.5mL 2020    Influenza, High Dose (Fluzone 65 yrs and older) 2015, 09/15/2016, 10/31/2017, 10/21/2018, 2019, 2021    Pneumococcal Conjugate 13-valent (Zethynx36) 2017    Pneumococcal Polysaccharide (Ggsigpvpk69) 2011, 2022    Td (Adult), 5 Lf Tetanus Toxoid, Pf (Tenivac, Decavac) 10/24/2022    Td, unspecified formulation 2017    Zoster Live (Zostavax) 2015       Active Problems:  Patient Active Problem List   Diagnosis Code    Sleep apnea G47.30    Meniere disease H81.09    Hypothyroidism E03.9    Chronic eczematous otitis externa of right ear H60.8X1    Disorder of right eustachian tube H69.91    Allergic sinusitis J30.9    Type 2 diabetes mellitus with chronic kidney disease ([facility] [address] [address] E11.22    White coat syndrome with diagnosis of hypertension I10    Dementia without behavioral disturbance ([facility] [address] [facility] F03.90    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    Chronic kidney disease (CKD) stage G3b/A1, moderately decreased glomerular filtration rate (GFR) between 30-44 mL/min/1.73 square meter and albuminuria creatinine ratio less than 30 mg/g (HCC) N18.32    Mixed hyperlipidemia E78.2    Right-sided lacunar infarction (HCC) I63.81    Ataxia R27.0    Hyponatremia E87.1       Isolation/Infection:   Isolation            No Isolation          Patient Infection Status       Infection Onset Added Last Indicated Last Indicated By Review Planned Expiration Resolved Resolved By    None active    Resolved    COVID-19 (Rule Out) 23 COVID-19, Rapid (Ordered)   23 Rule-Out Test Resulted            Nurse Assessment:  Last Vital Signs: BP (!) 168/70   Pulse 57   Temp 97.8 °F (36.6 °C) (Temporal)   Resp 18   Ht 5' 2\" (1.575 m)   Wt 153 lb 3.2 oz (69.5 kg)   SpO2 97%   BMI 28.02 kg/m²     Last documented pain score (0-10 scale): Pain Level: 1  Last Weight:   Wt Readings from Last 1 Encounters:   23 153 lb 3.2 oz (69.5 kg)     Mental Status:  {IP PT MENTAL STATUS:47979}    IV Access:  {[facility] IV ACCESS:317116333}    Nursing Mobility/ADLs:  Walking   {[facility] DME FCJC:355464014}  Transfer  {[facility] DME CSJX:872226276}  Bathing  {[facility] DME TTWY:435100198}  Dressing  {[facility] DME YZEU:954932661}  Toileting  {[facility] DME VLNX:677013701}  Feeding  {[facility] DME QSRV:921798359}  Med Admin  {[facility] MARLON ZBG}  Med Delivery   {[facility] MED Delivery:224198189}    Wound Care Documentation and Therapy:        Elimination:  Continence: Bowel: {YES / EM:35285}  Bladder: {YES / SK:50611}  Urinary Catheter: {Urinary Catheter:768050106}   Colostomy/Ileostomy/Ileal Conduit: {YES / RL:89316}       Date of Last BM: ***    Intake/Output Summary (Last 24 hours) at 2023 0837  Last data filed at 2023 0622  Gross per 24 hour   Intake 981.59 ml   Output --   Net 981.59 ml     I/O last 3 completed shifts: In: 1384.8 [P.O.:120;  I.V.:1264.8]  Out: -     Safety Concerns:     { ALDO Safety Concerns:012535255}    Impairments/Disabilities:      [facility] Impairments/Disabilities:106291316}    Nutrition Therapy:  Current Nutrition Therapy:   [facility] Diet List:131349583}    Routes of Feeding: {[facility] DME Other Feedings:873714992}  Liquids: {Slp liquid thickness:39088}  Daily Fluid Restriction: {[facility] DME Yes amt example:324115434}  Last Modified Barium Swallow with Video (Video Swallowing Test): {Done Not Done GIAT:696976786}    Treatments at the Time of Hospital Discharge:   Respiratory Treatments: ***  Oxygen Therapy:  {Therapy; copd oxygen:53606}  Ventilator:    { CC Vent WUWR:060158100}    Rehab Therapies: {THERAPEUTIC INTERVENTION:9838015879}  Weight Bearing Status/Restrictions: [facility] Weight Bearin}  Other Medical Equipment (for information only, NOT a DME order):  {EQUIPMENT:256143633}  Other Treatments: ***    Patient's personal belongings (please select all that are sent with patient):  {[facility] DME Belongings:103039695}    RN SIGNATURE:  {Esignature:349897950}    CASE MANAGEMENT/SOCIAL WORK SECTION    Inpatient Status Date: ***    Readmission Risk Assessment Score:  Readmission Risk              Risk of Unplanned Readmission:  0           Discharging to Facility/ Agency   Name:   Address:  Phone:  Fax:    Dialysis Facility (if applicable)   Name:  Address:  Dialysis Schedule:  Phone:  Fax:    / signature: {Esignature:655503692}    PHYSICIAN SECTION    Prognosis: Good    Condition at Discharge: Stable    Rehab Potential (if transferring to Rehab): Good    Recommended Labs or Other Treatments After Discharge: follow up PCP in 1 week    Physician Certification: I certify the above information and transfer of Naty Johnson  is necessary for the continuing treatment of the diagnosis listed and that she requires Home Care for less 30 days.      Update Admission H&P: No change in H&P    PHYSICIAN SIGNATURE:  Electronically signed by Nathanael Kocher, PA-C on 1/30/23 at 8:37 AM EST

## 2023-01-31 ENCOUNTER — CARE COORDINATION (OUTPATIENT)
Dept: CASE MANAGEMENT | Age: 78
End: 2023-01-31

## 2023-01-31 ENCOUNTER — TELEPHONE (OUTPATIENT)
Dept: FAMILY MEDICINE CLINIC | Age: 78
End: 2023-01-31

## 2023-01-31 DIAGNOSIS — E78.5 HYPERLIPIDEMIA, UNSPECIFIED HYPERLIPIDEMIA TYPE: ICD-10-CM

## 2023-01-31 DIAGNOSIS — F02.80 LATE ONSET ALZHEIMER'S DEMENTIA WITHOUT BEHAVIORAL DISTURBANCE (HCC): ICD-10-CM

## 2023-01-31 DIAGNOSIS — G30.1 LATE ONSET ALZHEIMER'S DEMENTIA WITHOUT BEHAVIORAL DISTURBANCE (HCC): ICD-10-CM

## 2023-01-31 DIAGNOSIS — I10 ESSENTIAL HYPERTENSION: ICD-10-CM

## 2023-01-31 RX ORDER — IRBESARTAN 150 MG/1
150 TABLET ORAL DAILY
Qty: 90 TABLET | Refills: 1 | Status: SHIPPED | OUTPATIENT
Start: 2023-01-31

## 2023-01-31 RX ORDER — ROSUVASTATIN CALCIUM 20 MG/1
TABLET, COATED ORAL
Qty: 90 TABLET | Refills: 3 | Status: SHIPPED | OUTPATIENT
Start: 2023-01-31

## 2023-01-31 RX ORDER — DONEPEZIL HYDROCHLORIDE 10 MG/1
10 TABLET, FILM COATED ORAL NIGHTLY
Qty: 90 TABLET | Refills: 1 | Status: SHIPPED | OUTPATIENT
Start: 2023-01-31

## 2023-01-31 NOTE — CARE COORDINATION
[facility] Care Transitions Initial Follow Up Call    Call within 2 business days of discharge: Yes    Care Transition Nurse contacted the patient by telephone to perform post hospital discharge assessment. Verified name and  with patient as identifiers. Provided introduction to self, and explanation of the Care Transition Nurse role. Patient: Ari Phipps Patient : 1945   MRN: [id number]  Reason for Admission:   Discharge Date: 23 RARS: Readmission Risk Score: 13      Last Discharge  [address]       Date Complaint Diagnosis Description Type Department Provider    23 Headache Ataxic gait . .. ED to Hosp-Admission (Discharged) (ADMITTED) Juan Antonio Soto MD; [facility] .. Was this an external facility discharge? No Discharge Facility:     Challenges to be reviewed by the provider   Additional needs identified to be addressed with provider: No  none               Method of communication with provider: none. Pt states doing much better, denies headache, continues to be a little weak. PT was out this morning to work with her and per [facility], he called PCP because pt did not have of all of her meds and her BP was 178/74. She was taken off hydrochlorothiazide when she was in the hospital. Pt verified this information and stated that the meds will come straight to her home. F/U appts listed below. Agreed to more CTC f/u calls. Care Transition Nurse reviewed discharge instructions with patient who verbalized understanding. The patient was given an opportunity to ask questions and does not have any further questions or concerns at this time. Were discharge instructions available to patient? Yes. Reviewed appropriate site of care based on symptoms and resources available to patient including: When to call 911. The patient agrees to contact the PCP office for questions related to their healthcare.      Advance Care Planning:   Does patient have an Advance Directive: not on file.    Medication reconciliation was performed with  not done today due to pt not having her meds and PT getting clarification from PCP office. , who verbalizes understanding of administration of home medications. Medications reviewed, 1111F entered: not done today due to pt not having her meds and PT getting clarification from PCP office. Was patient discharged with a pulse oximeter? no    Non-face-to-face services provided:  Obtained and reviewed discharge summary and/or continuity of care documents    Offered patient enrollment in the Remote Patient Monitoring (RPM) program for in-home monitoring:  next call . Care Transitions 24 Hour Call    Do you have a copy of your discharge instructions?: Yes  Do you have all of your prescriptions and are they filled?: No  Have you been contacted by a [address]?: No  Have you scheduled your follow up appointment?: Yes  How are you going to get to your appointment?: Car - family or friend to transport  Do you have support at home?: Alone  Do you feel like you have everything you need to keep you well at home?: Yes  Are you an active caregiver in your home?: No  Care Transitions Interventions  No Identified Needs         Follow Up  Future Appointments   Date Time Provider Tevin Alvarez   2/7/2023 10:00 AM Jared Fleming MD [facility]   4/4/2023 10:00 AM Jared Fleming MD [facility] Transition Nurse provided contact information. Plan for follow-up call in 5-7 days based on severity of symptoms and risk factors. Plan for next call: self management-  weakness and headache; Hyponatremia;  Orthostatic Hypotension, f/u appts, HC, do med rec, 1111F, discuss RPM    Jose Cook RN

## 2023-01-31 NOTE — TELEPHONE ENCOUNTER
Roni Guzman a home health physical therapist called to let [address] know he did a start of care and patient got out of hospital yesterday.  She was in there for her blood pressure    There was no nurse added and supervision independent but she does need 3 of her medications plus a metformin which was not on his list    Roni Guzman said her BP was 178/74    Pt does not have a pill box but she has dementia    They took her off hydrochlorothiazide in hospital    He will be adding a nurse to home care due to his judgement and he just wanted to let  know

## 2023-01-31 NOTE — TELEPHONE ENCOUNTER
Medication and Quantity requested:   donepezil (ARICEPT) 10 MG tablet     irbesartan (AVAPRO) 150 MG tablet     rosuvastatin (CRESTOR) 20 MG tablet         Last Visit 01/03/2023        Pharmacy and phone number updated in EPIC:    [facility] - [facility], [phone] - F [phone]

## 2023-01-31 NOTE — DISCHARGE SUMMARY
[facility]    Patient Demographics    Vaishnavi. David Parrish  Date of Birth. 1945  MRN. [id number]     Primary care provider. Akshat Soriano MD  (Tel: [phone])    Admit date: 1/28/2023    Discharge date (blank if same as Note Date): 1/30/2023  Note Date: 1/30/2023     Reason for Hospitalization. Chief Complaint   Patient presents with    Headache     [facility], generalized weakness x 2 days. Dx dementia, hard of hearing. Significant Findings. Principal Problem:    Ataxia  Active Problems:    Hyponatremia  Resolved Problems:    * No resolved hospital problems. *       Problems and results from this hospitalization that need follow up. Hyponatremia  Hypertension  dementia    Significant test results and incidental findings. MRI brain  Impression:     No acute ischemia. Mild chronic microvascular disease within the periventricular white matter   associated atrophy. Left temporal lobe encephalomalacia. Old right basal ganglionic lacune. CTA head and neck  1. No flow-limiting stenosis or occlusion within the neck. 2. Severe luminal narrowing involving the left M1 segment. 3. Chronic occlusion/aplasic right proximal PCA with diminutive right   posterior communicating artery supplying the right PCA territory. 4. 18 mm heterogeneous nodule involving the thyroid isthmus. Nonemergent   thyroid ultrasound recommended for further evaluation on an outpatient basis. CT abd/pelvis  No acute abdominopelvic abnormality. Invasive procedures and treatments. None     [facility] Course. Patient is a 73-year-old female who presented with her daughter for evaluation of headache and ataxia. She was previously at her PCPs 4 days ago who suggested she go to the ED however she did not do that.   Patient has been diagnosed with dementia and was started on Namenda and Aricept. In the ED her initial blood pressure was 178/71 but did go up to 201/74. Her sodium was 121. CT the head was negative for acute process. CTA revealed severe narrowing of the left M1 segment chronic occlusion of the right proximal PCA and an 18 mm nodule along the thyroid isthmus. Patient's daughter suggested she is concerned the patient is not taking her medications appropriately. Patient does drive and live alone. Patient was admitted and seen by neurology as well as nephrology for hyponatremia. Her hydrochlorothiazide was discontinued. I suspect hyponatremia secondary to increased ADH in the setting of headache and thiazide diuretics. She was placed on 1.5 L fluid restriction. She was also started on salt tabs which she will continue for 1 week. Her sodium improved to 133 at time of discharge. Neurology suspects that her ataxia was likely secondary to hyponatremia and dehydration. She did have an MRI of the brain which was negative for acute stroke. On the day of discharge. Patient is alert and oriented although she is very impulsive and somewhat agitated to leave. I did have a lengthy conversation with her daughter who seems to be in some denial about her mother's dementia diagnosis. We discussed the daughter managing patient's medication with pillboxes etc. and not letting her drive for the time being. I had ordered an echocardiogram due to her significant hypertension and history of old lacunar stroke. She also did have some orthostatic hypotension. But given the patient's irritability this can be done on an outpatient basis. Daughter is agreeable. Family is also contemplating patient moving into assisted living. Consults. IP CONSULT TO HOSPITALIST  IP CONSULT TO NEUROLOGY  IP CONSULT TO NEPHROLOGY  IP CONSULT TO HOME CARE NEEDS    Physical examination on discharge day.    BP (!) 160/77   Pulse 62   Temp 97.6 °F (36.4 °C) (Temporal)   Resp 19   Ht 5' 2\" (1.575 m)   Wt 153 lb 3.2 oz (69.5 kg)   SpO2 97%   BMI 28.02 kg/m²   General appearance. Alert. Looks comfortable. HEENT. Sclera clear. Moist mucus membranes. Cardiovascular. Regular rate and rhythm, normal S1, S2. No murmur. Respiratory. Not using accessory muscles. Clear to auscultation bilaterally, no wheeze. Gastrointestinal. Abdomen soft, non-tender, not distended, normal bowel sounds  Neurology. Facial symmetry. No speech deficits. Moving all extremities equally. Extremities. No edema in lower extremities. Skin. Warm, dry, normal turgor    Condition at time of discharge stable    Medication instructions provided to patient at discharge.      Medication List        START taking these medications      sodium chloride 1 g tablet  Take 1 tablet by mouth 2 times daily (with meals) for 7 days  Notes to patient: Use: supplement for sodium  Side effects: stomach upset, nausea             CONTINUE taking these medications      aspirin 325 MG tablet     Biotin 42947 MCG Tabs     Cinnamon 500 MG Caps     cloNIDine 0.3 MG tablet  Commonly known as: CATAPRES  TAKE 1 TABLET 3 TIMES A DAY     donepezil 10 MG tablet  Commonly known as: Aricept  Take 1 tablet by mouth nightly     * FreeStyle Ezra 14 Day [place] Ann-Marie Salvador  1 each by Does not apply route 3 times daily     * FreeStyle Ezra 14 Day [place] Ann-Marie Salvador  Use as needed     [employer] 14 Day Sensor Misc  CHECK BLOOD SUGARS THREE TIMES DAILY     GNP Alcohol Swabs 70 % Pads     ipratropium 0.06 % nasal spray  Commonly known as: ATROVENT  2 sprays by Each Nostril route 2 times daily     irbesartan 150 MG tablet  Commonly known as: AVAPRO  Take 1 tablet by mouth daily     levothyroxine 50 MCG tablet  Commonly known as: Synthroid  TAKE 1 TABLET ONCE DAILY     memantine 5 MG tablet  Commonly known as: NAMENDA  TAKE 1 TABLET DAILY     Prevagen Extra Strength 20 MG Caps  Generic drug: Apoaequorin     rosuvastatin 20 MG tablet  Commonly known as: CRESTOR  Take 1 tablet by mouth once daily     sertraline 100 MG tablet  Commonly known as: ZOLOFT  TAKE 1 TABLET EVERY MORNING(TOTAL 125MG)     UNABLE TO FIND     vitamin B-12 500 MCG tablet  Commonly known as: CYANOCOBALAMIN           * This list has 2 medication(s) that are the same as other medications prescribed for you. Read the directions carefully, and ask your doctor or other care provider to review them with you. STOP taking these medications      acetaminophen 500 MG tablet  Commonly known as: APAP Extra Strength     hydroCHLOROthiazide 25 MG tablet  Commonly known as: HYDRODIURIL     triamcinolone 0.1 % ointment  Commonly known as: KENALOG               Where to Get Your Medications        These medications were sent to [facility], [address] [place] [address], [address] [place] [address] Road      Phone: [phone]   sodium chloride 1 g tablet         Discharge recommendations given to patient. Follow Up. Pcp, Dr Alejandro Gandara in 2 weeks, Dr Rolando Huston in 2 weeks   Disposition. home  Activity. activity as tolerated  Diet: No diet orders on file      Spent 33 minutes in discharge process. Signed:   Rissa Gaffney PA-C     1/30/2023 7:17 PM

## 2023-02-07 ENCOUNTER — CARE COORDINATION (OUTPATIENT)
Dept: CASE MANAGEMENT | Age: 78
End: 2023-02-07

## 2023-02-07 ENCOUNTER — OFFICE VISIT (OUTPATIENT)
Dept: FAMILY MEDICINE CLINIC | Age: 78
End: 2023-02-07

## 2023-02-07 VITALS
RESPIRATION RATE: 16 BRPM | OXYGEN SATURATION: 98 % | SYSTOLIC BLOOD PRESSURE: 130 MMHG | WEIGHT: 155 LBS | DIASTOLIC BLOOD PRESSURE: 88 MMHG | BODY MASS INDEX: 28.52 KG/M2 | TEMPERATURE: 98.7 F | HEIGHT: 62 IN | HEART RATE: 70 BPM

## 2023-02-07 DIAGNOSIS — E87.1 HYPONATREMIA: ICD-10-CM

## 2023-02-07 DIAGNOSIS — N18.32 CHRONIC KIDNEY DISEASE (CKD) STAGE G3B/A1, MODERATELY DECREASED GLOMERULAR FILTRATION RATE (GFR) BETWEEN 30-44 ML/MIN/1.73 SQUARE METER AND ALBUMINURIA CREATININE RATIO LESS THAN 30 MG/G (HCC): ICD-10-CM

## 2023-02-07 DIAGNOSIS — F03.90 DEMENTIA WITHOUT BEHAVIORAL DISTURBANCE (HCC): ICD-10-CM

## 2023-02-07 DIAGNOSIS — I10 ESSENTIAL HYPERTENSION: ICD-10-CM

## 2023-02-07 DIAGNOSIS — R27.0 ATAXIA: ICD-10-CM

## 2023-02-07 DIAGNOSIS — R51.9 NONINTRACTABLE HEADACHE, UNSPECIFIED CHRONICITY PATTERN, UNSPECIFIED HEADACHE TYPE: Primary | ICD-10-CM

## 2023-02-07 NOTE — PROGRESS NOTES
[address] Note    Date: 2/7/2023                                               Liyah Uzbek:     Chief Complaint   Patient presents with    [facility] encounter  1/28/2023 had bad headache         HPI  Patient is here for hospital follow-up. Was seen in [facility] on 1/28/2023 with complaints of a bad headache. She also had generalized weakness for 2 days and some dizziness. In the ED her blood pressure was 178/71, but did go up to 201/74. There was also some confusion secondary to her dementia and CT of the head was negative for acute process. 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 Patient was admitted and seen by neurology as well as nephrology for hyponatremia. Her HCTZ was discontinued. It was thought that the hyponatremia was secondary to increased ADH due to thiazide diuretic. She was placed on 1.5 L fluid restriction. She was also started on salt tabs to be taken for 1 week. Her sodium improved to 133 at the time of discharge. Neurology suspected that her ataxia was likely secondary to hyponatremia and dehydration. She had an MRI of the brain which was negative for acute stroke. She was discharged home 1/30/2023. Since going home, pt feels much better. Denies any headaches. Only complaint is bruising on her abdomen where she received the heparin shots. Is improving. Not really painful. Is no longer taking HCTZ or salt tablets.          Patient Active Problem List    Diagnosis Date Noted    Hyponatremia 01/30/2023    Ataxia 01/28/2023    Right-sided lacunar infarction [facility]) 04/04/2022    Chronic kidney disease (CKD) stage G3b/A1, moderately decreased glomerular filtration rate (GFR) between 30-44 mL/min/1.73 square meter and albuminuria creatinine ratio less than 30 mg/g (HCC) 07/19/2021    Mixed hyperlipidemia 07/19/2021    Mixed conductive and sensorineural hearing loss of left ear with restricted hearing of right ear 05/03/2021    Dementia without behavioral disturbance ([facility] 09/11/2020    White coat syndrome with diagnosis of hypertension 01/31/2020    Type 2 diabetes mellitus with chronic kidney disease ([facility] 02/19/2018    Disorder of right eustachian tube 01/08/2018    Allergic sinusitis 01/08/2018    Chronic eczematous otitis externa of right ear 05/02/2017    Hypothyroidism 11/16/2016    Sleep apnea     Meniere disease        Past Medical History:   Diagnosis Date    Alopecia     Anxiety and depression     Cancer ([facility]     uterine, several years ago; follows w/ gyn yearly    Chronic kidney disease (CKD) stage G3b/A1, moderately decreased glomerular filtration rate (GFR) between 30-44 mL/min/1.73 square meter and albuminuria creatinine ratio less than 30 mg/g ([facility]) 07/19/2021    CTS (carpal tunnel syndrome)      History of rheumatic fever     Hypertension     Hypothyroidism     Meniere disease     bilateral ears    Mixed hyperlipidemia 07/19/2021    Nausea     associated with Meniere's dz    Obesity     Reflux esophagitis     Renal impairment     Sleep apnea     no longer needs cpap (per pt)    Type II or unspecified type diabetes mellitus without mention of complication, not stated as uncontrolled        Current Outpatient Medications   Medication Sig Dispense Refill    metFORMIN (GLUCOPHAGE) 500 MG tablet       irbesartan (AVAPRO) 150 MG tablet Take 1 tablet by mouth daily 90 tablet 1    rosuvastatin (CRESTOR) 20 MG tablet Take 1 tablet by mouth once daily 90 tablet 3    donepezil (ARICEPT) 10 MG tablet Take 1 tablet by mouth nightly 90 tablet 1    cloNIDine (CATAPRES) 0.3 MG tablet TAKE 1 TABLET 3 TIMES A DAY 60 tablet 0    levothyroxine (SYNTHROID) 50 MCG tablet TAKE 1 TABLET ONCE DAILY 90 tablet 0    sertraline (ZOLOFT) 100 MG tablet TAKE 1 TABLET EVERY MORNING(TOTAL 125MG) 90 tablet 0    memantine (NAMENDA) 5 MG tablet TAKE 1 TABLET DAILY 90 tablet 0    vitamin B-12 (CYANOCOBALAMIN) 500 MCG tablet Take 500 mcg by mouth daily      ipratropium (ATROVENT) 0.06 % nasal spray 2 sprays by Each Nostril route 2 times daily 15 mL 1    Cinnamon 500 MG CAPS Take 1,000 mg by mouth      aspirin 325 mg tablet Take 81 mg by mouth daily      Biotin 95547 MCG TABS Take 5,000 mcg by mouth       Apoaequorin (PREVAGEN EXTRA STRENGTH) 20 MG CAPS Take by mouth       Continuous Blood Gluc Sensor (FREESTYLE DENI 14 DAY SENSOR) MISC CHECK BLOOD SUGARS THREE TIMES DAILY 2 each 5    Continuous Blood Gluc  (FREESTYLE DENI 14 DAY READER) GILSON 1 each by Does not apply route 3 times daily 1 Device 5    Continuous Blood Gluc  (FREESTYLE DENI 14 DAY READER) GILSON Use as needed 1 Device 5    GNP Alcohol Swabs 70 % PADS       sodium chloride 1 g tablet Take 1 tablet by mouth 2 times daily (with meals) for 7 days 14 tablet 0     No current facility-administered medications for this visit. No Known Allergies    Review of Systems  No ataxia, no edema    Vitals:  /88 (Site: Right Upper Arm, Position: Sitting, Cuff Size: Large Adult)   Pulse 70   Temp 98.7 °F (37.1 °C)   Resp 16   Ht 5' 2\" (1.575 m)   Wt 155 lb (70.3 kg)   SpO2 98%   BMI 28.35 kg/m²     Wt Readings from Last 3 Encounters:   02/07/23 155 lb (70.3 kg)   01/30/23 153 lb 3.2 oz (69.5 kg)   01/28/23 145 lb (65.8 kg)        Physical Exam  General:  Well-appearing, NAD, alert, non-toxic  HEENT:  Normocephalic, atraumatic. Pupils equal and round. CHEST/LUNGS: CTAB, no crackles, no wheeze, no rhonchi. Symmetric rise  CARDIOVASCULAR: RRR,  no murmur, no rub  EXTREMETIES: Normal movement of all extremities  SKIN:  +ecchymosis of lower abdomen  PSYCH:  A+O x 3; normal affect  NEURO:  GCS 15, CN2-12 grossly intact, no focal motor/sensory deficits, no cerebellar deficits, normal gait, normal speech      Assessment/Plan     81-year-old female here for hospital follow-up.   Had recent hyponatremia likely caused by HCTZ. Patient has correctly discontinued the HCTZ. We will check BMP to assess both her sodium and her creatinine given her chronic kidney disease. Patient's symptoms have resolved, and she is back to her baseline. Blood pressure is at goal.  Continue clonidine and irbesartan. Continue donepezil and memantine for dementia. I had a long discussion with the patient about long-term placement, she currently lives alone in her house. States that she plans to sell her house later this year and moved in with her brother. Follow-up for wellness exam in 3 months. Discharged in stable condition. 1. Nonintractable headache, unspecified chronicity pattern, unspecified headache type  2. Hyponatremia  -     Basic Metabolic Panel; Future  3. Ataxia  4. Dementia without behavioral disturbance ([facility]  5. Chronic kidney disease (CKD) stage G3b/A1, moderately decreased glomerular filtration rate (GFR) between 30-44 mL/min/1.73 square meter and albuminuria creatinine ratio less than 30 mg/g (HCC)  -     Basic Metabolic Panel; Future  6. Essential hypertension     Orders Placed This Encounter   Procedures    Basic Metabolic Panel     Standing Status:   Future     Number of Occurrences:   1     Standing Expiration Date:   2/7/2024       No follow-ups on file.     Evon Barrientos MD, MD    2/7/2023  11:03 AM

## 2023-02-07 NOTE — CARE COORDINATION
[facility] Transitions Follow Up Call    2023    Patient: Laury Willis  Patient : 1945   MRN: [id number]  Reason for Admission: weakness and headache; Hyponatremia; Orthostatic Hypotension  Discharge Date: 23 RARS: Readmission Risk Score: 13         Spoke with: dana    [facility] attempted outreach for care transition follow up call. Left HIPPA compliant message and contact information for call back. Care Transitions Subsequent and Final Call    Subsequent and Final Calls  Care Transitions Interventions  Other Interventions:              Follow Up  Future Appointments   Date Time Provider Tevin Alvarez   3/1/2023  3:00 PM Rajendra Christopher MD AFL [facility] DEVEN [facility]   3/7/2023  9:00 AM MD SANDY Purcell - RODRICK   2023 10:00 AM MD Kota Purcell LPN
6

## 2023-02-09 ENCOUNTER — CARE COORDINATION (OUTPATIENT)
Dept: CASE MANAGEMENT | Age: 78
End: 2023-02-09

## 2023-02-09 NOTE — CARE COORDINATION
[facility] Care Transitions Follow Up Call    Patient Current Location:  Home: [place] [id number]    [facility] Care Coordinator contacted the patient by telephone to follow up after admission on 23. Verified name and  with patient as identifiers. Patient: Jennifer Sousa  Patient : 1945   MRN: [id number]  Reason for Admission:  weakness and headache; Hyponatremia; Orthostatic Hypotension  Discharge Date: 23 RARS: Readmission Risk Score: 13      Needs to be reviewed by the provider   Additional needs identified to be addressed with provider: No  none             Method of communication with provider: none. Spoke with Jennifer Sousa who reported that she is doing good. Patient reported that she do have a little headache and stomach pain but nothing to be alarmed about. Patient reported that doctor came in and did a visit with her this past Tuesday and the visit went well. Patient denied cp, sob, cough, dizziness, n/v, diarrhea, fever, or chills. Patient report that appetite and fluid intake is good and denied any problems with bowel or bladder. Patient reported that she is taking all medications as ordered. Patient denied any other needs at this time. Patient instructed to continue to monitor s/s, reporting any that may present to MD immediately for early intervention. Patient is agreeable to f/u calls. Addressed changes since last contact:  none  Discussed follow-up appointments. If no appointment was previously scheduled, appointment scheduling offered: Yes. Is follow up appointment scheduled within 7 days of discharge? Yes.     Follow Up  Future Appointments   Date Time Provider Tevin Alvarez   3/1/2023  3:00 PM MD ROSS [place]   2023 10:00 AM MD SANDY Ibrahim [place] - DYD   2023  9:00 AM MD SANDY Ibrahim [place] - DYD     Non-[facility] follow up appointment(s): dana    N Care Coordinator reviewed medical action plan with patient and discussed any barriers to care and/or understanding of plan of care after discharge. Discussed appropriate site of care based on symptoms and resources available to patient including: PCP  Specialist  Home health  When to call 911. The patient agrees to contact the PCP office for questions related to their healthcare. Advance Care Planning   The patient has the following advanced directives on file:  Advance Directives       Power of [address] Will ACP-Advance Directive ACP-Power of     Not on File Not on File Not on File Not on File            The patient has appointed the following active healthcare agents:    Primary Decision Maker: Karyna Vilchis - Child - [phone]    Secondary Decision Maker: Marie Justin - Child - [phone]         Patients top risk factors for readmission: medical condition-. Patient Active Problem List   Diagnosis    Sleep apnea    Meniere disease    Hypothyroidism    Chronic eczematous otitis externa of right ear    Disorder of right eustachian tube    Allergic sinusitis    Type 2 diabetes mellitus with chronic kidney disease (HCC)    White coat syndrome with diagnosis of hypertension    Dementia without behavioral disturbance (HCC)    Mixed conductive and sensorineural hearing loss of left ear with restricted hearing of right ear    Chronic kidney disease (CKD) stage G3b/A1, moderately decreased glomerular filtration rate (GFR) between 30-44 mL/min/1.73 square meter and albuminuria creatinine ratio less than 30 mg/g (HCC)    Mixed hyperlipidemia    Right-sided lacunar infarction ([address]    Ataxia    Hyponatremia       Interventions to address risk factors: Assessment and support for treatment adherence and medication management-.     Offered patient enrollment in the Remote Patient Monitoring (RPM) program for in-home monitoring:  no .     Care Transitions Subsequent and Final Call    Subsequent and Final Calls  Do you have any ongoing symptoms?: No  Have your medications changed?: No  Do you have any questions related to your medications?: No  Do you currently have any active services?: Yes  Are you currently active with any services?: Home Health  Do you have any needs or concerns that I can assist you with?: No  Identified Barriers: None  Care Transitions Interventions   Home Care Waiver: Completed     Other Interventions:             LPN Care Coordinator provided contact information for future needs. Plan for follow-up call in 7-10 days based on severity of symptoms and risk factors.   Plan for next call: symptom management-,  self management-.    Pia Hubbard LPN

## 2023-02-10 LAB
ANION GAP SERPL CALCULATED.3IONS-SCNC: 20 MMOL/L (ref 3–16)
BUN BLDV-MCNC: 21 MG/DL (ref 7–20)
CALCIUM SERPL-MCNC: 9.6 MG/DL (ref 8.3–10.6)
CHLORIDE BLD-SCNC: 99 MMOL/L (ref 99–110)
CO2: 21 MMOL/L (ref 21–32)
CREAT SERPL-MCNC: 1.6 MG/DL (ref 0.6–1.2)
GFR SERPL CREATININE-BSD FRML MDRD: 33 ML/MIN/{1.73_M2}
GLUCOSE BLD-MCNC: 106 MG/DL (ref 70–99)
POTASSIUM SERPL-SCNC: 4.2 MMOL/L (ref 3.5–5.1)
SODIUM BLD-SCNC: 140 MMOL/L (ref 136–145)

## 2023-02-16 ENCOUNTER — CARE COORDINATION (OUTPATIENT)
Dept: CASE MANAGEMENT | Age: 78
End: 2023-02-16

## 2023-02-20 ENCOUNTER — CARE COORDINATION (OUTPATIENT)
Dept: CASE MANAGEMENT | Age: 78
End: 2023-02-20

## 2023-02-20 NOTE — CARE COORDINATION
CTN attempted outreach, patient is shopping at [facility] and does not have privacy to speak at this time. Patient agrees to a call at a later time.

## 2023-02-21 ENCOUNTER — TELEPHONE (OUTPATIENT)
Dept: FAMILY MEDICINE CLINIC | Age: 78
End: 2023-02-21

## 2023-02-21 NOTE — TELEPHONE ENCOUNTER
Patient is calling from [facility] wanting to get lab work done and there are no orders in patients chart. I informed patient this may take a moment.  She would like a call back when orders for labs are in. [phone]

## 2023-02-22 ENCOUNTER — CARE COORDINATION (OUTPATIENT)
Dept: CASE MANAGEMENT | Age: 78
End: 2023-02-22

## 2023-02-22 NOTE — CARE COORDINATION
[facility] Care Transitions Initial Follow Up Call    Call within 2 business days of discharge: No    Patient Current Location: [facility]    Daughter Euna Siemens called CTN, left VM. CTN returned call, spoke with daughter \"Chanel\". She did report that she was with patient today and she is not sure if patient is experiencing any constipation. CTN encouraged her to assess and look for pain, abdominal distension, fever, she verbalized understanding. She said that her mother is in the early stages of dementia. CTN inquired about plans for their mother and they plan on selling her house soon and looking for a memory care unit or assisted living facility. CTN offered to provide resources if needed, daughter was appreciative. CTN will continue with outreach follow up calls.     Follow Up  Future Appointments   Date Time Provider Tevin Alvarez   3/1/2023  3:00 PM Sammy Shetty MD AFL [facility] DEVEN AFL [facility]   4/4/2023 10:00 AM MD SANDY Walker - DYKALIA   5/8/2023  9:00 AM MD Kota Walker, ELI

## 2023-02-22 NOTE — CARE COORDINATION
[facility] Care Transitions Follow Up Call    Patient Current Location:  Home: [place] [id number]    Care Transition Nurse contacted the patient by telephone. Verified name and  with patient as identifiers. Patient: Marie Durán  Patient : 1945   MRN: [id number]  Reason for Admission: weakness and headache; Hyponatremia; Orthostatic Hypotension  Discharge Date: 23 RARS: Readmission Risk Score: 13      Needs to be reviewed by the provider   Additional needs identified to be addressed with provider: No  none             Method of communication with provider: none. Patient reports that \"I have no butt\". CTN attempted to clarify and patient is describing constipation. She sounds a little confused and reports that she has not had a bowel movement in two weeks. CTN explained that if this is correct, she will need to go to the hospital, she declined. CTN inquired about family and she reports that she does not have any family, she lives alone with just her and her dog. CTN attempted outreach to daughter \"Chanel\" and left VM with contact information and request for return call. CTN will continue with outreach follow up calls. Follow Up  Future Appointments   Date Time Provider Tevin Alvarez   3/1/2023  3:00 PM Lanie Vidales MD AFL [facility]O   2023 10:00 AM Kathlyne Siemens, MD SDALE FP [place] - DYKALIA   2023  9:00 AM Kathlyne Siemens, MD SDALE FP [place] - DYD     Non-[facility] follow up appointment(s):     Care Transition Nurse reviewed red flags with patient and discussed any barriers to care and/or understanding of plan of care after discharge. Discussed appropriate site of care based on symptoms and resources available to patient including: PCP  Urgent care clinics  When to call 911. The patient agrees to contact the PCP office for questions related to their healthcare.           Care Transitions Subsequent and Final Call    Subsequent and Final Calls  Do you have any ongoing symptoms?: No  Have your medications changed?: No  Do you have any questions related to your medications?: No  Do you currently have any active services?: No  Are you currently active with any services?: Home Health  Do you have any needs or concerns that I can assist you with?: No  Identified Barriers: None  Care Transitions Interventions   Home Care Waiver: Completed     Other Interventions:             Care Transition Nurse provided contact information for future needs. Plan for follow-up call in 1-2 days based on severity of symptoms and risk factors.   Plan for next call: symptom management-.  self management-.  follow-up appointment-.    Vimal Llanos RN

## 2023-03-01 ENCOUNTER — CARE COORDINATION (OUTPATIENT)
Dept: CASE MANAGEMENT | Age: 78
End: 2023-03-01

## 2023-03-01 NOTE — CARE COORDINATION
Final follow up outreach call attempt, no answer. CTN also attempted daughter \"Chanel\". CTN left VMs with contact information and request for return call. CTN will resolve episode & remain available.

## 2023-03-02 ENCOUNTER — CARE COORDINATION (OUTPATIENT)
Dept: CARE COORDINATION | Age: 78
End: 2023-03-02

## 2023-03-02 DIAGNOSIS — E11.22 TYPE 2 DIABETES MELLITUS WITH CHRONIC KIDNEY DISEASE, WITHOUT LONG-TERM CURRENT USE OF INSULIN, UNSPECIFIED CKD STAGE (HCC): ICD-10-CM

## 2023-03-02 DIAGNOSIS — N18.32 CHRONIC KIDNEY DISEASE (CKD) STAGE G3B/A1, MODERATELY DECREASED GLOMERULAR FILTRATION RATE (GFR) BETWEEN 30-44 ML/MIN/1.73 SQUARE METER AND ALBUMINURIA CREATININE RATIO LESS THAN 30 MG/G (HCC): ICD-10-CM

## 2023-03-02 DIAGNOSIS — I10 WHITE COAT SYNDROME WITH DIAGNOSIS OF HYPERTENSION: Primary | ICD-10-CM

## 2023-03-02 NOTE — PROGRESS NOTES
3/2/233:34 PM       Remote Patient Monitoring Treatment Plan    Received request from ACLEONEL/CHRISSY Frances RN  to order remote patient monitoring for in home monitoring of Kidney Disease , Diabetes, and HTN and order completed. Patient will be monitoring blood pressure   glucose  pulse ox   weight. Please set alert for ONLY weight gain of 5# in 7 days. Pt has no documented hx of HF. Patient will engage in Remote Patient Monitoring each day to develop the skills necessary for self management. RPM Care Team Responsibilities:   Alerts will be reviewed daily and addressed within 2-4 hours during operational hours (Monday -Friday 9 am-4 pm)  Alert response and intervention documented in patient medical record  Alert response escalated to PCP per protocol and documented in patient medical record  Patient monitored over approximately  days  Discharge from program based on self-management readiness    See care coordination encounters for additional details.       Henry Butler DNP, FNP-C, Remote Patient Monitoring NP, () [phone]

## 2023-03-02 NOTE — CARE COORDINATION
Remote Patient Kit Ordering Note      Date/Time:  3/2/2023 3:45 PM      [x] CCSS confirmed patient shipping address  [x] Patient will receive package over the next 2-4 business days. Someone 21 years or older must be present to sign for UPS delivery. [x] Patient to contact virtual installation-specific phone number listed in the patient instructions. [x] If the patient does not contact HRS within 24 hours, an [employer] Ambassador [facility] Nateeduardo will call the patient directly: If the patient does not answer, HRS will follow up with the clinical team notifying them about the unsuccessful attempt to contact the patient. HRS will make three call attempts to the patient. [x] LPN will contact patient once equipment is active to welcome them to the program.                                                         [x] Hours of RPM monitoring - Monday-Friday [id number]                     All questions answered at this time. ACM made aware the RPM kit has been ordered. CCSS notified patient of RPM equipment order.

## 2023-03-02 NOTE — CARE COORDINATION
Ambulatory Care Coordination Note  3/2/2023    Patient Current Location:  Home: [address] [place] [id number]    ACM contacted the patient by telephone. Verified name and  with patient as identifiers. Provided introduction to self, and explanation of the ACM role. ACM: Sia Holt RN    Challenges to be reviewed by the provider   Additional needs identified to be addressed with provider: Yes  Sign and agree with CC POC               Method of communication with provider: chart routing. Summary Note: 80-year-old female patient of Filipe Forte MD with PMH of HTN, DM, Kidney Diseaese. RN, RICHA spoke with patient via telephone session this date. Patient provided to RN, ACM 2 patient identifiers. RN, ACM introduced patient to Care Coordination and RPM services and educated that CC and RPM  provides additional support, resources and health education to improve patients' health and wellness while teaching self-management skills. Patient verbalized understanding and in agreement with CC and RPM enrollment and follow up. Patient states she is feeling well. She denies any s/s of diesase exacerbations. Patient states she has all of her medications, does not need any refills at this time and is taking all medications as prescribed. ACM to continue enrollment assessments and education next outreach. PLAN:   Continue to work towards goals, address patient needs, provide CC education, support and care  Call back in 1 week  Continue to assess and execute referrals PRN   SDOH  Complete Enrollment, Fall Risk, CC protocol  Review Advanced Directives   Send Plan of Care to PCP . route         Offered patient enrollment in the Remote Patient Monitoring (RPM) program for in-home monitoring: Yes, patient enrolled:     Remote Patient Monitoring Enrollment Note      Date/Time: 3/2/2023 3:26 PM    Offered patient enrollment in the [facility] Remote Patient Monitoring (RPM) program for in home monitoring for Kidney Disease, Diabetes, and HTN. Patient accepted RPM services. Patient will be monitoring the following daily:  blood pressure heart rate, pulse ox reading, and weight    ACM reviewed the information below with patient:    Emergency Contact (name and contact number): Susan Cline (Child)   [phone] (Mobile)    [x] A member from the care coordination team will reach out to notify the patient once the RPM kit is ordered. [x] Once the kit is delivered, the [facility] team will contact the patient after UPS deliver to assist with set up. [x] Determined BP cuff size: regular (9.05\"-15.74\")      [x] Determined weight scale: regular (<330lbs)                                                [x] Hours of ACM monitoring - Monday-Friday [id number]                     All questions about RPM program answered at this time. .    Ambulatory Care Coordination Assessment    Care Coordination Protocol  Week 1 - Initial Assessment     Do you have all of your prescriptions and are they filled?: No     Do you have Home O2 Therapy?: No      Ability to seek help/take action for Emergent Urgent situations i.e. fire, crime, inclement weather or health crisis.: Needs Assistance  Ability to ambulate to restroom: Independent  Ability handle personal hygeine needs (bathing/dressing/grooming):  Independent  Ability to manage Medications: Needs Assistance  Ability to prepare Food Preparation: Needs Assistance  Ability to maintain home (clean home, laundry): Needs Assistance  Ability to drive and/or has transportation: Needs Assistance  Ability to do shopping: Needs Assistance  Ability to manage finances: Needs Assistance  Is patient able to live independently?: No     Current Housing: Private Residence           Frequent urination at night?: No     Are you experiencing loss of meaning?: No  Are you experiencing loss of hope and peace?: No     Suggested Interventions and [employer]                  Future Appointments Date Time Provider Tevin Alvarez   4/4/2023 10:00 AM Buster Rater, MD [place]   5/8/2023  9:00 AM Buster Rater, MD [place]

## 2023-03-14 ENCOUNTER — CARE COORDINATION (OUTPATIENT)
Dept: CARE COORDINATION | Age: 78
End: 2023-03-14

## 2023-03-14 ENCOUNTER — TELEPHONE (OUTPATIENT)
Dept: FAMILY MEDICINE CLINIC | Age: 78
End: 2023-03-14

## 2023-03-14 SDOH — SOCIAL STABILITY: SOCIAL NETWORK: HOW OFTEN DO YOU GET TOGETHER WITH FRIENDS OR RELATIVES?: MORE THAN THREE TIMES A WEEK

## 2023-03-14 SDOH — HEALTH STABILITY: PHYSICAL HEALTH: ON AVERAGE, HOW MANY MINUTES DO YOU ENGAGE IN EXERCISE AT THIS LEVEL?: 10 MIN

## 2023-03-14 SDOH — SOCIAL STABILITY: SOCIAL NETWORK: HOW OFTEN DO YOU ATTEND CHURCH OR RELIGIOUS SERVICES?: 1 TO 4 TIMES PER YEAR

## 2023-03-14 SDOH — HEALTH STABILITY: MENTAL HEALTH
STRESS IS WHEN SOMEONE FEELS TENSE, NERVOUS, ANXIOUS, OR CAN'T SLEEP AT NIGHT BECAUSE THEIR MIND IS TROUBLED. HOW STRESSED ARE YOU?: ONLY A LITTLE

## 2023-03-14 SDOH — SOCIAL STABILITY: SOCIAL NETWORK: ARE YOU MARRIED, WIDOWED, DIVORCED, SEPARATED, NEVER MARRIED, OR LIVING WITH A PARTNER?: WIDOWED

## 2023-03-14 SDOH — SOCIAL STABILITY: SOCIAL NETWORK
IN A TYPICAL WEEK, HOW MANY TIMES DO YOU TALK ON THE PHONE WITH FAMILY, FRIENDS, OR NEIGHBORS?: MORE THAN THREE TIMES A WEEK

## 2023-03-14 SDOH — ECONOMIC STABILITY: FOOD INSECURITY: WITHIN THE PAST 12 MONTHS, YOU WORRIED THAT YOUR FOOD WOULD RUN OUT BEFORE YOU GOT MONEY TO BUY MORE.: NEVER TRUE

## 2023-03-14 SDOH — ECONOMIC STABILITY: FOOD INSECURITY: WITHIN THE PAST 12 MONTHS, THE FOOD YOU BOUGHT JUST DIDN'T LAST AND YOU DIDN'T HAVE MONEY TO GET MORE.: NEVER TRUE

## 2023-03-14 SDOH — ECONOMIC STABILITY: HOUSING INSECURITY: IN THE LAST 12 MONTHS, HOW MANY PLACES HAVE YOU LIVED?: 1

## 2023-03-14 SDOH — ECONOMIC STABILITY: INCOME INSECURITY: HOW HARD IS IT FOR YOU TO PAY FOR THE VERY BASICS LIKE FOOD, HOUSING, MEDICAL CARE, AND HEATING?: NOT VERY HARD

## 2023-03-14 SDOH — SOCIAL STABILITY: SOCIAL NETWORK: HOW OFTEN DO YOU ATTENT MEETINGS OF THE CLUB OR ORGANIZATION YOU BELONG TO?: 1 TO 4 TIMES PER YEAR

## 2023-03-14 SDOH — HEALTH STABILITY: MENTAL HEALTH: HOW MANY STANDARD DRINKS CONTAINING ALCOHOL DO YOU HAVE ON A TYPICAL DAY?: PATIENT DOES NOT DRINK

## 2023-03-14 SDOH — ECONOMIC STABILITY: INCOME INSECURITY: IN THE LAST 12 MONTHS, WAS THERE A TIME WHEN YOU WERE NOT ABLE TO PAY THE MORTGAGE OR RENT ON TIME?: NO

## 2023-03-14 SDOH — HEALTH STABILITY: MENTAL HEALTH: HOW OFTEN DO YOU HAVE A DRINK CONTAINING ALCOHOL?: NEVER

## 2023-03-14 SDOH — SOCIAL STABILITY: SOCIAL NETWORK
DO YOU BELONG TO ANY CLUBS OR ORGANIZATIONS SUCH AS CHURCH GROUPS UNIONS, FRATERNAL OR ATHLETIC GROUPS, OR SCHOOL GROUPS?: YES

## 2023-03-14 SDOH — HEALTH STABILITY: PHYSICAL HEALTH: ON AVERAGE, HOW MANY DAYS PER WEEK DO YOU ENGAGE IN MODERATE TO STRENUOUS EXERCISE (LIKE A BRISK WALK)?: 4 DAYS

## 2023-03-14 NOTE — TELEPHONE ENCOUNTER
Called and spoke with patient's daughter on the phone. Patient's daughter last spoke to the patient yesterday, speaks here fairly frequently. I explained that I could not convince the patient to see the kidney specialist, or to see me in the office soon to discuss her elevated creatinine. Her kidneys are not feeling acutely, this has been a very gradual progression over the last several years. I explained the important thing right now is that the patient avoids ibuprofen and naproxen. She is to take Tylenol for pain, maintain a healthy diet. Patient's daughter expressed understanding, stated that she would encourage patient to come see me in the office when she is ready.

## 2023-03-14 NOTE — TELEPHONE ENCOUNTER
Patients daughter called returning a call from Friday from MD. I do not see an encounter and MA is unaware of conversation. Daughter thinks this could be related to a kidney doctor? Unsure.  Please advise and call Cassie Armendariz back [phone]

## 2023-03-14 NOTE — CARE COORDINATION
Ambulatory Care Coordination Note  3/14/2023    Patient Current Location:  Home: [address] [place] [id number]     ACM contacted the patient by telephone. Verified name and  with patient as identifiers. Provided introduction to self, and explanation of the ACM role. Challenges to be reviewed by the provider   Additional needs identified to be addressed with provider: No  none               Method of communication with provider: none. ACM: Kenneth Calle RN    81-year-old female patient of Tavo Fernando MD with PMH of HTN, DM, Kidney Diseaese. RN, ACM spoke with patient via telephone session this date. Patient provided to RN, ACM 2 patient identifiers. Patient states she is feeling well. She denies any s/s of diesase exacerbations. Patient states she has all of her medications, does not need any refills at this time and is taking all medications as prescribed. Patient states she has RPM equipment in the home but is waiting for her daughter to come over to help her get everything set up, she states he daughter will be coming over today or tomorrow. PLAN:   Continue to work towards goals, address patient needs, provide CC education, support and care  Call back in 3 weeks  Continue to assess and execute referrals PRN   ACP documents? Offered patient enrollment in the Remote Patient Monitoring (RPM) program for in-home monitoring: Yes, patient already enrolled.     Lab Results       None            Care Coordination Interventions    Referral from Primary Care Provider: No  Suggested Interventions and Community Resources  Diabetes Education: Completed (Comment: DM education sent - AS 3/14/23)  Fall Risk Prevention: Completed (Comment: Fall prevention education sent - AS 3/14/23)  Zone Management Tools: Completed (Comment: DM zone tool sent - 3/14/23 AS)          Goals Addressed                   This Visit's Progress     Self Monitoring        Daily Weights - I will weight myself as directed - Daily and write down weights  I will notify my provider of any increase in weight by 3 or more pounds in 2 days OR 5 or more pounds in a week. Blood Pressure - I will take my blood pressure as directed - Daily  I will notify my provider of any trends of increasing or decreasing blood pressures over a month period of time. I will notify my provider of any changes in blood pressure associated with symptoms of dizziness, falls, passing out, headache, confusion/change in mental status. None Recently Recorded    Barriers: overwhelmed by complexity of regimen  Plan for overcoming my barriers: daughter to assist   Confidence: 6/10  Anticipated Goal Completion Date: 6/2/2023                Future Appointments   Date Time Provider Tevin Alvarez   4/4/2023 10:00 AM Alem Dickinson MD [facility]   5/8/2023  9:00 AM MD SANDY Amador [place] - [facility]    and   Diabetes Assessment    Medic Alert ID: No  Meal Planning: None   How often do you test your blood sugar?:  (Comment: varies - 1-3 times daily - [facility] 3/7/22)   Do you have barriers with adherence to non-pharmacologic self-management interventions?  (Nutrition/Exercise/Self-Monitoring): Yes   Have you ever had to go to the ED for symptoms of low blood sugar?: No       No patient-reported symptoms

## 2023-03-21 RX ORDER — MEMANTINE HYDROCHLORIDE 5 MG/1
TABLET ORAL
Qty: 180 TABLET | Refills: 0 | Status: SHIPPED | OUTPATIENT
Start: 2023-03-21

## 2023-03-21 RX ORDER — MEMANTINE HYDROCHLORIDE 5 MG/1
TABLET ORAL
Qty: 180 TABLET | OUTPATIENT
Start: 2023-03-21

## 2023-03-30 ENCOUNTER — CARE COORDINATION (OUTPATIENT)
Dept: CARE COORDINATION | Age: 78
End: 2023-03-30

## 2023-03-30 NOTE — CARE COORDINATION
RN, ANNM attempted telephone outreach this date, unable to leave messageRICHA to make another outreach attempt within the next 30 days.

## 2023-04-06 ENCOUNTER — CARE COORDINATION (OUTPATIENT)
Dept: CARE COORDINATION | Age: 78
End: 2023-04-06

## 2023-04-06 NOTE — CARE COORDINATION
Patient enrolled in RPM program since 3.2.23, still not activated. Spoke to [facility] install team on 3.8, refusing assistance and reporting she will activate herself. SS call today to see if patient still interested in RPM program. No answer, HIPAA compliant VML. Provided HRS hours of operation, along with phone number. Will route to [place] for awareness and f/u.

## 2023-04-21 DIAGNOSIS — F32.A DEPRESSION, UNSPECIFIED DEPRESSION TYPE: ICD-10-CM

## 2023-04-21 RX ORDER — SERTRALINE HYDROCHLORIDE 100 MG/1
TABLET, FILM COATED ORAL
Qty: 90 TABLET | Refills: 1 | Status: SHIPPED | OUTPATIENT
Start: 2023-04-21

## 2023-04-26 ENCOUNTER — CARE COORDINATION (OUTPATIENT)
Dept: CARE COORDINATION | Age: 78
End: 2023-04-26

## 2023-04-26 NOTE — CARE COORDINATION
RN, ACM attempted telephone outreach this date. Patient answers phone but does not speak and hangs up, television can be heard in the background.

## 2023-04-28 ENCOUNTER — CARE COORDINATION (OUTPATIENT)
Dept: CARE COORDINATION | Age: 78
End: 2023-04-28

## 2023-05-03 ENCOUNTER — CARE COORDINATION (OUTPATIENT)
Dept: CARE COORDINATION | Age: 78
End: 2023-05-03

## 2023-05-03 NOTE — CARE COORDINATION
PCP for his knowledge d/t daughter will not be present at PCP appointment. PLAN:   Continue to work towards goals, address patient needs, provide CC education, support and care  Call back in 1 day  Continue to assess and execute referrals PRN     Offered patient enrollment in the Remote Patient Monitoring (RPM) program for in-home monitoring: NA. Lab Results       None            Care Coordination Interventions    Referral from Primary Care Provider: No  Suggested Interventions and Community Resources  Diabetes Education: Completed (Comment: DM education sent - AS 3/14/23)  Fall Risk Prevention: Completed (Comment: Fall prevention education sent - AS 3/14/23)  Zone Management Tools: Completed (Comment: DM zone tool sent - 3/14/23 AS)          Goals Addressed                   This Visit's Progress     Self Monitoring   No change     Daily Weights - I will weight myself as directed - Daily and write down weights  I will notify my provider of any increase in weight by 3 or more pounds in 2 days OR 5 or more pounds in a week. Blood Pressure - I will take my blood pressure as directed - Daily  I will notify my provider of any trends of increasing or decreasing blood pressures over a month period of time. I will notify my provider of any changes in blood pressure associated with symptoms of dizziness, falls, passing out, headache, confusion/change in mental status.     None Recently Recorded    Barriers: overwhelmed by complexity of regimen  Plan for overcoming my barriers: daughter to assist   Confidence: 6/10  Anticipated Goal Completion Date: 6/2/2023                Future Appointments   Date Time Provider Tevin Alvarez   5/8/2023  9:00 AM MD SANDY Boyd [place] - [facility]    and   Diabetes Assessment    Medic Alert ID: No  Meal Planning: None   How often do you test your blood sugar?:  (Comment: varies - 1-3 times daily - [facility] 3/7/22)   Do you have barriers with adherence to non-pharmacologic

## 2023-05-04 ENCOUNTER — CARE COORDINATION (OUTPATIENT)
Dept: CARE COORDINATION | Age: 78
End: 2023-05-04

## 2023-05-08 ENCOUNTER — OFFICE VISIT (OUTPATIENT)
Dept: FAMILY MEDICINE CLINIC | Age: 78
End: 2023-05-08
Payer: MEDICARE

## 2023-05-08 VITALS
OXYGEN SATURATION: 99 % | SYSTOLIC BLOOD PRESSURE: 158 MMHG | DIASTOLIC BLOOD PRESSURE: 76 MMHG | HEART RATE: 96 BPM | BODY MASS INDEX: 28.35 KG/M2 | WEIGHT: 155 LBS

## 2023-05-08 DIAGNOSIS — E03.9 ACQUIRED HYPOTHYROIDISM: ICD-10-CM

## 2023-05-08 DIAGNOSIS — N18.32 CHRONIC KIDNEY DISEASE (CKD) STAGE G3B/A1, MODERATELY DECREASED GLOMERULAR FILTRATION RATE (GFR) BETWEEN 30-44 ML/MIN/1.73 SQUARE METER AND ALBUMINURIA CREATININE RATIO LESS THAN 30 MG/G (HCC): ICD-10-CM

## 2023-05-08 DIAGNOSIS — F03.90 DEMENTIA WITHOUT BEHAVIORAL DISTURBANCE (HCC): ICD-10-CM

## 2023-05-08 DIAGNOSIS — I10 ESSENTIAL HYPERTENSION: ICD-10-CM

## 2023-05-08 DIAGNOSIS — E11.22 TYPE 2 DIABETES MELLITUS WITH CHRONIC KIDNEY DISEASE, WITHOUT LONG-TERM CURRENT USE OF INSULIN, UNSPECIFIED CKD STAGE (HCC): Primary | ICD-10-CM

## 2023-05-08 LAB
HBA1C MFR BLD: 6.3 %
T4 FREE SERPL-MCNC: 1 NG/DL (ref 0.9–1.8)
TSH SERPL DL<=0.005 MIU/L-ACNC: 7.38 UIU/ML (ref 0.27–4.2)

## 2023-05-08 PROCEDURE — 3078F DIAST BP <80 MM HG: CPT | Performed by: FAMILY MEDICINE

## 2023-05-08 PROCEDURE — 3077F SYST BP >= 140 MM HG: CPT | Performed by: FAMILY MEDICINE

## 2023-05-08 PROCEDURE — 3044F HG A1C LEVEL LT 7.0%: CPT | Performed by: FAMILY MEDICINE

## 2023-05-08 PROCEDURE — 83036 HEMOGLOBIN GLYCOSYLATED A1C: CPT | Performed by: FAMILY MEDICINE

## 2023-05-08 PROCEDURE — 99214 OFFICE O/P EST MOD 30 MIN: CPT | Performed by: FAMILY MEDICINE

## 2023-05-08 PROCEDURE — 1123F ACP DISCUSS/DSCN MKR DOCD: CPT | Performed by: FAMILY MEDICINE

## 2023-05-08 NOTE — PROGRESS NOTES
[address] Note    Date: 5/8/2023                                               Toni Kay:     Chief Complaint   Patient presents with    Follow-up       HPI  Patient is here for diabetic checkup. Is currently diet controlled. Denies polyuria or polydipsia. Denies vision change. Patient's history of hypothyroidism. Currently takes Synthroid. Denies constipation or diarrhea. Denies heat/cold intolerance. Patient has history of hypertension. Takes clonidine and irbesartan, though she did not take any medicines today. Denies chest pain or shortness of breath. Pt has hx of dementia. Lives alone. Her son comes to see her once  week. No recent accidents except she fell 3 months ago taking out the garbage and went to the hospital. Denies HA or dizziness.          Patient Active Problem List    Diagnosis Date Noted    Hyponatremia 01/30/2023    Ataxia 01/28/2023    Right-sided lacunar infarction ([facility] [address] 04/04/2022    Chronic kidney disease (CKD) stage G3b/A1, moderately decreased glomerular filtration rate (GFR) between 30-44 mL/min/1.73 square meter and albuminuria creatinine ratio less than 30 mg/g ([facility]) 07/19/2021    Mixed hyperlipidemia 07/19/2021    Mixed conductive and sensorineural hearing loss of left ear with restricted hearing of right ear 05/03/2021    Dementia without behavioral disturbance ([address] 09/11/2020    White coat syndrome with diagnosis of hypertension 01/31/2020    Type 2 diabetes mellitus with chronic kidney disease ([address] 02/19/2018    Disorder of right eustachian tube 01/08/2018    Allergic sinusitis 01/08/2018    Chronic eczematous otitis externa of right ear 05/02/2017    Hypothyroidism 11/16/2016    Sleep apnea     Meniere disease        Past Medical History:   Diagnosis Date    Alopecia     Anxiety and depression     Cancer ([address]     uterine, several years ago; follows w/ gyn yearly    Chronic kidney disease (CKD) stage G3b/A1, moderately decreased glomerular

## 2023-05-09 DIAGNOSIS — E03.9 ACQUIRED HYPOTHYROIDISM: Primary | ICD-10-CM

## 2023-05-09 LAB
ALBUMIN SERPL-MCNC: 4.7 G/DL (ref 3.4–5)
ALBUMIN/GLOB SERPL: 1.8 {RATIO} (ref 1.1–2.2)
ALP SERPL-CCNC: 84 U/L (ref 40–129)
ALT SERPL-CCNC: 17 U/L (ref 10–40)
ANION GAP SERPL CALCULATED.3IONS-SCNC: 14 MMOL/L (ref 3–16)
AST SERPL-CCNC: 23 U/L (ref 15–37)
BILIRUB SERPL-MCNC: 0.3 MG/DL (ref 0–1)
BUN SERPL-MCNC: 23 MG/DL (ref 7–20)
CALCIUM SERPL-MCNC: 9.7 MG/DL (ref 8.3–10.6)
CHLORIDE SERPL-SCNC: 105 MMOL/L (ref 99–110)
CO2 SERPL-SCNC: 24 MMOL/L (ref 21–32)
CREAT SERPL-MCNC: 1.6 MG/DL (ref 0.6–1.2)
GFR SERPLBLD CREATININE-BSD FMLA CKD-EPI: 33 ML/MIN/{1.73_M2}
GLUCOSE SERPL-MCNC: 134 MG/DL (ref 70–99)
POTASSIUM SERPL-SCNC: 4.6 MMOL/L (ref 3.5–5.1)
PROT SERPL-MCNC: 7.3 G/DL (ref 6.4–8.2)
SODIUM SERPL-SCNC: 143 MMOL/L (ref 136–145)

## 2023-05-09 RX ORDER — LEVOTHYROXINE SODIUM 0.07 MG/1
TABLET ORAL
Qty: 90 TABLET | Refills: 1 | Status: SHIPPED | OUTPATIENT
Start: 2023-05-09

## 2023-05-10 ENCOUNTER — CARE COORDINATION (OUTPATIENT)
Dept: CARE COORDINATION | Age: 78
End: 2023-05-10

## 2023-05-10 NOTE — CARE COORDINATION
Ambulatory Care Coordination Note  5/10/2023    Patient Current Location:  Home: [address] [place] [id number]     ACM contacted the patient by telephone. Verified name and  with patient as identifiers. Provided introduction to self, and explanation of the ACM role. Challenges to be reviewed by the provider   Additional needs identified to be addressed with provider: No  none               Method of communication with provider: none. ACM: Joy Otero RN    52-year-old female patient of Jose Huston MD with PMH of HTN, DM, Kidney Diseaese. RN, ACM spoke with patient via telephone session this date. Patient able to engage in conversation and answer ACM questions correctly. Patient aware of upcoming follow up appointment with PCP on 5/15/2023. Patient states she is feeling well today. She denies any s/s of disease exacerbation this outreach. PLAN:   Continue to work towards goals, address patient needs, provide CC education, support and care  Call back in 2 weeks  Continue to assess and execute referrals PRN     Offered patient enrollment in the Remote Patient Monitoring (RPM) program for in-home monitoring: NA. Lab Results       None            Care Coordination Interventions    Referral from Primary Care Provider: No  Suggested Interventions and Community Resources  Diabetes Education: Completed (Comment: DM education sent - AS 3/14/23)  Fall Risk Prevention: Completed (Comment: Fall prevention education sent - AS 3/14/23)  Zone Management Tools: Completed (Comment: DM zone tool sent - 3/14/23 AS)          Goals Addressed                   This Visit's Progress     Self Monitoring   No change     Daily Weights - I will weight myself as directed - Daily and write down weights  I will notify my provider of any increase in weight by 3 or more pounds in 2 days OR 5 or more pounds in a week.   Blood Pressure - I will take my blood pressure as directed - Daily  I will notify my

## 2023-05-15 ENCOUNTER — OFFICE VISIT (OUTPATIENT)
Dept: FAMILY MEDICINE CLINIC | Age: 78
End: 2023-05-15
Payer: MEDICARE

## 2023-05-15 VITALS
DIASTOLIC BLOOD PRESSURE: 82 MMHG | WEIGHT: 157 LBS | HEART RATE: 62 BPM | OXYGEN SATURATION: 96 % | BODY MASS INDEX: 28.89 KG/M2 | SYSTOLIC BLOOD PRESSURE: 157 MMHG | HEIGHT: 62 IN

## 2023-05-15 DIAGNOSIS — I10 ESSENTIAL HYPERTENSION: ICD-10-CM

## 2023-05-15 DIAGNOSIS — F33.42 RECURRENT MAJOR DEPRESSIVE DISORDER, IN FULL REMISSION (HCC): ICD-10-CM

## 2023-05-15 DIAGNOSIS — E78.5 HYPERLIPIDEMIA, UNSPECIFIED HYPERLIPIDEMIA TYPE: Primary | ICD-10-CM

## 2023-05-15 PROBLEM — F33.0 MAJOR DEPRESSIVE DISORDER, RECURRENT, MILD (HCC): Status: RESOLVED | Noted: 2023-05-15 | Resolved: 2023-05-15

## 2023-05-15 PROBLEM — F33.0 MAJOR DEPRESSIVE DISORDER, RECURRENT, MILD (HCC): Status: ACTIVE | Noted: 2023-05-15

## 2023-05-15 PROBLEM — F33.9 MAJOR DEPRESSIVE DISORDER, RECURRENT, UNSPECIFIED (HCC): Status: ACTIVE | Noted: 2023-05-15

## 2023-05-15 PROBLEM — F33.1 MAJOR DEPRESSIVE DISORDER, RECURRENT, MODERATE (HCC): Status: RESOLVED | Noted: 2023-05-15 | Resolved: 2023-05-15

## 2023-05-15 PROBLEM — F33.1 MAJOR DEPRESSIVE DISORDER, RECURRENT, MODERATE (HCC): Status: ACTIVE | Noted: 2023-05-15

## 2023-05-15 PROCEDURE — 1123F ACP DISCUSS/DSCN MKR DOCD: CPT | Performed by: FAMILY MEDICINE

## 2023-05-15 PROCEDURE — 3079F DIAST BP 80-89 MM HG: CPT | Performed by: FAMILY MEDICINE

## 2023-05-15 PROCEDURE — 99214 OFFICE O/P EST MOD 30 MIN: CPT | Performed by: FAMILY MEDICINE

## 2023-05-15 PROCEDURE — 3077F SYST BP >= 140 MM HG: CPT | Performed by: FAMILY MEDICINE

## 2023-05-15 RX ORDER — IPRATROPIUM BROMIDE 42 UG/1
2 SPRAY, METERED NASAL 2 TIMES DAILY
Qty: 15 ML | Refills: 1 | Status: SHIPPED | OUTPATIENT
Start: 2023-05-15

## 2023-05-15 RX ORDER — ROSUVASTATIN CALCIUM 20 MG/1
TABLET, COATED ORAL
Qty: 90 TABLET | Refills: 3 | Status: SHIPPED | OUTPATIENT
Start: 2023-05-15

## 2023-05-15 RX ORDER — AMLODIPINE BESYLATE 10 MG/1
10 TABLET ORAL DAILY
Qty: 90 TABLET | Refills: 1 | Status: SHIPPED | OUTPATIENT
Start: 2023-05-15

## 2023-05-15 NOTE — PROGRESS NOTES
[address] Note    Date: 5/15/2023                                               Sarah Logan:     Chief Complaint   Patient presents with    Medication Check       HPI  Patient is here for follow-up on hypertension. Currently takes Clonidine and Irbesartan. Denies CP or SOB. Patient has history of depression. Takes Zoloft. Reports that moods are well controlled.          Patient Active Problem List    Diagnosis Date Noted    Hyponatremia 01/30/2023    Ataxia 01/28/2023    Major depressive disorder, recurrent, unspecified 05/15/2023    Right-sided lacunar infarction ([facility] [address] 04/04/2022    Chronic kidney disease (CKD) stage G3b/A1, moderately decreased glomerular filtration rate (GFR) between 30-44 mL/min/1.73 square meter and albuminuria creatinine ratio less than 30 mg/g ([facility]) 07/19/2021    Mixed hyperlipidemia 07/19/2021    Mixed conductive and sensorineural hearing loss of left ear with restricted hearing of right ear 05/03/2021    Dementia without behavioral disturbance ([facility] [address] 09/11/2020    White coat syndrome with diagnosis of hypertension 01/31/2020    Type 2 diabetes mellitus with chronic kidney disease ([address] 02/19/2018    Disorder of right eustachian tube 01/08/2018    Allergic sinusitis 01/08/2018    Chronic eczematous otitis externa of right ear 05/02/2017    Hypothyroidism 11/16/2016    Sleep apnea     Meniere disease        Past Medical History:   Diagnosis Date    Alopecia     Anxiety and depression     Cancer ([address]     uterine, several years ago; follows w/ gyn yearly    Chronic kidney disease (CKD) stage G3b/A1, moderately decreased glomerular filtration rate (GFR) between 30-44 mL/min/1.73 square meter and albuminuria creatinine ratio less than 30 mg/g ([facility]) 07/19/2021    CTS (carpal tunnel syndrome)      History of rheumatic fever     Hypertension     Hypothyroidism     Meniere disease     bilateral ears    Mixed hyperlipidemia 07/19/2021    Nausea     associated with Meniere's

## 2023-05-16 DIAGNOSIS — I10 ESSENTIAL HYPERTENSION: ICD-10-CM

## 2023-05-16 RX ORDER — IRBESARTAN 150 MG/1
150 TABLET ORAL DAILY
Qty: 90 TABLET | Refills: 1 | Status: SHIPPED | OUTPATIENT
Start: 2023-05-16

## 2023-05-16 NOTE — TELEPHONE ENCOUNTER
Medication:   Requested Prescriptions     Pending Prescriptions Disp Refills    irbesartan (AVAPRO) 150 MG tablet [Pharmacy Med Name: IRBESARTAN 150MG TABLETS] 90 tablet 1     Sig: TAKE 1 TABLET BY MOUTH DAILY       Last Filled:  1/31/2023    Patient Phone Number: [phone] (home)     Last appt: 5/15/2023   Next appt: 6/15/2023    Lab Results   Component Value Date     05/08/2023    K 4.6 05/08/2023     05/08/2023    CO2 24 05/08/2023    BUN 23 (H) 05/08/2023    CREATININE 1.6 (H) 05/08/2023    GLUCOSE 134 (H) 05/08/2023    CALCIUM 9.7 05/08/2023    PROT 7.3 05/08/2023    LABALBU 4.7 05/08/2023    BILITOT 0.3 05/08/2023    ALKPHOS 84 05/08/2023    AST 23 05/08/2023    ALT 17 05/08/2023    LABGLOM 33 (A) 05/08/2023    GFRAA 38 (A) 02/21/2022    AGRATIO 1.8 05/08/2023    GLOB 2.9 10/19/2021

## 2023-05-25 ENCOUNTER — CARE COORDINATION (OUTPATIENT)
Dept: CARE COORDINATION | Age: 78
End: 2023-05-25

## 2023-05-25 DIAGNOSIS — N18.32 CHRONIC KIDNEY DISEASE (CKD) STAGE G3B/A1, MODERATELY DECREASED GLOMERULAR FILTRATION RATE (GFR) BETWEEN 30-44 ML/MIN/1.73 SQUARE METER AND ALBUMINURIA CREATININE RATIO LESS THAN 30 MG/G (HCC): ICD-10-CM

## 2023-05-25 DIAGNOSIS — E11.22 TYPE 2 DIABETES MELLITUS WITH CHRONIC KIDNEY DISEASE, WITHOUT LONG-TERM CURRENT USE OF INSULIN, UNSPECIFIED CKD STAGE (HCC): ICD-10-CM

## 2023-05-25 DIAGNOSIS — I10 WHITE COAT SYNDROME WITH DIAGNOSIS OF HYPERTENSION: Primary | ICD-10-CM

## 2023-05-25 NOTE — PROGRESS NOTES
5/25/23 5:50 PM     Remote Patient Order Discontinued    Received request from Félix Peace RN  to discontinue order for remote patient monitoring of Kidney Disease , Diabetes, and HTN and order completed.      Virgil Perez DNP, FNP-C, Remote Patient Monitoring NP, () [phone]

## 2023-05-25 NOTE — CARE COORDINATION
Ambulatory Care Coordination Note  2023    Patient Current Location:  Home: [address] [place] [id number]     ACM contacted the patient by telephone. Verified name and  with patient as identifiers. Provided introduction to self, and explanation of the ACM role. Challenges to be reviewed by the provider   Additional needs identified to be addressed with provider: No  none               Method of communication with provider: chart routing. ACM: Angel Jim RN    ACM made care coordination outreach to patient. Patient very pleasant while conversing with ACM. Patient stated that she is doing \"fine. \"  Patient denied any chest pain, shortness of breath or swelling at this time. ACM asked patient about RPM equipment and she stated she is no longer interested. ACM to discontinue RPM. Patient stated that she has all of her medications filled and is taking them as prescribed. Patient stated no other questions or concerns at the end of the call. Patient provided with ACM phone number to call for any non-emergent questions. Patient agreeable to future care coordination calls. ACM will follow up at a later time. Plan   RPM discontinue   Assess future needs   Follow up 3 weeks     Offered patient enrollment in the Remote Patient Monitoring (RPM) program for in-home monitoring: Yes, patient already enrolled - has not set up equipment and would like to return. Remote Patient Monitoring Graduation      Date/Time:  2023 1:26 PM  Patient Current Location: Home: [address] [place] [id number]  Patient has graduated from the Remote Patient Monitoring program on 2023. RPM goals have been met at this time. Patient has been provided instruction on process to return RPM equipment and RPM has been deactivated. Patient has ACM's contact information for any further questions, concerns, or needs.      Lab Results       None            Care Coordination Interventions

## 2023-06-08 ENCOUNTER — CARE COORDINATION (OUTPATIENT)
Dept: CARE COORDINATION | Age: 78
End: 2023-06-08

## 2023-06-08 NOTE — CARE COORDINATION
ACM called but patient did not answer. ACM unable to leave voicemail due to mailbox being full. Will follow up at a later date.

## 2023-06-19 RX ORDER — MEMANTINE HYDROCHLORIDE 5 MG/1
TABLET ORAL
Qty: 180 TABLET | Refills: 0 | Status: SHIPPED | OUTPATIENT
Start: 2023-06-19

## 2023-06-23 ENCOUNTER — TELEPHONE (OUTPATIENT)
Dept: FAMILY MEDICINE CLINIC | Age: 78
End: 2023-06-23

## 2023-06-23 NOTE — TELEPHONE ENCOUNTER
Pt stopped by the office asking for an appt - she said she needed a shot, but didn't explain what shot. She said she had a headache and abdominal pain. I checked with Dr Rohan Jaramillo - no appts available - told pt to go to Urgent Care. Pt kept saying she couldn't go to Urgent Care and will stay here until she gets a shot.

## 2023-06-23 NOTE — TELEPHONE ENCOUNTER
ECC transfer,  Patient calling for a shot due to headache and abdominal pain. Didn't go to urgent care as advised by Dr. Ambreen Akins. Patient is a little confused and unsure of what she was needing. She asked for Dr. Larry Rushing next appointment which is July 3rd patient stated she would take that appointment and was happy with this.

## 2023-07-03 ENCOUNTER — CARE COORDINATION (OUTPATIENT)
Dept: CARE COORDINATION | Age: 78
End: 2023-07-03

## 2023-07-11 ENCOUNTER — CARE COORDINATION (OUTPATIENT)
Dept: CARE COORDINATION | Age: 78
End: 2023-07-11

## 2023-07-11 NOTE — CARE COORDINATION
Attempted to reach patient by phone x3. No answer. ACM unable to leave message. Waiting for patient response at this time. Will update notes when callback is received. No additional outreaches if return call not received.

## 2023-07-12 ENCOUNTER — HOSPITAL ENCOUNTER (OUTPATIENT)
Age: 78
Setting detail: OBSERVATION
Discharge: HOME | End: 2023-07-13
Attending: EMERGENCY MEDICINE | Admitting: INTERNAL MEDICINE
Payer: MEDICARE

## 2023-07-12 ENCOUNTER — TELEPHONE (OUTPATIENT)
Dept: INTERNAL MEDICINE CLINIC | Age: 78
End: 2023-07-12

## 2023-07-12 ENCOUNTER — OFFICE VISIT (OUTPATIENT)
Dept: INTERNAL MEDICINE CLINIC | Age: 78
End: 2023-07-12
Payer: MEDICARE

## 2023-07-12 ENCOUNTER — APPOINTMENT (OUTPATIENT)
Dept: CT IMAGING | Age: 78
End: 2023-07-12
Payer: MEDICARE

## 2023-07-12 VITALS
WEIGHT: 155 LBS | BODY MASS INDEX: 28.52 KG/M2 | HEART RATE: 79 BPM | HEIGHT: 62 IN | SYSTOLIC BLOOD PRESSURE: 138 MMHG | DIASTOLIC BLOOD PRESSURE: 84 MMHG | OXYGEN SATURATION: 98 %

## 2023-07-12 DIAGNOSIS — I10 WHITE COAT SYNDROME WITH DIAGNOSIS OF HYPERTENSION: ICD-10-CM

## 2023-07-12 DIAGNOSIS — R41.82 ALTERED MENTAL STATUS, UNSPECIFIED ALTERED MENTAL STATUS TYPE: Primary | ICD-10-CM

## 2023-07-12 DIAGNOSIS — F03.918 DEMENTIA WITH BEHAVIORAL DISTURBANCE (HCC): Primary | ICD-10-CM

## 2023-07-12 PROBLEM — Z78.9 UNABLE TO CARE FOR SELF: Status: ACTIVE | Noted: 2023-07-12

## 2023-07-12 LAB
ALBUMIN SERPL-MCNC: 4.2 G/DL (ref 3.4–5)
ALBUMIN/GLOB SERPL: 1.6 {RATIO} (ref 1.1–2.2)
ALP SERPL-CCNC: 74 U/L (ref 40–129)
ALT SERPL-CCNC: 29 U/L (ref 10–40)
AMMONIA PLAS-SCNC: 12 UMOL/L (ref 11–51)
ANION GAP SERPL CALCULATED.3IONS-SCNC: 14 MMOL/L (ref 3–16)
AST SERPL-CCNC: 38 U/L (ref 15–37)
BACTERIA URNS QL MICRO: ABNORMAL /HPF
BASOPHILS # BLD: 0 K/UL (ref 0–0.2)
BASOPHILS NFR BLD: 0.2 %
BILIRUB SERPL-MCNC: 0.3 MG/DL (ref 0–1)
BILIRUB UR QL STRIP.AUTO: ABNORMAL
BILIRUBIN, POC: NORMAL
BLOOD URINE, POC: NORMAL
BUN SERPL-MCNC: 21 MG/DL (ref 7–20)
CALCIUM SERPL-MCNC: 9.7 MG/DL (ref 8.3–10.6)
CHLORIDE SERPL-SCNC: 104 MMOL/L (ref 99–110)
CLARITY UR: CLEAR
CLARITY, POC: CLEAR
CO2 SERPL-SCNC: 22 MMOL/L (ref 21–32)
COLOR UR: ABNORMAL
COLOR, POC: YELLOW
CREAT SERPL-MCNC: 1.8 MG/DL (ref 0.6–1.2)
DEPRECATED RDW RBC AUTO: 14.8 % (ref 12.4–15.4)
EOSINOPHIL # BLD: 0.2 K/UL (ref 0–0.6)
EOSINOPHIL NFR BLD: 3.8 %
EPI CELLS #/AREA URNS AUTO: 2 /HPF (ref 0–5)
GFR SERPLBLD CREATININE-BSD FMLA CKD-EPI: 29 ML/MIN/{1.73_M2}
GLUCOSE BLD-MCNC: 107 MG/DL (ref 70–99)
GLUCOSE SERPL-MCNC: 121 MG/DL (ref 70–99)
GLUCOSE UR STRIP.AUTO-MCNC: NEGATIVE MG/DL
GLUCOSE URINE, POC: NORMAL
HCT VFR BLD AUTO: 36.9 % (ref 36–48)
HGB BLD-MCNC: 12.5 G/DL (ref 12–16)
HGB UR QL STRIP.AUTO: NEGATIVE
HYALINE CASTS #/AREA URNS AUTO: 21 /LPF (ref 0–8)
KETONES UR STRIP.AUTO-MCNC: ABNORMAL MG/DL
KETONES, POC: NORMAL
LEUKOCYTE EST, POC: NORMAL
LEUKOCYTE ESTERASE UR QL STRIP.AUTO: NEGATIVE
LYMPHOCYTES # BLD: 0.8 K/UL (ref 1–5.1)
LYMPHOCYTES NFR BLD: 15.4 %
MCH RBC QN AUTO: 27.8 PG (ref 26–34)
MCHC RBC AUTO-ENTMCNC: 33.9 G/DL (ref 31–36)
MCV RBC AUTO: 82 FL (ref 80–100)
MONOCYTES # BLD: 0.4 K/UL (ref 0–1.3)
MONOCYTES NFR BLD: 8.1 %
NEUTROPHILS # BLD: 3.6 K/UL (ref 1.7–7.7)
NEUTROPHILS NFR BLD: 72.5 %
NITRITE UR QL STRIP.AUTO: NEGATIVE
NITRITE, POC: NORMAL
PERFORMED ON: ABNORMAL
PH UR STRIP.AUTO: 5.5 [PH] (ref 5–8)
PH, POC: 6.5
PLATELET # BLD AUTO: 164 K/UL (ref 135–450)
PMV BLD AUTO: 7.8 FL (ref 5–10.5)
POTASSIUM SERPL-SCNC: 3.8 MMOL/L (ref 3.5–5.1)
PROT SERPL-MCNC: 6.9 G/DL (ref 6.4–8.2)
PROT UR STRIP.AUTO-MCNC: >=1000 MG/DL
PROTEIN, POC: NORMAL
RBC # BLD AUTO: 4.51 M/UL (ref 4–5.2)
RBC CLUMPS #/AREA URNS AUTO: 1 /HPF (ref 0–4)
SODIUM SERPL-SCNC: 140 MMOL/L (ref 136–145)
SP GR UR STRIP.AUTO: >=1.03 (ref 1–1.03)
SPECIFIC GRAVITY, POC: 1.01
UA COMPLETE W REFLEX CULTURE PNL UR: ABNORMAL
UA DIPSTICK W REFLEX MICRO PNL UR: YES
URN SPEC COLLECT METH UR: ABNORMAL
UROBILINOGEN UR STRIP-ACNC: 1 E.U./DL
UROBILINOGEN, POC: 0.2
WBC # BLD AUTO: 4.9 K/UL (ref 4–11)
WBC #/AREA URNS AUTO: 3 /HPF (ref 0–5)

## 2023-07-12 PROCEDURE — 96372 THER/PROPH/DIAG INJ SC/IM: CPT

## 2023-07-12 PROCEDURE — 81001 URINALYSIS AUTO W/SCOPE: CPT

## 2023-07-12 PROCEDURE — 6360000002 HC RX W HCPCS: Performed by: INTERNAL MEDICINE

## 2023-07-12 PROCEDURE — 2700000000 HC OXYGEN THERAPY PER DAY

## 2023-07-12 PROCEDURE — 80053 COMPREHEN METABOLIC PANEL: CPT

## 2023-07-12 PROCEDURE — G0378 HOSPITAL OBSERVATION PER HR: HCPCS

## 2023-07-12 PROCEDURE — 99285 EMERGENCY DEPT VISIT HI MDM: CPT

## 2023-07-12 PROCEDURE — 2580000003 HC RX 258: Performed by: INTERNAL MEDICINE

## 2023-07-12 PROCEDURE — 6360000002 HC RX W HCPCS: Performed by: EMERGENCY MEDICINE

## 2023-07-12 PROCEDURE — 3075F SYST BP GE 130 - 139MM HG: CPT

## 2023-07-12 PROCEDURE — 82140 ASSAY OF AMMONIA: CPT

## 2023-07-12 PROCEDURE — 1123F ACP DISCUSS/DSCN MKR DOCD: CPT

## 2023-07-12 PROCEDURE — 84443 ASSAY THYROID STIM HORMONE: CPT

## 2023-07-12 PROCEDURE — 81002 URINALYSIS NONAUTO W/O SCOPE: CPT

## 2023-07-12 PROCEDURE — 85025 COMPLETE CBC W/AUTO DIFF WBC: CPT

## 2023-07-12 PROCEDURE — 82607 VITAMIN B-12: CPT

## 2023-07-12 PROCEDURE — 99215 OFFICE O/P EST HI 40 MIN: CPT

## 2023-07-12 PROCEDURE — 36415 COLL VENOUS BLD VENIPUNCTURE: CPT

## 2023-07-12 PROCEDURE — 70450 CT HEAD/BRAIN W/O DYE: CPT

## 2023-07-12 PROCEDURE — 82746 ASSAY OF FOLIC ACID SERUM: CPT

## 2023-07-12 PROCEDURE — 3078F DIAST BP <80 MM HG: CPT

## 2023-07-12 RX ORDER — GLUCOSAMINE/D3/BOSWELLIA SERRA 1500MG-400
TABLET ORAL DAILY
COMMUNITY

## 2023-07-12 RX ORDER — ACETAMINOPHEN 650 MG/1
650 SUPPOSITORY RECTAL EVERY 6 HOURS PRN
Status: DISCONTINUED | OUTPATIENT
Start: 2023-07-12 | End: 2023-07-13 | Stop reason: HOSPADM

## 2023-07-12 RX ORDER — ONDANSETRON 2 MG/ML
4 INJECTION INTRAMUSCULAR; INTRAVENOUS EVERY 6 HOURS PRN
Status: DISCONTINUED | OUTPATIENT
Start: 2023-07-12 | End: 2023-07-13 | Stop reason: HOSPADM

## 2023-07-12 RX ORDER — SODIUM CHLORIDE 0.9 % (FLUSH) 0.9 %
5-40 SYRINGE (ML) INJECTION EVERY 12 HOURS SCHEDULED
Status: DISCONTINUED | OUTPATIENT
Start: 2023-07-12 | End: 2023-07-13 | Stop reason: HOSPADM

## 2023-07-12 RX ORDER — ROSUVASTATIN CALCIUM 10 MG/1
10 TABLET, COATED ORAL NIGHTLY
Status: DISCONTINUED | OUTPATIENT
Start: 2023-07-13 | End: 2023-07-13 | Stop reason: HOSPADM

## 2023-07-12 RX ORDER — LORAZEPAM 2 MG/ML
1 INJECTION INTRAMUSCULAR ONCE
Status: COMPLETED | OUTPATIENT
Start: 2023-07-12 | End: 2023-07-12

## 2023-07-12 RX ORDER — ENOXAPARIN SODIUM 100 MG/ML
30 INJECTION SUBCUTANEOUS DAILY
Status: DISCONTINUED | OUTPATIENT
Start: 2023-07-12 | End: 2023-07-13 | Stop reason: HOSPADM

## 2023-07-12 RX ORDER — LEVOTHYROXINE SODIUM 0.05 MG/1
50 TABLET ORAL DAILY
COMMUNITY

## 2023-07-12 RX ORDER — ACETAMINOPHEN 160 MG
TABLET,DISINTEGRATING ORAL DAILY
COMMUNITY

## 2023-07-12 RX ORDER — SODIUM CHLORIDE 0.9 % (FLUSH) 0.9 %
5-40 SYRINGE (ML) INJECTION PRN
Status: DISCONTINUED | OUTPATIENT
Start: 2023-07-12 | End: 2023-07-13 | Stop reason: HOSPADM

## 2023-07-12 RX ORDER — SERTRALINE HYDROCHLORIDE 100 MG/1
TABLET, FILM COATED ORAL
Qty: 90 TABLET | Refills: 1 | Status: CANCELLED | OUTPATIENT
Start: 2023-07-12

## 2023-07-12 RX ORDER — LEVOTHYROXINE SODIUM 0.07 MG/1
75 TABLET ORAL DAILY
Status: DISCONTINUED | OUTPATIENT
Start: 2023-07-13 | End: 2023-07-13 | Stop reason: HOSPADM

## 2023-07-12 RX ORDER — DIPHENHYDRAMINE HYDROCHLORIDE 50 MG/ML
25 INJECTION INTRAMUSCULAR; INTRAVENOUS ONCE
Status: COMPLETED | OUTPATIENT
Start: 2023-07-12 | End: 2023-07-12

## 2023-07-12 RX ORDER — MEMANTINE HYDROCHLORIDE 5 MG/1
5 TABLET ORAL DAILY
Status: DISCONTINUED | OUTPATIENT
Start: 2023-07-13 | End: 2023-07-13 | Stop reason: HOSPADM

## 2023-07-12 RX ORDER — ACETAMINOPHEN 325 MG/1
650 TABLET ORAL EVERY 6 HOURS PRN
Status: DISCONTINUED | OUTPATIENT
Start: 2023-07-12 | End: 2023-07-13 | Stop reason: HOSPADM

## 2023-07-12 RX ORDER — HALOPERIDOL 5 MG/ML
5 INJECTION INTRAMUSCULAR ONCE
Status: COMPLETED | OUTPATIENT
Start: 2023-07-12 | End: 2023-07-12

## 2023-07-12 RX ORDER — POLYETHYLENE GLYCOL 3350 17 G/17G
17 POWDER, FOR SOLUTION ORAL DAILY PRN
Status: DISCONTINUED | OUTPATIENT
Start: 2023-07-12 | End: 2023-07-13 | Stop reason: HOSPADM

## 2023-07-12 RX ORDER — ASPIRIN 325 MG
325 TABLET ORAL DAILY
Status: DISCONTINUED | OUTPATIENT
Start: 2023-07-13 | End: 2023-07-13 | Stop reason: HOSPADM

## 2023-07-12 RX ORDER — ONDANSETRON 4 MG/1
4 TABLET, ORALLY DISINTEGRATING ORAL EVERY 8 HOURS PRN
Status: DISCONTINUED | OUTPATIENT
Start: 2023-07-12 | End: 2023-07-13 | Stop reason: HOSPADM

## 2023-07-12 RX ORDER — SODIUM CHLORIDE 9 MG/ML
INJECTION, SOLUTION INTRAVENOUS PRN
Status: DISCONTINUED | OUTPATIENT
Start: 2023-07-12 | End: 2023-07-13 | Stop reason: HOSPADM

## 2023-07-12 RX ORDER — HYDROCHLOROTHIAZIDE 25 MG/1
25 TABLET ORAL DAILY
COMMUNITY

## 2023-07-12 RX ORDER — AMLODIPINE BESYLATE 5 MG/1
10 TABLET ORAL DAILY
Status: DISCONTINUED | OUTPATIENT
Start: 2023-07-13 | End: 2023-07-13 | Stop reason: HOSPADM

## 2023-07-12 RX ADMIN — DIPHENHYDRAMINE HYDROCHLORIDE 25 MG: 50 INJECTION, SOLUTION INTRAMUSCULAR; INTRAVENOUS at 13:13

## 2023-07-12 RX ADMIN — HALOPERIDOL LACTATE 5 MG: 5 INJECTION INTRAMUSCULAR at 13:14

## 2023-07-12 RX ADMIN — LORAZEPAM 1 MG: 2 INJECTION, SOLUTION INTRAMUSCULAR; INTRAVENOUS at 13:14

## 2023-07-12 RX ADMIN — Medication 10 ML: at 20:33

## 2023-07-12 RX ADMIN — ENOXAPARIN SODIUM 30 MG: 100 INJECTION SUBCUTANEOUS at 18:03

## 2023-07-12 ASSESSMENT — PAIN SCALES - GENERAL
PAINLEVEL_OUTOF10: 0

## 2023-07-12 ASSESSMENT — ENCOUNTER SYMPTOMS
SHORTNESS OF BREATH: 0
COUGH: 0

## 2023-07-12 NOTE — ASSESSMENT & PLAN NOTE
Patient in to establish care, however there are concerns for mental status upon HPI taking. Patient is Oriented to self only today in office. She is talking in circles, unable to complete sentences. \"Something is wrong with my head\" as she points to multiple areas on her head. Drove here on her own and lives on her own. SLUMS exam 6/30. Reviewed previous notes from PCP 5/15/23 which state she was stable and oriented x3. It is unclear whether she's been taking her medication as she has multiple pill bottles for each medication, some with several half tablets. Urinalysis obtained - no abnormalities noted. History of hyponatremia, stabilized on last BMP. It was determined by this NP that it wasn't safe for patient to leave on her own today given mental status and unclear etiology of mental decline from prior notes. Spoke with patient that it would be best for her to go to emergency department for workup. Patient started hitting her hand into her fist leaning forward, refusing to go to emergency department. EMS arrived and patient was then agreeable to going to emergency department, walked out of office with EMS.

## 2023-07-12 NOTE — ED PROVIDER NOTES
by Each Nostril route 2 times daily    LEVOTHYROXINE (SYNTHROID) 75 MCG TABLET    TAKE 1 TABLET ONCE DAILY    MEMANTINE (NAMENDA) 5 MG TABLET    TAKE 1 TABLET BY MOUTH TWICE DAILY    MULTIPLE VITAMINS-MINERALS (MULTIVITAMIN ADULTS 50+ PO)    Take by mouth daily    ROSUVASTATIN (CRESTOR) 20 MG TABLET    Take 1 tablet by mouth once daily    SERTRALINE (ZOLOFT) 100 MG TABLET    TAKE 1 TABLET BY MOUTH ONCE DAILY IN THE MORNING (TOTAL  125MG)       ALLERGIES     Patient has no known allergies. FAMILY HISTORY       Family History   Problem Relation Age of Onset    Breast Cancer Mother     Heart Disease Mother     Diabetes Mother     Heart Failure Mother     Diabetes Father     Kidney Disease Father     Heart Disease Father     Heart Disease Sister     Diabetes Maternal Grandmother     Crohn's Disease Child     Crohn's Disease Grandchild     Ovarian Cancer Neg Hx           SOCIAL HISTORY       Social History     Socioeconomic History    Marital status:     Number of children: 3   Occupational History    Occupation: retired \"patino secretary\"      Comment: [employer]   Tobacco Use    Smoking status: Never    Smokeless tobacco: Never   Vaping Use    Vaping Use: Unknown   Substance and Sexual Activity    Alcohol use: No    Drug use: No    Sexual activity: Not Currently   Social History Narrative    Her   in . She has three children who live locally-2020      Social Determinants of Health     Financial Resource Strain: Low Risk     Difficulty of Paying Living Expenses: Not very hard   Food Insecurity: No Food Insecurity    Worried About Running Out of Food in the Last Year: Never true    Ran Out of Food in the Last Year: Never true   Transportation Needs: No Transportation Needs    Lack of Transportation (Medical): No    Lack of Transportation (Non-Medical):  No   Physical Activity: Insufficiently Active    Days of Exercise per Week: 4 days    Minutes of Exercise per Session: 10 min   Stress: No

## 2023-07-12 NOTE — TELEPHONE ENCOUNTER
Slums evaluation was done on patient with RN score 6 out of 30. PCP was made aware, per PCP no full history of how advanced dementia was prior to today's visit. Patient lives alone and has been driving, PCP does not feel it is safe for patient to drive or be alone at this time. Spoke with patient that PCP wanted us to contact family patient wanted son called not daughter. Call placed to son he stated that he is at work and unable to leave at this time. Son also stated that patient's daughter is more of POA for patient and she should be contacted to assist.   Daughter called and spoke with her about coming up to take her mother to ED for further evaluation she is agreeable to this, stated her mother may not like this as she likes to do things for herself. Re-talked with patient letting her know daughter is on her way per daughters request to inform her; patient became angry started hitting her fist to her hand leaning forward with clenched teeth stating she does not want her daughter to come. Unable to determine why she does not want daughter to come as sentences were not able to be followed. Return call placed to daughter informing her best not to come EMT will be called to escort her mother home or to ED. Call placed to 911 and Gus arrived, Squad able to get patient to agree to go to hospital for further evaluation aware patient may not be safe to be alone at home, may need social work when at hospital.  Daughter called back to informed of plan to go to ED, daughter stated she is going to go to ED to be with Mother. Spoke with her that it might be more helpful to staff that she does not go into room with her mother, but still be present at ED for background and information and to discuss plan of care. Daughter informed to call office if she needs any assistance.

## 2023-07-12 NOTE — H&P
HOSPITALISTS HISTORY AND PHYSICAL    7/12/2023 4:09 PM    Patient Information:  Tesha Farley is a 69 y.o. female [id number]  PCP:  JENN Pierson CNP (Tel: [phone] )    Chief complaint:    Chief Complaint   Patient presents with    Dementia     Pt via Ems from doctor appt, they called due to increased confusion, was diagnosed with dementia recently, pt thinks she is here for a fall and head lac, she was seen for that in October of last year and think it just happened, pt alert to self but not time or situation       History of Present Illness:  Tesha Farley is a 69 y.o. female who was brought to the ED with increasing confusion over the last 6 to 8 months. Patient lives at home alone. No acute fevers chills sweats chest pain nausea vomiting focal weakness per daughter who was in the room I could not get history from the patient as patient was sedated with Ativan. CT head in the ED was negative. Did have MRI brain and January that showed old strokes and no new stroke. REVIEW OF SYSTEMS:   Sedated unable to answer. Past Medical History:   has a past medical history of Alopecia, Anxiety and depression, Cancer ([address]), Chronic kidney disease (CKD) stage G3b/A1, moderately decreased glomerular filtration rate (GFR) between 30-44 mL/min/1.73 square meter and albuminuria creatinine ratio less than 30 mg/g ([facility]), CTS (carpal tunnel syndrome), History of rheumatic fever, Hypertension, Hypothyroidism, Meniere disease, Mixed hyperlipidemia, Nausea, Obesity, Reflux esophagitis, Renal impairment, Sleep apnea, and Type II or unspecified type diabetes mellitus without mention of complication, not stated as uncontrolled. Past Surgical History:   has a past surgical history that includes Hysterectomy (Bilateral, 1975); Mastoid surgery; Tonsillectomy; Appendectomy; Ankle surgery (Right);  Upper

## 2023-07-12 NOTE — ED NOTES
Pt calm and cooperative, labs obtained and straight cath completed for urine, pt placed on purewick for further need, pt placed in gown and attached to tele monitor. Sitter and daughter at bedside.      Livier Prince  07/12/23 5761
Pt not keeping on vitals, refusing gown.  Pt adamant she is not staying, pt very confused     Terry Hoover RN  07/12/23 8467
Pt noted to be sating mid 80s while asleep, placed on 0678 [facility], RN  07/12/23 4417
Pt placed on an emergency hold, trying to leave and yelling at staff. Notified MD, see MAR for orders. Pt hitting and kicking at staff while trying to administer medications, staff to bedside to help hold.  Meds given per mar, sitter at bedside for safety, pt remains on bed alarm with both side rails up      [facility], RN  07/12/23 6500
Pt placed on bed alarm with both side rails up      Brinda Newell RN  07/12/23 2205
Rounded on patient, all needs addressed at this time, updated on plan of care     [facility], RN  07/12/23 9556
Rounded on patient, in bed with sitter at bedside, calm at this time     [facility], RN  07/12/23 3527
Timeline found under the Summary Nursing Index tab. Recommendation    Pending orders All ED orders completed  Plan for Discharge (if known):    Additional Comments: Pt daughter and sitter at bedside, pt alert to self, still calm after administration of haldol, ativan, and benadryl   If any further questions, please call Sending RN at [phone]    Electronically signed by: Electronically signed by Missy Sams RN on 7/12/2023 at 3:42 PM      Livier Perez  07/12/23 [address] [place] [address], RN  07/12/23 8719

## 2023-07-12 NOTE — ACP (ADVANCE CARE PLANNING)
Advanced Care Planning Note. Purpose of Encounter: Advanced care planning in light of hospitalization  Parties In Attendance: Patient,  Daugther who is   POA  Decisional Capacity: no  Subjective: FAmily  understand that this conversation is to address long term care goal  Objective: To hospital with progressive dementia history of hypothyroidism and hypertension  Goals of Care Determination: Patient would not pursue CPR and Intubation if required.    Code Status: DNR CCA  Time spent on Advanced care Plannin minutes  Advanced Care Planning Documents: PHOEBE is daughter Harris Sykesr, MD  2023 4:12 PM

## 2023-07-12 NOTE — ASSESSMENT & PLAN NOTE
Last saw her PCP 5/15/23 for hypertension follow-up, taking clonidine (only once daily, prescribed 3 times daily) and irbesartan. She was started on Amlodipine as her blood pressure was not at goal. Was supposed to follow up in 2-4 weeks but this didn't happen. Controlled today on 2nd check.

## 2023-07-13 ENCOUNTER — TELEPHONE (OUTPATIENT)
Dept: INTERNAL MEDICINE CLINIC | Age: 78
End: 2023-07-13

## 2023-07-13 VITALS
DIASTOLIC BLOOD PRESSURE: 83 MMHG | SYSTOLIC BLOOD PRESSURE: 174 MMHG | HEART RATE: 79 BPM | BODY MASS INDEX: 29.11 KG/M2 | TEMPERATURE: 97.6 F | WEIGHT: 158.2 LBS | OXYGEN SATURATION: 96 % | HEIGHT: 62 IN | RESPIRATION RATE: 16 BRPM

## 2023-07-13 LAB
ANION GAP SERPL CALCULATED.3IONS-SCNC: 10 MMOL/L (ref 3–16)
BASOPHILS # BLD: 0 K/UL (ref 0–0.2)
BASOPHILS NFR BLD: 0.3 %
BUN SERPL-MCNC: 22 MG/DL (ref 7–20)
CALCIUM SERPL-MCNC: 9.2 MG/DL (ref 8.3–10.6)
CHLORIDE SERPL-SCNC: 104 MMOL/L (ref 99–110)
CO2 SERPL-SCNC: 26 MMOL/L (ref 21–32)
CREAT SERPL-MCNC: 1.7 MG/DL (ref 0.6–1.2)
DEPRECATED RDW RBC AUTO: 14.9 % (ref 12.4–15.4)
EOSINOPHIL # BLD: 0.3 K/UL (ref 0–0.6)
EOSINOPHIL NFR BLD: 5.3 %
FOLATE SERPL-MCNC: >20 NG/ML (ref 4.78–24.2)
GFR SERPLBLD CREATININE-BSD FMLA CKD-EPI: 31 ML/MIN/{1.73_M2}
GLUCOSE SERPL-MCNC: 141 MG/DL (ref 70–99)
HCT VFR BLD AUTO: 35.9 % (ref 36–48)
HGB BLD-MCNC: 12.5 G/DL (ref 12–16)
LYMPHOCYTES # BLD: 0.8 K/UL (ref 1–5.1)
LYMPHOCYTES NFR BLD: 17.3 %
MCH RBC QN AUTO: 28.5 PG (ref 26–34)
MCHC RBC AUTO-ENTMCNC: 34.7 G/DL (ref 31–36)
MCV RBC AUTO: 81.9 FL (ref 80–100)
MONOCYTES # BLD: 0.4 K/UL (ref 0–1.3)
MONOCYTES NFR BLD: 7.8 %
NEUTROPHILS # BLD: 3.4 K/UL (ref 1.7–7.7)
NEUTROPHILS NFR BLD: 69.3 %
PLATELET # BLD AUTO: 172 K/UL (ref 135–450)
PMV BLD AUTO: 8 FL (ref 5–10.5)
POTASSIUM SERPL-SCNC: 4.2 MMOL/L (ref 3.5–5.1)
RBC # BLD AUTO: 4.38 M/UL (ref 4–5.2)
SODIUM SERPL-SCNC: 140 MMOL/L (ref 136–145)
TSH SERPL DL<=0.005 MIU/L-ACNC: 0.8 UIU/ML (ref 0.27–4.2)
VIT B12 SERPL-MCNC: 1168 PG/ML (ref 211–911)
WBC # BLD AUTO: 4.9 K/UL (ref 4–11)

## 2023-07-13 PROCEDURE — 96372 THER/PROPH/DIAG INJ SC/IM: CPT

## 2023-07-13 PROCEDURE — 36415 COLL VENOUS BLD VENIPUNCTURE: CPT

## 2023-07-13 PROCEDURE — 2580000003 HC RX 258: Performed by: INTERNAL MEDICINE

## 2023-07-13 PROCEDURE — 97161 PT EVAL LOW COMPLEX 20 MIN: CPT

## 2023-07-13 PROCEDURE — 85025 COMPLETE CBC W/AUTO DIFF WBC: CPT

## 2023-07-13 PROCEDURE — 51798 US URINE CAPACITY MEASURE: CPT

## 2023-07-13 PROCEDURE — 6360000002 HC RX W HCPCS: Performed by: INTERNAL MEDICINE

## 2023-07-13 PROCEDURE — 6370000000 HC RX 637 (ALT 250 FOR IP): Performed by: INTERNAL MEDICINE

## 2023-07-13 PROCEDURE — 97116 GAIT TRAINING THERAPY: CPT

## 2023-07-13 PROCEDURE — 97530 THERAPEUTIC ACTIVITIES: CPT

## 2023-07-13 PROCEDURE — 92523 SPEECH SOUND LANG COMPREHEN: CPT

## 2023-07-13 PROCEDURE — 80048 BASIC METABOLIC PNL TOTAL CA: CPT

## 2023-07-13 PROCEDURE — 97165 OT EVAL LOW COMPLEX 30 MIN: CPT

## 2023-07-13 PROCEDURE — G0378 HOSPITAL OBSERVATION PER HR: HCPCS

## 2023-07-13 PROCEDURE — 97535 SELF CARE MNGMENT TRAINING: CPT

## 2023-07-13 RX ADMIN — LEVOTHYROXINE SODIUM 75 MCG: 0.07 TABLET ORAL at 05:41

## 2023-07-13 RX ADMIN — Medication 10 ML: at 09:34

## 2023-07-13 RX ADMIN — ENOXAPARIN SODIUM 30 MG: 100 INJECTION SUBCUTANEOUS at 09:33

## 2023-07-13 RX ADMIN — SERTRALINE 100 MG: 50 TABLET, FILM COATED ORAL at 09:34

## 2023-07-13 RX ADMIN — ASPIRIN 325 MG: 325 TABLET ORAL at 09:34

## 2023-07-13 RX ADMIN — AMLODIPINE BESYLATE 10 MG: 5 TABLET ORAL at 09:33

## 2023-07-13 RX ADMIN — MEMANTINE 5 MG: 5 TABLET ORAL at 09:34

## 2023-07-13 ASSESSMENT — PAIN SCALES - GENERAL: PAINLEVEL_OUTOF10: 0

## 2023-07-13 NOTE — PROGRESS NOTES
Bladder scan pt and she had 250 mL in bladder. Pt has ext cath on. Told her if she has to use the bathroom to let us know. Will continue to monitor.
Data- discharge order received, pt verbalized agreement to discharge, disposition to previous residence, no needs for HHC/DME. Action- discharge instructions prepared and given to pt, pt verbalized understanding. Medication information packet given r/t NEW and/or CHANGED prescriptions emphasizing name/purpose/side effects, pt verbalized understanding. Discharge instruction summary: Diet- regular, Activity- as tolerated, Primary Care Physician as follows: Samra ElzaJENN - [phone] f/u appointment 1 week, immunizations reviewed, prescription medications filled n/a. Inpatient surgical procedure precautions reviewed. CHF Education reviewed. Pt/ Family has had a total of 60 minutes CHF education this admission encounter. 1. WEIGHT: Admit Weight - Scale: 155 lb (70.3 kg) (07/12/23 1246)        Today  Weight - Scale: 158 lb 3.2 oz (71.8 kg) (07/13/23 0100)       2. O2 SAT.: SpO2: 96 % (07/13/23 1049)    Response- Pt belongings gathered, IV removed. Disposition is home (no HHC/DME needs), transported with nurse, taken to lobby via w/c w/ walking, no complications.
Daughter states patient lives at home alone, still drives and does all of her ADLs. Questioned daughter about possible placement - daughter states at some point they are thinking she will need assisted living. That the patient's  recently passed away and was a hoarder and they need to clean out her house prior to placing her.
Facility/Department: [address] NURSING  SLP Speech Language Cognitive Assessment     Patient: Gage Naik   : 1945   MRN: [id number]      Evaluation Date: 2023      Admitting Dx: Altered mental status, unspecified altered mental status type [R41.82]  Unable to care for self [Z78.9]  Pain: Pt did not report pain                                  H&P: Gage Naik is a 69 y.o. female who was brought to the ED with increasing confusion over the last 6 to 8 months. Patient lives at home alone. No acute fevers chills sweats chest pain nausea vomiting focal weakness per daughter who was in the room I could not get history from the patient as patient was sedated with Ativan. CT head in the ED was negative. Did have MRI brain and January that showed old strokes and no new stroke. Imaging:  Head CT:  23  IMPRESSION:  1. No acute intracranial findings. 2. 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 History/Prior Level of Function:   Living Status: Home independently. Previously managing medications, finances, household and was an active . Prior Speech History: Per chart review, no prior history of dysphagia. Reason for referral: SLP evaluation orders received due to altered mental status and cognitive state  for a cognitive evaluation. Speech Diagnosis:   Cognitive-Linguistic Deficits     Impressions:   Pt presents with moderate-severe cognitive-linguistic deficits characterized by impaired orientation, working, long and short term memory, problem solving and reasoning and attention. Pt speech is confabulatory with episodes of nonsensical speech. Pt was oriented x4 with use of visual aid. Pt was able to answer Y/N questions and follow simple 1-step commands but demonstrated difficulty with 2+ step commands. Processing is delayed although pt with bilateral hearing loss, not wearing HA during the visit.  Pt
Morning assessment completed, bp in 180s, doctor notified, schedule meds given, see mar, The care plan and education has been reviewed and mutually agreed upon with the patient. Pt remains free from falls. Fall precautions in place--bed in lowest position, call light within reach, bed alarm in use. Pt aware to call for assistance before getting up.
Shift assessment completed. VSS, scheduled meds given/held per MAR orders. Pt is alert to self, time and place but disoriented to situation. Pt is very sleepy still from ER, sitter in room and camera on. Pt has no complaints or needs at this time, just wants to sleep. Brakes locked, bed in lowest position, bed alarm engaged. All belongings and call light within reach.
Electronically Signed By: April Schofield, [address] OTR/L [id number]
time.   Safety Interventions: patient left in chair, call light within reach, patient at risk for falls, telesitter in use, nurse notified, and family/caregiver present; daughter present; chair alarm not in pace upon arrival    Plan  Frequency: Eval with same day discharge. No follow up required. Current Treatment Recommendations: Not applicable, evaluation completed with same day discharge. Goals  Patient eval with same day discharge. No goals set as patient is at baseline mobility status. Therapy Session Time      Individual Group Co-treatment   Time In 1663       Time Out 1210       Minutes 23         Timed Code Treatment Minutes:  8 Minutes  Total Treatment Minutes:  23       Electronically Signed By: Aileen Rodarte, PT    Hasmukh Salomon, SPT  I agree with the above note. PT directly observed the SPT with the patient.   Murtaza Cintron[address] DPT [id number]

## 2023-07-13 NOTE — DISCHARGE SUMMARY
Discharge Summary  Cassandra Rose MD     Name:  Carroll Uribe /Age/Sex: 1945  (69 y.o. female)   MRN & CSN:  [id number] & [id number] Admission Date/Time: 2023 12:40 PM   Attending:  Cassandra Rose MD Discharging Physician: Cassandra Rose MD     Hospital Course:   Carroll Uribe is a 69 y.o.  female history of progressive dementia hypertension was brought to the ER due to progressive mentation changes for last few months. In the ER patient was anxious, received Ativan. CT head nonacute, UA normal, ammonia levels normal.  Patient had a restful night and this morning awake alert and oriented x3. Patient daughter at bedside who confirms that patient is back to baseline. Likely her personality changes are due to worsening dementia. She is nonfocal eager to go home. Will be discharged to follow-up with PCP. The patient expressed appropriate understanding of and agreement with the discharge recommendations, medications, and plan. Consults this admission:  None    Discharge Instruction:     Follow up appointments:     No follow-up provider specified.     Primary care physician:  within 2 weeks    Diet:  regular diet     Activity: activity as tolerated and no driving for today    Disposition: Discharged to:   [x]Home, []C, []SNF, []Acute Rehab, []Hospice     Condition on discharge: Stable    Discharge Medications:        Medication List        CONTINUE taking these medications      amLODIPine 10 MG tablet  Commonly known as: NORVASC  Take 1 tablet by mouth daily     aspirin 325 MG tablet     Biotin 07527 MCG Tabs     CINNAMON PO     cloNIDine 0.3 MG tablet  Commonly known as: CATAPRES  TAKE 1 TABLET 3 TIMES A DAY     hydroCHLOROthiazide 25 MG tablet  Commonly known as: HYDRODIURIL     ipratropium 0.06 % nasal spray  Commonly known as: ATROVENT  2 sprays by Each Nostril route 2 times daily     levothyroxine 50 MCG tablet  Commonly known as: SYNTHROID     memantine 5 MG tablet  Commonly

## 2023-07-13 NOTE — PLAN OF CARE
Problem: Discharge Planning  Goal: Discharge to home or other facility with appropriate resources  Outcome: Progressing  Flowsheets (Taken 7/12/2023 1657 by Balaji Valles RN)  Discharge to home or other facility with appropriate resources:   Identify barriers to discharge with patient and caregiver   Identify discharge learning needs (meds, wound care, etc)   Refer to discharge planning if patient needs post-hospital services based on physician order or complex needs related to functional status, cognitive ability or social support system   Arrange for needed discharge resources and transportation as appropriate   Arrange for interpreters to assist at discharge as needed     Problem: Risk for Elopement  Goal: Patient will not exit the unit/facility without proper excort  Outcome: Progressing  Flowsheets  Taken 7/12/2023 1656 by Balaji Valles, RN  Nursing Interventions for Elopement Risk:   Assist with personal care needs such as toileting, eating, dressing, as needed to reduce the risk of wandering   Collaborate with family members/caregivers to mitigate the elopement risk   Collaborate with treatment team for nicotine replacement   Communicate/escalate to /other team member the risk of elopement   Communicate to physician the risk for elopement   Make sure patient has all necessary personal care items   Shoes and clothing collected and placed in gown attire   Place patient in room far away from exits and [address] with two staff members if patient must leave the unit   Reduce environmental triggers   Communicate/escalate to charge nurse the risk of elopement   Collaborate with treatment team for drug withdrawal symptoms treatment   Communicate/escalate to nursing supervisor the risk of elopement  Taken 7/12/2023 1252 by Missy Sams RN  Nursing Interventions for Elopement Risk: Assist with personal care needs such as toileting, eating, dressing, as needed to reduce the risk of wandering

## 2023-07-13 NOTE — CARE COORDINATION
Case Management Assessment  Initial Evaluation    Date/Time of Evaluation: 7/13/2023 12:30 PM  Assessment Completed by: LUDMILA Osei    If patient is discharged prior to next notation, then this note serves as note for discharge by case management. Patient Name: Danita Monterroso                   YOB: 1945  Diagnosis: Altered mental status, unspecified altered mental status type [R41.82]  Unable to care for self [Z78.9]                   Date / Time: 7/12/2023 12:40 PM    Patient Admission Status: Observation   Readmission Risk (Low < 19, Mod (19-27), High > 27): Readmission Risk Score: 13    Current PCP: JENN Miranda CNP  PCP verified by CM? Yes    Chart Reviewed: Yes      History Provided by: Child/Family (Daughter, Stevie Part)  Patient Orientation: Other (see comment) (Completed with Daughter, Stevie Part, via telephone.)    Patient Cognition: Alert    Hospitalization in the last 30 days (Readmission):  No    If yes, Readmission Assessment in  Navigator will be completed. Advance Directives:      Code Status: Full Code   Patient's Primary Decision Maker is: Legal Next of Kin    Primary Decision MakerOneida Montilla Child - [phone]    Secondary Decision Maker: Duran Herrera  Child - [phone]    Discharge Planning:    Patient lives with: Alone Type of Home: House  Primary Care Giver: Family (Daughter and Son-In-Law assist with patient's care. Daughter reported that they see patient almost daily. Daughter reported that they are working on getting patient into Assisted Living.)  Patient Support Systems include: Children   Current Financial resources: Medicare  Current community resources: None  Current services prior to admission: None            Current DME:              Type of Home Care services:  None    ADLS  Prior functional level: Independent in ADLs/IADLs  Current functional level:  Independent in ADLs/IADLs    PT AM-PAC: 18 /24  OT AM-PAC: 25 /24    Family can provide

## 2023-07-13 NOTE — PLAN OF CARE
Problem: Discharge Planning  Goal: Discharge to home or other facility with appropriate resources  7/13/2023 0956 by Bill Temple RN  Outcome: Progressing     Problem: Risk for Elopement  Goal: Patient will not exit the unit/facility without proper excort  7/13/2023 0956 by Bill Temple RN  Outcome: Progressing     Problem: Safety - Adult  Goal: Free from fall injury  7/13/2023 0956 by Bill Temple RN  Outcome: Progressing

## 2023-07-13 NOTE — DISCHARGE INSTR - COC
Treatments After Discharge: ***    Physician Certification: I certify the above information and transfer of Bishop Granados  is necessary for the continuing treatment of the diagnosis listed and that she requires {Admit to Appropriate Level of Care:27600} for {GREATER/LESS:017516648} 30 days.      Update Admission H&P: {CHP DME Changes in PIEOD:208329591}    PHYSICIAN SIGNATURE:  {Esignature:[id number]}

## 2023-07-14 ENCOUNTER — TELEPHONE (OUTPATIENT)
Dept: INTERNAL MEDICINE CLINIC | Age: 78
End: 2023-07-14

## 2023-07-14 NOTE — TELEPHONE ENCOUNTER
Care Transitions Initial Follow Up Call    Outreach made within 2 business days of discharge: Yes    Patient: Marylou Esparza Patient : 1945   MRN: [id number]  Reason for Admission: There are no discharge diagnoses documented for the most recent discharge. Discharge Date: 23       Spoke with: rufus    Discharge department/facility: [place]    TCM Interactive Patient Contact:  Was patient able to fill all prescriptions: Yes  Was patient instructed to bring all medications to the follow-up visit: Yes  Is patient taking all medications as directed in the discharge summary? Yes  Does patient understand their discharge instructions: Yes  Does patient have questions or concerns that need addressed prior to 7-14 day follow up office visit: no    Scheduled appointment with PCP within 7-14 days    Patient scheduled for HFU. Pt is very confused.      Follow Up  Future Appointments   Date Time Provider [address] [place] [address]   2023 11:00 AM JENN Arnold - CNP [facility] Flavia Gutierrez

## 2023-07-20 ENCOUNTER — TELEPHONE (OUTPATIENT)
Dept: INTERNAL MEDICINE CLINIC | Age: 78
End: 2023-07-20

## 2023-07-20 NOTE — TELEPHONE ENCOUNTER
----- Message from Navid Escamilla sent at 7/20/2023 10:06 AM EDT -----  Subject: Referral Request    Reason for referral request? Pt would like referral to [facility]   - Dr Tom Li. Pt was sent one but too much time has passed and new referral   is needed. Please advise. Provider patient wants to be referred to(if known):     Provider Phone Number(if known):     Additional Information for Provider?   ---------------------------------------------------------------------------  --------------  Tori Redfox Tanner    [phone]; OK to leave message on voicemail  ---------------------------------------------------------------------------  --------------

## 2023-07-20 NOTE — TELEPHONE ENCOUNTER
Attempted to contact pt, VM was full unable to LVM. Pt needs to see Adi Vivar for a HFU and she can discuss referral at that time. If pt returns call please schedule.

## 2023-07-24 ENCOUNTER — TELEPHONE (OUTPATIENT)
Dept: INTERNAL MEDICINE CLINIC | Age: 78
End: 2023-07-24

## 2023-07-24 NOTE — TELEPHONE ENCOUNTER
Noted Patient and Daughter call to confirm HFU, appointment today for 7/25/23 by daughter. Daughter confirmed 1:40pm tomorrow.

## 2023-07-24 NOTE — TELEPHONE ENCOUNTER
Patient called today and stated my daughter handling whatever I'm dealing with. Made her aware that I will contact her daughter in regards to her care.

## 2023-07-25 ENCOUNTER — OFFICE VISIT (OUTPATIENT)
Dept: INTERNAL MEDICINE CLINIC | Age: 78
End: 2023-07-25

## 2023-07-25 VITALS
DIASTOLIC BLOOD PRESSURE: 78 MMHG | HEIGHT: 62 IN | HEART RATE: 76 BPM | OXYGEN SATURATION: 98 % | BODY MASS INDEX: 28.63 KG/M2 | SYSTOLIC BLOOD PRESSURE: 136 MMHG | WEIGHT: 155.6 LBS

## 2023-07-25 DIAGNOSIS — E11.22 TYPE 2 DIABETES MELLITUS WITH CHRONIC KIDNEY DISEASE, WITHOUT LONG-TERM CURRENT USE OF INSULIN, UNSPECIFIED CKD STAGE (HCC): ICD-10-CM

## 2023-07-25 DIAGNOSIS — I10 WHITE COAT SYNDROME WITH DIAGNOSIS OF HYPERTENSION: ICD-10-CM

## 2023-07-25 DIAGNOSIS — N18.32 CHRONIC KIDNEY DISEASE (CKD) STAGE G3B/A1, MODERATELY DECREASED GLOMERULAR FILTRATION RATE (GFR) BETWEEN 30-44 ML/MIN/1.73 SQUARE METER AND ALBUMINURIA CREATININE RATIO LESS THAN 30 MG/G (HCC): ICD-10-CM

## 2023-07-25 DIAGNOSIS — Z09 HOSPITAL DISCHARGE FOLLOW-UP: Primary | ICD-10-CM

## 2023-07-25 DIAGNOSIS — F03.918 DEMENTIA WITH BEHAVIORAL DISTURBANCE (HCC): ICD-10-CM

## 2023-07-25 LAB — HBA1C MFR BLD: 6.1 %

## 2023-07-25 ASSESSMENT — ENCOUNTER SYMPTOMS
SHORTNESS OF BREATH: 0
CHEST TIGHTNESS: 0
ABDOMINAL PAIN: 0
COUGH: 0
NAUSEA: 0
BLOOD IN STOOL: 0
WHEEZING: 0
COLOR CHANGE: 0
VOMITING: 0

## 2023-07-25 NOTE — ASSESSMENT & PLAN NOTE
Moderately controlled in office, 136/78. Doesn't look like she's had refills on clonidine for awhile, patient unsure if she's been taking this. Last prior PCP note states she was. Confirm medications once home. Continue amlodipine.

## 2023-07-25 NOTE — ASSESSMENT & PLAN NOTE
Labs/imaging reviewed from inpatient 7/12/23-7/13/23. Was admitted for one night then discharged. Home health care was declines by patient. Daughter has been helping set up med box. CT head impression: No acute intracranial findings. 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 Neurology referral placed as patient states she wasn't given referral at discharge.

## 2023-07-25 NOTE — ASSESSMENT & PLAN NOTE
POCT A1C 6.1% in office today, 6.3% two months ago. Patient states she doesn't think she's taking metformin, daughter to check when she gets home and notify office.

## 2023-07-25 NOTE — ASSESSMENT & PLAN NOTE
Patient pleasant during visit today, however still with difficulty forming completing sentences. Daughter states this has been progressing. Patient was admitted for one evening after I sent her to emergency department due to behavioral disturbance at new patient appointment, then discharged next day. States she didn't get referral to neurology at hospital.    Daughter states she's unaware of when mother was diagnosed with dementia. Patient takes namenda. Neurology referral placed today. Office called daughter while in visit, appointment scheduled for 9/13/23.

## 2023-07-25 NOTE — ASSESSMENT & PLAN NOTE
Unclear as to when she last saw nephrologist.   Crt 1.7, GFR 31 7/13/23. Looks like its been around the same since 2/2023. Referral to nephrology placed. Encouraged water intake. Avoid NSAIDS.

## 2023-07-25 NOTE — PROGRESS NOTES
Post-Discharge Transitional Care Follow Up    Hilton Hdz   YOB: 1945    Date of Office Visit:  7/25/2023  Date of Hospital Admission: 7/12/23  Date of Hospital Discharge: 7/13/23  Readmission Risk Score (high >=14%. Medium >=10%):Readmission Risk Score: 13    Care management risk score Rising risk (score 2-5) and Complex Care (Scores >=6): No Risk Score On File     Non face to face  following discharge, date last encounter closed (first attempt may have been earlier): 07/14/2023     Call initiated 2 business days of discharge: Yes     Hospital discharge follow-up  Assessment & Plan:  Labs/imaging reviewed from inpatient 7/12/23-7/13/23. Was admitted for one night then discharged. Home health care was declines by patient. Daughter has been helping set up med box. CT head impression: No acute intracranial findings. 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 Neurology referral placed as patient states she wasn't given referral at discharge. Orders:  -     [place] DISCHARGE MEDS RECONCILED W/ CURRENT OUTPATIENT MED LIST  Dementia with behavioral disturbance ([address])  Assessment & Plan:  Patient pleasant during visit today, however still with difficulty forming completing sentences. Daughter states this has been progressing. Patient was admitted for one evening after I sent her to emergency department due to behavioral disturbance at new patient appointment, then discharged next day. States she didn't get referral to neurology at hospital.    Daughter states she's unaware of when mother was diagnosed with dementia. Patient takes namenda. Neurology referral placed today. Office called daughter while in visit, appointment scheduled for 9/13/23.   Orders:  -     Gracie Robertson MD, Neurology, [facility]  Chronic kidney disease (CKD) stage G3b/A1, moderately decreased glomerular filtration rate (GFR) between 30-44

## 2023-07-26 ENCOUNTER — TELEPHONE (OUTPATIENT)
Dept: INTERNAL MEDICINE CLINIC | Age: 78
End: 2023-07-26

## 2023-07-26 NOTE — TELEPHONE ENCOUNTER
----- Message from Flaquita Hair, [address] sent at 7/26/2023 10:50 AM EDT -----  Subject: Medication Problem    Medication: Other - Metformin   Dosage: 500mg x 2 daily  Ordering Provider: odalis    Question/Problem: Daughter would like to know if Asha Estevez would like the   patient to take the medication above. As of right now she is not currently   taking the medication and does not have any pills. Please send in refill   if Jannie would like her to resume the medication.       Pharmacy: [facility] [address] [facility], [address] [place] [address] [phone] [facility] [phone]    ---------------------------------------------------------------------------  --------------  Joby Mckinnon INFO  [phone]; OK to leave message on voicemail  ---------------------------------------------------------------------------  --------------    SCRIPT ANSWERS  Relationship to Patient: Other/Third Party  Representative Name: Stevie Resendez   Is the representative on the Communication Release of Information (HARSHA)   form in Epic: Yes

## 2023-07-26 NOTE — TELEPHONE ENCOUNTER
----- Message from Yamile López, [address] sent at 7/26/2023 10:47 AM EDT -----  Subject: Message to Provider    QUESTIONS  Information for Provider? Patient's daughter calling in. Patient would   like her chart updated to have her preferred pharmacy as [address] on   [facility]. Patient switched this yesterday, but would like it switched   back for the future.   ---------------------------------------------------------------------------  --------------  CALL BACK INFO  [phone]; OK to leave message on voicemail  ---------------------------------------------------------------------------  --------------  SCRIPT ANSWERS  Relationship to Patient? Other/Third Party  Representative Name? Flora Lacy  Is the representative on the Communication Release of Information (HARSHA)   form in Epic?  Yes

## 2023-07-27 DIAGNOSIS — I10 ESSENTIAL HYPERTENSION: ICD-10-CM

## 2023-07-27 RX ORDER — CLONIDINE HYDROCHLORIDE 0.3 MG/1
TABLET ORAL
Qty: 270 TABLET | Refills: 1 | Status: SHIPPED | OUTPATIENT
Start: 2023-07-27

## 2023-07-27 RX ORDER — LEVOTHYROXINE SODIUM 0.05 MG/1
50 TABLET ORAL DAILY
Qty: 90 TABLET | Refills: 3 | Status: SHIPPED | OUTPATIENT
Start: 2023-07-27

## 2023-07-27 RX ORDER — IRBESARTAN 150 MG/1
150 TABLET ORAL DAILY
Qty: 90 TABLET | Refills: 1 | Status: SHIPPED | OUTPATIENT
Start: 2023-07-27

## 2023-07-27 RX ORDER — MEMANTINE HYDROCHLORIDE 5 MG/1
TABLET ORAL
Qty: 180 TABLET | Refills: 0 | Status: SHIPPED | OUTPATIENT
Start: 2023-07-27

## 2023-07-27 NOTE — TELEPHONE ENCOUNTER
Medication and Quantity requested:  levothyroxine (SYNTHROID) 50 MCG tablet - qty 90    memantine (NAMENDA) 5 MG tablet - qty 180    cloNIDine (CATAPRES) 0.3 MG tablet - qty 60    Irbesartan 150 MG Tab - qty 90    4 MEDICATIONS      Last Visit  05/15/23 - Dr Greg Moses and phone number updated in EPIC:  yes    [facility]

## 2023-08-01 NOTE — TELEPHONE ENCOUNTER
PCP recommendations:  \"I've went back through all notes from providers for the past year. Looks like last note from Dr Yariel Paul stated she was diet controlled (no metformin). Her A1C was 6.1 in the office last visit, which is controlled and improved from previous readings. Given her poor kidney function, it's not recommended that she re-starts metformin as this may cause additional damage. It's very important that she gets in to see kidney specialist. I put this referral in last visit and it looks like other providers tried very hard in February to get her scheduled. Please schedule appointment with nephrology and let me know if there are issues with getting in. Thanks, Alia Hong"    Spoke with Daughter on the phone about recommendations at request of PCP. Reviewed with Serina José importance of patient being evaluated by Nephrology due to kidney function not at normal levels. Reasoning for not restating Metformin reviewed. Daughter confirms understanding of information and is going to assist with scheduling appointment with specialist due to memory issues with patient.

## 2023-08-15 DIAGNOSIS — F32.A DEPRESSION, UNSPECIFIED DEPRESSION TYPE: ICD-10-CM

## 2023-08-15 RX ORDER — SERTRALINE HYDROCHLORIDE 100 MG/1
TABLET, FILM COATED ORAL
Qty: 90 TABLET | Refills: 0 | Status: SHIPPED | OUTPATIENT
Start: 2023-08-15

## 2023-08-15 NOTE — TELEPHONE ENCOUNTER
Medication:   Requested Prescriptions     Pending Prescriptions Disp Refills    sertraline (ZOLOFT) 100 MG tablet [Pharmacy Med Name: Sertraline HCl 100 MG Oral Tablet] 90 tablet 0     Sig: TAKE 1 TABLET BY MOUTH ONCE DAILY IN THE MORNING (TOTAL  125MG)        Last Filled:      Patient Phone Number: [phone] (home)     Last appt: 5/15/2023   Next appt: Visit date not found    Last OARRS: No flowsheet data found.

## 2023-08-24 PROBLEM — Z09 HOSPITAL DISCHARGE FOLLOW-UP: Status: RESOLVED | Noted: 2023-07-25 | Resolved: 2023-08-24

## 2023-08-29 RX ORDER — LEVOTHYROXINE SODIUM 0.05 MG/1
50 TABLET ORAL DAILY
Qty: 90 TABLET | Refills: 1 | Status: SHIPPED | OUTPATIENT
Start: 2023-08-29

## 2023-09-13 ENCOUNTER — OFFICE VISIT (OUTPATIENT)
Dept: NEUROLOGY | Age: 78
End: 2023-09-13
Payer: MEDICARE

## 2023-09-13 VITALS
HEART RATE: 60 BPM | BODY MASS INDEX: 27.62 KG/M2 | WEIGHT: 151 LBS | SYSTOLIC BLOOD PRESSURE: 201 MMHG | DIASTOLIC BLOOD PRESSURE: 70 MMHG

## 2023-09-13 DIAGNOSIS — Q21.12 PATENT FORAMEN OVALE: ICD-10-CM

## 2023-09-13 DIAGNOSIS — F01.50 VASCULAR DEMENTIA OF ACUTE ONSET, WITHOUT BEHAVIORAL DISTURBANCE (HCC): ICD-10-CM

## 2023-09-13 DIAGNOSIS — I69.920 APHASIA, LATE EFFECT OF CEREBROVASCULAR DISEASE: ICD-10-CM

## 2023-09-13 DIAGNOSIS — I10 HTN (HYPERTENSION), BENIGN: ICD-10-CM

## 2023-09-13 DIAGNOSIS — Z86.73 REMOTE HISTORY OF STROKE: Primary | ICD-10-CM

## 2023-09-13 PROCEDURE — 3078F DIAST BP <80 MM HG: CPT | Performed by: PSYCHIATRY & NEUROLOGY

## 2023-09-13 PROCEDURE — 99204 OFFICE O/P NEW MOD 45 MIN: CPT | Performed by: PSYCHIATRY & NEUROLOGY

## 2023-09-13 PROCEDURE — 1123F ACP DISCUSS/DSCN MKR DOCD: CPT | Performed by: PSYCHIATRY & NEUROLOGY

## 2023-09-13 PROCEDURE — 3077F SYST BP >= 140 MM HG: CPT | Performed by: PSYCHIATRY & NEUROLOGY

## 2023-09-13 RX ORDER — ROSUVASTATIN CALCIUM 20 MG/1
20 TABLET, COATED ORAL DAILY
COMMUNITY

## 2023-09-13 RX ORDER — MEMANTINE HYDROCHLORIDE 5 MG/1
TABLET ORAL
Qty: 180 TABLET | Refills: 1 | Status: SHIPPED | OUTPATIENT
Start: 2023-09-13

## 2023-09-13 NOTE — PROGRESS NOTES
Appropriate follow-up labs were ordered    I personally reviewed and updated social history, past medical history, medications, allergy, surgical history, and family history as documented in the patient's electronic health records. Labs and/or neuroimaging and other test results reviewed and discussed with the patient. Reviewed notes from other physicians. Provided patient education regarding risk, benefits and treatment options as well as adherence to medication regimen and side effect from these medications. Assessment:     Diagnosis Orders   1. Remote history of stroke  ECHO Complete With Bubble Study    Ambulatory referral to Speech Therapy      2. Aphasia, late effect of cerebrovascular disease        3. HTN (hypertension), benign        4. Vascular dementia of acute onset, without behavioral disturbance (HCC)  memantine (NAMENDA) 5 MG tablet      5. Patent foramen ovale  ECHO Complete With Bubble Study          Chronic aphasia which is likely secondary to remote left temporal stroke. Less likely dementia of Alzheimer disease. Could be a component of vascular dementia. Patient has significant aphasia on examination today.   Hypertension  Intracranial stenosis  Hyperlipidemia    Plan:  Schedule echo with bubble study to exclude PFO and other embolic source  Continue aspirin daily  Follow lipid panel and A1c  Aspirin  Continue statin  Monitor blood pressure closely and daily at home  Continue current blood pressure medications  Continue Namenda 5 mg twice daily  6 months refill  Discussed driving precautions  Refer to speech therapy for aphasia  Stroke education and stroke prevention discussed with the patient and her daughter today  Multivitamin daily  We will consider event monitor with her next visit  3-month follow-up

## 2023-09-18 ENCOUNTER — HOSPITAL ENCOUNTER (OUTPATIENT)
Dept: SPEECH THERAPY | Age: 78
Setting detail: THERAPIES SERIES
Discharge: HOME | End: 2023-09-18
Attending: PSYCHIATRY & NEUROLOGY

## 2023-09-18 ENCOUNTER — TELEPHONE (OUTPATIENT)
Dept: NEUROLOGY | Age: 78
End: 2023-09-18

## 2023-09-18 NOTE — PROGRESS NOTES
Speech Therapy  Cancellation/No-show Note  Patient Name:  Jimi Avalos  :  1945   Date:  2023  Cancels to Date: 0  No-shows to Date: 1    Patient status for today's appointment patient:  []  Cancelled  []  Rescheduled appointment  [x]  No-show ( - eval)     Reason given by patient:  []  Patient ill  []  Conflicting appointment  []  No transportation    []  Conflict with work  []  No reason given  [x]  Other:     Comments: no show     Phone call information:   []  Phone call made today to patient at _ time at number provided:      []  Patient answered, conversation as follows:    []  Patient did not answer, message left as follows:  [x]  Phone call not made today, pt's daughter called clinic and reported pt was not home when she went to pick her up for the appointment, daughter unsure if pt would come independently     Electronically signed by:  Daniela Hicks, SLP

## 2023-09-18 NOTE — TELEPHONE ENCOUNTER
LOV 9-  NOV 12-    Patient daughter called concerned about her mother. She is acting differently since last office visit. Concerned her BP might have gotten too high, not sure if she took hr BP meds and possible had another mini stroke. She was not making no sense. Walking around in [place], talking to herself. She forgot some of the events that happened on Friday. She was at [facility] and trying to get in someone's car and [place] police had to come and stop her. She thought it was her car. She has speech therapy today  Echo scheduled for 12-    Patient told daughter she is taking her Namenda 5 mg BID    Patient did refuse to go to hospital to be checked out. Daughter wants to know what if anything she can do? Please advise.

## 2023-09-19 ENCOUNTER — APPOINTMENT (OUTPATIENT)
Dept: GENERAL RADIOLOGY | Age: 78
End: 2023-09-19
Payer: MEDICARE

## 2023-09-19 ENCOUNTER — APPOINTMENT (OUTPATIENT)
Dept: CT IMAGING | Age: 78
End: 2023-09-19
Payer: MEDICARE

## 2023-09-19 ENCOUNTER — HOSPITAL ENCOUNTER (INPATIENT)
Age: 78
LOS: 3 days | Discharge: NURSING, REHAB OR LONG-TERM CARE | End: 2023-09-22
Attending: EMERGENCY MEDICINE | Admitting: INTERNAL MEDICINE
Payer: MEDICARE

## 2023-09-19 DIAGNOSIS — R03.0 ELEVATED BLOOD PRESSURE READING: ICD-10-CM

## 2023-09-19 DIAGNOSIS — R41.82 ALTERED MENTAL STATUS, UNSPECIFIED ALTERED MENTAL STATUS TYPE: Primary | ICD-10-CM

## 2023-09-19 PROBLEM — G93.41 ACUTE METABOLIC ENCEPHALOPATHY: Status: ACTIVE | Noted: 2023-09-19

## 2023-09-19 PROBLEM — N39.0 UTI (URINARY TRACT INFECTION): Status: ACTIVE | Noted: 2023-09-19

## 2023-09-19 PROBLEM — N18.9 ACUTE KIDNEY INJURY SUPERIMPOSED ON CKD (HCC): Status: ACTIVE | Noted: 2023-09-19

## 2023-09-19 PROBLEM — N17.9 ACUTE KIDNEY INJURY SUPERIMPOSED ON CKD (HCC): Status: ACTIVE | Noted: 2023-09-19

## 2023-09-19 LAB
ALBUMIN SERPL-MCNC: 4.4 G/DL (ref 3.4–5)
ALBUMIN/GLOB SERPL: 1.6 {RATIO} (ref 1.1–2.2)
ALP SERPL-CCNC: 72 U/L (ref 40–129)
ALT SERPL-CCNC: 17 U/L (ref 10–40)
AMMONIA PLAS-SCNC: 30 UMOL/L (ref 11–51)
ANION GAP SERPL CALCULATED.3IONS-SCNC: 10 MMOL/L (ref 3–16)
AST SERPL-CCNC: 20 U/L (ref 15–37)
BACTERIA URNS QL MICRO: NORMAL /HPF
BASOPHILS # BLD: 0 K/UL (ref 0–0.2)
BASOPHILS NFR BLD: 0.3 %
BILIRUB SERPL-MCNC: 0.4 MG/DL (ref 0–1)
BILIRUB UR QL STRIP.AUTO: NEGATIVE
BUN SERPL-MCNC: 25 MG/DL (ref 7–20)
CALCIUM SERPL-MCNC: 10 MG/DL (ref 8.3–10.6)
CHLORIDE SERPL-SCNC: 106 MMOL/L (ref 99–110)
CLARITY UR: CLEAR
CO2 SERPL-SCNC: 25 MMOL/L (ref 21–32)
COLOR UR: YELLOW
CREAT SERPL-MCNC: 1.9 MG/DL (ref 0.6–1.2)
DEPRECATED RDW RBC AUTO: 14.9 % (ref 12.4–15.4)
EKG ATRIAL RATE: 75 BPM
EKG DIAGNOSIS: NORMAL
EKG P AXIS: 36 DEGREES
EKG P-R INTERVAL: 168 MS
EKG Q-T INTERVAL: 410 MS
EKG QRS DURATION: 82 MS
EKG QTC CALCULATION (BAZETT): 457 MS
EKG R AXIS: -17 DEGREES
EKG T AXIS: 57 DEGREES
EKG VENTRICULAR RATE: 75 BPM
EOSINOPHIL # BLD: 0.2 K/UL (ref 0–0.6)
EOSINOPHIL NFR BLD: 3 %
EPI CELLS #/AREA URNS AUTO: 1 /HPF (ref 0–5)
GFR SERPLBLD CREATININE-BSD FMLA CKD-EPI: 27 ML/MIN/{1.73_M2}
GLUCOSE SERPL-MCNC: 127 MG/DL (ref 70–99)
GLUCOSE UR STRIP.AUTO-MCNC: NEGATIVE MG/DL
HCT VFR BLD AUTO: 38.4 % (ref 36–48)
HGB BLD-MCNC: 13.1 G/DL (ref 12–16)
HGB UR QL STRIP.AUTO: NEGATIVE
HYALINE CASTS #/AREA URNS AUTO: 3 /LPF (ref 0–8)
KETONES UR STRIP.AUTO-MCNC: NEGATIVE MG/DL
LEUKOCYTE ESTERASE UR QL STRIP.AUTO: ABNORMAL
LYMPHOCYTES # BLD: 0.8 K/UL (ref 1–5.1)
LYMPHOCYTES NFR BLD: 14.8 %
MCH RBC QN AUTO: 27.9 PG (ref 26–34)
MCHC RBC AUTO-ENTMCNC: 34.1 G/DL (ref 31–36)
MCV RBC AUTO: 81.7 FL (ref 80–100)
MONOCYTES # BLD: 0.4 K/UL (ref 0–1.3)
MONOCYTES NFR BLD: 6.9 %
NEUTROPHILS # BLD: 3.8 K/UL (ref 1.7–7.7)
NEUTROPHILS NFR BLD: 75 %
NITRITE UR QL STRIP.AUTO: NEGATIVE
PH UR STRIP.AUTO: 5.5 [PH] (ref 5–8)
PLATELET # BLD AUTO: 140 K/UL (ref 135–450)
PMV BLD AUTO: 8.2 FL (ref 5–10.5)
POTASSIUM SERPL-SCNC: 4.2 MMOL/L (ref 3.5–5.1)
PROT SERPL-MCNC: 7.1 G/DL (ref 6.4–8.2)
PROT UR STRIP.AUTO-MCNC: 100 MG/DL
RBC # BLD AUTO: 4.7 M/UL (ref 4–5.2)
RBC CLUMPS #/AREA URNS AUTO: 0 /HPF (ref 0–4)
REASON FOR REJECTION: NORMAL
REJECTED TEST: NORMAL
SODIUM SERPL-SCNC: 141 MMOL/L (ref 136–145)
SP GR UR STRIP.AUTO: 1.01 (ref 1–1.03)
UA COMPLETE W REFLEX CULTURE PNL UR: ABNORMAL
UA DIPSTICK W REFLEX MICRO PNL UR: YES
URN SPEC COLLECT METH UR: ABNORMAL
UROBILINOGEN UR STRIP-ACNC: 0.2 E.U./DL
WBC # BLD AUTO: 5.1 K/UL (ref 4–11)
WBC #/AREA URNS AUTO: 1 /HPF (ref 0–5)

## 2023-09-19 PROCEDURE — 6360000002 HC RX W HCPCS: Performed by: PHYSICIAN ASSISTANT

## 2023-09-19 PROCEDURE — 93010 ELECTROCARDIOGRAM REPORT: CPT | Performed by: INTERNAL MEDICINE

## 2023-09-19 PROCEDURE — 36415 COLL VENOUS BLD VENIPUNCTURE: CPT

## 2023-09-19 PROCEDURE — 6360000002 HC RX W HCPCS: Performed by: INTERNAL MEDICINE

## 2023-09-19 PROCEDURE — 71045 X-RAY EXAM CHEST 1 VIEW: CPT

## 2023-09-19 PROCEDURE — 70450 CT HEAD/BRAIN W/O DYE: CPT

## 2023-09-19 PROCEDURE — 84443 ASSAY THYROID STIM HORMONE: CPT

## 2023-09-19 PROCEDURE — 85025 COMPLETE CBC W/AUTO DIFF WBC: CPT

## 2023-09-19 PROCEDURE — 2580000003 HC RX 258: Performed by: INTERNAL MEDICINE

## 2023-09-19 PROCEDURE — 6370000000 HC RX 637 (ALT 250 FOR IP): Performed by: INTERNAL MEDICINE

## 2023-09-19 PROCEDURE — 87086 URINE CULTURE/COLONY COUNT: CPT

## 2023-09-19 PROCEDURE — 80053 COMPREHEN METABOLIC PANEL: CPT

## 2023-09-19 PROCEDURE — 99285 EMERGENCY DEPT VISIT HI MDM: CPT

## 2023-09-19 PROCEDURE — 96374 THER/PROPH/DIAG INJ IV PUSH: CPT

## 2023-09-19 PROCEDURE — 93005 ELECTROCARDIOGRAM TRACING: CPT | Performed by: PHYSICIAN ASSISTANT

## 2023-09-19 PROCEDURE — 2060000000 HC ICU INTERMEDIATE R&B

## 2023-09-19 PROCEDURE — 6370000000 HC RX 637 (ALT 250 FOR IP): Performed by: PHYSICIAN ASSISTANT

## 2023-09-19 PROCEDURE — 82140 ASSAY OF AMMONIA: CPT

## 2023-09-19 PROCEDURE — 81001 URINALYSIS AUTO W/SCOPE: CPT

## 2023-09-19 RX ORDER — ONDANSETRON 4 MG/1
4 TABLET, ORALLY DISINTEGRATING ORAL EVERY 8 HOURS PRN
Status: DISCONTINUED | OUTPATIENT
Start: 2023-09-19 | End: 2023-09-22 | Stop reason: HOSPADM

## 2023-09-19 RX ORDER — ONDANSETRON 2 MG/ML
4 INJECTION INTRAMUSCULAR; INTRAVENOUS EVERY 6 HOURS PRN
Status: DISCONTINUED | OUTPATIENT
Start: 2023-09-19 | End: 2023-09-22 | Stop reason: HOSPADM

## 2023-09-19 RX ORDER — GLUCAGON 1 MG/ML
1 KIT INJECTION PRN
Status: DISCONTINUED | OUTPATIENT
Start: 2023-09-19 | End: 2023-09-22 | Stop reason: HOSPADM

## 2023-09-19 RX ORDER — ZIPRASIDONE MESYLATE 20 MG/ML
20 INJECTION, POWDER, LYOPHILIZED, FOR SOLUTION INTRAMUSCULAR EVERY 12 HOURS PRN
Status: DISCONTINUED | OUTPATIENT
Start: 2023-09-19 | End: 2023-09-22 | Stop reason: HOSPADM

## 2023-09-19 RX ORDER — VITAMIN B COMPLEX
2000 TABLET ORAL DAILY
Status: DISCONTINUED | OUTPATIENT
Start: 2023-09-19 | End: 2023-09-22 | Stop reason: HOSPADM

## 2023-09-19 RX ORDER — SODIUM CHLORIDE 9 MG/ML
INJECTION, SOLUTION INTRAVENOUS CONTINUOUS
Status: DISCONTINUED | OUTPATIENT
Start: 2023-09-19 | End: 2023-09-22 | Stop reason: HOSPADM

## 2023-09-19 RX ORDER — LOSARTAN POTASSIUM 25 MG/1
50 TABLET ORAL DAILY
Status: DISCONTINUED | OUTPATIENT
Start: 2023-09-19 | End: 2023-09-22 | Stop reason: HOSPADM

## 2023-09-19 RX ORDER — DEXTROSE MONOHYDRATE 100 MG/ML
INJECTION, SOLUTION INTRAVENOUS CONTINUOUS PRN
Status: DISCONTINUED | OUTPATIENT
Start: 2023-09-19 | End: 2023-09-22 | Stop reason: HOSPADM

## 2023-09-19 RX ORDER — POLYETHYLENE GLYCOL 3350 17 G/17G
17 POWDER, FOR SOLUTION ORAL DAILY PRN
Status: DISCONTINUED | OUTPATIENT
Start: 2023-09-19 | End: 2023-09-22 | Stop reason: HOSPADM

## 2023-09-19 RX ORDER — LEVOTHYROXINE SODIUM 0.07 MG/1
75 TABLET ORAL DAILY
COMMUNITY

## 2023-09-19 RX ORDER — MEMANTINE HYDROCHLORIDE 5 MG/1
5 TABLET ORAL 2 TIMES DAILY
Status: DISCONTINUED | OUTPATIENT
Start: 2023-09-19 | End: 2023-09-22 | Stop reason: HOSPADM

## 2023-09-19 RX ORDER — CLONIDINE HYDROCHLORIDE 0.1 MG/1
0.3 TABLET ORAL 3 TIMES DAILY
Status: DISCONTINUED | OUTPATIENT
Start: 2023-09-19 | End: 2023-09-22 | Stop reason: HOSPADM

## 2023-09-19 RX ORDER — QUETIAPINE FUMARATE 25 MG/1
12.5 TABLET, FILM COATED ORAL NIGHTLY
Status: DISCONTINUED | OUTPATIENT
Start: 2023-09-19 | End: 2023-09-22 | Stop reason: HOSPADM

## 2023-09-19 RX ORDER — ACETAMINOPHEN 325 MG/1
650 TABLET ORAL EVERY 6 HOURS PRN
Status: DISCONTINUED | OUTPATIENT
Start: 2023-09-19 | End: 2023-09-22 | Stop reason: HOSPADM

## 2023-09-19 RX ORDER — ENOXAPARIN SODIUM 100 MG/ML
30 INJECTION SUBCUTANEOUS DAILY
Status: DISCONTINUED | OUTPATIENT
Start: 2023-09-19 | End: 2023-09-22 | Stop reason: HOSPADM

## 2023-09-19 RX ORDER — ONDANSETRON 4 MG/1
4 TABLET, ORALLY DISINTEGRATING ORAL EVERY 8 HOURS PRN
Status: DISCONTINUED | OUTPATIENT
Start: 2023-09-19 | End: 2023-09-19

## 2023-09-19 RX ORDER — SODIUM CHLORIDE 9 MG/ML
INJECTION, SOLUTION INTRAVENOUS PRN
Status: DISCONTINUED | OUTPATIENT
Start: 2023-09-19 | End: 2023-09-22 | Stop reason: HOSPADM

## 2023-09-19 RX ORDER — LEVOTHYROXINE SODIUM 0.07 MG/1
75 TABLET ORAL DAILY
Status: DISCONTINUED | OUTPATIENT
Start: 2023-09-19 | End: 2023-09-22 | Stop reason: HOSPADM

## 2023-09-19 RX ORDER — SODIUM CHLORIDE 0.9 % (FLUSH) 0.9 %
5-40 SYRINGE (ML) INJECTION PRN
Status: DISCONTINUED | OUTPATIENT
Start: 2023-09-19 | End: 2023-09-22 | Stop reason: HOSPADM

## 2023-09-19 RX ORDER — ACETAMINOPHEN 500 MG
1000 TABLET ORAL ONCE
Status: COMPLETED | OUTPATIENT
Start: 2023-09-19 | End: 2023-09-19

## 2023-09-19 RX ORDER — ACETAMINOPHEN 650 MG/1
650 SUPPOSITORY RECTAL EVERY 6 HOURS PRN
Status: DISCONTINUED | OUTPATIENT
Start: 2023-09-19 | End: 2023-09-22 | Stop reason: HOSPADM

## 2023-09-19 RX ORDER — LABETALOL HYDROCHLORIDE 5 MG/ML
10 INJECTION, SOLUTION INTRAVENOUS ONCE
Status: COMPLETED | OUTPATIENT
Start: 2023-09-19 | End: 2023-09-19

## 2023-09-19 RX ORDER — HYDRALAZINE HYDROCHLORIDE 20 MG/ML
10 INJECTION INTRAMUSCULAR; INTRAVENOUS EVERY 4 HOURS PRN
Status: DISCONTINUED | OUTPATIENT
Start: 2023-09-19 | End: 2023-09-22 | Stop reason: HOSPADM

## 2023-09-19 RX ORDER — ROSUVASTATIN CALCIUM 20 MG/1
20 TABLET, COATED ORAL DAILY
Status: DISCONTINUED | OUTPATIENT
Start: 2023-09-19 | End: 2023-09-19

## 2023-09-19 RX ORDER — SODIUM CHLORIDE 0.9 % (FLUSH) 0.9 %
5-40 SYRINGE (ML) INJECTION EVERY 12 HOURS SCHEDULED
Status: DISCONTINUED | OUTPATIENT
Start: 2023-09-19 | End: 2023-09-22 | Stop reason: HOSPADM

## 2023-09-19 RX ORDER — ASPIRIN 81 MG/1
81 TABLET, CHEWABLE ORAL DAILY
Status: DISCONTINUED | OUTPATIENT
Start: 2023-09-19 | End: 2023-09-22 | Stop reason: HOSPADM

## 2023-09-19 RX ORDER — ATORVASTATIN CALCIUM 80 MG/1
80 TABLET, FILM COATED ORAL NIGHTLY
Status: DISCONTINUED | OUTPATIENT
Start: 2023-09-19 | End: 2023-09-22 | Stop reason: HOSPADM

## 2023-09-19 RX ORDER — ONDANSETRON 2 MG/ML
4 INJECTION INTRAMUSCULAR; INTRAVENOUS EVERY 6 HOURS PRN
Status: DISCONTINUED | OUTPATIENT
Start: 2023-09-19 | End: 2023-09-19

## 2023-09-19 RX ADMIN — SERTRALINE 100 MG: 50 TABLET, FILM COATED ORAL at 17:52

## 2023-09-19 RX ADMIN — SODIUM CHLORIDE 25 ML: 9 INJECTION, SOLUTION INTRAVENOUS at 18:54

## 2023-09-19 RX ADMIN — CLONIDINE HYDROCHLORIDE 0.3 MG: 0.1 TABLET ORAL at 20:44

## 2023-09-19 RX ADMIN — ATORVASTATIN CALCIUM 80 MG: 80 TABLET, FILM COATED ORAL at 20:44

## 2023-09-19 RX ADMIN — ASPIRIN 81 MG 81 MG: 81 TABLET ORAL at 17:52

## 2023-09-19 RX ADMIN — LEVOTHYROXINE SODIUM 75 MCG: 75 TABLET ORAL at 17:52

## 2023-09-19 RX ADMIN — ACETAMINOPHEN 1000 MG: 500 TABLET ORAL at 14:53

## 2023-09-19 RX ADMIN — MEMANTINE 5 MG: 5 TABLET ORAL at 20:44

## 2023-09-19 RX ADMIN — QUETIAPINE FUMARATE 12.5 MG: 25 TABLET ORAL at 20:45

## 2023-09-19 RX ADMIN — ENOXAPARIN SODIUM 30 MG: 100 INJECTION SUBCUTANEOUS at 17:52

## 2023-09-19 RX ADMIN — Medication 2000 UNITS: at 17:52

## 2023-09-19 RX ADMIN — HYDRALAZINE HYDROCHLORIDE 10 MG: 20 INJECTION, SOLUTION INTRAMUSCULAR; INTRAVENOUS at 19:03

## 2023-09-19 RX ADMIN — CEFTRIAXONE SODIUM 1000 MG: 1 INJECTION, POWDER, FOR SOLUTION INTRAMUSCULAR; INTRAVENOUS at 18:55

## 2023-09-19 RX ADMIN — LOSARTAN POTASSIUM 50 MG: 25 TABLET, FILM COATED ORAL at 17:51

## 2023-09-19 RX ADMIN — SODIUM CHLORIDE: 9 INJECTION, SOLUTION INTRAVENOUS at 17:58

## 2023-09-19 RX ADMIN — LABETALOL HYDROCHLORIDE 10 MG: 5 INJECTION INTRAVENOUS at 13:22

## 2023-09-19 ASSESSMENT — PAIN DESCRIPTION - LOCATION: LOCATION: HEAD

## 2023-09-19 ASSESSMENT — PAIN SCALES - GENERAL
PAINLEVEL_OUTOF10: 6
PAINLEVEL_OUTOF10: 0

## 2023-09-19 ASSESSMENT — LIFESTYLE VARIABLES
HOW MANY STANDARD DRINKS CONTAINING ALCOHOL DO YOU HAVE ON A TYPICAL DAY: PATIENT DOES NOT DRINK
HOW OFTEN DO YOU HAVE A DRINK CONTAINING ALCOHOL: NEVER

## 2023-09-19 ASSESSMENT — PAIN - FUNCTIONAL ASSESSMENT: PAIN_FUNCTIONAL_ASSESSMENT: NONE - DENIES PAIN

## 2023-09-19 NOTE — ED NOTES
Pt transferred to [facility] via wheelchair all questions answered during handoff report to [facility] RN, all questions answered handoff.      Cammy Dodge RN  09/19/23 1537

## 2023-09-19 NOTE — ACP (ADVANCE CARE PLANNING)
Advanced Care Planning Note. Purpose of Encounter: Advanced care planning in light of dementia  Parties In Attendance: Patient, daughter on phone  Decisional Capacity: Limited  Subjective: Patient is [employer] [place]. No CP or SOB  Objective: Cr 1.9  Goals of Care Determination: Patient wants limited support (No CPR, no vent, no HD, no trach, no PEG, consider surgery)  Plan:  IVF, IV Abx, MRI Brain, Echo, PT/OT/SLP, Neuro consult  Code Status: Limited   Time spent on Advanced care Plannin minutes  Advanced Care Planning Documents: Completed advanced directives on chart, daughter is the POA.     Eliza Kiran MD  2023 4:35 PM

## 2023-09-19 NOTE — ED NOTES
ED TO INPATIENT SBAR HANDOFF    Patient Name: Boris Mattson   :  1945  69 y.o. MRN:  [id number]  Preferred Name  Xavier Grant  ED Room #:  ED-0026/26  Family/Caregiver Present no   Restraints no   Sitter no   Sepsis Risk Score Sepsis Risk Score: 2.35    Situation  Code Status: Prior No additional code details. Allergies: Patient has no known allergies. Weight: Patient Vitals for the past 96 hrs (Last 3 readings):   Weight   23 1120 153 lb (69.4 kg)     Arrived from: home  Chief Complaint:   Chief Complaint   Patient presents with    Altered Mental Status     According to daughter she has had some episodes of confusion in the last week. Trying to get in other peoples car at the store and unable to turn off her alarm at home. History of dementia. Hospital Problem/Diagnosis:  Principal Problem:    Acute metabolic encephalopathy  Active Problems:    Hypothyroidism    Dementia with behavioral disturbance (HCC)    Chronic kidney disease (CKD) stage G3b/A1, moderately decreased glomerular filtration rate (GFR) between 30-44 mL/min/1.73 square meter and albuminuria creatinine ratio less than 30 mg/g (HCC)    HTN (hypertension), benign    Acute kidney injury superimposed on CKD ([address])    UTI (urinary tract infection)  Resolved Problems:    * No resolved hospital problems. *    Imaging:   XR CHEST PORTABLE   Final Result   No acute cardiopulmonary findings. CT HEAD WO CONTRAST   Final Result   No acute intracranial abnormality.   Focal hypoechoic 10 UA shin seen within   the center [place] on the right which may represent an area prior   infarction/demyelination           Abnormal labs:   Abnormal Labs Reviewed   CBC WITH AUTO DIFFERENTIAL - Abnormal; Notable for the following components:       Result Value    Lymphocytes Absolute 0.8 (*)     All other components within normal limits   COMPREHENSIVE METABOLIC PANEL W/ REFLEX TO MG FOR LOW K - Abnormal; Notable for the following components:

## 2023-09-19 NOTE — H&P
09/19/2023 11:48 AM    CO2 25 09/19/2023 11:48 AM    BUN 25 09/19/2023 11:48 AM    CREATININE 1.9 09/19/2023 11:48 AM    CALCIUM 10.0 09/19/2023 11:48 AM    GFRAA 38 02/21/2022 09:48 AM    GFRAA 53 04/10/2013 09:07 AM    LABGLOM 27 09/19/2023 11:48 AM    GLUCOSE 127 09/19/2023 11:48 AM     XR CHEST PORTABLE   Final Result   No acute cardiopulmonary findings. CT HEAD WO CONTRAST   Final Result   No acute intracranial abnormality. Focal hypoechoic 10 UA shin seen within   the center [place] on the right which may represent an area prior   infarction/demyelination             Problem List  Principal Problem:    Acute metabolic encephalopathy  Active Problems:    Hypothyroidism    Dementia with behavioral disturbance (HCC)    Chronic kidney disease (CKD) stage G3b/A1, moderately decreased glomerular filtration rate (GFR) between 30-44 mL/min/1.73 square meter and albuminuria creatinine ratio less than 30 mg/g (HCC)    HTN (hypertension), benign    Acute kidney injury superimposed on CKD ([address])    UTI (urinary tract infection)  Resolved Problems:    * No resolved hospital problems. *        Assessment/Plan:   Acute metabolic encephalopathy  Admit to inpatient  Neuro checks  Cannot exclude stroke  Permissive HTN   Check MRI Brain  Check Echo with bubble  Continue ASA and Lipitor  Neuro consult for AMS  Repeat TSH  Check ammonia  IVF  PT/OT/SLP  UTI  Start Rocephin IV  Add UCx  WESLEY on CKD 3  IVF  Dementia  Continue Namenda  Add Seroquel 12.5 mg qhs  Hypothyroidism  Continue Synthroid  Check TSH  HTN  Resume Clonidine  Permissive HTN today      DVT prophylaxis Lovenox  Code status Limited  Diet Soft (SLP eval)  IV access Peripheral  Narvaez Catheter No    Admit as inpatient. I anticipate hospitalization spanning more than two midnights for investigation and treatment of the above medically necessary diagnoses. Discussed with patient and daughter. May require SNF placement upon DC.     Bobbye Lefort, MD 9/19/2023 4:13 PM

## 2023-09-19 NOTE — ED PROVIDER NOTES
[address]        Pt Name: Samson Roque  MRN: [id number]  [facility] 1945  Date of evaluation: 9/19/2023  Provider: GERARDO Ford  PCP: JENN Menon CNP  Note Started: 12:13 PM EDT 9/19/23       I have seen and evaluated this patient with my supervising physician Giuseppe Wilson MD.      [address]       Chief Complaint   Patient presents with    Altered Mental Status     According to daughter she has had some episodes of confusion in the last week. Trying to get in other peoples car at the store and unable to turn off her alarm at home. History of dementia. HISTORY OF PRESENT ILLNESS: 1 or more Elements     History from : Patient and Family patient's daughter at bedside provides majority of history    Limitations to history : None    Samson Roque is a 69 y.o. female who presents to the emergency room with her daughter with concern for worsening altered mental status/confusion. Daughter states that they believe that her confusion state has been worsening since this past Friday. She states that she had episodes where she tried to get into someone else's vehicle when she thought it was her own after she was shopping at a store. He also states that this past weekend she was seen walking up and down the street on people's doors and the fire department had to come out and get her back to her house. She does live alone but the family is trying to arrange for her to be placed in a care facility. She did see a neurologist this past week due to dementia. Per patient's daughter patient had a stroke sometime in the past and this has caused her dementia per the neurologist.  Patient states that she otherwise feels well has no specific complaints. At baseline she is confused of the time but knows that she is. Nursing Notes were all reviewed and agreed with or any disagreements were addressed in the HPI.     REVIEW OF SYSTEMS : show any acute abnormalities urinalysis was unremarkable for acute infection. Patient will be admitted to the Hospital for this confusion. [employer] sent to hospitalist for admission. admission      I am the Primary Clinician of Record. FINAL IMPRESSION      1. Altered mental status, unspecified altered mental status type    2. Elevated blood pressure reading          DISPOSITION/PLAN     DISPOSITION Admitted 09/19/2023 04:11:27 PM      PATIENT REFERRED TO:  No follow-up provider specified.     DISCHARGE MEDICATIONS:  Current Discharge Medication List          DISCONTINUED MEDICATIONS:  Current Discharge Medication List                 (Please note that portions of this note were completed with a voice recognition program.  Efforts were made to edit the dictations but occasionally words are mis-transcribed.)    GERARDO Chavez (electronically signed)            GERARDO Chavez  09/19/23 1910

## 2023-09-19 NOTE — PROGRESS NOTES
Patient seen in ED, room 26. Admission attempted. No visitors at bedside. Patient is oriented to person only. She is aware that she is 69years old, but is unable to recite date of birth. Ms. Joselito Arvizu was able to confirm the family members to call if she is unable to speak for herself, but does not know the phone numbers. She is unaware of any advance directives, but stated that she has all her \"papers in order, do she does not need anything else\". Unable to verify any history. Zeejohn Meyerla attempted to verify home medications, but patient does not know which medications she takes. When this RN asked about home medications, she says she does not have anyone to monitor or watch her take her medications, but that they are in individual compartments in her medi-set and she takes what she is supposed to for the day. All information for the balance of the admission was gathered during the interview process with Ms. Scott. She says she still drives and has had no problems with driving or finding her way wherever she wants to go. She stated that she would not want any blood transfusions even if it was a life threatening situation, because \"I am ready go go be with Stalin\". She does wear the adult briefs at night. She did not know the current year or month, or her current location or any of the answers in the delirium screening. She did state that she would like to be living in a different setting where she was around people and could have some assistance with her medications. She said this would also make her daughter more comfortable. She is currently living at [address] [place] [address] according to the chart. Patient is not currently oriented and many of the answers on the admission may need to be verified or validated to ensure accuracy.

## 2023-09-20 ENCOUNTER — APPOINTMENT (OUTPATIENT)
Dept: MRI IMAGING | Age: 78
End: 2023-09-20
Payer: MEDICARE

## 2023-09-20 PROBLEM — R41.82 ALTERED MENTAL STATUS: Status: ACTIVE | Noted: 2023-09-20

## 2023-09-20 PROBLEM — N39.0 UTI (URINARY TRACT INFECTION): Status: RESOLVED | Noted: 2023-09-19 | Resolved: 2023-09-20

## 2023-09-20 LAB
ANION GAP SERPL CALCULATED.3IONS-SCNC: 10 MMOL/L (ref 3–16)
BACTERIA UR CULT: NORMAL
BUN SERPL-MCNC: 25 MG/DL (ref 7–20)
CALCIUM SERPL-MCNC: 9.1 MG/DL (ref 8.3–10.6)
CHLORIDE SERPL-SCNC: 110 MMOL/L (ref 99–110)
CHOLEST SERPL-MCNC: 241 MG/DL (ref 0–199)
CO2 SERPL-SCNC: 23 MMOL/L (ref 21–32)
CREAT SERPL-MCNC: 1.8 MG/DL (ref 0.6–1.2)
DEPRECATED RDW RBC AUTO: 15.1 % (ref 12.4–15.4)
EST. AVERAGE GLUCOSE BLD GHB EST-MCNC: 122.6 MG/DL
GFR SERPLBLD CREATININE-BSD FMLA CKD-EPI: 29 ML/MIN/{1.73_M2}
GLUCOSE BLD-MCNC: 132 MG/DL (ref 70–99)
GLUCOSE BLD-MCNC: 135 MG/DL (ref 70–99)
GLUCOSE SERPL-MCNC: 138 MG/DL (ref 70–99)
HBA1C MFR BLD: 5.9 %
HCT VFR BLD AUTO: 35.7 % (ref 36–48)
HDLC SERPL-MCNC: 33 MG/DL (ref 40–60)
HGB BLD-MCNC: 12.1 G/DL (ref 12–16)
LDLC SERPL CALC-MCNC: 153 MG/DL
MCH RBC QN AUTO: 27.8 PG (ref 26–34)
MCHC RBC AUTO-ENTMCNC: 33.8 G/DL (ref 31–36)
MCV RBC AUTO: 82.2 FL (ref 80–100)
PERFORMED ON: ABNORMAL
PERFORMED ON: ABNORMAL
PLATELET # BLD AUTO: 142 K/UL (ref 135–450)
PMV BLD AUTO: 8.5 FL (ref 5–10.5)
POTASSIUM SERPL-SCNC: 4.4 MMOL/L (ref 3.5–5.1)
RBC # BLD AUTO: 4.35 M/UL (ref 4–5.2)
SODIUM SERPL-SCNC: 143 MMOL/L (ref 136–145)
TRIGL SERPL-MCNC: 275 MG/DL (ref 0–150)
TSH SERPL DL<=0.005 MIU/L-ACNC: 1.44 UIU/ML (ref 0.27–4.2)
VLDLC SERPL CALC-MCNC: 55 MG/DL
WBC # BLD AUTO: 4.8 K/UL (ref 4–11)

## 2023-09-20 PROCEDURE — 97166 OT EVAL MOD COMPLEX 45 MIN: CPT

## 2023-09-20 PROCEDURE — 83036 HEMOGLOBIN GLYCOSYLATED A1C: CPT

## 2023-09-20 PROCEDURE — 36415 COLL VENOUS BLD VENIPUNCTURE: CPT

## 2023-09-20 PROCEDURE — 6370000000 HC RX 637 (ALT 250 FOR IP): Performed by: STUDENT IN AN ORGANIZED HEALTH CARE EDUCATION/TRAINING PROGRAM

## 2023-09-20 PROCEDURE — 2060000000 HC ICU INTERMEDIATE R&B

## 2023-09-20 PROCEDURE — 2580000003 HC RX 258: Performed by: INTERNAL MEDICINE

## 2023-09-20 PROCEDURE — APPSS45 APP SPLIT SHARED TIME 31-45 MINUTES

## 2023-09-20 PROCEDURE — 97162 PT EVAL MOD COMPLEX 30 MIN: CPT

## 2023-09-20 PROCEDURE — 85027 COMPLETE CBC AUTOMATED: CPT

## 2023-09-20 PROCEDURE — 92526 ORAL FUNCTION THERAPY: CPT

## 2023-09-20 PROCEDURE — 97530 THERAPEUTIC ACTIVITIES: CPT

## 2023-09-20 PROCEDURE — 80061 LIPID PANEL: CPT

## 2023-09-20 PROCEDURE — 95816 EEG AWAKE AND DROWSY: CPT | Performed by: PSYCHIATRY & NEUROLOGY

## 2023-09-20 PROCEDURE — 80048 BASIC METABOLIC PNL TOTAL CA: CPT

## 2023-09-20 PROCEDURE — 95819 EEG AWAKE AND ASLEEP: CPT

## 2023-09-20 PROCEDURE — 99223 1ST HOSP IP/OBS HIGH 75: CPT | Performed by: PSYCHIATRY & NEUROLOGY

## 2023-09-20 PROCEDURE — 92610 EVALUATE SWALLOWING FUNCTION: CPT

## 2023-09-20 PROCEDURE — 6370000000 HC RX 637 (ALT 250 FOR IP): Performed by: INTERNAL MEDICINE

## 2023-09-20 PROCEDURE — 70551 MRI BRAIN STEM W/O DYE: CPT

## 2023-09-20 PROCEDURE — 6360000002 HC RX W HCPCS: Performed by: INTERNAL MEDICINE

## 2023-09-20 PROCEDURE — 92523 SPEECH SOUND LANG COMPREHEN: CPT

## 2023-09-20 PROCEDURE — APPNB30 APP NON BILLABLE TIME 0-30 MINS

## 2023-09-20 RX ORDER — BUTALBITAL, ACETAMINOPHEN AND CAFFEINE 50; 325; 40 MG/1; MG/1; MG/1
1 TABLET ORAL EVERY 8 HOURS PRN
Status: DISCONTINUED | OUTPATIENT
Start: 2023-09-20 | End: 2023-09-22 | Stop reason: HOSPADM

## 2023-09-20 RX ORDER — AMLODIPINE BESYLATE 5 MG/1
10 TABLET ORAL DAILY
Status: DISCONTINUED | OUTPATIENT
Start: 2023-09-20 | End: 2023-09-22 | Stop reason: HOSPADM

## 2023-09-20 RX ADMIN — BUTALBITAL, ACETAMINOPHEN, AND CAFFEINE 1 TABLET: 50; 325; 40 TABLET ORAL at 11:33

## 2023-09-20 RX ADMIN — ENOXAPARIN SODIUM 30 MG: 100 INJECTION SUBCUTANEOUS at 07:55

## 2023-09-20 RX ADMIN — HYDRALAZINE HYDROCHLORIDE 10 MG: 20 INJECTION, SOLUTION INTRAMUSCULAR; INTRAVENOUS at 06:00

## 2023-09-20 RX ADMIN — AMLODIPINE BESYLATE 10 MG: 5 TABLET ORAL at 07:58

## 2023-09-20 RX ADMIN — ACETAMINOPHEN 650 MG: 325 TABLET ORAL at 07:58

## 2023-09-20 RX ADMIN — CLONIDINE HYDROCHLORIDE 0.3 MG: 0.1 TABLET ORAL at 15:05

## 2023-09-20 RX ADMIN — Medication 10 ML: at 07:56

## 2023-09-20 RX ADMIN — SODIUM CHLORIDE: 9 INJECTION, SOLUTION INTRAVENOUS at 08:01

## 2023-09-20 RX ADMIN — Medication 10 ML: at 19:41

## 2023-09-20 RX ADMIN — CLONIDINE HYDROCHLORIDE 0.3 MG: 0.1 TABLET ORAL at 19:37

## 2023-09-20 RX ADMIN — LOSARTAN POTASSIUM 50 MG: 25 TABLET, FILM COATED ORAL at 07:55

## 2023-09-20 RX ADMIN — Medication 2000 UNITS: at 07:55

## 2023-09-20 RX ADMIN — ASPIRIN 81 MG 81 MG: 81 TABLET ORAL at 07:55

## 2023-09-20 RX ADMIN — LEVOTHYROXINE SODIUM 75 MCG: 75 TABLET ORAL at 06:00

## 2023-09-20 RX ADMIN — MEMANTINE 5 MG: 5 TABLET ORAL at 07:55

## 2023-09-20 RX ADMIN — QUETIAPINE FUMARATE 12.5 MG: 25 TABLET ORAL at 19:38

## 2023-09-20 RX ADMIN — CLONIDINE HYDROCHLORIDE 0.3 MG: 0.1 TABLET ORAL at 07:55

## 2023-09-20 RX ADMIN — ATORVASTATIN CALCIUM 80 MG: 80 TABLET, FILM COATED ORAL at 19:38

## 2023-09-20 RX ADMIN — ZIPRASIDONE MESYLATE 20 MG: 20 INJECTION, POWDER, LYOPHILIZED, FOR SOLUTION INTRAMUSCULAR at 20:52

## 2023-09-20 RX ADMIN — SERTRALINE 100 MG: 50 TABLET, FILM COATED ORAL at 07:55

## 2023-09-20 RX ADMIN — MEMANTINE 5 MG: 5 TABLET ORAL at 19:38

## 2023-09-20 ASSESSMENT — PAIN DESCRIPTION - LOCATION
LOCATION: HEAD
LOCATION: HEAD

## 2023-09-20 ASSESSMENT — PAIN SCALES - GENERAL
PAINLEVEL_OUTOF10: 7
PAINLEVEL_OUTOF10: 6

## 2023-09-20 NOTE — PROCEDURES
Patient: Leo Rollins    MR Number: [id number]  YOB: 1945  Date of Visit: 9/20/2023    Clinical History:  The patient is a 69y.o. years old female with new onset MS changes      Method: The EEG was performed utilizing the international 10/20 of electrode placements of both referential and bipolar montages. The patient was awake through out the recording. Photic stimulation was performed. Findings: The background of the EEG showed normal alpha posterior background of 9- HZ and amplitude of 20-40 UV. This background was symmetric, waxing and waning. The patient did not fall asleep during such recording. No spike or sharp waves were seen. Photic stimulation did not activate EEG. Impression: This awake EEG  is within normal limits. There is no evidence of epileptiform discharges, focal, or lateralizing abnormalities.       Armando Loredo MD      Board certified in clinical neurophysiology

## 2023-09-20 NOTE — PLAN OF CARE
Problem: Discharge Planning  Goal: Discharge to home or other facility with appropriate resources  9/20/2023 0837 by Ivania Zhou RN  Outcome: Progressing     Problem: Chronic Conditions and Co-morbidities  Goal: Patient's chronic conditions and co-morbidity symptoms are monitored and maintained or improved  9/20/2023 0837 by Ivania Zhou RN  Outcome: Progressing     Problem: Pain  Goal: Verbalizes/displays adequate comfort level or baseline comfort level  9/20/2023 0837 by Ivania Zhou RN  Outcome: Progressing     Problem: Safety - Adult  Goal: Free from fall injury  9/20/2023 0837 by Ivania Zhou RN  Outcome: Progressing

## 2023-09-20 NOTE — PROGRESS NOTES
Per pt request, PIV removed due to pain. Resisting a new one at this time, pt just went off the floor for testing.  MD haider

## 2023-09-20 NOTE — PROGRESS NOTES
Pt resting in bed, camera monitor in place for safety. Pt oriented to person only, able to verbalize head pain. Unsure of date/place/situation. Call light in reach, bed alarm engaged.

## 2023-09-20 NOTE — PROGRESS NOTES
[facility]   Phone: [phone]    Physical Therapy    [x] Initial Evaluation            [] Daily Treatment Note         [] Discharge Summary      Patient: Kari Lan   : 1945   MRN: [id number]   Date of Service:  2023  Admitting Diagnosis: Acute metabolic encephalopathy  Current Admission Summary: Kari Lan is a 69 y.o. female who presents to the emergency room with her daughter with concern for worsening altered mental status/confusion. Daughter states that they believe that her confusion state has been worsening since this past Friday. She states that she had episodes where she tried to get into someone else's vehicle when she thought it was her own after she was shopping at a store. He also states that this past weekend she was seen walking up and down the street on people's doors and the fire department had to come out and get her back to her house. She does live alone but the family is trying to arrange for her to be placed in a care facility. She did see a neurologist this past week due to dementia. Per patient's daughter patient had a stroke sometime in the past and this has caused her dementia per the neurologist.  Patient states that she otherwise feels well has no specific complaints. At baseline she is confused of the time but knows that she is.   Past Medical History:  has a past medical history of Alopecia, Anxiety and depression, Cancer ([address]), Chronic kidney disease (CKD) stage G3b/A1, moderately decreased glomerular filtration rate (GFR) between 30-44 mL/min/1.73 square meter and albuminuria creatinine ratio less than 30 mg/g (HCC), CTS (carpal tunnel syndrome), History of rheumatic fever, Hypertension, Hypothyroidism, Meniere disease, Mixed hyperlipidemia, Nausea, Obesity, Reflux esophagitis, Renal impairment, Sleep apnea, and Type II or unspecified type diabetes mellitus without mention of complication, not

## 2023-09-20 NOTE — PROGRESS NOTES
9/18/2023 but was a no-show for appointment. Per daughter, gradual onset of language and memory impairment over the past year but increased confusion on Friday. Reason for referral: SLP evaluation orders received due to altered mental status. DYSPHAGIA BEDSIDE SWALLOW EVALUATION   Dysphagia Impressions/Dysphagia Diagnosis: Oropharyngeal Dysphagia   Pt alert and lethargic. Positioned Upright in bed . On room air with RR <20/min. Oral motor exam revealed grossly functional lingual, labial, and buccal ROM and coordination; pt did not consistently follow commands to complete oral motor exam despite multimodality cues. Dentition was adequate, dentures in place. Laryngeal function assessment revealed strong volitional cough and clear vocal quality. Pt assessed with PO presentations, fed with set-up assistance from SLP: thin liquids (cup, straw), puree, soft and bite-sized, and solids. Oral phase characterized by functional labial seal, mildly prolonged oral manipulation/mastication, but adequate A-P transit with complete oral clearance. No overt clinical s/s aspiration across PO intake. Overall, pt dysphagia risk factors include Impaired cognition and co-morbidities. Therefore, aspiration precautions should be in place. Recommend continuation of regular diet with thin liquids and follow-up x 1-2 for diet tolerance. Recommended Diet and Intervention:  Diet Solids Recommendation:  Regular texture diet  Liquid Consistency Recommendation:   Thin liquids  Recommended form of Meds: Meds whole with water or Meds in puree        Dysphagia Therapeutic Intervention:  Diet Tolerance Monitoring , Patient/Family Education , Therapeutic Trials with SLP     Compensatory Swallowing Strategies:  Upright as possible with all PO intake , Small bites/sips , Eat/feed slowly, Remain upright 30-45 min     Oral Mechanism Exam:  [x]WFL []Mild   [] Moderate  []Severe  [x]To be assessed    SHORT TERM DYSPHAGIA GOALS  Pt will functionally tolerate recommended diet with no overt clinical s/s of aspiration     Patient Positioning: Upright in bed       SPEECH LANGUAGE COGNITIVE ASSESSMENT:     Speech Diagnosis:   Cognitive-Linguistic Deficits , Speech Language Deficits     Impressions: Pt presents with moderate-severe cognitive communication deficits characterized by impaired orientation, auditory comprehension to answer basic question or follow commands, and reduced verbal output. Pt with inconsistent verbal responses to prompts (automatic speech, responsive speech, open-ended questions) but frequently verbalized \"I don't know\" and perseverating on pain in head. Utterance length only between 1-4 words. Occasional phonemic paraphasias/irregular articulatory errors and word-finding delays, pt completed confrontational naming of common objects with 80% accuracy. Speech intelligibility judged to be 100% to unfamiliar listener. Pt followed one-step commands in 60% of opportunities given SLP model. Pt verbalized her first name but with delay when prompted for last name, incorrectly stated her daughter's name as \"Radha\" which daughter reports has been ongoing for the past few days. Pt stating \"I don't know\" when asked for , location, and date. Of note, pt [place] without hearing aids in place, daughter reports R ear > L ear; pt benefited from verbal repetition and cues to direct/shift attention. Cognitive assessment limited due to receptive language deficits and reduced verbal output. Overall, this appears to be a decline in cognitive-linguistic functioning as compared to previous speech therapy assessment 2023. Pt will benefit from skilled speech intervention targeting above cognitive communication deficits to support return to PLOF.        COMPREHENSION  Auditory Comprehension: Moderate -Severe  Impaired Basic questions  Impaired Complex questions  Impaired One step commands  Impaired Two step commands    EXPRESSION  Verbal Expression: Moderate

## 2023-09-20 NOTE — CARE COORDINATION
assistance at DC: No  Would you like Case Management to discuss the discharge plan with any other family members/significant others, and if so, who? Yes (Daughter- Anthony Olsen)  Plans to Return to Present Housing: No  Other Identified Issues/Barriers to RETURNING to current housing: No  Potential Assistance needed at discharge: [address]            Potential DME:    Patient expects to discharge to: [address] [place] [address] S for transportation at discharge:      Financial    Payor: [employer] / Plan: Leesa Jones ESSENTIAL/PLUS / Product Type: *No Product type* /     Does insurance require precert for SNF: Yes    Potential assistance Purchasing Medications: No  Meds-to-Beds request:        [facility] [address], [address] [facility] [phone] - F [phone]  [address] [facility]  [address] [facility]  Phone: [phone] Fax: [phone]    [facility] - [id number] [address] [phone] - F [phone]  [facility]  [address] [facility] [address]  Phone: [phone] Fax: [phone] [facility], [facility] [address] [phone] - F [phone] [address] [place] [address] 1362 [facility] [address]  Phone: [phone] Fax: [phone]    [address] [place] [address] [address], [address] [place] [address] [phone] Kristy Carvalho [phone]  [address] [place] [address]  Phone: [phone] Fax: [phone]      Notes:    Factors facilitating achievement of predicted outcomes: Family support, Cooperative, and Pleasant      Additional Case Management Notes: CM spoke in length with Brady Olsen. She advised prior to this stay, Patient was living at home alone and driving. They realize that she needs to not be on her own anymore and needs more care. Anthony Olsen is open to SNF for the Patient and also open for CM looking for a SNF where they could possibly go to LTC after.  Anthony Olsen okay with CM reaching out to facilities. BETTY reached out to Toni Marsh with [facility] who is meeting with Rio Rowland tomorrow at 2:30pm.     CM also spoke with Mary Landis at [facility] who is currently reviewing the Patient. CM called Brittany with [facility] asking to see if she has any opening at any of her facilities for a Patient needing SNF and possible LTC that has dementia. CM provided call back information. CM team to follow. The Plan for Transition of Care is related to the following treatment goals of Elevated blood pressure reading [R03.0]  Altered mental status, unspecified altered mental status type [X99.11]  Acute metabolic encephalopathy [F80.32]    IF APPLICABLE: The Patient and/or patient representative Katherine Castro and her family were provided with a choice of provider and agrees with the discharge plan. Freedom of choice list with basic dialogue that supports the patient's individualized plan of care/goals and shares the quality data associated with the providers was provided to:     Patient Representative Name:       The Patient and/or Patient Representative Agree with the Discharge Plan?         Electronically signed by LUDMILA Jones on 9/20/2023 at 4:09 PM
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
## 2023-09-20 NOTE — PROGRESS NOTES
Hospitalist Progress Note    Name:  Gage Naik    /Age/Sex: 1945  (69 y.o. female)  MRN & CSN:  [id number] & [id number]    PCP: JENN Grissom CNP    Date of Admission: 2023    Patient Status:  Inpatient     Chief Complaint:   Chief Complaint   Patient presents with    Altered Mental Status     According to daughter she has had some episodes of confusion in the last week. Trying to get in other peoples car at the store and unable to turn off her alarm at home. History of dementia. Hospital Course:   Admitted for altered mental status. Also complaining of headache. CT head non acute, but shows a possible hypoechoic area in center semiovale. Neurology consulted. Subjective: Today is:  Hospital Day: 2. Patient seen and examined in [id number]. Lying in bed. Complains of headache. States the same problem during the interview. Unable to really answer any further questions. Daughter at bedside.       Medications:  Reviewed    Infusion Medications    sodium chloride 25 mL (23 1594)    sodium chloride 75 mL/hr at 23 0801    dextrose       Scheduled Medications    amLODIPine  10 mg Oral Daily    aspirin  81 mg Oral Daily    Vitamin D  2,000 Units Oral Daily    cloNIDine  0.3 mg Oral TID    losartan  50 mg Oral Daily    levothyroxine  75 mcg Oral Daily    memantine  5 mg Oral BID    sertraline  100 mg Oral Daily    sodium chloride flush  5-40 mL IntraVENous 2 times per day    enoxaparin  30 mg SubCUTAneous Daily    atorvastatin  80 mg Oral Nightly    QUEtiapine  12.5 mg Oral Nightly     PRN Meds: butalbital-acetaminophen-caffeine, sodium chloride flush, sodium chloride, ondansetron **OR** ondansetron, polyethylene glycol, acetaminophen **OR** acetaminophen, perflutren lipid microspheres, ziprasidone, dextrose bolus **OR** dextrose bolus, glucagon (rDNA), dextrose, hydrALAZINE      Intake/Output Summary (Last 24 hours) at 2023 1227  Last data filed at 03:23 PM    RBCUA 0 09/19/2023 03:23 PM    BLOODU Negative 09/19/2023 03:23 PM    SPECGRAV 1.010 09/19/2023 03:23 PM    GLUCOSEU Negative 09/19/2023 03:23 PM    GLUCOSEU NEGATIVE 04/02/2011 10:54 AM       Radiology:  XR CHEST PORTABLE   Final Result   No acute cardiopulmonary findings. CT HEAD WO CONTRAST   Final Result   No acute intracranial abnormality.   Focal hypoechoic 10 UA shin seen within   the center semiovale on the right which may represent an area prior   infarction/demyelination         MRI brain without contrast    (Results Pending)           Assessment/Plan:    Active Hospital Problems    Diagnosis     Altered mental status [R41.82]     Encephalopathy, metabolic [V75.36]     Acute kidney injury superimposed on CKD ([address]) [N17.9, N18.9]     HTN (hypertension), benign [I10]     Chronic kidney disease (CKD) stage G3b/A1, moderately decreased glomerular filtration rate (GFR) between 30-44 mL/min/1.73 square meter and albuminuria creatinine ratio less than 30 mg/g ([address]) [N18.32]     Dementia with behavioral disturbance ([address]) [U62.957]     Hypothyroidism [E03.9]          Hospital Day: 2    This is a 69 y.o. female who presented to [facility] on [facility]/19/2023 and is being treated for:    Acute metabolic encephalopathy  Dementia  -CT head non acute, but shows a possible hypoechoic area in center semiovale  -MRI head ordered  -Labs pending; work up thus far negative  -PT/OT  -Patient would benefit from placement  -Daily labs; replace electrolytes as needed  -Neurology consulted    Hypertension  -Continue home antihypertensives    Hyperlipidemia  -Continue home statin    CKD III  -Creatinine baseline approximately 1.5-1.8  -Avoid nephrotoxins  -Daily labs; trend creatinine    Hypothyroid  -Continue synthroid      Discussed management of the case with neurology who recommended EEG    Drugs that require monitoring for toxicity include: Lovenox and the method of monitoring was/is CBC    DVT ppx:

## 2023-09-21 PROBLEM — I63.9 CVA (CEREBRAL VASCULAR ACCIDENT) (HCC): Status: ACTIVE | Noted: 2023-09-21

## 2023-09-21 PROBLEM — G93.41 ENCEPHALOPATHY, METABOLIC: Status: RESOLVED | Noted: 2023-09-19 | Resolved: 2023-09-21

## 2023-09-21 LAB
ALBUMIN SERPL-MCNC: 3.7 G/DL (ref 3.4–5)
ANION GAP SERPL CALCULATED.3IONS-SCNC: 9 MMOL/L (ref 3–16)
BASOPHILS # BLD: 0 K/UL (ref 0–0.2)
BASOPHILS NFR BLD: 0.5 %
BUN SERPL-MCNC: 20 MG/DL (ref 7–20)
CALCIUM SERPL-MCNC: 9.4 MG/DL (ref 8.3–10.6)
CHLORIDE SERPL-SCNC: 109 MMOL/L (ref 99–110)
CO2 SERPL-SCNC: 20 MMOL/L (ref 21–32)
CREAT SERPL-MCNC: 1.6 MG/DL (ref 0.6–1.2)
DEPRECATED RDW RBC AUTO: 15.4 % (ref 12.4–15.4)
EOSINOPHIL # BLD: 0.3 K/UL (ref 0–0.6)
EOSINOPHIL NFR BLD: 5.7 %
GFR SERPLBLD CREATININE-BSD FMLA CKD-EPI: 33 ML/MIN/{1.73_M2}
GLUCOSE BLD-MCNC: 125 MG/DL (ref 70–99)
GLUCOSE BLD-MCNC: 129 MG/DL (ref 70–99)
GLUCOSE BLD-MCNC: 80 MG/DL (ref 70–99)
GLUCOSE SERPL-MCNC: 118 MG/DL (ref 70–99)
HCT VFR BLD AUTO: 35.4 % (ref 36–48)
HGB BLD-MCNC: 12.1 G/DL (ref 12–16)
LYMPHOCYTES # BLD: 1.1 K/UL (ref 1–5.1)
LYMPHOCYTES NFR BLD: 24.3 %
MCH RBC QN AUTO: 28.2 PG (ref 26–34)
MCHC RBC AUTO-ENTMCNC: 34.2 G/DL (ref 31–36)
MCV RBC AUTO: 82.6 FL (ref 80–100)
MONOCYTES # BLD: 0.4 K/UL (ref 0–1.3)
MONOCYTES NFR BLD: 8.1 %
NEUTROPHILS # BLD: 2.8 K/UL (ref 1.7–7.7)
NEUTROPHILS NFR BLD: 61.4 %
PERFORMED ON: ABNORMAL
PERFORMED ON: ABNORMAL
PERFORMED ON: NORMAL
PHOSPHATE SERPL-MCNC: 3.7 MG/DL (ref 2.5–4.9)
PLATELET # BLD AUTO: 131 K/UL (ref 135–450)
PMV BLD AUTO: 8 FL (ref 5–10.5)
POTASSIUM SERPL-SCNC: 3.8 MMOL/L (ref 3.5–5.1)
RBC # BLD AUTO: 4.28 M/UL (ref 4–5.2)
SODIUM SERPL-SCNC: 138 MMOL/L (ref 136–145)
WBC # BLD AUTO: 4.5 K/UL (ref 4–11)

## 2023-09-21 PROCEDURE — 92507 TX SP LANG VOICE COMM INDIV: CPT

## 2023-09-21 PROCEDURE — 97530 THERAPEUTIC ACTIVITIES: CPT

## 2023-09-21 PROCEDURE — 6360000002 HC RX W HCPCS: Performed by: INTERNAL MEDICINE

## 2023-09-21 PROCEDURE — 2060000000 HC ICU INTERMEDIATE R&B

## 2023-09-21 PROCEDURE — 36415 COLL VENOUS BLD VENIPUNCTURE: CPT

## 2023-09-21 PROCEDURE — 99233 SBSQ HOSP IP/OBS HIGH 50: CPT

## 2023-09-21 PROCEDURE — 6370000000 HC RX 637 (ALT 250 FOR IP): Performed by: STUDENT IN AN ORGANIZED HEALTH CARE EDUCATION/TRAINING PROGRAM

## 2023-09-21 PROCEDURE — 80069 RENAL FUNCTION PANEL: CPT

## 2023-09-21 PROCEDURE — 6370000000 HC RX 637 (ALT 250 FOR IP): Performed by: INTERNAL MEDICINE

## 2023-09-21 PROCEDURE — 93880 EXTRACRANIAL BILAT STUDY: CPT

## 2023-09-21 PROCEDURE — 92526 ORAL FUNCTION THERAPY: CPT

## 2023-09-21 PROCEDURE — 2580000003 HC RX 258: Performed by: INTERNAL MEDICINE

## 2023-09-21 PROCEDURE — 85025 COMPLETE CBC W/AUTO DIFF WBC: CPT

## 2023-09-21 PROCEDURE — 93306 TTE W/DOPPLER COMPLETE: CPT

## 2023-09-21 RX ORDER — ATORVASTATIN CALCIUM 80 MG/1
80 TABLET, FILM COATED ORAL NIGHTLY
Qty: 30 TABLET | Refills: 3 | Status: SHIPPED | OUTPATIENT
Start: 2023-09-21

## 2023-09-21 RX ORDER — QUETIAPINE FUMARATE 25 MG/1
12.5 TABLET, FILM COATED ORAL NIGHTLY
Qty: 60 TABLET | Refills: 3 | Status: SHIPPED | OUTPATIENT
Start: 2023-09-21

## 2023-09-21 RX ORDER — AMLODIPINE BESYLATE 10 MG/1
10 TABLET ORAL DAILY
Qty: 30 TABLET | Refills: 3 | Status: SHIPPED | OUTPATIENT
Start: 2023-09-22

## 2023-09-21 RX ADMIN — CLONIDINE HYDROCHLORIDE 0.3 MG: 0.1 TABLET ORAL at 09:46

## 2023-09-21 RX ADMIN — AMLODIPINE BESYLATE 10 MG: 5 TABLET ORAL at 09:45

## 2023-09-21 RX ADMIN — LEVOTHYROXINE SODIUM 75 MCG: 75 TABLET ORAL at 09:48

## 2023-09-21 RX ADMIN — LOSARTAN POTASSIUM 50 MG: 25 TABLET, FILM COATED ORAL at 09:46

## 2023-09-21 RX ADMIN — Medication 2000 UNITS: at 09:48

## 2023-09-21 RX ADMIN — ATORVASTATIN CALCIUM 80 MG: 80 TABLET, FILM COATED ORAL at 21:17

## 2023-09-21 RX ADMIN — MEMANTINE 5 MG: 5 TABLET ORAL at 09:47

## 2023-09-21 RX ADMIN — QUETIAPINE FUMARATE 12.5 MG: 25 TABLET ORAL at 21:17

## 2023-09-21 RX ADMIN — Medication 10 ML: at 09:47

## 2023-09-21 RX ADMIN — MEMANTINE 5 MG: 5 TABLET ORAL at 21:17

## 2023-09-21 RX ADMIN — ASPIRIN 81 MG 81 MG: 81 TABLET ORAL at 09:46

## 2023-09-21 RX ADMIN — SERTRALINE 100 MG: 50 TABLET, FILM COATED ORAL at 09:47

## 2023-09-21 RX ADMIN — CLONIDINE HYDROCHLORIDE 0.3 MG: 0.1 TABLET ORAL at 21:17

## 2023-09-21 RX ADMIN — ZIPRASIDONE MESYLATE 20 MG: 20 INJECTION, POWDER, LYOPHILIZED, FOR SOLUTION INTRAMUSCULAR at 17:48

## 2023-09-21 ASSESSMENT — PAIN SCALES - GENERAL
PAINLEVEL_OUTOF10: 0
PAINLEVEL_OUTOF10: 0

## 2023-09-21 NOTE — CARE COORDINATION
Discharge Planning:     (CM) updated daughter Michelle Cueto in the [address] called from [facility] she advised out of all of her facilities the only one that has memory care unit is [facility] and they are currently full. John Kwan with [facility] called and they are also full. Daughter is touring [place] today at 2:30PM and has CM's contact information to call her after visiting to let CM know if they would like to move forward. CM team to follow.        Electronically signed by LUDMILA Pierre on 9/21/2023 at 3:40 PM

## 2023-09-21 NOTE — PLAN OF CARE
Problem: Discharge Planning  Goal: Discharge to home or other facility with appropriate resources  9/21/2023 0933 by Ovidio Mariscal RN  Outcome: Progressing  Flowsheets (Taken 9/21/2023 4217)  Discharge to home or other facility with appropriate resources: Arrange for needed discharge resources and transportation as appropriate  9/20/2023 2034 by Vilma Rai RN  Outcome: Progressing     Problem: Chronic Conditions and Co-morbidities  Goal: Patient's chronic conditions and co-morbidity symptoms are monitored and maintained or improved  9/21/2023 0933 by Ovidio Mariscal RN  Outcome: Progressing  Flowsheets (Taken 9/21/2023 2273)  Care Plan - Patient's Chronic Conditions and Co-Morbidity Symptoms are Monitored and Maintained or Improved: Monitor and assess patient's chronic conditions and comorbid symptoms for stability, deterioration, or improvement  9/20/2023 2034 by Vilma Rai RN  Outcome: Progressing     Problem: Pain  Goal: Verbalizes/displays adequate comfort level or baseline comfort level  9/21/2023 0933 by Ovidio Mariscal RN  Outcome: Progressing  9/20/2023 2034 by Vilma Rai RN  Outcome: Progressing     Problem: Safety - Adult  Goal: Free from fall injury  9/21/2023 0933 by Ovidio Mariscal RN  Outcome: Progressing  9/20/2023 2034 by Vilma Rai RN  Outcome: Progressing

## 2023-09-21 NOTE — PROGRESS NOTES
Physical Therapy Attempt  Samson Roque    PT attempted follow-up. Pt continuously declining, very circular conversation with no executive functioning or reasoning noted. Expressive aphasia also noted. ~10 minutes spent with various attempts to get pt to participate but she continued to decline and adjust things in her purse. Per chart pt is noted to have new CVA on MRI on 9/21. No charge.    Susan Mondragon, PT, DPT #[id number]

## 2023-09-21 NOTE — PROGRESS NOTES
Patient awake and wandering around her room, at  times will come out in the hallway. Unable to perform NIH due to patient unable to follow commands. No c/o pain voiced when asked. Patient sitting now on the bed eating breakfast.     Patient took scheduled medications without any difficulty as well as vital signs. Patient states she feels better.

## 2023-09-21 NOTE — DISCHARGE INSTR - COC
Continuity of Care Form    Patient Name: Jocelin Dillon   :  1945  MRN:  [id number]    Admit date:  2023  Discharge date:  23    Code Status Order: Limited   Advance Directives:     Admitting Physician:  Mikhail Vivar MD  PCP: Ryan Donnelly, APRN - CNP    Discharging Nurse: Glendy Inman RN  [facility] Unit/Room#: 0VC-9651/9355-84  Discharging Unit Phone Number: [phone]    Emergency Contact:   Extended Emergency Contact Information  Primary Emergency Contact: [address] Phone: [phone]  Mobile Phone: [phone]  Relation: Child  Secondary Emergency Contact: Lemuel Dee Phone: [phone]  Mobile Phone: [phone]  Relation: Child  Preferred language: English   needed?  No    Past Surgical History:  Past Surgical History:   Procedure Laterality Date    ANKLE SURGERY Right     repair of fracture     APPENDECTOMY      BREAST SURGERY Right     benign    COLONOSCOPY  04/15/2011    HYSTERECTOMY (CERVIX STATUS UNKNOWN) Bilateral 1975    SHARYN for ovarian CA    MASTOID SURGERY      at age 26    TONSILLECTOMY      UPPER GASTROINTESTINAL ENDOSCOPY  2011    with dilitation and biopsy       Immunization History:   Immunization History   Administered Date(s) Administered    COVID-19, MODERNA BLUE border, Primary or Immunocompromised, (age 12y+), IM, 100 mcg/0.5mL 2021, 2021, 2021    Influenza 10/12/2012, 10/21/2013    Influenza Virus Vaccine 10/14/2014    Influenza Whole 10/12/2010    Influenza, FLUAD, (age 72 y+), Adjuvanted, 0.5mL 2020    Influenza, High Dose (Fluzone 65 yrs and older) 2015, 09/15/2016, 10/31/2017, 10/21/2018, 2019, 2021    Pneumococcal, PCV-13, PREVNAR 13, (age 6w+), IM, 0.5mL 2017    Pneumococcal, PPSV23, PNEUMOVAX 23, (age 2y+), SC/IM, 0.5mL 2011, 2022    TD 5LF, Aman Lines, (age 7y+), IM, 0.5mL 10/24/2022    Td, unspecified formulation 2017    Zoster Live (Zostavax) Admin  Dependent  Med Delivery   whole    Wound Care Documentation and Therapy:        Elimination:  Continence: Bowel: Yes  Bladder: Yes  Urinary Catheter: None   Colostomy/Ileostomy/Ileal Conduit: No       Date of Last BM: 9/21/23    Intake/Output Summary (Last 24 hours) at 9/21/2023 1543  Last data filed at 9/21/2023 1349  Gross per 24 hour   Intake 190 ml   Output --   Net 190 ml     I/O last 3 completed shifts: In: 10 [I.V.:10]  Out: -     Safety Concerns:     Sundowners Sundrome and At Risk for Falls    Impairments/Disabilities:      None    Nutrition Therapy:  Current Nutrition Therapy:   - Oral Diet:  General    Routes of Feeding: Oral  Liquids: Thin Liquids  Daily Fluid Restriction: no  Last Modified Barium Swallow with Video (Video Swallowing Test): not done    Treatments at the Time of Hospital Discharge:   Respiratory Treatments: none  Oxygen Therapy:  is not on home oxygen therapy. Ventilator:    - No ventilator support    Rehab Therapies: none    Weight Bearing Status/Restrictions: No weight bearing restrictions  Other Medical Equipment (for information only, NOT a DME order):  hospital bed  Other Treatments:     Patient's personal belongings (please select all that are sent with patient):  clothes    RN SIGNATURE:  Electronically signed by Sonya Camara RN on 9/22/23 at 11:46 AM EDT    CASE MANAGEMENT/SOCIAL WORK SECTION    Inpatient Status Date: 09/19/2023    Readmission Risk Assessment Score: 15%  Readmission Risk              Risk of Unplanned Readmission:  20           Discharging to Facility/ [address] [facility]  [address]  Phone: [phone]  Fax: [phone]      / signature: Electronically signed by LUDMILA Mckinley on 9/21/2023 at 3:43 PM      PHYSICIAN SECTION    Prognosis: Good    Condition at Discharge: Stable    Rehab Potential (if transferring to Rehab):  Fair    Recommended Labs or Other
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
## 2023-09-21 NOTE — PROGRESS NOTES
Patient refusing vitals at this time. Patient upset daughter left. No sitter at bedside at this time. Patient sitting up in chair eating lunch.

## 2023-09-21 NOTE — CARE COORDINATION
Discharge Planning:     (CM) rec'd a call from Laurent with [facility] [phone] she advised family finished tour and are wanting to move forward with them. [facility] and this is a pre-cert the facility starts. Laurent advised she would go ahead and start the pre-cert. CM called Patient's [facility] [phone] to confirm this. She is in agreement with [facility] and pre-cert being started. BETTY team to follow.        Electronically signed by LUDMILA Sharp on 9/21/2023 at 3:43 PM

## 2023-09-21 NOTE — PROGRESS NOTES
Awake, agitated, oriented to self, flight of ideas- very confusing conversation. Denies having any pain. Up to bathroom with assist, gait unsteady. VSS. Unable to perform NIHSS/neuro check, cannot follow instructions.   Assisted back to bed, will continue to monitor.lore

## 2023-09-21 NOTE — PROGRESS NOTES
Facility/Department: [address]  Speech Language Pathology   Dysphagia and Speech Language/Cognitive Treatment Note    Patient: Rene Anderson   : 1945   MRN: [id number]      Evaluation Date: 2023      Admitting Dx: Elevated blood pressure reading [R03.0]  Altered mental status, unspecified altered mental status type [X25.61]  Acute metabolic encephalopathy [F91.13]  Treatment Diagnosis: Cognitive-Linguistic Deficits , Speech Language Deficits , Oropharyngeal Dysphagia   Pain: Did not state                                                Subjective:  Pt alert and upright in bed for treatment session. Sitter present. Dysphagia Treatment:   Diet and Treatment Recommendations 2023:  Diet Solids Recommendation:  Regular texture diet  Liquid Consistency Recommendation: Thin liquids  Recommended form of Meds: Meds whole with water or Meds in puree         Compensatory strategies: Upright as possible with all PO intake , Small bites/sips , Eat/feed slowly, Remain upright 30-45 min     Assessment of Texture Tolerance:  Diet level prior to treatment: Regular texture diet , Thin liquids   Tolerance of Current Diet Level:RN reported pt appears to be tolerating current diet level      -Impressions: Pt was positioned Upright in bed , awake and alert. Currently on room air. Trials of thin liquids and regular solids  were provided to assess swallow function. Pt able to self feed but required cues to take bites/drinks. Prolonged mastication, timely AP transit and effective oral clearance noted with regular solids. Given trials of thin liquids, pt demonstrated a seemingly timely swallow initiation with no overt clinical s/s of aspiration. Pt demonstrates increased risk for aspiration due to cognitive state  and new CVA .  Based on today's assessment recommend Regular texture diet  with Thin liquids , Meds whole with water or Meds in puree  with use of compensatory swallow strategies (see

## 2023-09-21 NOTE — PROGRESS NOTES
Hospitalist Progress Note    Name:  Dong Collado    /Age/Sex: 1945  (69 y.o. female)  MRN & CSN:  [id number] & [id number]    PCP: JENN Jones CNP    Date of Admission: 2023    Patient Status:  Inpatient     Chief Complaint:   Chief Complaint   Patient presents with    Altered Mental Status     According to daughter she has had some episodes of confusion in the last week. Trying to get in other peoples car at the store and unable to turn off her alarm at home. History of dementia. Hospital Course:   Admitted for altered mental status. Also complaining of headache. CT head non acute, but shows a possible hypoechoic area in center semiovale. Neurology consulted. MRI brain showed new acute area of infarct in right insular cortex and right parietal temporal lobe with infarct in area of right MCA territory. Echo ordered. Planning on SNF placement. Subjective: Today is:  Hospital Day: 3. Patient seen and examined in 3TN-3366/3366-01. In chair. Semi-confused today, but more engaged in conversation. Has chronic aphasia. Denies pain today. Wants to go home. Told her she may need a facility upon discharge. No family at bedside.       Medications:  Reviewed    Infusion Medications    sodium chloride 25 mL (23 1854)    sodium chloride 75 mL/hr at 23 0801    dextrose       Scheduled Medications    amLODIPine  10 mg Oral Daily    aspirin  81 mg Oral Daily    Vitamin D  2,000 Units Oral Daily    cloNIDine  0.3 mg Oral TID    losartan  50 mg Oral Daily    levothyroxine  75 mcg Oral Daily    memantine  5 mg Oral BID    sertraline  100 mg Oral Daily    sodium chloride flush  5-40 mL IntraVENous 2 times per day    enoxaparin  30 mg SubCUTAneous Daily    atorvastatin  80 mg Oral Nightly    QUEtiapine  12.5 mg Oral Nightly     PRN Meds: butalbital-acetaminophen-caffeine, sodium chloride flush, sodium chloride, ondansetron **OR** ondansetron, polyethylene glycol, 0. 4   ALKPHOS 72       No results for input(s): \"INR\" in the last 72 hours. No results for input(s): \"CKTOTAL\", \"TROPONINI\" in the last 72 hours. Urinalysis:      Lab Results   Component Value Date/Time    NITRU Negative 09/19/2023 03:23 PM    WBCUA 1 09/19/2023 03:23 PM    BACTERIA None Seen 09/19/2023 03:23 PM    RBCUA 0 09/19/2023 03:23 PM    BLOODU Negative 09/19/2023 03:23 PM    SPECGRAV 1.010 09/19/2023 03:23 PM    GLUCOSEU Negative 09/19/2023 03:23 PM    GLUCOSEU NEGATIVE 04/02/2011 10:54 AM       Radiology:  MRI brain without contrast   Final Result   There is an acute area of infarct involving the right insular cortex and   right parietal temporal lobe consistent with an acute area of infarct in the   right middle cerebral artery territory. Motion artifact mildly degrades the images. Cerebral atrophy. Moderate   chronic small vessel ischemic disease. XR CHEST PORTABLE   Final Result   No acute cardiopulmonary findings. CT HEAD WO CONTRAST   Final Result   No acute intracranial abnormality.   Focal hypoechoic 10 UA shin seen within   the center semiovale on the right which may represent an area prior   infarction/demyelination                 Assessment/Plan:    Active Hospital Problems    Diagnosis     CVA (cerebral vascular accident) ([address]) [I63.9]     Altered mental status [R41.82]     Acute kidney injury superimposed on CKD ([address]) [N17.9, N18.9]     HTN (hypertension), benign [I10]     Chronic kidney disease (CKD) stage G3b/A1, moderately decreased glomerular filtration rate (GFR) between 30-44 mL/min/1.73 square meter and albuminuria creatinine ratio less than 30 mg/g ([address]) [N18.32]     Dementia with behavioral disturbance ([address]) [B38.914]     Hypothyroidism [E03.9]          Hospital Day: 3    This is a 69 y.o. female who presented to [facility] on 9/19/2023 and is being treated for:    Acute metabolic encephalopathy - resolved  Dementia  CVA  -CT head non acute, but shows a

## 2023-09-22 VITALS
BODY MASS INDEX: 27.42 KG/M2 | SYSTOLIC BLOOD PRESSURE: 174 MMHG | RESPIRATION RATE: 16 BRPM | DIASTOLIC BLOOD PRESSURE: 83 MMHG | WEIGHT: 149 LBS | HEART RATE: 63 BPM | TEMPERATURE: 98.1 F | OXYGEN SATURATION: 95 % | HEIGHT: 62 IN

## 2023-09-22 LAB
ALBUMIN SERPL-MCNC: 4.1 G/DL (ref 3.4–5)
ANION GAP SERPL CALCULATED.3IONS-SCNC: 13 MMOL/L (ref 3–16)
BASOPHILS # BLD: 0 K/UL (ref 0–0.2)
BASOPHILS NFR BLD: 0.3 %
BUN SERPL-MCNC: 21 MG/DL (ref 7–20)
CALCIUM SERPL-MCNC: 9.9 MG/DL (ref 8.3–10.6)
CHLORIDE SERPL-SCNC: 106 MMOL/L (ref 99–110)
CO2 SERPL-SCNC: 22 MMOL/L (ref 21–32)
CREAT SERPL-MCNC: 1.6 MG/DL (ref 0.6–1.2)
DEPRECATED RDW RBC AUTO: 14.8 % (ref 12.4–15.4)
EOSINOPHIL # BLD: 0.3 K/UL (ref 0–0.6)
EOSINOPHIL NFR BLD: 6.1 %
GFR SERPLBLD CREATININE-BSD FMLA CKD-EPI: 33 ML/MIN/{1.73_M2}
GLUCOSE SERPL-MCNC: 119 MG/DL (ref 70–99)
HCT VFR BLD AUTO: 38.4 % (ref 36–48)
HGB BLD-MCNC: 12.9 G/DL (ref 12–16)
LYMPHOCYTES # BLD: 1.3 K/UL (ref 1–5.1)
LYMPHOCYTES NFR BLD: 26.7 %
MCH RBC QN AUTO: 27.7 PG (ref 26–34)
MCHC RBC AUTO-ENTMCNC: 33.7 G/DL (ref 31–36)
MCV RBC AUTO: 82.3 FL (ref 80–100)
MONOCYTES # BLD: 0.4 K/UL (ref 0–1.3)
MONOCYTES NFR BLD: 7.9 %
NEUTROPHILS # BLD: 2.8 K/UL (ref 1.7–7.7)
NEUTROPHILS NFR BLD: 59 %
PHOSPHATE SERPL-MCNC: 4.1 MG/DL (ref 2.5–4.9)
PLATELET # BLD AUTO: 143 K/UL (ref 135–450)
PMV BLD AUTO: 8.5 FL (ref 5–10.5)
POTASSIUM SERPL-SCNC: 4 MMOL/L (ref 3.5–5.1)
RBC # BLD AUTO: 4.66 M/UL (ref 4–5.2)
SODIUM SERPL-SCNC: 141 MMOL/L (ref 136–145)
WBC # BLD AUTO: 4.7 K/UL (ref 4–11)

## 2023-09-22 PROCEDURE — 92526 ORAL FUNCTION THERAPY: CPT

## 2023-09-22 PROCEDURE — 80069 RENAL FUNCTION PANEL: CPT

## 2023-09-22 PROCEDURE — 85025 COMPLETE CBC W/AUTO DIFF WBC: CPT

## 2023-09-22 PROCEDURE — 99233 SBSQ HOSP IP/OBS HIGH 50: CPT | Performed by: PSYCHIATRY & NEUROLOGY

## 2023-09-22 PROCEDURE — APPSS30 APP SPLIT SHARED TIME 16-30 MINUTES

## 2023-09-22 PROCEDURE — 92507 TX SP LANG VOICE COMM INDIV: CPT

## 2023-09-22 PROCEDURE — 97530 THERAPEUTIC ACTIVITIES: CPT

## 2023-09-22 PROCEDURE — 36415 COLL VENOUS BLD VENIPUNCTURE: CPT

## 2023-09-22 PROCEDURE — APPNB30 APP NON BILLABLE TIME 0-30 MINS

## 2023-09-22 NOTE — PROGRESS NOTES
.RN discharge summary from [facility] to a facility. This patient has had a discharge order placed. They are being discharged to [facility] and [facility]. Discharge paperwork has been printed and faxed by ALDO HERNÁNDEZ completed by this RN. no further needs at this time. IV has been removed with no complications. Telemetry has been removed. Pt has all belongings present. Report has been called to facility and all questions have been answered.

## 2023-09-22 NOTE — PROGRESS NOTES
.Shift assessment completed. Patient refusing vitals and this time and wants to be left alone. Patient is laying in bed with eyes closed but opens her eyes with voice. Respirations are easy and unlabored. Patient does not appear to be in distress, resting comfortably at this time. Call light within reach. Sitter in place as well as video monitor.

## 2023-09-22 NOTE — PROGRESS NOTES
Facility/Department: [address]  Speech Language Pathology   Dysphagia and Speech Language/Cognitive Treatment Note    Patient: Samson Roque   : 1945   MRN: [id number]      Evaluation Date: 2023      Admitting Dx: Elevated blood pressure reading [R03.0]  Altered mental status, unspecified altered mental status type [T73.46]  Acute metabolic encephalopathy [W54.58]  Treatment Diagnosis: Cognitive-Linguistic Deficits , Speech Language Deficits , Oropharyngeal Dysphagia   Pain: Did not state                                                Subjective:  Pt alert and upright in bed for treatment session. Sitter present. Dysphagia Treatment:   Diet and Treatment Recommendations 2023:  Diet Solids Recommendation:  Regular texture diet  Liquid Consistency Recommendation: Thin liquids  Recommended form of Meds: Meds whole with water or Meds in puree         Compensatory strategies: Upright as possible with all PO intake , Eat/feed slowly    Assessment of Texture Tolerance:  Diet level prior to treatment: Regular texture diet , Thin liquids   Tolerance of Current Diet Level:RN reported pt appears to be tolerating current diet level but has had poor oral intake     -Impressions: Pt was positioned Upright in bed , awake and alert. Currently on room air. Trials of thin liquids and mixed consistency  were provided to assess swallow function. Pt able to self feed trials independently. Pt demonstrated functional mastication and effective oral clearing of mixed consistencies. No overt clinical s/s of aspiration noted across PO trials. Overall, pt appears tolerating of current diet recommendations. Based on today's assessment recommend Regular texture diet  with Thin liquids , Meds whole with water or Meds in puree  with use of compensatory swallow strategies (see above). No further dysphagia tx indicated.      Dysphagia Goals:  Pt will functionally tolerate recommended diet with no overt

## 2023-09-22 NOTE — PLAN OF CARE
Problem: Coping  Goal: Pt/Family able to verbalize concerns and demonstrate effective coping strategies  Description: INTERVENTIONS:  1. Assist patient/family to identify coping skills, available support systems and cultural and spiritual values  2. Provide emotional support, including active listening and acknowledgement of concerns of patient and caregivers  3. Reduce environmental stimuli, as able  4. Instruct patient/family in relaxation techniques, as appropriate  5.  Assess for spiritual pain/suffering and initiate Spiritual Care, Psychosocial Clinical Specialist consults as needed  Outcome: Adequate for Discharge     Problem: Neurosensory - Adult  Goal: Achieves maximal functionality and self care  Outcome: Adequate for Discharge     Problem: Metabolic/Fluid and Electrolytes - Adult  Goal: Electrolytes maintained within normal limits  Recent Flowsheet Documentation  Taken 9/22/2023 6797 by Ovidio Mariscal RN  Electrolytes maintained within normal limits: Monitor labs and assess patient for signs and symptoms of electrolyte imbalances  9/22/2023 0758 by Ovidio Mariscal RN  Outcome: Adequate for Discharge

## 2023-09-22 NOTE — PROGRESS NOTES
assist.     Examination   Vision:   Vision Gross Assessment: Impaired and Vision Corrective Device: wears glasses for reading--pt previously reported wearing glasses at all times. Hearing:   hard of hearing      Subjective  General: Pt seated in chair on arrival, sitter present. Pt does not follow conversation throughout the session- inappropriately responds to questions. Pain: Patient does not rate upon questioning- did not appear to be in pain during mobility  Pain Interventions: not applicable     Functional Mobility  Bed Mobility:  Bed mobility not completed on this date. Comments: Pt seated in chair on arrival and at the end of the session. Transfers:  Sit to stand transfer: stand by assistance  Stand to sit transfer: stand by assistance  Comments: No AD used for transfers  Ambulation:  Surface:level surface  Assistive Device: no device  Assistance: stand by assistance  Distance: 20'  Gait Mechanics: Pt ambulates with decreased step length, slow michela, no LOB. Comments:    Stair Mobility:  Stair mobility not completed on this date. Comments:  Wheelchair Mobility:  No w/c mobility completed on this date. Comments:  Balance:  Static Sitting Balance: good: independent with functional balance in unsupported position  Dynamic Sitting Balance: fair (+): maintains balance at SBA/supervision without use of UE support  Static Standing Balance: fair (+): maintains balance at SBA/supervision without use of UE support  Dynamic Standing Balance: fair (+): maintains balance at SBA/supervision without use of UE support  Comments:    Other Therapeutic Interventions  Pt performed grooming while standing at the sink. See OT note for assist levels.     Functional Outcomes  AM-PAC Inpatient Mobility Raw Score : 20              Cognition  Overall Cognitive Status: Impaired  Arousal/Alertness: delayed responses to stimuli  Following Commands: inconsistently follows commands  Attention Span: difficulty attending to Goals: \"I just want to die\"   Short Term Goals:  Time Frame: Prior to D/C  Patient will complete bed mobility at stand by assistance   Patient will complete transfers at contact guard assistance- met 9/22/23   Patient will ambulate 50 ft with use of LRAD at contact guard assistance  Patient will complete transfers at independent    *One goal met and updated 9/22/23    Above goals reviewed on 9/22/2023. All goals are ongoing at this time unless indicated above.       Therapy Session Time      Individual Group Co-treatment   Time In     8351   Time Out     1335   Minutes     11     Timed Code Treatment Minutes: 11 Minutes  Total Treatment Minutes: 11 Minutes       Electronically Signed By: Dwayne Lancaster, PT  Alan Young PT, DPT [id number]
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
## 2023-09-22 NOTE — DISCHARGE SUMMARY
Hospital Medicine Discharge Summary    Name:  Hilton Hdz  Gender: female  : 1945  69 y.o. MRN: [id number]    PCP: JENN Phoenix - CNP     Date of Admission:  2023 11:13 AM  Discharge Date: 2023    Admitting Physician: J Luis Zimmerman MD  Discharge Physician: Kim Dockery DO    Communication to PCP  -CVA in right MCA territory on MRI  -Aspirin and statin      Discharge Diagnoses: Active Hospital Problems    Diagnosis     CVA (cerebral vascular accident) ([address]) [I63.9]     Altered mental status [R41.82]     Acute kidney injury superimposed on CKD ([address]) [N17.9, N18.9]     HTN (hypertension), benign [I10]     Chronic kidney disease (CKD) stage G3b/A1, moderately decreased glomerular filtration rate (GFR) between 30-44 mL/min/1.73 square meter and albuminuria creatinine ratio less than 30 mg/g ([facility]) [N18.32]     Dementia with behavioral disturbance ([address]) [F03.918]     Hypothyroidism [E03.9]        The patient was seen and examined on day of discharge and this discharge summary is in conjunction with any daily progress note from day of discharge. Hospital Course:  Hilton Hdz is a 69y.o. year old female who presented to [facility] on 2023 11:13 AM.      Admitted for altered mental status. Also complaining of headache. CT head non acute, but shows a possible hypoechoic area in center semiovale. Neurology consulted. MRI brain showed new acute area of infarct in right insular cortex and right parietal temporal lobe with infarct in area of right MCA territory. Echo  shows LVEF 55-60%; no RWMA; mild to moderate MR    On the last day of hospital stay, patient was doing well. Still having issues with chronic aphasia. No obvious deficits. Had some abdominal pain before breakfast, but improved afterwards. The patient expressed appropriate understanding of and agreement with the discharge recommendations, medications, and plan.       Physical Exam

## 2023-09-22 NOTE — PLAN OF CARE
Problem: Discharge Planning  Goal: Discharge to home or other facility with appropriate resources  Outcome: Adequate for Discharge     Problem: Chronic Conditions and Co-morbidities  Goal: Patient's chronic conditions and co-morbidity symptoms are monitored and maintained or improved  Outcome: Adequate for Discharge     Problem: Pain  Goal: Verbalizes/displays adequate comfort level or baseline comfort level  Outcome: Adequate for Discharge     Problem: Safety - Adult  Goal: Free from fall injury  Outcome: Adequate for Discharge     Problem: Coping  Goal: Pt/Family able to verbalize concerns and demonstrate effective coping strategies  Description: INTERVENTIONS:  1. Assist patient/family to identify coping skills, available support systems and cultural and spiritual values  2. Provide emotional support, including active listening and acknowledgement of concerns of patient and caregivers  3. Reduce environmental stimuli, as able  4. Instruct patient/family in relaxation techniques, as appropriate  5.  Assess for spiritual pain/suffering and initiate Spiritual Care, Psychosocial Clinical Specialist consults as needed  Outcome: Adequate for Discharge     Problem: Neurosensory - Adult  Goal: Achieves maximal functionality and self care  Outcome: Adequate for Discharge     Problem: Metabolic/Fluid and Electrolytes - Adult  Goal: Electrolytes maintained within normal limits  Outcome: Adequate for Discharge

## 2023-09-22 NOTE — CARE COORDINATION
SW reviewed the [facility] and found that patient's SNF authorization ([id number]) was approved. Set transport with [address] [place] [address] at 2:30pm today. Oneda Starford in admissions at the facility informed, RN informed and daughter was called and informed. HENS completed. Discharge packet completed. All documents faxed. Discharge Plan:  [address] [place], [place] [address]Alcides Nielson.   [address]  Phone: [phone]  Fax: [phone]    [address] [place] [address] at 2:30pm today    Electronically signed by LUDMILA Boyer, JANAKW on 9/22/2023 at 11:46 AM

## 2023-09-22 NOTE — PROGRESS NOTES
Hospitalist Progress Note    Name:  Oscar Kaur    /Age/Sex: 1945  (69 y.o. female)  MRN & CSN:  [id number] & [id number]    PCP: JENN Jalloh CNP    Date of Admission: 2023    Patient Status:  Inpatient     Chief Complaint:   Chief Complaint   Patient presents with    Altered Mental Status     According to daughter she has had some episodes of confusion in the last week. Trying to get in other peoples car at the store and unable to turn off her alarm at home. History of dementia. Hospital Course:   Admitted for altered mental status. Also complaining of headache. CT head non acute, but shows a possible hypoechoic area in center semiovale. Neurology consulted. MRI brain showed new acute area of infarct in right insular cortex and right parietal temporal lobe with infarct in area of right MCA territory. Echo ordered. Planning on SNF placement. Subjective: Today is:  Hospital Day: 4. Patient seen and examined in 3TN-3366/3366-01. In bed. Has some abdominal pain today, but has not eaten breakfast yet. Still showing signs of confusion and chronic aphasia. No family at bedside.       Medications:  Reviewed    Infusion Medications    sodium chloride 25 mL (23 1854)    sodium chloride 75 mL/hr at 23 0801    dextrose       Scheduled Medications    amLODIPine  10 mg Oral Daily    aspirin  81 mg Oral Daily    Vitamin D  2,000 Units Oral Daily    cloNIDine  0.3 mg Oral TID    losartan  50 mg Oral Daily    levothyroxine  75 mcg Oral Daily    memantine  5 mg Oral BID    sertraline  100 mg Oral Daily    sodium chloride flush  5-40 mL IntraVENous 2 times per day    enoxaparin  30 mg SubCUTAneous Daily    atorvastatin  80 mg Oral Nightly    QUEtiapine  12.5 mg Oral Nightly     PRN Meds: butalbital-acetaminophen-caffeine, sodium chloride flush, sodium chloride, ondansetron **OR** ondansetron, polyethylene glycol, acetaminophen **OR** acetaminophen, perflutren lipid

## 2023-10-10 ENCOUNTER — HOSPITAL ENCOUNTER (INPATIENT)
Age: 78
LOS: 3 days | Discharge: NURSING, REHAB OR LONG-TERM CARE | End: 2023-10-13
Attending: EMERGENCY MEDICINE | Admitting: INTERNAL MEDICINE
Payer: MEDICARE

## 2023-10-10 ENCOUNTER — APPOINTMENT (OUTPATIENT)
Dept: GENERAL RADIOLOGY | Age: 78
End: 2023-10-10
Payer: MEDICARE

## 2023-10-10 DIAGNOSIS — E86.0 DEHYDRATION: Primary | ICD-10-CM

## 2023-10-10 DIAGNOSIS — E87.6 HYPOKALEMIA: ICD-10-CM

## 2023-10-10 DIAGNOSIS — R41.82 ALTERED MENTAL STATUS, UNSPECIFIED ALTERED MENTAL STATUS TYPE: ICD-10-CM

## 2023-10-10 LAB
ALBUMIN SERPL-MCNC: 4.4 G/DL (ref 3.4–5)
ALBUMIN/GLOB SERPL: 1.3 {RATIO} (ref 1.1–2.2)
ALP SERPL-CCNC: 88 U/L (ref 40–129)
ALT SERPL-CCNC: 47 U/L (ref 10–40)
ANION GAP SERPL CALCULATED.3IONS-SCNC: 21 MMOL/L (ref 3–16)
AST SERPL-CCNC: 66 U/L (ref 15–37)
BACTERIA URNS QL MICRO: NORMAL /HPF
BASE EXCESS BLDV CALC-SCNC: -3.2 MMOL/L (ref -3–3)
BASOPHILS # BLD: 0 K/UL (ref 0–0.2)
BASOPHILS NFR BLD: 0.1 %
BILIRUB SERPL-MCNC: 0.6 MG/DL (ref 0–1)
BILIRUB UR QL STRIP.AUTO: NEGATIVE
BUN SERPL-MCNC: 28 MG/DL (ref 7–20)
CALCIUM SERPL-MCNC: 8.9 MG/DL (ref 8.3–10.6)
CHLORIDE SERPL-SCNC: 95 MMOL/L (ref 99–110)
CLARITY UR: CLEAR
CO2 BLDV-SCNC: 52 MMOL/L
CO2 SERPL-SCNC: 19 MMOL/L (ref 21–32)
COHGB MFR BLDV: 1.7 % (ref 0–1.5)
COLOR UR: YELLOW
CREAT SERPL-MCNC: 1.8 MG/DL (ref 0.6–1.2)
DEPRECATED RDW RBC AUTO: 14.9 % (ref 12.4–15.4)
DO-HGB MFR BLDV: 35 %
EOSINOPHIL # BLD: 0 K/UL (ref 0–0.6)
EOSINOPHIL NFR BLD: 0.2 %
EPI CELLS #/AREA URNS AUTO: 2 /HPF (ref 0–5)
GFR SERPLBLD CREATININE-BSD FMLA CKD-EPI: 29 ML/MIN/{1.73_M2}
GLUCOSE SERPL-MCNC: 121 MG/DL (ref 70–99)
GLUCOSE UR STRIP.AUTO-MCNC: NEGATIVE MG/DL
HCO3 BLDV-SCNC: 21.9 MMOL/L (ref 23–29)
HCT VFR BLD AUTO: 41.1 % (ref 36–48)
HGB BLD-MCNC: 14.3 G/DL (ref 12–16)
HGB UR QL STRIP.AUTO: ABNORMAL
HYALINE CASTS #/AREA URNS AUTO: 8 /LPF (ref 0–8)
KETONES UR STRIP.AUTO-MCNC: 15 MG/DL
LACTATE BLDV-SCNC: 0.9 MMOL/L (ref 0.4–2)
LEUKOCYTE ESTERASE UR QL STRIP.AUTO: NEGATIVE
LYMPHOCYTES # BLD: 0.5 K/UL (ref 1–5.1)
LYMPHOCYTES NFR BLD: 8.8 %
MAGNESIUM SERPL-MCNC: 1.8 MG/DL (ref 1.8–2.4)
MCH RBC QN AUTO: 27.2 PG (ref 26–34)
MCHC RBC AUTO-ENTMCNC: 34.8 G/DL (ref 31–36)
MCV RBC AUTO: 78 FL (ref 80–100)
METHGB MFR BLDV: 0.7 %
MONOCYTES # BLD: 0.5 K/UL (ref 0–1.3)
MONOCYTES NFR BLD: 8.1 %
NEUTROPHILS # BLD: 5 K/UL (ref 1.7–7.7)
NEUTROPHILS NFR BLD: 82.8 %
NITRITE UR QL STRIP.AUTO: NEGATIVE
O2 CT VFR BLDV CALC: 13 VOL %
O2 THERAPY: ABNORMAL
PCO2 BLDV: 39 MMHG (ref 40–50)
PH BLDV: 7.36 [PH] (ref 7.35–7.45)
PH UR STRIP.AUTO: 5.5 [PH] (ref 5–8)
PLATELET # BLD AUTO: 144 K/UL (ref 135–450)
PMV BLD AUTO: 8.5 FL (ref 5–10.5)
PO2 BLDV: 34.9 MMHG (ref 25–40)
POTASSIUM SERPL-SCNC: 2.3 MMOL/L (ref 3.5–5.1)
PROT SERPL-MCNC: 7.8 G/DL (ref 6.4–8.2)
PROT UR STRIP.AUTO-MCNC: 300 MG/DL
RBC # BLD AUTO: 5.27 M/UL (ref 4–5.2)
RBC CLUMPS #/AREA URNS AUTO: 0 /HPF (ref 0–4)
SAO2 % BLDV: 65 %
SODIUM SERPL-SCNC: 135 MMOL/L (ref 136–145)
SP GR UR STRIP.AUTO: 1.01 (ref 1–1.03)
UA COMPLETE W REFLEX CULTURE PNL UR: ABNORMAL
UA DIPSTICK W REFLEX MICRO PNL UR: YES
URN SPEC COLLECT METH UR: ABNORMAL
UROBILINOGEN UR STRIP-ACNC: 0.2 E.U./DL
WBC # BLD AUTO: 6 K/UL (ref 4–11)
WBC #/AREA URNS AUTO: 1 /HPF (ref 0–5)

## 2023-10-10 PROCEDURE — 84443 ASSAY THYROID STIM HORMONE: CPT

## 2023-10-10 PROCEDURE — 71045 X-RAY EXAM CHEST 1 VIEW: CPT

## 2023-10-10 PROCEDURE — 83735 ASSAY OF MAGNESIUM: CPT

## 2023-10-10 PROCEDURE — 6370000000 HC RX 637 (ALT 250 FOR IP): Performed by: INTERNAL MEDICINE

## 2023-10-10 PROCEDURE — 2580000003 HC RX 258: Performed by: INTERNAL MEDICINE

## 2023-10-10 PROCEDURE — 96366 THER/PROPH/DIAG IV INF ADDON: CPT

## 2023-10-10 PROCEDURE — 6360000002 HC RX W HCPCS: Performed by: INTERNAL MEDICINE

## 2023-10-10 PROCEDURE — 2580000003 HC RX 258: Performed by: EMERGENCY MEDICINE

## 2023-10-10 PROCEDURE — 96376 TX/PRO/DX INJ SAME DRUG ADON: CPT

## 2023-10-10 PROCEDURE — 82803 BLOOD GASES ANY COMBINATION: CPT

## 2023-10-10 PROCEDURE — 36415 COLL VENOUS BLD VENIPUNCTURE: CPT

## 2023-10-10 PROCEDURE — 81001 URINALYSIS AUTO W/SCOPE: CPT

## 2023-10-10 PROCEDURE — 99285 EMERGENCY DEPT VISIT HI MDM: CPT

## 2023-10-10 PROCEDURE — 80053 COMPREHEN METABOLIC PANEL: CPT

## 2023-10-10 PROCEDURE — 96375 TX/PRO/DX INJ NEW DRUG ADDON: CPT

## 2023-10-10 PROCEDURE — 6360000002 HC RX W HCPCS: Performed by: EMERGENCY MEDICINE

## 2023-10-10 PROCEDURE — 83605 ASSAY OF LACTIC ACID: CPT

## 2023-10-10 PROCEDURE — 96365 THER/PROPH/DIAG IV INF INIT: CPT

## 2023-10-10 PROCEDURE — 85025 COMPLETE CBC W/AUTO DIFF WBC: CPT

## 2023-10-10 PROCEDURE — 1200000000 HC SEMI PRIVATE

## 2023-10-10 RX ORDER — SERTRALINE HYDROCHLORIDE 25 MG/1
25 TABLET, FILM COATED ORAL DAILY
Status: DISCONTINUED | OUTPATIENT
Start: 2023-10-11 | End: 2023-10-13 | Stop reason: HOSPADM

## 2023-10-10 RX ORDER — QUETIAPINE FUMARATE 25 MG/1
50 TABLET, FILM COATED ORAL 2 TIMES DAILY
Status: DISCONTINUED | OUTPATIENT
Start: 2023-10-10 | End: 2023-10-13 | Stop reason: HOSPADM

## 2023-10-10 RX ORDER — SERTRALINE HYDROCHLORIDE 25 MG/1
25 TABLET, FILM COATED ORAL DAILY
Status: ON HOLD | COMMUNITY
End: 2023-10-13 | Stop reason: HOSPADM

## 2023-10-10 RX ORDER — LOSARTAN POTASSIUM 50 MG/1
50 TABLET ORAL DAILY
Status: ON HOLD | COMMUNITY
End: 2023-10-13 | Stop reason: HOSPADM

## 2023-10-10 RX ORDER — MAGNESIUM SULFATE 1 G/100ML
1000 INJECTION INTRAVENOUS ONCE
Status: COMPLETED | OUTPATIENT
Start: 2023-10-10 | End: 2023-10-10

## 2023-10-10 RX ORDER — CIPROFLOXACIN 250 MG/1
250 TABLET, FILM COATED ORAL 2 TIMES DAILY
Status: ON HOLD | COMMUNITY
Start: 2023-10-03 | End: 2023-10-13 | Stop reason: HOSPADM

## 2023-10-10 RX ORDER — LORAZEPAM 2 MG/ML
1 INJECTION INTRAMUSCULAR ONCE
Status: COMPLETED | OUTPATIENT
Start: 2023-10-10 | End: 2023-10-10

## 2023-10-10 RX ORDER — LOSARTAN POTASSIUM 25 MG/1
50 TABLET ORAL DAILY
Status: DISCONTINUED | OUTPATIENT
Start: 2023-10-11 | End: 2023-10-13

## 2023-10-10 RX ORDER — HALOPERIDOL 5 MG/ML
2.5 INJECTION INTRAMUSCULAR ONCE
Status: COMPLETED | OUTPATIENT
Start: 2023-10-10 | End: 2023-10-10

## 2023-10-10 RX ORDER — ONDANSETRON 4 MG/1
4 TABLET, ORALLY DISINTEGRATING ORAL EVERY 8 HOURS PRN
Status: DISCONTINUED | OUTPATIENT
Start: 2023-10-10 | End: 2023-10-13 | Stop reason: HOSPADM

## 2023-10-10 RX ORDER — ONDANSETRON 2 MG/ML
4 INJECTION INTRAMUSCULAR; INTRAVENOUS EVERY 6 HOURS PRN
Status: DISCONTINUED | OUTPATIENT
Start: 2023-10-10 | End: 2023-10-13 | Stop reason: HOSPADM

## 2023-10-10 RX ORDER — AMLODIPINE BESYLATE 5 MG/1
10 TABLET ORAL DAILY
Status: DISCONTINUED | OUTPATIENT
Start: 2023-10-11 | End: 2023-10-10

## 2023-10-10 RX ORDER — HYDROXYZINE PAMOATE 25 MG/1
25 CAPSULE ORAL 3 TIMES DAILY PRN
COMMUNITY

## 2023-10-10 RX ORDER — ATORVASTATIN CALCIUM 80 MG/1
80 TABLET, FILM COATED ORAL NIGHTLY
Status: DISCONTINUED | OUTPATIENT
Start: 2023-10-10 | End: 2023-10-13 | Stop reason: HOSPADM

## 2023-10-10 RX ORDER — POTASSIUM CHLORIDE 20 MEQ/1
40 TABLET, EXTENDED RELEASE ORAL ONCE
Status: DISCONTINUED | OUTPATIENT
Start: 2023-10-10 | End: 2023-10-10

## 2023-10-10 RX ORDER — LEVOTHYROXINE SODIUM 0.07 MG/1
75 TABLET ORAL DAILY
Status: DISCONTINUED | OUTPATIENT
Start: 2023-10-11 | End: 2023-10-13 | Stop reason: HOSPADM

## 2023-10-10 RX ORDER — QUETIAPINE FUMARATE 50 MG/1
50 TABLET, FILM COATED ORAL 2 TIMES DAILY
COMMUNITY

## 2023-10-10 RX ORDER — SODIUM CHLORIDE 0.9 % (FLUSH) 0.9 %
5-40 SYRINGE (ML) INJECTION EVERY 12 HOURS SCHEDULED
Status: DISCONTINUED | OUTPATIENT
Start: 2023-10-10 | End: 2023-10-13 | Stop reason: HOSPADM

## 2023-10-10 RX ORDER — ASPIRIN 81 MG/1
81 TABLET, CHEWABLE ORAL DAILY
Status: DISCONTINUED | OUTPATIENT
Start: 2023-10-11 | End: 2023-10-13 | Stop reason: HOSPADM

## 2023-10-10 RX ORDER — ACETAMINOPHEN 650 MG/1
650 SUPPOSITORY RECTAL EVERY 6 HOURS PRN
Status: DISCONTINUED | OUTPATIENT
Start: 2023-10-10 | End: 2023-10-13 | Stop reason: HOSPADM

## 2023-10-10 RX ORDER — QUETIAPINE FUMARATE 25 MG/1
12.5 TABLET, FILM COATED ORAL NIGHTLY
Status: DISCONTINUED | OUTPATIENT
Start: 2023-10-10 | End: 2023-10-13 | Stop reason: HOSPADM

## 2023-10-10 RX ORDER — HYDROXYZINE PAMOATE 25 MG/1
25 CAPSULE ORAL 3 TIMES DAILY PRN
Status: DISCONTINUED | OUTPATIENT
Start: 2023-10-10 | End: 2023-10-13 | Stop reason: HOSPADM

## 2023-10-10 RX ORDER — ACETAMINOPHEN 325 MG/1
650 TABLET ORAL EVERY 6 HOURS PRN
Status: DISCONTINUED | OUTPATIENT
Start: 2023-10-10 | End: 2023-10-13 | Stop reason: HOSPADM

## 2023-10-10 RX ORDER — SODIUM CHLORIDE, SODIUM LACTATE, POTASSIUM CHLORIDE, CALCIUM CHLORIDE 600; 310; 30; 20 MG/100ML; MG/100ML; MG/100ML; MG/100ML
INJECTION, SOLUTION INTRAVENOUS CONTINUOUS
Status: DISCONTINUED | OUTPATIENT
Start: 2023-10-10 | End: 2023-10-12

## 2023-10-10 RX ORDER — MEMANTINE HYDROCHLORIDE 5 MG/1
5 TABLET ORAL DAILY
Status: DISCONTINUED | OUTPATIENT
Start: 2023-10-11 | End: 2023-10-13 | Stop reason: HOSPADM

## 2023-10-10 RX ORDER — POTASSIUM CHLORIDE 7.45 MG/ML
10 INJECTION INTRAVENOUS
Status: DISCONTINUED | OUTPATIENT
Start: 2023-10-10 | End: 2023-10-10

## 2023-10-10 RX ORDER — ENOXAPARIN SODIUM 100 MG/ML
30 INJECTION SUBCUTANEOUS DAILY
Status: DISCONTINUED | OUTPATIENT
Start: 2023-10-11 | End: 2023-10-13 | Stop reason: HOSPADM

## 2023-10-10 RX ORDER — POLYETHYLENE GLYCOL 3350 17 G/17G
17 POWDER, FOR SOLUTION ORAL DAILY PRN
Status: DISCONTINUED | OUTPATIENT
Start: 2023-10-10 | End: 2023-10-13 | Stop reason: HOSPADM

## 2023-10-10 RX ORDER — SODIUM CHLORIDE 0.9 % (FLUSH) 0.9 %
5-40 SYRINGE (ML) INJECTION PRN
Status: DISCONTINUED | OUTPATIENT
Start: 2023-10-10 | End: 2023-10-13 | Stop reason: HOSPADM

## 2023-10-10 RX ORDER — CHOLECALCIFEROL (VITAMIN D3) 125 MCG
5 CAPSULE ORAL NIGHTLY
COMMUNITY

## 2023-10-10 RX ORDER — 0.9 % SODIUM CHLORIDE 0.9 %
1000 INTRAVENOUS SOLUTION INTRAVENOUS ONCE
Status: COMPLETED | OUTPATIENT
Start: 2023-10-10 | End: 2023-10-10

## 2023-10-10 RX ORDER — CLONIDINE HYDROCHLORIDE 0.1 MG/1
0.3 TABLET ORAL 3 TIMES DAILY
Status: DISCONTINUED | OUTPATIENT
Start: 2023-10-10 | End: 2023-10-13 | Stop reason: HOSPADM

## 2023-10-10 RX ORDER — POTASSIUM CHLORIDE 7.45 MG/ML
10 INJECTION INTRAVENOUS
Status: DISPENSED | OUTPATIENT
Start: 2023-10-10 | End: 2023-10-10

## 2023-10-10 RX ORDER — SODIUM CHLORIDE 9 MG/ML
INJECTION, SOLUTION INTRAVENOUS PRN
Status: DISCONTINUED | OUTPATIENT
Start: 2023-10-10 | End: 2023-10-13 | Stop reason: HOSPADM

## 2023-10-10 RX ADMIN — POTASSIUM CHLORIDE 10 MEQ: 7.46 INJECTION, SOLUTION INTRAVENOUS at 18:03

## 2023-10-10 RX ADMIN — POTASSIUM CHLORIDE 10 MEQ: 7.46 INJECTION, SOLUTION INTRAVENOUS at 17:55

## 2023-10-10 RX ADMIN — Medication 10 ML: at 23:39

## 2023-10-10 RX ADMIN — QUETIAPINE FUMARATE 12.5 MG: 25 TABLET ORAL at 23:26

## 2023-10-10 RX ADMIN — CLONIDINE HYDROCHLORIDE 0.3 MG: 0.1 TABLET ORAL at 23:26

## 2023-10-10 RX ADMIN — SODIUM CHLORIDE, POTASSIUM CHLORIDE, SODIUM LACTATE AND CALCIUM CHLORIDE: 600; 310; 30; 20 INJECTION, SOLUTION INTRAVENOUS at 23:24

## 2023-10-10 RX ADMIN — SODIUM CHLORIDE 1000 ML: 9 INJECTION, SOLUTION INTRAVENOUS at 16:49

## 2023-10-10 RX ADMIN — LORAZEPAM 1 MG: 2 INJECTION INTRAMUSCULAR; INTRAVENOUS at 16:30

## 2023-10-10 RX ADMIN — POTASSIUM CHLORIDE 10 MEQ: 7.46 INJECTION, SOLUTION INTRAVENOUS at 16:50

## 2023-10-10 RX ADMIN — HALOPERIDOL LACTATE 2.5 MG: 5 INJECTION INTRAMUSCULAR at 17:36

## 2023-10-10 RX ADMIN — ATORVASTATIN CALCIUM 80 MG: 80 TABLET, FILM COATED ORAL at 23:26

## 2023-10-10 RX ADMIN — LORAZEPAM 1 MG: 2 INJECTION INTRAMUSCULAR; INTRAVENOUS at 17:35

## 2023-10-10 RX ADMIN — MAGNESIUM SULFATE HEPTAHYDRATE 1000 MG: 1 INJECTION, SOLUTION INTRAVENOUS at 19:27

## 2023-10-10 ASSESSMENT — PAIN - FUNCTIONAL ASSESSMENT: PAIN_FUNCTIONAL_ASSESSMENT: 0-10

## 2023-10-10 ASSESSMENT — PAIN SCALES - GENERAL
PAINLEVEL_OUTOF10: 0
PAINLEVEL_OUTOF10: 0

## 2023-10-10 ASSESSMENT — PAIN SCALES - WONG BAKER: WONGBAKER_NUMERICALRESPONSE: 0

## 2023-10-10 NOTE — H&P
HOSPITALISTS HISTORY AND PHYSICAL    10/10/2023 6:48 PM    Patient Information:  Mare Salvador is a 69 y.o. female [id number]  PCP:  Marzetta Lesch, APRN - CNP (Tel: [phone] )    Chief complaint:    Chief Complaint   Patient presents with    Failure To Thrive     Pt brought in per [address] from [facility], pt admitted to their facility on 10/3 for more acute care on their dementia unit, pt had a known UTI and was prescribed Cipro, since arriving pt has refused all meds and oral intact, facility concerned for dehydration. ECF states pt refused labs. Pt is baseline mental status. History of Present Illness:  Mare Salvador is a 69 y.o. female who presented ECF for failure to thrive. Patient has been having increased dementia and nursing home was changed. Patient has not been drinking or any other new home and concerned that she is high dehydrated thus was sent to the ED. In the ED patient was agitated was given Ativan and Haldol likely patient to sedated. No family in the room      REVIEW OF SYSTEMS:   Dementia unabel to answer    Past Medical History:   has a past medical history of Alopecia, Anxiety and depression, Cancer ([address]), Chronic kidney disease (CKD) stage G3b/A1, moderately decreased glomerular filtration rate (GFR) between 30-44 mL/min/1.73 square meter and albuminuria creatinine ratio less than 30 mg/g (HCC), CTS (carpal tunnel syndrome), History of rheumatic fever, Hypertension, Hypothyroidism, Meniere disease, Mixed hyperlipidemia, Nausea, Obesity, Reflux esophagitis, Renal impairment, Sleep apnea, and Type II or unspecified type diabetes mellitus without mention of complication, not stated as uncontrolled. Past Surgical History:   has a past surgical history that includes Hysterectomy (Bilateral, 1975); Mastoid surgery; Tonsillectomy; Appendectomy;  Ankle surgery (Right);

## 2023-10-10 NOTE — ED NOTES
ED TO INPATIENT SBAR HANDOFF    Patient Name: Thai Woodall   :  1945  69 y.o. MRN:  [id number]  Preferred Name  Jairo Chatman  ED Room #:  ED-0006/06  Family/Caregiver Present no   Restraints no   Sitter no   Sepsis Risk Score Sepsis Risk Score: 1.66    Situation  Code Status: Prior No additional code details. Allergies: Patient has no known allergies. Weight: Patient Vitals for the past 96 hrs (Last 3 readings):   Weight   10/10/23 1511 160 lb (72.6 kg)     Arrived from: home  Chief Complaint:   Chief Complaint   Patient presents with    Failure To Thrive     Pt brought in per [address] from [facility] ECF, pt admitted to their facility on 10/3 for more acute care on their dementia unit, pt had a known UTI and was prescribed Cipro, since arriving pt has refused all meds and oral intact, facility concerned for dehydration. ECF states pt refused labs. Pt is baseline mental status. Hospital Problem/Diagnosis:  Principal Problem:    Hypokalemia  Resolved Problems:    * No resolved hospital problems. *    Imaging:   XR CHEST PORTABLE   Final Result   No abnormalities noted.            Abnormal labs:   Abnormal Labs Reviewed   CBC WITH AUTO DIFFERENTIAL - Abnormal; Notable for the following components:       Result Value    RBC 5.27 (*)     MCV 78.0 (*)     Lymphocytes Absolute 0.5 (*)     All other components within normal limits   COMPREHENSIVE METABOLIC PANEL W/ REFLEX TO MG FOR LOW K - Abnormal; Notable for the following components:    Sodium 135 (*)     Potassium reflex Magnesium 2.3 (*)     Chloride 95 (*)     CO2 19 (*)     Anion Gap 21 (*)     Glucose 121 (*)     BUN 28 (*)     Creatinine 1.8 (*)     Est, Glom Filt Rate 29 (*)     ALT 47 (*)     AST 66 (*)     All other components within normal limits    Narrative:     CALL  [facility] tel. [phone],  Chemistry results called to and read back by Moises Dickson RN, 10/10/2023  16:05, by Kari Simpson   BLOOD GAS, VENOUS - Abnormal; Notable for no

## 2023-10-10 NOTE — ED NOTES
Patient still agitated. Patient is agitated and patient repeatedly taking off medical equipment. See new orders.       Deonna Armstrong RN  10/10/23 5650

## 2023-10-10 NOTE — PROGRESS NOTES
Pharmacy Home Medication Reconciliation Note    A medication reconciliation has been completed for Kimberly Doshi 1945    [facility] [address]  Information provided by: facility    The patient's home medication list is as follows: No current facility-administered medications on file prior to encounter.      Current Outpatient Medications on File Prior to Encounter   Medication Sig Dispense Refill    ciprofloxacin (CIPRO) 250 MG tablet Take 1 tablet by mouth 2 times daily Give one tablet by mouth twice daily for 7 days for UTI.      losartan (COZAAR) 50 MG tablet Take 1 tablet by mouth daily      melatonin 5 MG TABS tablet Take 1 tablet by mouth nightly      QUEtiapine (SEROQUEL) 50 MG tablet Take 1 tablet by mouth 2 times daily      sertraline (ZOLOFT) 25 MG tablet Take 1 tablet by mouth daily Give with 100 mg tab for 125 mg total.      hydrOXYzine pamoate (VISTARIL) 25 MG capsule Take 1 capsule by mouth 3 times daily as needed for Anxiety      atorvastatin (LIPITOR) 80 MG tablet Take 1 tablet by mouth nightly 30 tablet 3    QUEtiapine (SEROQUEL) 25 MG tablet Take 0.5 tablets by mouth nightly (Patient taking differently: Take 1 tablet by mouth nightly) 60 tablet 3    amLODIPine (NORVASC) 10 MG tablet Take 1 tablet by mouth daily (Patient not taking: Reported on 10/10/2023) 30 tablet 3    levothyroxine (SYNTHROID) 75 MCG tablet Take 1 tablet by mouth Daily      memantine (NAMENDA) 5 MG tablet TAKE 1 TABLET BY MOUTH TWICE DAILY (Patient taking differently: Take 1 tablet by mouth daily Indications: Decline in Cognition due to a Brain Disease) 180 tablet 1    sertraline (ZOLOFT) 100 MG tablet TAKE 1 TABLET BY MOUTH ONCE DAILY IN THE MORNING (TOTAL  125MG) (Patient taking differently: Take 1 tablet by mouth daily Give with 25 mg tab for 125 mg total.) 90 tablet 0    cloNIDine (CATAPRES) 0.3 MG tablet TAKE 1 TABLET 3 TIMES A DAY (Patient taking differently:

## 2023-10-10 NOTE — PROGRESS NOTES
Pharmacy Home Medication Reconciliation Note    A medication reconciliation has been completed for Jason Ugalde 1945    Pharmacy: [address] [place], [place] [address]  Information provided by: facility    The patient's home medication list is as follows: No current facility-administered medications on file prior to encounter.      Current Outpatient Medications on File Prior to Encounter   Medication Sig Dispense Refill    ciprofloxacin (CIPRO) 250 MG tablet Take 1 tablet by mouth 2 times daily Give one tablet by mouth twice daily for 7 days for UTI.      losartan (COZAAR) 50 MG tablet Take 1 tablet by mouth daily      melatonin 5 MG TABS tablet Take 1 tablet by mouth nightly      QUEtiapine (SEROQUEL) 50 MG tablet Take 1 tablet by mouth 2 times daily      sertraline (ZOLOFT) 25 MG tablet Take 1 tablet by mouth daily Give with 100 mg tab for 125 mg total.      hydrOXYzine pamoate (VISTARIL) 25 MG capsule Take 1 capsule by mouth 3 times daily as needed for Anxiety      atorvastatin (LIPITOR) 80 MG tablet Take 1 tablet by mouth nightly 30 tablet 3    QUEtiapine (SEROQUEL) 25 MG tablet Take 0.5 tablets by mouth nightly (Patient taking differently: Take 1 tablet by mouth nightly) 60 tablet 3    amLODIPine (NORVASC) 10 MG tablet Take 1 tablet by mouth daily (Patient not taking: Reported on 10/10/2023) 30 tablet 3    levothyroxine (SYNTHROID) 75 MCG tablet Take 1 tablet by mouth Daily      memantine (NAMENDA) 5 MG tablet TAKE 1 TABLET BY MOUTH TWICE DAILY (Patient taking differently: Take 1 tablet by mouth daily Indications: Decline in Cognition due to a Brain Disease) 180 tablet 1    sertraline (ZOLOFT) 100 MG tablet TAKE 1 TABLET BY MOUTH ONCE DAILY IN THE MORNING (TOTAL  125MG) (Patient taking differently: Take 1 tablet by mouth daily Give with 25 mg tab for 125 mg total.) 90 tablet 0    cloNIDine (CATAPRES) 0.3 MG tablet TAKE 1 TABLET 3 TIMES A DAY (Patient taking differently:

## 2023-10-10 NOTE — ED NOTES
SBAR note in, reviewed with receiving 5T nurse, all questions answered.      Beatriz Otto, RN  10/10/23 1950

## 2023-10-10 NOTE — ED PROVIDER NOTES
EMERGENCY MEDICINE ATTENDING NOTE  Emily Atkins. [facility] DO, FACEP, [address]  [place] COMPLAINT  Chief Complaint   Patient presents with    Failure To Thrive     Pt brought in per [address] from [facility], pt admitted to their facility on 10/3 for more acute care on their dementia unit, pt had a known UTI and was prescribed Cipro, since arriving pt has refused all meds and oral intact, facility concerned for dehydration. ECF states pt refused labs. Pt is baseline mental status. HISTORY OF PRESENT ILLNESS  Samson Roque is a 69 y.o. female who presents to the ED for evaluation of possible failure to thrive. Patient was recently switched to a different nursing facility due to worsening dementia and inability of other care facility to deal with the severity of her dementia. Patient was also diagnosed with urine infection around that time and was started on Cipro. Since being at the new facility patient has not been eating or drinking very much and they are concerned that she is becoming dehydrated. Patient was refusing to cooperate with any labs or other evaluation there so she was sent here. Patient herself is not able to offer any information and states that herself that she would be of no use because she does not know anything. Nursing/triage notes reviewed. No other complaints, modifying factors or associated symptoms. REVIEW OF SYSTEMS:  Unable to assess secondary to mental status.     PAST MEDICAL HISTORY  Past Medical History:   Diagnosis Date    Alopecia     Anxiety and depression     Cancer ([address])     uterine, several years ago; follows w/ gyn yearly    Chronic kidney disease (CKD) stage G3b/A1, moderately decreased glomerular filtration rate (GFR) between 30-44 mL/min/1.73 square meter and albuminuria creatinine ratio less than 30 mg/g ([facility]) 07/19/2021    CTS (carpal tunnel syndrome)      History of rheumatic fever     Hypertension     Hypothyroidism     Meniere

## 2023-10-10 NOTE — ED NOTES
Pt walked in to pt with removed IV. Pt hitting and swinging at Piazza and This RN.  See new orders     Rise Force, RN  10/10/23 1813

## 2023-10-11 LAB
ANION GAP SERPL CALCULATED.3IONS-SCNC: 13 MMOL/L (ref 3–16)
BASOPHILS # BLD: 0 K/UL (ref 0–0.2)
BASOPHILS NFR BLD: 0.2 %
BUN SERPL-MCNC: 22 MG/DL (ref 7–20)
CALCIUM SERPL-MCNC: 7.9 MG/DL (ref 8.3–10.6)
CHLORIDE SERPL-SCNC: 106 MMOL/L (ref 99–110)
CO2 SERPL-SCNC: 19 MMOL/L (ref 21–32)
CREAT SERPL-MCNC: 1.3 MG/DL (ref 0.6–1.2)
DEPRECATED RDW RBC AUTO: 14.8 % (ref 12.4–15.4)
EOSINOPHIL # BLD: 0.1 K/UL (ref 0–0.6)
EOSINOPHIL NFR BLD: 2.7 %
GFR SERPLBLD CREATININE-BSD FMLA CKD-EPI: 42 ML/MIN/{1.73_M2}
GLUCOSE SERPL-MCNC: 81 MG/DL (ref 70–99)
HCT VFR BLD AUTO: 35 % (ref 36–48)
HGB BLD-MCNC: 12.4 G/DL (ref 12–16)
LYMPHOCYTES # BLD: 0.8 K/UL (ref 1–5.1)
LYMPHOCYTES NFR BLD: 17.2 %
MAGNESIUM SERPL-MCNC: 1.9 MG/DL (ref 1.8–2.4)
MCH RBC QN AUTO: 27.2 PG (ref 26–34)
MCHC RBC AUTO-ENTMCNC: 35.4 G/DL (ref 31–36)
MCV RBC AUTO: 76.9 FL (ref 80–100)
MONOCYTES # BLD: 0.4 K/UL (ref 0–1.3)
MONOCYTES NFR BLD: 8.2 %
NEUTROPHILS # BLD: 3.2 K/UL (ref 1.7–7.7)
NEUTROPHILS NFR BLD: 71.7 %
PLATELET # BLD AUTO: 127 K/UL (ref 135–450)
PMV BLD AUTO: 7.9 FL (ref 5–10.5)
POTASSIUM SERPL-SCNC: 2.4 MMOL/L (ref 3.5–5.1)
POTASSIUM SERPL-SCNC: 3.1 MMOL/L (ref 3.5–5.1)
RBC # BLD AUTO: 4.55 M/UL (ref 4–5.2)
SODIUM SERPL-SCNC: 138 MMOL/L (ref 136–145)
TSH SERPL DL<=0.005 MIU/L-ACNC: 1.82 UIU/ML (ref 0.27–4.2)
WBC # BLD AUTO: 4.4 K/UL (ref 4–11)

## 2023-10-11 PROCEDURE — 1200000000 HC SEMI PRIVATE

## 2023-10-11 PROCEDURE — 97530 THERAPEUTIC ACTIVITIES: CPT

## 2023-10-11 PROCEDURE — 97116 GAIT TRAINING THERAPY: CPT

## 2023-10-11 PROCEDURE — 2580000003 HC RX 258: Performed by: INTERNAL MEDICINE

## 2023-10-11 PROCEDURE — 6360000002 HC RX W HCPCS: Performed by: INTERNAL MEDICINE

## 2023-10-11 PROCEDURE — 36415 COLL VENOUS BLD VENIPUNCTURE: CPT

## 2023-10-11 PROCEDURE — 84132 ASSAY OF SERUM POTASSIUM: CPT

## 2023-10-11 PROCEDURE — 85025 COMPLETE CBC W/AUTO DIFF WBC: CPT

## 2023-10-11 PROCEDURE — 6370000000 HC RX 637 (ALT 250 FOR IP): Performed by: INTERNAL MEDICINE

## 2023-10-11 PROCEDURE — 97166 OT EVAL MOD COMPLEX 45 MIN: CPT

## 2023-10-11 PROCEDURE — 80048 BASIC METABOLIC PNL TOTAL CA: CPT

## 2023-10-11 PROCEDURE — 92610 EVALUATE SWALLOWING FUNCTION: CPT

## 2023-10-11 PROCEDURE — 83735 ASSAY OF MAGNESIUM: CPT

## 2023-10-11 PROCEDURE — 97535 SELF CARE MNGMENT TRAINING: CPT

## 2023-10-11 PROCEDURE — 92526 ORAL FUNCTION THERAPY: CPT

## 2023-10-11 PROCEDURE — 97162 PT EVAL MOD COMPLEX 30 MIN: CPT

## 2023-10-11 RX ORDER — POTASSIUM CHLORIDE 7.45 MG/ML
10 INJECTION INTRAVENOUS
Status: COMPLETED | OUTPATIENT
Start: 2023-10-11 | End: 2023-10-12

## 2023-10-11 RX ORDER — POTASSIUM CHLORIDE 7.45 MG/ML
10 INJECTION INTRAVENOUS
Status: DISCONTINUED | OUTPATIENT
Start: 2023-10-11 | End: 2023-10-11

## 2023-10-11 RX ADMIN — MEMANTINE 5 MG: 5 TABLET ORAL at 12:20

## 2023-10-11 RX ADMIN — Medication 10 ML: at 21:52

## 2023-10-11 RX ADMIN — ASPIRIN 81 MG: 81 TABLET, CHEWABLE ORAL at 12:20

## 2023-10-11 RX ADMIN — QUETIAPINE FUMARATE 50 MG: 25 TABLET ORAL at 21:51

## 2023-10-11 RX ADMIN — QUETIAPINE FUMARATE 50 MG: 25 TABLET ORAL at 00:59

## 2023-10-11 RX ADMIN — ATORVASTATIN CALCIUM 80 MG: 80 TABLET, FILM COATED ORAL at 21:49

## 2023-10-11 RX ADMIN — CLONIDINE HYDROCHLORIDE 0.3 MG: 0.1 TABLET ORAL at 12:20

## 2023-10-11 RX ADMIN — SODIUM CHLORIDE, POTASSIUM CHLORIDE, SODIUM LACTATE AND CALCIUM CHLORIDE: 600; 310; 30; 20 INJECTION, SOLUTION INTRAVENOUS at 18:46

## 2023-10-11 RX ADMIN — QUETIAPINE FUMARATE 12.5 MG: 25 TABLET ORAL at 21:52

## 2023-10-11 RX ADMIN — QUETIAPINE FUMARATE 50 MG: 25 TABLET ORAL at 12:21

## 2023-10-11 RX ADMIN — POTASSIUM CHLORIDE 10 MEQ: 7.46 INJECTION, SOLUTION INTRAVENOUS at 21:47

## 2023-10-11 RX ADMIN — Medication 10 ML: at 12:31

## 2023-10-11 RX ADMIN — POTASSIUM CHLORIDE 10 MEQ: 7.46 INJECTION, SOLUTION INTRAVENOUS at 13:29

## 2023-10-11 RX ADMIN — POTASSIUM CHLORIDE 10 MEQ: 7.46 INJECTION, SOLUTION INTRAVENOUS at 16:49

## 2023-10-11 RX ADMIN — POTASSIUM CHLORIDE 10 MEQ: 7.46 INJECTION, SOLUTION INTRAVENOUS at 20:32

## 2023-10-11 RX ADMIN — POTASSIUM CHLORIDE 10 MEQ: 7.46 INJECTION, SOLUTION INTRAVENOUS at 15:15

## 2023-10-11 RX ADMIN — CLONIDINE HYDROCHLORIDE 0.3 MG: 0.1 TABLET ORAL at 21:51

## 2023-10-11 RX ADMIN — CLONIDINE HYDROCHLORIDE 0.3 MG: 0.1 TABLET ORAL at 16:56

## 2023-10-11 RX ADMIN — POTASSIUM CHLORIDE 10 MEQ: 7.46 INJECTION, SOLUTION INTRAVENOUS at 23:46

## 2023-10-11 RX ADMIN — LOSARTAN POTASSIUM 50 MG: 25 TABLET, FILM COATED ORAL at 12:34

## 2023-10-11 RX ADMIN — POTASSIUM CHLORIDE 10 MEQ: 7.46 INJECTION, SOLUTION INTRAVENOUS at 18:45

## 2023-10-11 RX ADMIN — SERTRALINE HYDROCHLORIDE 25 MG: 25 TABLET ORAL at 12:21

## 2023-10-11 RX ADMIN — ENOXAPARIN SODIUM 30 MG: 100 INJECTION SUBCUTANEOUS at 12:19

## 2023-10-11 RX ADMIN — LEVOTHYROXINE SODIUM 75 MCG: 0.07 TABLET ORAL at 05:58

## 2023-10-11 ASSESSMENT — PAIN SCALES - GENERAL
PAINLEVEL_OUTOF10: 0

## 2023-10-11 ASSESSMENT — PAIN SCALES - WONG BAKER
WONGBAKER_NUMERICALRESPONSE: 0

## 2023-10-11 NOTE — PROGRESS NOTES
[facility] Department   Phone: [phone]    Physical Therapy    [x] Initial Evaluation            [] Daily Treatment Note         [] Discharge Summary      Patient: Shy Iraheta   : 1945   MRN: [id number]   Date of Service:  10/11/2023  Admitting Diagnosis: Hypokalemia    Current Admission Summary: has a past medical history of Alopecia, Anxiety and depression, Cancer ([facility]), Chronic kidney disease (CKD) stage G3b/A1, moderately decreased glomerular filtration rate (GFR) between 30-44 mL/min/1.73 square meter and albuminuria creatinine ratio less than 30 mg/g (HCC), CTS (carpal tunnel syndrome), History of rheumatic fever, Hypertension, Hypothyroidism, Meniere disease, Mixed hyperlipidemia, Nausea, Obesity, Reflux esophagitis, Renal impairment, Sleep apnea, and Type II or unspecified type diabetes mellitus without mention of complication, not stated as uncontrolled. Past Medical History:  has a past medical history of Alopecia, Anxiety and depression, Cancer ([address] Chronic kidney disease (CKD) stage G3b/A1, moderately decreased glomerular filtration rate (GFR) between 30-44 mL/min/1.73 square meter and albuminuria creatinine ratio less than 30 mg/g (HCC), CTS (carpal tunnel syndrome), History of rheumatic fever, Hypertension, Hypothyroidism, Meniere disease, Mixed hyperlipidemia, Nausea, Obesity, Reflux esophagitis, Renal impairment, Sleep apnea, and Type II or unspecified type diabetes mellitus without mention of complication, not stated as uncontrolled. Past Surgical History:  has a past surgical history that includes Hysterectomy (Bilateral, ); Mastoid surgery; Tonsillectomy; Appendectomy; Ankle surgery (Right); Upper gastrointestinal endoscopy (2011); Colonoscopy (04/15/2011); and Breast surgery (Right). Discharge Recommendations: Shy Iraheta scored a  on the AM-PAC short mobility form.  Current research shows that an AM-PAC score stable      Subjective  General: Upon arrival patient is lying supine with HOB elevated, with explanation patient is agreeable to PT/OT services. Patient would often respond to questions/instructions with \"I don't know\" and lift her arms in a questioning gesture. Pain: 0/10  Pain Interventions: not applicable       Functional Mobility  Bed Mobility:  Supine to Sit: stand by assistance  Comments:  Transfers:  Sit to stand transfer: contact guard assistance  Stand to sit transfer: contact guard assistance  Toilet transfer: moderate assistance  Comments: Patient required continuous one step commands and explanations in order to perform and complete tasks  Ambulation:  Surface:level surface  Assistive Device: rolling walker  Assistance: moderate assistance  Distance: 40 feet  Gait Mechanics: decreased gait speed, step length, height, width, reciprocal gait  Comments:  Patient unable to successfully use walker for ambulation due to cognitive deficits. Patient recurrently lifted hands from walker and required constant cues to  handles instead of sliding hands posteriorly along length of walker structure or letting go of walker. She required continues simple commands to complete task, LOB resulted in Mod A with no attempt at self correction, tactile cueing for turning and direction  Stair Mobility:  Stair mobility not completed on this date. Comments:  Wheelchair Mobility:  No w/c mobility completed on this date.   Comments:  Balance:  Static Sitting Balance: fair (+): maintains balance at SBA/supervision without use of UE support  Dynamic Sitting Balance: fair (+): maintains balance at SBA/supervision without use of UE support  Static Standing Balance: fair (-): maintains balance at CGA with use of UE support  Dynamic Standing Balance: poor: requires mod (A) to maintain balance  Comments: Patient able to maintain balance intermittently but requires constant guarding because of lack of safety awareness    Other

## 2023-10-11 NOTE — CARE COORDINATION
Case Management Assessment  Initial Evaluation    Date/Time of Evaluation: 10/11/2023 2:58 PM  Assessment Completed by: Hector Fregoso    If patient is discharged prior to next notation, then this note serves as note for discharge by case management. Patient Name: Tesha Farley                   YOB: 1945  Diagnosis: Dehydration [E86.0]  Hypokalemia [E87.6]  Altered mental status, unspecified altered mental status type [R41.82]                   Date / Time: 10/10/2023  3:04 PM    Patient Admission Status: Inpatient   Readmission Risk (Low < 19, Mod (19-27), High > 27): Readmission Risk Score: 14.8    Current PCP: JENN Barros CNP  PCP verified by CM? Yes    Chart Reviewed: Yes      History Provided by: Other (see comment), Medical Record (Patient from [address] [place] [address])  Patient Orientation: Person (Patient has Dementia.)    Patient Cognition: Dementia / Early Alzheimer's    Hospitalization in the last 30 days (Readmission):  Yes    If yes, Readmission Assessment in  Navigator will be completed. Advance Directives:      Code Status: Full Code   Patient's Primary Decision Maker is: Legal Next of Kin    Primary Decision MakerCrehan Lee Child - [phone]    Secondary Decision Maker: Hany Goodwin - Child - [phone]    Discharge Planning:    Patient lives with: Other (Comment) (From [facility]) Type of Home: [address] [place] [address] (From [facility])  Primary Care Giver:  Other (Comment) (Patient from [address] [place] [address])  Patient Support Systems include: Family Members, /   Current Financial resources: Edwin Fairbanks  Current community resources: None  Current services prior to admission: [facility] (From [facility])            Current DME:              Type of Home Care services:  None    ADLS  Prior functional level: Assistance with the following:,

## 2023-10-11 NOTE — CARE COORDINATION
10/11/23 0921   Readmission Assessment   Number of Days since last admission? 8-30 days   Previous Disposition Long Term Care  [facility])   Who is being Interviewed Caregiver  [facility])   What was the patient's/caregiver's perception as to why they think they needed to return back to the hospital? Other (Comment)  (change in [address] [place] [address])   Did you visit your Primary Care Physician after you left the hospital, before you returned this time? No  (DEREK ARCOS)   Why weren't you able to visit your PCP? Did not have an appointment  (DEREK ARCOS)   Did you see a specialist, such as Cardiac, Pulmonary, Orthopedic Physician, etc. after you left the hospital? No   Who advised the patient to return to the hospital? Other Nurse (Navigator, CHRISSY); Caregiver  (DEREK ARCOS)   Does the patient report anything that got in the way of taking their medications? No   In our efforts to provide the best possible care to you and others like you, can you think of anything that we could have done to help you after you left the hospital the first time, so that you might not have needed to return so soon?  Other (Comment)  (no)     Electronically signed by Laurence Alonso on 10/11/23 at 9:24 AM EDT

## 2023-10-11 NOTE — PROGRESS NOTES
Facility/Department: [address] ONCOLOGY  Speech Language Pathology  DYSPHAGIA BEDSIDE SWALLOW EVALUATION     Patient: Gage Naik   : 1945   MRN: [id number]      Evaluation Date: 10/11/2023   Admitting Diagnosis: Dehydration [E86.0]  Hypokalemia [E87.6]  Altered mental status, unspecified altered mental status type [R41.82]  Pain: Did not state                                                       H&P:   Gage Naik is a 69 y.o. female who presented ECF for failure to thrive. Patient has been having increased dementia and nursing home was changed. Patient has not been drinking or any other new home and concerned that she is high dehydrated thus was sent to the ED. In the ED patient was agitated was given Ativan and Haldol likely patient to sedated. No family in the room   Pt has a past medical history of Alopecia, Anxiety and depression, Cancer ([address]), Chronic kidney disease (CKD) stage G3b/A1, moderately decreased glomerular filtration rate (GFR) between 30-44 mL/min/1.73 square meter and albuminuria creatinine ratio less than 30 mg/g (HCC), CTS (carpal tunnel syndrome), History of rheumatic fever, Hypertension, Hypothyroidism, Meniere disease, Mixed hyperlipidemia, Nausea, Obesity, Reflux esophagitis, Renal impairment, Sleep apnea, and Type II or unspecified type diabetes mellitus without mention of complication, not stated as uncontrolled. Imaging:  Chest X-ray:   No abnormalities noted. 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 Motion artifact mildly degrades the images. Cerebral atrophy. Moderate  chronic small vessel ischemic disease.  ()        History/Prior Level of Function:   Living Status:   ECF  Prior Dysphagia History:   Pt recently seen by SLP for dysphagia on 2023  Reason for referral: SLP evaluation orders received due to CVA protocol , prior hx of dysphagia , and limited PO intake     Dysphagia Impressions/Diagnosis: Oropharyngeal Dysphagia   Pt is a 70 yo female who has been seen recently for cognitive impairments and dysphagia however was on regular/thin with  whole meds in puree upon d/c. Pt has returned d/t increased confusion and poor oral intake since moving to a new ECF. Pt oriented to name, city and state when given f/2 choices. Pt is missing bottom teeth and it is not clear if she has bottom dentures at her ECF. Pt vocal quality throughout evaluation was [facility] with no wet vocal quality or other s/s of penetration/aspiration. Pt able to complete part of the OME d/t increased confusion when given directions with verbal,visual, and tactile cues. Pt did elicit smile, frown,pout, opening wide eyes WFL. P.O. trials on ice chips, thin from cups and with straw, and teaspoon with no s/s of penetration/aspiration. Pt did not initiate any PO trials without cuing. PO trials also with regular, soft, and puree with no s/s of penetration/aspiration. On all PO trials, pt demonstrated anterior mastication with slight delay of initiation of swallow. Pt upgraded to Soft and bite size/Thin with meds crushed in puree with feeing assistance. RN informed of diet upgrade. Pt is able to self feed with verbal/tactile cues to initiate. Poor oral intake, but able to self feed, tolerate regular diet texture/thin liquids with meds whole in puree    Recommended Diet and Intervention 10/11/2023:  Diet Solids Recommendation:  Dysphagia III Soft and bite sized  Liquid Consistency Recommendation: Thin liquids  Recommended form of Meds: Meds in puree           Compensatory Swallowing Strategies:  Upright as possible with all PO intake , Assist Feed , Small bites/sips     SHORT TERM DYSPHAGIA GOALS/PLAN OF CARE: Speech therapy for dysphagia tx 3-5 times per week during acute care stay.     Pt will functionally tolerate

## 2023-10-11 NOTE — PROGRESS NOTES
Patient with critical potassium level of 2.41, attending MD notified by secure message. Waiting for response.

## 2023-10-11 NOTE — PROGRESS NOTES
[facility] Department   Phone: [phone]    Occupational Therapy    [x] Initial Evaluation            [] Daily Treatment Note         [] Discharge Summary      Patient: Kari Lan   : 1945   MRN: [id number]   Date of Service:  10/11/2023    Admitting Diagnosis:  Hypokalemia  Current Admission Summary:    Failure To Thrive       Pt brought in per [address] [facility], pt admitted to their facility on 10/3 for more acute care on their dementia unit, pt had a known UTI and was prescribed Cipro, since arriving pt has refused all meds and oral intact, facility concerned for dehydration. ECF states pt refused labs. Pt is baseline mental status. HISTORY OF PRESENT ILLNESS  Kari Lan is a 69 y.o. female who presents to the ED for evaluation of possible failure to thrive. Patient was recently switched to a different nursing facility due to worsening dementia and inability of other care facility to deal with the severity of her dementia. Patient was also diagnosed with urine infection around that time and was started on Cipro. Since being at the new facility patient has not been eating or drinking very much and they are concerned that she is becoming dehydrated. Patient was refusing to cooperate with any labs or other evaluation there so she was sent here. Patient herself is not able to offer any information and states that herself that she would be of no use because she does not know anything.      Past Medical History:  has a past medical history of Alopecia, Anxiety and depression, Cancer ([address]), Chronic kidney disease (CKD) stage G3b/A1, moderately decreased glomerular filtration rate (GFR) between 30-44 mL/min/1.73 square meter and albuminuria creatinine ratio less than 30 mg/g (HCC), CTS (carpal tunnel syndrome), History of rheumatic fever, Hypertension, Hypothyroidism, Meniere disease, Mixed hyperlipidemia, Nausea, Obesity,

## 2023-10-11 NOTE — PROGRESS NOTES
4 Eyes Skin Assessment     NAME:  Bishop Granados  YOB: 1945  MEDICAL RECORD NUMBER:  [id number]    The patient is being assessed for  Admission    I agree that at least one RN has performed a thorough Head to Toe Skin Assessment on the patient. ALL assessment sites listed below have been assessed. Areas assessed by both nurses:    Head, Face, Ears, Shoulders, Back, Chest, Arms, Elbows, Hands, Sacrum. Buttock, Coccyx, Ischium, Legs. Feet and Heels, and Under Medical Devices         Does the Patient have a Wound? Yes wound(s) were present on assessment.  LDA wound assessment was Initiated and completed by RN       Juancarlos Prevention initiated by RN: No  Wound Care Orders initiated by RN: No    Pressure Injury (Stage 3,4, Unstageable, DTI, NWPT, and Complex wounds) if present, place Wound referral order by RN under : No    New Ostomies, if present place, Ostomy referral order under : No     Nurse 1 eSignature: Electronically signed by Hilda Galarza RN on 10/11/23 at 2:22 AM EDT    **SHARE this note so that the co-signing nurse can place an eSignature**    Nurse 2 eSignature: Electronically signed by Alissa Mcgraw RN on 10/11/23 at 2:27 AM EDT

## 2023-10-11 NOTE — PROGRESS NOTES
Received call from Lab for critical potassium of 2.41. Notified Hospitalist [employer] about critical lab. Waiting for hospitalist to place in new orders.

## 2023-10-12 LAB
ANION GAP SERPL CALCULATED.3IONS-SCNC: 10 MMOL/L (ref 3–16)
BUN SERPL-MCNC: 13 MG/DL (ref 7–20)
CALCIUM SERPL-MCNC: 8.1 MG/DL (ref 8.3–10.6)
CHLORIDE SERPL-SCNC: 107 MMOL/L (ref 99–110)
CO2 SERPL-SCNC: 21 MMOL/L (ref 21–32)
CREAT SERPL-MCNC: 1.1 MG/DL (ref 0.6–1.2)
GFR SERPLBLD CREATININE-BSD FMLA CKD-EPI: 52 ML/MIN/{1.73_M2}
GLUCOSE SERPL-MCNC: 124 MG/DL (ref 70–99)
MAGNESIUM SERPL-MCNC: 1.6 MG/DL (ref 1.8–2.4)
POTASSIUM SERPL-SCNC: 3.3 MMOL/L (ref 3.5–5.1)
SODIUM SERPL-SCNC: 138 MMOL/L (ref 136–145)

## 2023-10-12 PROCEDURE — 80048 BASIC METABOLIC PNL TOTAL CA: CPT

## 2023-10-12 PROCEDURE — 92526 ORAL FUNCTION THERAPY: CPT

## 2023-10-12 PROCEDURE — 6370000000 HC RX 637 (ALT 250 FOR IP): Performed by: INTERNAL MEDICINE

## 2023-10-12 PROCEDURE — 83735 ASSAY OF MAGNESIUM: CPT

## 2023-10-12 PROCEDURE — 36415 COLL VENOUS BLD VENIPUNCTURE: CPT

## 2023-10-12 PROCEDURE — 97530 THERAPEUTIC ACTIVITIES: CPT

## 2023-10-12 PROCEDURE — 1200000000 HC SEMI PRIVATE

## 2023-10-12 PROCEDURE — 6360000002 HC RX W HCPCS: Performed by: INTERNAL MEDICINE

## 2023-10-12 PROCEDURE — 2580000003 HC RX 258: Performed by: INTERNAL MEDICINE

## 2023-10-12 RX ORDER — POTASSIUM CHLORIDE 7.45 MG/ML
10 INJECTION INTRAVENOUS
Status: DISCONTINUED | OUTPATIENT
Start: 2023-10-12 | End: 2023-10-12

## 2023-10-12 RX ORDER — MAGNESIUM SULFATE IN WATER 40 MG/ML
2000 INJECTION, SOLUTION INTRAVENOUS ONCE
Status: DISCONTINUED | OUTPATIENT
Start: 2023-10-12 | End: 2023-10-12

## 2023-10-12 RX ORDER — MAGNESIUM SULFATE IN WATER 40 MG/ML
2000 INJECTION, SOLUTION INTRAVENOUS ONCE
Status: COMPLETED | OUTPATIENT
Start: 2023-10-12 | End: 2023-10-12

## 2023-10-12 RX ORDER — POTASSIUM CHLORIDE 7.45 MG/ML
10 INJECTION INTRAVENOUS
Status: COMPLETED | OUTPATIENT
Start: 2023-10-12 | End: 2023-10-12

## 2023-10-12 RX ADMIN — POTASSIUM CHLORIDE 10 MEQ: 7.46 INJECTION, SOLUTION INTRAVENOUS at 02:14

## 2023-10-12 RX ADMIN — POTASSIUM CHLORIDE 10 MEQ: 7.46 INJECTION, SOLUTION INTRAVENOUS at 14:19

## 2023-10-12 RX ADMIN — ENOXAPARIN SODIUM 30 MG: 100 INJECTION SUBCUTANEOUS at 10:56

## 2023-10-12 RX ADMIN — POTASSIUM CHLORIDE 10 MEQ: 7.46 INJECTION, SOLUTION INTRAVENOUS at 17:52

## 2023-10-12 RX ADMIN — CLONIDINE HYDROCHLORIDE 0.3 MG: 0.1 TABLET ORAL at 21:56

## 2023-10-12 RX ADMIN — QUETIAPINE FUMARATE 50 MG: 25 TABLET ORAL at 21:57

## 2023-10-12 RX ADMIN — Medication 10 ML: at 11:09

## 2023-10-12 RX ADMIN — POTASSIUM CHLORIDE 10 MEQ: 7.46 INJECTION, SOLUTION INTRAVENOUS at 17:51

## 2023-10-12 RX ADMIN — POTASSIUM CHLORIDE 10 MEQ: 7.46 INJECTION, SOLUTION INTRAVENOUS at 13:07

## 2023-10-12 RX ADMIN — LOSARTAN POTASSIUM 50 MG: 25 TABLET, FILM COATED ORAL at 10:53

## 2023-10-12 RX ADMIN — QUETIAPINE FUMARATE 50 MG: 25 TABLET ORAL at 10:55

## 2023-10-12 RX ADMIN — POTASSIUM CHLORIDE 10 MEQ: 7.46 INJECTION, SOLUTION INTRAVENOUS at 11:11

## 2023-10-12 RX ADMIN — MEMANTINE 5 MG: 5 TABLET ORAL at 10:55

## 2023-10-12 RX ADMIN — CLONIDINE HYDROCHLORIDE 0.3 MG: 0.1 TABLET ORAL at 10:55

## 2023-10-12 RX ADMIN — CLONIDINE HYDROCHLORIDE 0.3 MG: 0.1 TABLET ORAL at 17:59

## 2023-10-12 RX ADMIN — Medication 10 ML: at 21:57

## 2023-10-12 RX ADMIN — SERTRALINE HYDROCHLORIDE 25 MG: 25 TABLET ORAL at 10:55

## 2023-10-12 RX ADMIN — MAGNESIUM SULFATE HEPTAHYDRATE 2000 MG: 40 INJECTION, SOLUTION INTRAVENOUS at 11:07

## 2023-10-12 RX ADMIN — ASPIRIN 81 MG: 81 TABLET, CHEWABLE ORAL at 10:58

## 2023-10-12 RX ADMIN — ATORVASTATIN CALCIUM 80 MG: 80 TABLET, FILM COATED ORAL at 21:56

## 2023-10-12 RX ADMIN — QUETIAPINE FUMARATE 12.5 MG: 25 TABLET ORAL at 21:56

## 2023-10-12 RX ADMIN — LEVOTHYROXINE SODIUM 75 MCG: 0.07 TABLET ORAL at 07:00

## 2023-10-12 NOTE — PROGRESS NOTES
Facility/Department: [address] ONCOLOGY  Speech Language Pathology   Dysphagia Treatment Note    Patient: Lakesha Patel   : 1945   MRN: [id number]      Evaluation Date: 10/12/2023      Admitting Dx: Dehydration [E86.0]  Hypokalemia [E87.6]  Altered mental status, unspecified altered mental status type [R41.82]  Treatment Diagnosis: Oropharyngeal Dysphagia   Pain: Did not state                                              Diet and Treatment Recommendations 10/12/2023:  Diet Solids Recommendation:  Dysphagia III Soft and bite sized  Liquid Consistency Recommendation: Thin liquids  Recommended form of Meds: Meds in puree  or Meds crushed as able in puree         Compensatory strategies:   Upright as possible with all PO intake , Assist Feed , Small bites/sips     Assessment of Texture Tolerance:  Diet level prior to treatment: Dysphagia III Soft and bite sized , Thin liquids   Tolerance of Current Diet Level:Per chart, no noted difficulty with current diet level     Impressions: Pt was positioned Upright in bed , awake and alert. Currently on room air. Pt's daughter present and reported she has had improved PO intake since admission. Trials of thin liquids and regular solids  were provided to assess swallow function. Thin liquids via straw revealed suspected premature bolus loss to pharynx with mildly delayed swallow initiation, no overt clinical s/s of aspiration were assessed. Given regular solids, prolonged/reduced mastication was noted with effective oral clearing achieved given liquid wash. Overall, Pt demonstrates increased risk for aspiration due to cognitive state , co morbidities , and prior hx of dysphagia . Based on today's assessment recommend Dysphagia III Soft and bite sized  with Thin liquids , Meds in puree  or Meds crushed as able in puree .      Dysphagia Goals:   Pt will functionally tolerate recommended diet with no overt clinical s/s of aspiration (Ongoing 10/12/23)  Pt will advance

## 2023-10-12 NOTE — CASE COMMUNICATION
Discharge Planning:     (CM) called and LVM for [facility] palliative care for consult.     Electronically signed by Laurence Alonso on 10/12/23 at 3:07 PM EDT

## 2023-10-12 NOTE — PROGRESS NOTES
[address] [facility] Department   Phone: [phone]    Physical Therapy    [] Initial Evaluation            [x] Daily Treatment Note         [] Discharge Summary      Patient: Danita Monterroso   : 1945   MRN: [id number]   Date of Service:  10/12/2023  Admitting Diagnosis: Hypokalemia    Current Admission Summary: has a past medical history of Alopecia, Anxiety and depression, Cancer ([address] [facility]), Chronic kidney disease (CKD) stage G3b/A1, moderately decreased glomerular filtration rate (GFR) between 30-44 mL/min/1.73 square meter and albuminuria creatinine ratio less than 30 mg/g (HCC), CTS (carpal tunnel syndrome), History of rheumatic fever, Hypertension, Hypothyroidism, Meniere disease, Mixed hyperlipidemia, Nausea, Obesity, Reflux esophagitis, Renal impairment, Sleep apnea, and Type II or unspecified type diabetes mellitus without mention of complication, not stated as uncontrolled. Past Medical History:  has a past medical history of Alopecia, Anxiety and depression, Cancer ([address] Chronic kidney disease (CKD) stage G3b/A1, moderately decreased glomerular filtration rate (GFR) between 30-44 mL/min/1.73 square meter and albuminuria creatinine ratio less than 30 mg/g (HCC), CTS (carpal tunnel syndrome), History of rheumatic fever, Hypertension, Hypothyroidism, Meniere disease, Mixed hyperlipidemia, Nausea, Obesity, Reflux esophagitis, Renal impairment, Sleep apnea, and Type II or unspecified type diabetes mellitus without mention of complication, not stated as uncontrolled. Past Surgical History:  has a past surgical history that includes Hysterectomy (Bilateral, ); Mastoid surgery; Tonsillectomy; Appendectomy; Ankle surgery (Right); Upper gastrointestinal endoscopy (2011); Colonoscopy (04/15/2011); and Breast surgery (Right). Discharge Recommendations: Danita Monterroso scored a 13/24 on the AM-PAC short mobility form.  Current research shows that an AM-PAC score

## 2023-10-12 NOTE — CARE COORDINATION
Discharge Planning:     (BETTY) spoke with Samara Alford [phone] [facility]. Patient will not return skilled. No pre-cert required.   Electronically signed by Marbella Foley on 10/12/23 at 3:09 PM EDT

## 2023-10-13 VITALS
BODY MASS INDEX: 27.31 KG/M2 | RESPIRATION RATE: 16 BRPM | DIASTOLIC BLOOD PRESSURE: 66 MMHG | HEIGHT: 64 IN | OXYGEN SATURATION: 96 % | WEIGHT: 160 LBS | SYSTOLIC BLOOD PRESSURE: 174 MMHG | HEART RATE: 64 BPM | TEMPERATURE: 98.5 F

## 2023-10-13 LAB
ANION GAP SERPL CALCULATED.3IONS-SCNC: 10 MMOL/L (ref 3–16)
BUN SERPL-MCNC: 9 MG/DL (ref 7–20)
CALCIUM SERPL-MCNC: 8.4 MG/DL (ref 8.3–10.6)
CHLORIDE SERPL-SCNC: 109 MMOL/L (ref 99–110)
CO2 SERPL-SCNC: 20 MMOL/L (ref 21–32)
CREAT SERPL-MCNC: 1.1 MG/DL (ref 0.6–1.2)
GFR SERPLBLD CREATININE-BSD FMLA CKD-EPI: 52 ML/MIN/{1.73_M2}
GLUCOSE SERPL-MCNC: 102 MG/DL (ref 70–99)
MAGNESIUM SERPL-MCNC: 1.7 MG/DL (ref 1.8–2.4)
PHOSPHATE SERPL-MCNC: 1.9 MG/DL (ref 2.5–4.9)
POTASSIUM SERPL-SCNC: 3.5 MMOL/L (ref 3.5–5.1)
SODIUM SERPL-SCNC: 139 MMOL/L (ref 136–145)

## 2023-10-13 PROCEDURE — 6370000000 HC RX 637 (ALT 250 FOR IP): Performed by: INTERNAL MEDICINE

## 2023-10-13 PROCEDURE — 2500000003 HC RX 250 WO HCPCS: Performed by: INTERNAL MEDICINE

## 2023-10-13 PROCEDURE — 36415 COLL VENOUS BLD VENIPUNCTURE: CPT

## 2023-10-13 PROCEDURE — 80048 BASIC METABOLIC PNL TOTAL CA: CPT

## 2023-10-13 PROCEDURE — 2580000003 HC RX 258: Performed by: INTERNAL MEDICINE

## 2023-10-13 PROCEDURE — 6360000002 HC RX W HCPCS: Performed by: INTERNAL MEDICINE

## 2023-10-13 PROCEDURE — 83735 ASSAY OF MAGNESIUM: CPT

## 2023-10-13 PROCEDURE — 84100 ASSAY OF PHOSPHORUS: CPT

## 2023-10-13 RX ORDER — LOSARTAN POTASSIUM 25 MG/1
50 TABLET ORAL DAILY
Status: DISCONTINUED | OUTPATIENT
Start: 2023-10-13 | End: 2023-10-13 | Stop reason: HOSPADM

## 2023-10-13 RX ORDER — MAGNESIUM SULFATE IN WATER 40 MG/ML
2000 INJECTION, SOLUTION INTRAVENOUS ONCE
Status: COMPLETED | OUTPATIENT
Start: 2023-10-13 | End: 2023-10-13

## 2023-10-13 RX ORDER — LOSARTAN POTASSIUM 100 MG/1
100 TABLET ORAL DAILY
Qty: 30 TABLET | Refills: 3 | Status: SHIPPED | OUTPATIENT
Start: 2023-10-14

## 2023-10-13 RX ORDER — MAGNESIUM SULFATE IN WATER 40 MG/ML
2000 INJECTION, SOLUTION INTRAVENOUS ONCE
Status: DISCONTINUED | OUTPATIENT
Start: 2023-10-13 | End: 2023-10-13

## 2023-10-13 RX ORDER — LOSARTAN POTASSIUM 100 MG/1
100 TABLET ORAL DAILY
Status: DISCONTINUED | OUTPATIENT
Start: 2023-10-14 | End: 2023-10-13 | Stop reason: HOSPADM

## 2023-10-13 RX ADMIN — POTASSIUM PHOSPHATE, MONOBASIC POTASSIUM PHOSPHATE, DIBASIC 14.52 MMOL: 224; 236 INJECTION, SOLUTION, CONCENTRATE INTRAVENOUS at 09:18

## 2023-10-13 RX ADMIN — SERTRALINE HYDROCHLORIDE 25 MG: 25 TABLET ORAL at 09:20

## 2023-10-13 RX ADMIN — CLONIDINE HYDROCHLORIDE 0.3 MG: 0.1 TABLET ORAL at 09:20

## 2023-10-13 RX ADMIN — ACETAMINOPHEN 650 MG: 325 TABLET ORAL at 09:30

## 2023-10-13 RX ADMIN — LOSARTAN POTASSIUM 50 MG: 25 TABLET, FILM COATED ORAL at 09:20

## 2023-10-13 RX ADMIN — MAGNESIUM SULFATE HEPTAHYDRATE 2000 MG: 40 INJECTION, SOLUTION INTRAVENOUS at 09:19

## 2023-10-13 RX ADMIN — ASPIRIN 81 MG: 81 TABLET, CHEWABLE ORAL at 09:21

## 2023-10-13 RX ADMIN — LEVOTHYROXINE SODIUM 75 MCG: 0.07 TABLET ORAL at 09:25

## 2023-10-13 RX ADMIN — MEMANTINE 5 MG: 5 TABLET ORAL at 09:20

## 2023-10-13 RX ADMIN — ENOXAPARIN SODIUM 30 MG: 100 INJECTION SUBCUTANEOUS at 09:20

## 2023-10-13 RX ADMIN — QUETIAPINE FUMARATE 50 MG: 25 TABLET ORAL at 09:20

## 2023-10-13 RX ADMIN — SODIUM CHLORIDE, PRESERVATIVE FREE 10 ML: 5 INJECTION INTRAVENOUS at 09:25

## 2023-10-13 ASSESSMENT — PAIN DESCRIPTION - LOCATION: LOCATION: HEAD

## 2023-10-13 ASSESSMENT — PAIN SCALES - GENERAL: PAINLEVEL_OUTOF10: 0

## 2023-10-13 ASSESSMENT — PAIN DESCRIPTION - DESCRIPTORS: DESCRIPTORS: THROBBING

## 2023-10-13 NOTE — CARE COORDINATION
10/13/23 1157   IMM Letter   IMM Letter given to Patient/Family/Significant other/Guardian/POA/by: Reviewed via phone with daughter-Chanel.  [place] [facility]   IMM Letter date given: 10/13/23   IMM Letter time given: 1100     Electronically signed by Adeola Solis on 10/13/23 at 11:58 AM EDT

## 2023-10-13 NOTE — PROGRESS NOTES
Speech Language Pathology  Attempt    Yumiko Dunaway  1945    Attempted to see pt for dysphagia therapy this date. Code violet called and not currently appropriate for treatment session. Will attempt to follow-up as schedule allows.      Thank you,     Adore Resendez M.A., [address] [facility]  Speech-Language Pathologist

## 2023-10-13 NOTE — PROGRESS NOTES
Physician Progress Note      Deshaun Mccoy  CSN #:                  [id number]  :                       1945  ADMIT DATE:       10/10/2023 3:04 PM  [facility] DATE:  RESPONDING  PROVIDER #:        Kyaw Moon MD          QUERY TEXT:    Patient admitted with dehydration and hypokalemia, noted to have SCr of 1.8 on   admission and a SCr of 1.1 today. If possible, please document in progress   notes and discharge summary if you are evaluating and/or treating any of the   following: The medical record reflects the following:  Risk Factors: 79yo with dehydration and hypokalemia  Clinical Indicators: SCr 1.8, 1.3, 1.1, 1.1  Treatment: IVF, serial SCr, supportive care  Options provided:  -- WESLEY  -- Elevated SCr not clinically significant  -- Other - I will add my own diagnosis  -- Disagree - Not applicable / Not valid  -- Disagree - Clinically unable to determine / Unknown  -- Refer to Clinical Documentation Reviewer    PROVIDER RESPONSE TEXT:    This patient has WESLEY. Query created by:  Obed Richter on 10/13/2023 10:34 AM      Electronically signed by:  Kyaw Moon MD 10/13/2023 11:14 AM

## 2023-10-13 NOTE — CARE COORDINATION
Case Management -  Discharge Note      Patient Name: Aurora Martins                   YOB: 1945            Readmission Risk (Low < 19, Mod (19-27), High > 27): Readmission Risk Score: 19.5    Current PCP: JENN Salguero CNP    ([facility]) Important Message from Medicare:    Date: 10/13/23    PT AM-PAC: 15 /24  OT AM-PAC: 9 /24    Discharge Plan:  Patient discharge to:    [facility] [address] [facility],[address] 3 S Floors  [facility], [facility]  Phone: [phone]  Fax: [phone]     SW faxed [address] to [phone]    DAMARIS Goins to call report to [address] [place], [place] [address] transport with [facility], 11:15 am  time     Family advised of discharge and in agreement. SW intervention complete. Financial    Payor: Keith López / Plan: Merlinda Hemp ESSENTIAL/PLUS / Product Type: *No Product type* /     Pharmacy:  Potential assistance Purchasing Medications: No  Meds-to-Beds request:        [address] [address], [address] [facility],[address] [facility] [phone] - F [phone]  [address] [place] [address]  Phone: [phone] Fax: [phone]    [facility] - [address] [address] [phone] - F [phone]  [address] [facility]  [address] [address]  Phone: [phone] Fax: [address] [facility] [address], [facility] [address] [facility] [address] [phone] [address] [facility] [address] [facility],[address] [address]  Phone: [phone] Fax: [phone]    [facility] [address] [facility], [address] [facility] [address] [phone] Lyly Garcia [phone]  [address] [address]  Phone: [phone] Fax: [phone]      Notes: Additional Case Management Notes: Patient will discharge back to [facility] today. CM notified the patients daughter. IMM reviewed via phne. Patient has orders for a Palliative consult ordered but was not completed prior to discharge.  Salvador notified Sharda-admissions at [facility] to make aware. Goldy Arellano will reach out to the facility SSD. No further needs at this time.     Electronically signed by Curtis Buchanan on 10/13/23 at 11:57 AM EDT

## 2023-10-13 NOTE — PROGRESS NOTES
Pt noted to be walking down the mckeon entering another pt room, she pulled out IV as we attempted to re direct her back to her room. Pt then become combative, refusing to return to her room, swinging at staff, bit PCT. Sherron Mann called. After several attempts, pt calmed down and staff was able to put her back in bed. Attending made aware of pt condition.

## 2023-10-13 NOTE — PLAN OF CARE
Problem: Safety - Adult  Goal: Free from fall injury  Outcome: Not Progressing     Problem: Skin/Tissue Integrity  Goal: Absence of new skin breakdown  Description: 1. Monitor for areas of redness and/or skin breakdown  2. Assess vascular access sites hourly  3. Every 4-6 hours minimum:  Change oxygen saturation probe site  4. Every 4-6 hours:  If on nasal continuous positive airway pressure, respiratory therapy assess nares and determine need for appliance change or resting period.   Outcome: Progressing     Problem: Chronic Conditions and Co-morbidities  Goal: Patient's chronic conditions and co-morbidity symptoms are monitored and maintained or improved  Outcome: Progressing     Problem: Pain  Goal: Verbalizes/displays adequate comfort level or baseline comfort level  Outcome: Adequate for Discharge     Problem: Safety - Adult  Goal: Free from fall injury  Outcome: Not Progressing

## 2023-10-13 NOTE — DISCHARGE INSTR - COC
Continuity of Care Form    Patient Name: Renetta Ba   :  1945  MRN:  [id number]    Admit date:  10/10/2023  Discharge date:  ***    Code Status Order: Full Code   Advance Directives:     Admitting Physician:  Willie Montague MD  PCP: Konstantin Escoto, JENN - CNP    Discharging Nurse: [facility] Unit/Room#: 8YX-7184/5813-53  Discharging Unit Phone Number: ***    Emergency Contact:   Extended Emergency Contact Information  Primary Emergency Contact: [facility] Phone: [phone]  Mobile Phone: [phone]  Relation: Child  Secondary Emergency Contact: Hdez Anthonyton Phone: [phone]  Mobile Phone: [phone]  Relation: Child  Preferred language: English   needed?  No    Past Surgical History:  Past Surgical History:   Procedure Laterality Date    ANKLE SURGERY Right     repair of fracture     APPENDECTOMY      BREAST SURGERY Right     benign    COLONOSCOPY  04/15/2011    HYSTERECTOMY (CERVIX STATUS UNKNOWN) Bilateral 1975    SHARYN for ovarian CA    MASTOID SURGERY      at age 26    TONSILLECTOMY      UPPER GASTROINTESTINAL ENDOSCOPY  2011    with dilitation and biopsy       Immunization History:   Immunization History   Administered Date(s) Administered    COVID-19, MODERNA BLUE border, Primary or Immunocompromised, (age 12y+), IM, 100 mcg/0.5mL 2021, 2021, 2021    Influenza 10/12/2012, 10/21/2013    Influenza Virus Vaccine 10/14/2014    Influenza Whole 10/12/2010    Influenza, FLUAD, (age 72 y+), Adjuvanted, 0.5mL 2020    Influenza, High Dose (Fluzone 65 yrs and older) 2015, 09/15/2016, 10/31/2017, 10/21/2018, 2019, 2021    Pneumococcal, PCV-13, PREVNAR 13, (age 6w+), IM, 0.5mL 2017    Pneumococcal, PPSV23, PNEUMOVAX 23, (age 2y+), SC/IM, 0.5mL 2011, 2022    TD 5LF, Jena Santiago, (age 7y+), IM, 0.5mL 10/24/2022    Td, unspecified formulation 2017    Zoster Live (Zostavax) 2015 MANAGEMENT/SOCIAL WORK SECTION    Inpatient Status Date: 10/10/2023    Readmission Risk Assessment Score: 20%  Readmission Risk              Risk of Unplanned Readmission:  21           Discharging to Facility/ [facility] of [address] [place] [address], [facility]  Phone: [phone]  Fax: [phone]      / signature: Electronically signed by LUDMILA Kelly on 10/13/23 at 11:27 AM EDT    PHYSICIAN SECTION    Prognosis: Fair    Condition at Discharge: Stable    Rehab Potential (if transferring to Rehab): Fair    Recommended Labs or Other Treatments After Discharge:     Physician Certification: I certify the above information and transfer of Kaitlin Byers  is necessary for the continuing treatment of the diagnosis listed and that she requires [address] for greater 30 days.      Update Admission H&P: No change in H&P    PHYSICIAN SIGNATURE:  Electronically signed by Isa Rodriguez MD on 10/13/23 at 11:19 AM EDT

## 2023-10-13 NOTE — PROGRESS NOTES
Assessment complete. VSS. Patient resting in bed. Respirations even and easy. Call light in reach. Fall precautions in place. Pt took her medications today with pudding, but ended up spitting them all out before swallowing. No needs expressed at this time.

## 2023-10-13 NOTE — PROGRESS NOTES
RN discharge summary from  [place] to a facility. This patient has had a discharge order placed. They are being discharged to [place], [place] and being picked up by transport. Discharge paperwork has been printed and faxed by ALDO HERNÁNDEZ completed by this RN. no further needs at this time. IV has been removed with no complications. Telemetry has been removed. Pt has all belongings present. Report has been called and VM left.

## 2023-10-18 ENCOUNTER — TELEPHONE (OUTPATIENT)
Dept: FAMILY MEDICINE CLINIC | Age: 78
End: 2023-10-18

## 2023-11-25 ENCOUNTER — APPOINTMENT (OUTPATIENT)
Dept: CT IMAGING | Age: 78
End: 2023-11-25
Payer: MEDICARE

## 2023-11-25 ENCOUNTER — APPOINTMENT (OUTPATIENT)
Dept: GENERAL RADIOLOGY | Age: 78
End: 2023-11-25
Payer: MEDICARE

## 2023-11-25 ENCOUNTER — HOSPITAL ENCOUNTER (EMERGENCY)
Age: 78
Discharge: HOME | End: 2023-11-25
Attending: EMERGENCY MEDICINE
Payer: MEDICARE

## 2023-11-25 VITALS
HEART RATE: 60 BPM | DIASTOLIC BLOOD PRESSURE: 62 MMHG | BODY MASS INDEX: 23.22 KG/M2 | WEIGHT: 136 LBS | SYSTOLIC BLOOD PRESSURE: 128 MMHG | OXYGEN SATURATION: 98 % | HEIGHT: 64 IN | TEMPERATURE: 96.9 F | RESPIRATION RATE: 17 BRPM

## 2023-11-25 DIAGNOSIS — N39.0 ACUTE LOWER UTI: ICD-10-CM
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
## 2023-11-25 DIAGNOSIS — S09.90XA CLOSED HEAD INJURY, INITIAL ENCOUNTER: ICD-10-CM

## 2023-11-25 DIAGNOSIS — S02.40DA CLOSED FRACTURE OF LEFT SIDE OF MAXILLA, INITIAL ENCOUNTER (HCC): Primary | ICD-10-CM

## 2023-11-25 LAB
BACTERIA URNS QL MICRO: ABNORMAL /HPF
BILIRUB UR QL STRIP.AUTO: NEGATIVE
CLARITY UR: CLEAR
COLOR UR: ABNORMAL
EPI CELLS #/AREA URNS AUTO: 0 /HPF (ref 0–5)
GLUCOSE UR STRIP.AUTO-MCNC: NEGATIVE MG/DL
HGB UR QL STRIP.AUTO: NEGATIVE
HYALINE CASTS #/AREA URNS AUTO: 1 /LPF (ref 0–8)
KETONES UR STRIP.AUTO-MCNC: ABNORMAL MG/DL
LEUKOCYTE ESTERASE UR QL STRIP.AUTO: ABNORMAL
NITRITE UR QL STRIP.AUTO: POSITIVE
PH UR STRIP.AUTO: 5.5 [PH] (ref 5–8)
PROT UR STRIP.AUTO-MCNC: 30 MG/DL
RBC CLUMPS #/AREA URNS AUTO: 1 /HPF (ref 0–4)
SP GR UR STRIP.AUTO: 1.01 (ref 1–1.03)
UA COMPLETE W REFLEX CULTURE PNL UR: ABNORMAL
UA DIPSTICK W REFLEX MICRO PNL UR: YES
URN SPEC COLLECT METH UR: ABNORMAL
UROBILINOGEN UR STRIP-ACNC: 1 E.U./DL
WBC #/AREA URNS AUTO: 2 /HPF (ref 0–5)

## 2023-11-25 PROCEDURE — 99284 EMERGENCY DEPT VISIT MOD MDM: CPT

## 2023-11-25 PROCEDURE — 70450 CT HEAD/BRAIN W/O DYE: CPT

## 2023-11-25 PROCEDURE — 70486 CT MAXILLOFACIAL W/O DYE: CPT

## 2023-11-25 PROCEDURE — 71045 X-RAY EXAM CHEST 1 VIEW: CPT

## 2023-11-25 PROCEDURE — 72125 CT NECK SPINE W/O DYE: CPT

## 2023-11-25 PROCEDURE — 6370000000 HC RX 637 (ALT 250 FOR IP): Performed by: EMERGENCY MEDICINE

## 2023-11-25 PROCEDURE — 72170 X-RAY EXAM OF PELVIS: CPT

## 2023-11-25 PROCEDURE — 81001 URINALYSIS AUTO W/SCOPE: CPT

## 2023-11-25 RX ORDER — LORAZEPAM 2 MG/ML
CONCENTRATE ORAL
COMMUNITY
Start: 2023-11-05

## 2023-11-25 RX ORDER — MORPHINE SULFATE 20 MG/ML
SOLUTION ORAL
COMMUNITY
Start: 2023-11-05

## 2023-11-25 RX ORDER — CEPHALEXIN 250 MG/1
500 CAPSULE ORAL ONCE
Status: COMPLETED | OUTPATIENT
Start: 2023-11-25 | End: 2023-11-25

## 2023-11-25 RX ORDER — LOSARTAN POTASSIUM 50 MG/1
TABLET ORAL
COMMUNITY
Start: 2023-11-22

## 2023-11-25 RX ORDER — CEPHALEXIN 500 MG/1
500 CAPSULE ORAL 2 TIMES DAILY
Qty: 10 CAPSULE | Refills: 0 | Status: SHIPPED | OUTPATIENT
Start: 2023-11-25 | End: 2023-11-30

## 2023-11-25 RX ADMIN — CEPHALEXIN 500 MG: 250 CAPSULE ORAL at 21:22

## 2023-11-25 ASSESSMENT — PAIN - FUNCTIONAL ASSESSMENT: PAIN_FUNCTIONAL_ASSESSMENT: NONE - DENIES PAIN

## 2023-11-25 ASSESSMENT — PAIN DESCRIPTION - PAIN TYPE: TYPE: ACUTE PAIN

## 2023-11-25 NOTE — ED PROVIDER NOTES
Comments: Multiple abrasions of varying ages to extremity   Neurological:      General: No focal deficit present. Mental Status: She is alert and oriented to person, place, and time. Psychiatric:         Mood and Affect: Mood normal.         Behavior: Behavior normal.        BRIEF DIFFERENTIAL DIAGNOSIS  1. Fracture  2. Contusion  3. Intracranial injury    INDEPENDENT EKG INTERPRETATION:  None    LABS  I have personally reviewed all labs for this visit. Results for orders placed or performed during the hospital encounter of 11/25/23   Urinalysis with Reflex to Culture    Specimen: Urine   Result Value Ref Range    Color, UA DARK YELLOW (A) Straw/Yellow    Clarity, UA Clear Clear    Glucose, Ur Negative Negative mg/dL    Bilirubin Urine Negative Negative    Ketones, Urine TRACE (A) Negative mg/dL    Specific Gravity, UA 1.015 1.005 - 1.030    Blood, Urine Negative Negative    pH, UA 5.5 5.0 - 8.0    Protein, UA 30 (A) Negative mg/dL    Urobilinogen, Urine 1.0 <2.0 E.U./dL    Nitrite, Urine POSITIVE (A) Negative    Leukocyte Esterase, Urine TRACE (A) Negative    Microscopic Examination YES     Urine Type NotGiven        RADIOLOGY  Any applicable radiology studies including x-ray, CT, MRI, and/or ultrasound, were reviewed independently by me in addition to the radiologist.  I reviewed all radiology images and reports as well from this evaluation. CT C-Spine W/O Contrast   Preliminary Result   No acute abnormality of the cervical spine. Mild, likely chronic   anterolisthesis of C4 over C5. Moderate multilevel degenerative disc disease. Mild superior endplate compression at C7, age indeterminate, but likely   chronic. CT MAXILLOFACIAL WO CONTRAST   Final Result   Acute, nondisplaced fracture right and left anterior maxillary spine. Nasal bridge swelling in keeping with contusion. CT Head W/O Contrast   Final Result   1. No acute intracranial bleed.    2. Small left parietal scalp administration in time range)        Hospitalization considered?:  Considered however nonoperative fracture and urine infection that can be treated outpatient    Discharge Medications:  New Prescriptions    CEPHALEXIN (KEFLEX) 500 MG CAPSULE    Take 1 capsule by mouth 2 times daily for 5 days       PROCEDURES  None    CLINICAL IMPRESSION  1. Closed fracture of left side of maxilla, initial encounter ([address])    2. Closed head injury, initial encounter    3. Fall, initial encounter    4. Acute lower UTI        DISPOSITION  Kimberly Doshi was discharged in stable condition. CRITICAL CARE TIME  None        I have personally seen and examined this patient. I have fully participated in the care of this patient and I have reviewed and agree with all pertinent clinical information including history, physical exam, and plan. I have also reviewed and agree with the medications, allergies and past medical history section for this patient. Electronically signed by: Emerald Lombardi., AFRICA WANG Georganne Francisco, 11/25/2023  9:18 PM       Abdulaziz MOTTA DO  11/25/23 2767

## 2023-12-01 ENCOUNTER — TELEPHONE (OUTPATIENT)
Dept: INTERNAL MEDICINE CLINIC | Age: 78
End: 2023-12-01

## 2023-12-01 NOTE — TELEPHONE ENCOUNTER
Patient's daughter called today was advised on if her Mom is going stay under [facility]. Daughter reports Mom continue fall at the assisted [address] and working with [facility]; her mom will be moving into [address] and won't be continuing with Jaydon Cordon as a provider. No further question asked by patient's daughter at this time. ---------------------------------------------------------------------    JENN Monique - SONNY Carreno LPN  Please reach out to Katia's daughter to see if she's continuing to see me or another provider. [facility] and she had a new patient appointment with [address] [facility] on 10/18/23 which she missed. She's had multiple ER visits since we saw her last. If she chooses to stay with me, we need to bring her in for a 40 minute visit.     Thanks,  Jaydon Cordon

## 2023-12-07 ENCOUNTER — APPOINTMENT (OUTPATIENT)
Dept: CT IMAGING | Age: 78
End: 2023-12-07
Payer: MEDICARE

## 2023-12-07 ENCOUNTER — HOSPITAL ENCOUNTER (EMERGENCY)
Age: 78
Discharge: HOME | End: 2023-12-07
Attending: EMERGENCY MEDICINE
Payer: MEDICARE

## 2023-12-07 VITALS
RESPIRATION RATE: 16 BRPM | HEART RATE: 72 BPM | DIASTOLIC BLOOD PRESSURE: 77 MMHG | TEMPERATURE: 98 F | OXYGEN SATURATION: 95 % | SYSTOLIC BLOOD PRESSURE: 121 MMHG

## 2023-12-07 DIAGNOSIS — S09.90XA CLOSED HEAD INJURY, INITIAL ENCOUNTER: ICD-10-CM

## 2023-12-07 DIAGNOSIS — S01.01XA LACERATION OF SCALP, INITIAL ENCOUNTER: Primary | ICD-10-CM

## 2023-12-07 PROCEDURE — 70450 CT HEAD/BRAIN W/O DYE: CPT

## 2023-12-07 PROCEDURE — 99284 EMERGENCY DEPT VISIT MOD MDM: CPT

## 2023-12-07 RX ORDER — LORAZEPAM 2 MG/ML
0.5 INJECTION INTRAMUSCULAR SEE ADMIN INSTRUCTIONS
COMMUNITY

## 2023-12-07 RX ORDER — LOSARTAN POTASSIUM 50 MG/1
50 TABLET ORAL DAILY
COMMUNITY

## 2023-12-07 RX ORDER — SERTRALINE HYDROCHLORIDE 20 MG/ML
6 SOLUTION ORAL DAILY
COMMUNITY

## 2023-12-07 ASSESSMENT — PAIN - FUNCTIONAL ASSESSMENT: PAIN_FUNCTIONAL_ASSESSMENT: NONE - DENIES PAIN

## 2023-12-07 NOTE — PROGRESS NOTES
Pharmacy Home Medication Reconciliation Note    A medication reconciliation has been completed for Danita Monterroso 1945    Pharmacy: [address] [place] [address]., Effie Blanc  Information provided by: facility    The patient's home medication list is as follows: No current facility-administered medications on file prior to encounter. Current Outpatient Medications on File Prior to Encounter   Medication Sig Dispense Refill    LORazepam (ATIVAN) 2 MG/ML injection Inject 0.25 mLs into the muscle See Admin Instructions. Inject 0.5 mg intramuscularly twice daily as needed for anxiety. losartan (COZAAR) 50 MG tablet Take 1 tablet by mouth daily      vitamin D (CHOLECALCIFEROL) 25 MCG (1000 UT) TABS tablet Take 2 tablets by mouth daily      sertraline (ZOLOFT) 20 MG/ML concentrated solution Take 6 mLs by mouth daily Give 6 mL by mouth once daily for depression.       Morphine Sulfate (MORPHINE 20MG/ML) SOLN concentrated solution  (Patient not taking: Reported on 12/7/2023)      [DISCONTINUED] LORazepam (ATIVAN) 2 MG/ML concentrated solution       [DISCONTINUED] losartan (COZAAR) 50 MG tablet       losartan (COZAAR) 100 MG tablet Take 1 tablet by mouth daily (Patient not taking: Reported on 12/7/2023) 30 tablet 3    melatonin 5 MG TABS tablet Take 1 tablet by mouth nightly      QUEtiapine (SEROQUEL) 50 MG tablet Take 1 tablet by mouth 2 times daily      hydrOXYzine pamoate (VISTARIL) 25 MG capsule Take 1 capsule by mouth every 8 hours as needed for Anxiety      atorvastatin (LIPITOR) 80 MG tablet Take 1 tablet by mouth nightly 30 tablet 3    QUEtiapine (SEROQUEL) 25 MG tablet Take 0.5 tablets by mouth nightly (Patient taking differently: Take 1 tablet by mouth nightly) 60 tablet 3    levothyroxine (SYNTHROID) 75 MCG tablet Take 1 tablet by mouth Daily      memantine (NAMENDA) 5 MG tablet TAKE 1 TABLET BY MOUTH TWICE DAILY (Patient taking differently: Take 1 tablet by mouth daily

## 2023-12-07 NOTE — ED PROVIDER NOTES
EMERGENCY MEDICINE ATTENDING NOTE  Teagan Padgett., DO, FACEP, [address]  [place] COMPLAINT  Chief Complaint   Patient presents with    Fall     Pt in by ff ems from memory unit at [facility], unwitnessed fall, pt has lac to back of head and multiple old bruises and contusions to head. Alert to self only        HISTORY OF PRESENT ILLNESS  Jenny Gold is a 69 y.o. female who presents to the ED for evaluation of a fall. Patient lives at a nursing facility in the dementia unit. Apparently had a unwitnessed fall with small laceration to the back of the head. Unknown if she lost consciousness. Unable to obtain any other history due to dementia. Nursing/triage notes reviewed. No other complaints, modifying factors or associated symptoms.      REVIEW OF SYSTEMS:  Unable to fully assess secondary to dementia    PAST MEDICAL HISTORY  Past Medical History:   Diagnosis Date    Alopecia     Anxiety and depression     Cancer ([address])     uterine, several years ago; follows w/ gyn yearly    Chronic kidney disease (CKD) stage G3b/A1, moderately decreased glomerular filtration rate (GFR) between 30-44 mL/min/1.73 square meter and albuminuria creatinine ratio less than 30 mg/g ([facility]) 07/19/2021    CTS (carpal tunnel syndrome)      History of rheumatic fever     Hypertension     Hypothyroidism     Meniere disease     bilateral ears    Mixed hyperlipidemia 07/19/2021    Nausea     associated with Meniere's dz    Obesity     Reflux esophagitis     Renal impairment     Sleep apnea     no longer needs cpap (per pt)    Type II or unspecified type diabetes mellitus without mention of complication, not stated as uncontrolled        SURGICAL HISTORY  Past Surgical History:   Procedure Laterality Date    ANKLE SURGERY Right     repair of fracture     APPENDECTOMY      BREAST SURGERY Right     benign    COLONOSCOPY  04/15/2011    HYSTERECTOMY (CERVIX STATUS UNKNOWN) Bilateral 1975    SHARYN for ovarian CA    MASTOID

## 2023-12-08 ENCOUNTER — CARE COORDINATION (OUTPATIENT)
Dept: CARE COORDINATION | Age: 78
End: 2023-12-08